# Patient Record
Sex: FEMALE | Race: WHITE
[De-identification: names, ages, dates, MRNs, and addresses within clinical notes are randomized per-mention and may not be internally consistent; named-entity substitution may affect disease eponyms.]

---

## 2017-06-30 ENCOUNTER — HOSPITAL ENCOUNTER (OUTPATIENT)
Dept: HOSPITAL 23 - CAMB | Age: 62
Discharge: HOME | End: 2017-06-30
Attending: ORTHOPAEDIC SURGERY
Payer: OTHER GOVERNMENT

## 2017-06-30 DIAGNOSIS — M24.662: ICD-10-CM

## 2017-06-30 DIAGNOSIS — R91.8: ICD-10-CM

## 2017-06-30 DIAGNOSIS — Z01.818: Primary | ICD-10-CM

## 2017-06-30 LAB
BLOOD UREA NITROGEN: 13 MG/DL (ref 9–23)
BUN/CREATININE RATIO: 43.33
CALCIUM SERUM: 9.1 MG/DL (ref 8.4–10.2)
CK MB SERPL-RTO: 14.5 % (ref 11–15.5)
CK MB SERPL-RTO: 34.1 G/DL (ref 30–36)
CREATININE SERUM: 0.3 MG/DL (ref 0.6–1.4)
GENTAMICIN PEAK SERPL-MCNC: YES MG/L
GLOM FILT RATE ESTIMATED: 121.1 ML/MIN (ref 60–?)
GLUCOSE FASTING: 259 MG/DL (ref 70–110)
HEMATOCRIT: 41.4 % (ref 35–45)
HEMOGLOBIN: 14.1 GM/DL (ref 12–16)
KETONES UR QL: 105 MMOL/L (ref 100–111)
KETONES UR QL: 24 MMOL/L (ref 22–31)
MEAN CELL VOLUME: 89.8 FL (ref 83–96)
MEAN CORPUSCULAR HEMOGLOBIN: 30.6 PG (ref 28–34)
MEAN PLATELET VOLUME: 9 FL (ref 6.5–11.5)
PLATELET COUNT: 48 X10E3 (ref 140–420)
POTASSIUM: 4.2 MMOL/L (ref 3.5–5.1)
RED BLOOD COUNT: 4.6 X10E (ref 3.9–5.3)
SODIUM: 136 MMOL/L (ref 135–145)
U HYALINE CASTS AUWI: (no result) /[LPF]
URBCS1 AUWI: (no result) /[HPF] (ref 0–2)
URINE APPEARANCE: (no result)
URINE BACTERIA AUWI: (no result)
URINE BILIRUBIN: (no result)
URINE BLOOD: (no result)
URINE COLOR: (no result)
URINE GLUCOSE: >1000 MG/DL
URINE KETONE: (no result)
URINE LEUKOCYTE ESTERASE: (no result)
URINE NITRATE: (no result)
URINE PH: 5.5 (ref 5–8)
URINE PROTEIN: (no result)
URINE SOURCE: (no result)
URINE SPECIFIC GRAVITY: 1.03 (ref 1–1.03)
URINE SQUAMOUS EPITHELIAL CELL: (no result) /[HPF]
URINE UROBILINOGEN: 4 MG/DL
URINE YEAST: PRESENT
WHITE BLOOD COUNT: 2.8 X10E3 (ref 4–10.5)

## 2017-07-10 ENCOUNTER — HOSPITAL ENCOUNTER (OUTPATIENT)
Dept: HOSPITAL 23 - CSUR | Age: 62
Discharge: HOME | End: 2017-07-10
Attending: ORTHOPAEDIC SURGERY
Payer: OTHER GOVERNMENT

## 2017-07-10 DIAGNOSIS — Z86.73: ICD-10-CM

## 2017-07-10 DIAGNOSIS — I50.9: ICD-10-CM

## 2017-07-10 DIAGNOSIS — Z98.41: ICD-10-CM

## 2017-07-10 DIAGNOSIS — Z79.899: ICD-10-CM

## 2017-07-10 DIAGNOSIS — M24.662: Primary | ICD-10-CM

## 2017-07-10 DIAGNOSIS — E78.5: ICD-10-CM

## 2017-07-10 DIAGNOSIS — Z90.49: ICD-10-CM

## 2017-07-10 DIAGNOSIS — I11.0: ICD-10-CM

## 2017-07-10 DIAGNOSIS — Z87.891: ICD-10-CM

## 2017-07-10 DIAGNOSIS — M17.0: ICD-10-CM

## 2017-07-10 DIAGNOSIS — Z90.710: ICD-10-CM

## 2017-07-10 DIAGNOSIS — Z98.890: ICD-10-CM

## 2017-07-10 DIAGNOSIS — Z79.4: ICD-10-CM

## 2017-07-10 DIAGNOSIS — Z89.522: ICD-10-CM

## 2017-07-10 DIAGNOSIS — Z98.42: ICD-10-CM

## 2017-07-10 DIAGNOSIS — E66.9: ICD-10-CM

## 2017-07-10 DIAGNOSIS — J44.9: ICD-10-CM

## 2017-07-10 DIAGNOSIS — Z90.721: ICD-10-CM

## 2017-07-10 DIAGNOSIS — Z79.1: ICD-10-CM

## 2017-07-10 DIAGNOSIS — E11.9: ICD-10-CM

## 2017-07-10 LAB
GENTAMICIN PEAK SERPL-MCNC: YES MG/L
U HYALINE CASTS AUWI: (no result) /[LPF]
URINE APPEARANCE: (no result)
URINE BACTERIA AUWI: (no result)
URINE BILIRUBIN: (no result)
URINE BLOOD: (no result)
URINE COLOR: (no result)
URINE GLUCOSE: 500 MG/DL
URINE KETONE: (no result)
URINE LEUKOCYTE ESTERASE: (no result)
URINE NITRATE: (no result)
URINE PH: 5.5
URINE PROTEIN: (no result)
URINE SOURCE: (no result)
URINE SPECIFIC GRAVITY: 1.02
URINE SQUAMOUS EPITHELIAL CELL: (no result) /[HPF]
URINE UROBILINOGEN: 4 MG/DL
UWBCS1 AUWI: (no result)

## 2017-07-10 PROCEDURE — P9035 PLATELET PHERES LEUKOREDUCED: HCPCS

## 2018-01-17 ENCOUNTER — OFFICE VISIT CONVERTED (OUTPATIENT)
Dept: ORTHOPEDIC SURGERY | Facility: CLINIC | Age: 63
End: 2018-01-17
Attending: PHYSICIAN ASSISTANT

## 2018-01-23 ENCOUNTER — OFFICE VISIT CONVERTED (OUTPATIENT)
Dept: ORTHOPEDIC SURGERY | Facility: CLINIC | Age: 63
End: 2018-01-23
Attending: ORTHOPAEDIC SURGERY

## 2018-02-05 ENCOUNTER — OFFICE VISIT CONVERTED (OUTPATIENT)
Dept: ORTHOPEDIC SURGERY | Facility: CLINIC | Age: 63
End: 2018-02-05
Attending: ORTHOPAEDIC SURGERY

## 2018-02-09 ENCOUNTER — OFFICE VISIT CONVERTED (OUTPATIENT)
Dept: ORTHOPEDIC SURGERY | Facility: CLINIC | Age: 63
End: 2018-02-09
Attending: ORTHOPAEDIC SURGERY

## 2018-02-09 ENCOUNTER — OFFICE VISIT CONVERTED (OUTPATIENT)
Dept: FAMILY MEDICINE CLINIC | Facility: CLINIC | Age: 63
End: 2018-02-09
Attending: FAMILY MEDICINE

## 2018-02-15 ENCOUNTER — OFFICE VISIT CONVERTED (OUTPATIENT)
Dept: FAMILY MEDICINE CLINIC | Facility: CLINIC | Age: 63
End: 2018-02-15
Attending: FAMILY MEDICINE

## 2018-02-15 ENCOUNTER — CONVERSION ENCOUNTER (OUTPATIENT)
Dept: FAMILY MEDICINE CLINIC | Facility: CLINIC | Age: 63
End: 2018-02-15

## 2018-03-12 ENCOUNTER — OFFICE VISIT CONVERTED (OUTPATIENT)
Dept: ORTHOPEDIC SURGERY | Facility: CLINIC | Age: 63
End: 2018-03-12
Attending: ORTHOPAEDIC SURGERY

## 2018-03-26 ENCOUNTER — OFFICE VISIT CONVERTED (OUTPATIENT)
Dept: GASTROENTEROLOGY | Facility: CLINIC | Age: 63
End: 2018-03-26
Attending: INTERNAL MEDICINE

## 2018-04-09 ENCOUNTER — OFFICE VISIT CONVERTED (OUTPATIENT)
Dept: FAMILY MEDICINE CLINIC | Facility: CLINIC | Age: 63
End: 2018-04-09
Attending: FAMILY MEDICINE

## 2018-04-20 ENCOUNTER — OFFICE VISIT CONVERTED (OUTPATIENT)
Dept: ORTHOPEDIC SURGERY | Facility: CLINIC | Age: 63
End: 2018-04-20
Attending: ORTHOPAEDIC SURGERY

## 2018-04-20 ENCOUNTER — CONVERSION ENCOUNTER (OUTPATIENT)
Dept: ORTHOPEDIC SURGERY | Facility: CLINIC | Age: 63
End: 2018-04-20

## 2018-04-26 ENCOUNTER — OFFICE VISIT CONVERTED (OUTPATIENT)
Dept: FAMILY MEDICINE CLINIC | Facility: CLINIC | Age: 63
End: 2018-04-26
Attending: FAMILY MEDICINE

## 2018-04-26 ENCOUNTER — CONVERSION ENCOUNTER (OUTPATIENT)
Dept: FAMILY MEDICINE CLINIC | Facility: CLINIC | Age: 63
End: 2018-04-26

## 2018-05-11 ENCOUNTER — OFFICE VISIT CONVERTED (OUTPATIENT)
Dept: FAMILY MEDICINE CLINIC | Facility: CLINIC | Age: 63
End: 2018-05-11
Attending: FAMILY MEDICINE

## 2018-05-31 ENCOUNTER — OFFICE VISIT CONVERTED (OUTPATIENT)
Dept: ORTHOPEDIC SURGERY | Facility: CLINIC | Age: 63
End: 2018-05-31
Attending: ORTHOPAEDIC SURGERY

## 2018-06-12 ENCOUNTER — CONVERSION ENCOUNTER (OUTPATIENT)
Dept: OTHER | Facility: HOSPITAL | Age: 63
End: 2018-06-12

## 2018-06-12 ENCOUNTER — OFFICE VISIT CONVERTED (OUTPATIENT)
Dept: FAMILY MEDICINE CLINIC | Facility: CLINIC | Age: 63
End: 2018-06-12
Attending: FAMILY MEDICINE

## 2018-06-18 ENCOUNTER — OFFICE VISIT CONVERTED (OUTPATIENT)
Dept: ORTHOPEDIC SURGERY | Facility: CLINIC | Age: 63
End: 2018-06-18
Attending: ORTHOPAEDIC SURGERY

## 2018-06-26 ENCOUNTER — CONVERSION ENCOUNTER (OUTPATIENT)
Dept: MAMMOGRAPHY | Facility: HOSPITAL | Age: 63
End: 2018-06-26

## 2018-06-29 ENCOUNTER — OFFICE VISIT CONVERTED (OUTPATIENT)
Dept: ORTHOPEDIC SURGERY | Facility: CLINIC | Age: 63
End: 2018-06-29
Attending: ORTHOPAEDIC SURGERY

## 2018-07-09 ENCOUNTER — CONVERSION ENCOUNTER (OUTPATIENT)
Dept: FAMILY MEDICINE CLINIC | Facility: CLINIC | Age: 63
End: 2018-07-09

## 2018-07-09 ENCOUNTER — OFFICE VISIT CONVERTED (OUTPATIENT)
Dept: FAMILY MEDICINE CLINIC | Facility: CLINIC | Age: 63
End: 2018-07-09
Attending: FAMILY MEDICINE

## 2018-08-09 ENCOUNTER — OFFICE VISIT CONVERTED (OUTPATIENT)
Dept: ORTHOPEDIC SURGERY | Facility: CLINIC | Age: 63
End: 2018-08-09
Attending: ORTHOPAEDIC SURGERY

## 2018-08-14 ENCOUNTER — OFFICE VISIT CONVERTED (OUTPATIENT)
Dept: FAMILY MEDICINE CLINIC | Facility: CLINIC | Age: 63
End: 2018-08-14
Attending: FAMILY MEDICINE

## 2018-08-14 ENCOUNTER — CONVERSION ENCOUNTER (OUTPATIENT)
Dept: FAMILY MEDICINE CLINIC | Facility: CLINIC | Age: 63
End: 2018-08-14

## 2018-08-21 ENCOUNTER — OFFICE VISIT CONVERTED (OUTPATIENT)
Dept: FAMILY MEDICINE CLINIC | Facility: CLINIC | Age: 63
End: 2018-08-21
Attending: FAMILY MEDICINE

## 2018-10-02 ENCOUNTER — OFFICE VISIT CONVERTED (OUTPATIENT)
Dept: FAMILY MEDICINE CLINIC | Facility: CLINIC | Age: 63
End: 2018-10-02
Attending: FAMILY MEDICINE

## 2018-10-12 ENCOUNTER — OFFICE VISIT CONVERTED (OUTPATIENT)
Dept: FAMILY MEDICINE CLINIC | Facility: CLINIC | Age: 63
End: 2018-10-12
Attending: FAMILY MEDICINE

## 2018-10-15 ENCOUNTER — CONVERSION ENCOUNTER (OUTPATIENT)
Dept: ORTHOPEDIC SURGERY | Facility: CLINIC | Age: 63
End: 2018-10-15

## 2018-10-15 ENCOUNTER — OFFICE VISIT CONVERTED (OUTPATIENT)
Dept: ORTHOPEDIC SURGERY | Facility: CLINIC | Age: 63
End: 2018-10-15
Attending: ORTHOPAEDIC SURGERY

## 2018-10-26 ENCOUNTER — OFFICE VISIT CONVERTED (OUTPATIENT)
Dept: FAMILY MEDICINE CLINIC | Facility: CLINIC | Age: 63
End: 2018-10-26
Attending: FAMILY MEDICINE

## 2018-10-30 ENCOUNTER — OFFICE VISIT CONVERTED (OUTPATIENT)
Dept: ONCOLOGY | Facility: HOSPITAL | Age: 63
End: 2018-10-30
Attending: INTERNAL MEDICINE

## 2018-11-08 ENCOUNTER — OFFICE VISIT CONVERTED (OUTPATIENT)
Dept: ORTHOPEDIC SURGERY | Facility: CLINIC | Age: 63
End: 2018-11-08
Attending: ORTHOPAEDIC SURGERY

## 2018-11-26 ENCOUNTER — OFFICE VISIT CONVERTED (OUTPATIENT)
Dept: ORTHOPEDIC SURGERY | Facility: CLINIC | Age: 63
End: 2018-11-26
Attending: ORTHOPAEDIC SURGERY

## 2019-01-15 ENCOUNTER — HOSPITAL ENCOUNTER (OUTPATIENT)
Dept: FAMILY MEDICINE CLINIC | Facility: CLINIC | Age: 64
Discharge: HOME | End: 2019-01-15
Attending: FAMILY MEDICINE

## 2019-01-15 ENCOUNTER — OFFICE VISIT CONVERTED (OUTPATIENT)
Dept: FAMILY MEDICINE CLINIC | Facility: CLINIC | Age: 64
End: 2019-01-15
Attending: FAMILY MEDICINE

## 2019-01-15 LAB
BASOPHILS # BLD AUTO: 0 10*3/UL (ref 0–0.2)
BASOPHILS NFR BLD AUTO: 0 % (ref 0–3)
EOSINOPHIL # BLD AUTO: 0.08 10*3/UL (ref 0–0.7)
EOSINOPHIL # BLD AUTO: 2.19 % (ref 0–7)
ERYTHROCYTE [DISTWIDTH] IN BLOOD BY AUTOMATED COUNT: 14 % (ref 11.5–14.5)
FERRITIN SERPL-MCNC: 134 NG/ML (ref 10–200)
HBA1C MFR BLD: 14 G/DL (ref 12–16)
HCT VFR BLD AUTO: 40.5 % (ref 37–47)
INR PPP: 1.17 (ref 2–3)
IRON SATN MFR SERPL: 36 % (ref 20–55)
IRON SERPL-MCNC: 114 UG/DL (ref 60–170)
LYMPHOCYTES # BLD AUTO: 0.99 10*3/UL (ref 1–5)
MCH RBC QN AUTO: 31.3 PG (ref 27–31)
MCHC RBC AUTO-ENTMCNC: 34.6 G/DL (ref 33–37)
MCV RBC AUTO: 90.4 FL (ref 81–99)
MONOCYTES # BLD AUTO: 0.32 10*3/UL (ref 0.2–1.2)
MONOCYTES NFR BLD AUTO: 8.98 % (ref 3–10)
NEUTROPHILS # BLD AUTO: 2.21 10*3/UL (ref 2–8)
NEUTROPHILS NFR BLD AUTO: 61.4 % (ref 30–85)
NRBC BLD AUTO-RTO: 0 % (ref 0–0.01)
PLATELET # BLD AUTO: 56.9 10*3/UL (ref 130–400)
PMV BLD AUTO: 10.9 FL (ref 7.4–10.4)
PROTHROMBIN TIME: 11.8 S (ref 9.4–12)
RBC # BLD AUTO: 4.48 10*6/UL (ref 4.2–5.4)
TIBC SERPL-MCNC: 316 UG/DL (ref 245–450)
TRANSFERRIN SERPL-MCNC: 221 MG/DL (ref 250–380)
VARIANT LYMPHS NFR BLD MANUAL: 27.5 % (ref 20–45)
WBC # BLD AUTO: 3.59 10*3/UL (ref 4.8–10.8)

## 2019-01-17 ENCOUNTER — OFFICE VISIT CONVERTED (OUTPATIENT)
Dept: GASTROENTEROLOGY | Facility: CLINIC | Age: 64
End: 2019-01-17
Attending: INTERNAL MEDICINE

## 2019-01-17 ENCOUNTER — CONVERSION ENCOUNTER (OUTPATIENT)
Dept: GASTROENTEROLOGY | Facility: CLINIC | Age: 64
End: 2019-01-17

## 2019-01-21 ENCOUNTER — OFFICE VISIT CONVERTED (OUTPATIENT)
Dept: FAMILY MEDICINE CLINIC | Facility: CLINIC | Age: 64
End: 2019-01-21
Attending: FAMILY MEDICINE

## 2019-01-21 ENCOUNTER — HOSPITAL ENCOUNTER (OUTPATIENT)
Dept: OTHER | Facility: HOSPITAL | Age: 64
Discharge: HOME | End: 2019-01-21
Attending: FAMILY MEDICINE

## 2019-02-12 ENCOUNTER — HOSPITAL ENCOUNTER (OUTPATIENT)
Dept: FAMILY MEDICINE CLINIC | Facility: CLINIC | Age: 64
Discharge: HOME | End: 2019-02-12
Attending: FAMILY MEDICINE

## 2019-02-12 ENCOUNTER — OFFICE VISIT CONVERTED (OUTPATIENT)
Dept: FAMILY MEDICINE CLINIC | Facility: CLINIC | Age: 64
End: 2019-02-12
Attending: FAMILY MEDICINE

## 2019-02-12 LAB
ALBUMIN SERPL-MCNC: 3.2 G/DL (ref 3.5–5)
ALBUMIN/GLOB SERPL: 1.3 {RATIO} (ref 1.4–2.6)
ALP SERPL-CCNC: 96 U/L (ref 43–160)
ALT SERPL-CCNC: 20 U/L (ref 10–40)
ANION GAP SERPL CALC-SCNC: 13 MMOL/L (ref 8–19)
AST SERPL-CCNC: 29 U/L (ref 15–50)
BILIRUB SERPL-MCNC: 1.92 MG/DL (ref 0.2–1.3)
BUN SERPL-MCNC: 17 MG/DL (ref 5–25)
BUN/CREAT SERPL: 26 {RATIO} (ref 6–20)
CALCIUM SERPL-MCNC: 10.3 MG/DL (ref 8.7–10.4)
CHLORIDE SERPL-SCNC: 106 MMOL/L (ref 99–111)
CONV CO2: 26 MMOL/L (ref 22–32)
CONV CREATININE URINE, RANDOM: 120.2 MG/DL (ref 10–300)
CONV MICROALBUM.,U,RANDOM: 651.9 MG/L (ref 0–20)
CONV TOTAL PROTEIN: 5.7 G/DL (ref 6.3–8.2)
CREAT UR-MCNC: 0.65 MG/DL (ref 0.5–0.9)
EST. AVERAGE GLUCOSE BLD GHB EST-MCNC: 240 MG/DL
GFR SERPLBLD BASED ON 1.73 SQ M-ARVRAT: >60 ML/MIN/{1.73_M2}
GLOBULIN UR ELPH-MCNC: 2.5 G/DL (ref 2–3.5)
GLUCOSE SERPL-MCNC: 237 MG/DL (ref 65–99)
HBA1C MFR BLD: 10 % (ref 3.5–5.7)
MICROALBUMIN/CREAT UR: 542.3 MG/G{CRE} (ref 0–35)
OSMOLALITY SERPL CALC.SUM OF ELEC: 301 MOSM/KG (ref 273–304)
POTASSIUM SERPL-SCNC: 4.4 MMOL/L (ref 3.5–5.3)
SODIUM SERPL-SCNC: 141 MMOL/L (ref 135–147)

## 2019-02-13 LAB — H PYLORI IGM SER-ACNC: <9 UNITS (ref 0–8.9)

## 2019-02-19 ENCOUNTER — OFFICE VISIT CONVERTED (OUTPATIENT)
Dept: FAMILY MEDICINE CLINIC | Facility: CLINIC | Age: 64
End: 2019-02-19
Attending: FAMILY MEDICINE

## 2019-02-19 ENCOUNTER — HOSPITAL ENCOUNTER (OUTPATIENT)
Dept: FAMILY MEDICINE CLINIC | Facility: CLINIC | Age: 64
Discharge: HOME | End: 2019-02-19
Attending: FAMILY MEDICINE

## 2019-02-21 LAB
BACTERIA SPEC AEROBE CULT: ABNORMAL
CIPROFLOXACIN SUSC ISLT: <=0.5
CLINDAMYCIN SUSC ISLT: 0.25
ERYTHROMYCIN SUSC ISLT: >=8
GENTAMICIN SUSC ISLT: <=0.5
LEVOFLOXACIN SUSC ISLT: 0.25
OXACILLIN SUSC ISLT: 0.5
RIFAMPIN SUSC ISLT: <=0.5
TETRACYCLINE SUSC ISLT: <=1
TMP SMX SUSC ISLT: <=10
VANCOMYCIN SUSC ISLT: 1

## 2019-02-27 ENCOUNTER — HOSPITAL ENCOUNTER (OUTPATIENT)
Dept: FAMILY MEDICINE CLINIC | Facility: CLINIC | Age: 64
Discharge: HOME | End: 2019-02-27
Attending: FAMILY MEDICINE

## 2019-03-01 ENCOUNTER — OFFICE VISIT CONVERTED (OUTPATIENT)
Dept: FAMILY MEDICINE CLINIC | Facility: CLINIC | Age: 64
End: 2019-03-01
Attending: FAMILY MEDICINE

## 2019-03-01 LAB — BACTERIA UR CULT: NORMAL

## 2019-03-20 ENCOUNTER — CONVERSION ENCOUNTER (OUTPATIENT)
Dept: FAMILY MEDICINE CLINIC | Facility: CLINIC | Age: 64
End: 2019-03-20

## 2019-03-20 ENCOUNTER — HOSPITAL ENCOUNTER (OUTPATIENT)
Dept: FAMILY MEDICINE CLINIC | Facility: CLINIC | Age: 64
Discharge: HOME | End: 2019-03-20
Attending: FAMILY MEDICINE

## 2019-03-20 ENCOUNTER — OFFICE VISIT CONVERTED (OUTPATIENT)
Dept: FAMILY MEDICINE CLINIC | Facility: CLINIC | Age: 64
End: 2019-03-20
Attending: FAMILY MEDICINE

## 2019-03-21 ENCOUNTER — HOSPITAL ENCOUNTER (OUTPATIENT)
Dept: GENERAL RADIOLOGY | Facility: HOSPITAL | Age: 64
Discharge: HOME | End: 2019-03-21
Attending: FAMILY MEDICINE

## 2019-03-26 ENCOUNTER — CONVERSION ENCOUNTER (OUTPATIENT)
Dept: FAMILY MEDICINE CLINIC | Facility: CLINIC | Age: 64
End: 2019-03-26

## 2019-03-26 ENCOUNTER — OFFICE VISIT CONVERTED (OUTPATIENT)
Dept: FAMILY MEDICINE CLINIC | Facility: CLINIC | Age: 64
End: 2019-03-26
Attending: FAMILY MEDICINE

## 2019-03-29 ENCOUNTER — HOSPITAL ENCOUNTER (OUTPATIENT)
Dept: FAMILY MEDICINE CLINIC | Facility: CLINIC | Age: 64
Discharge: HOME | End: 2019-03-29
Attending: FAMILY MEDICINE

## 2019-03-31 LAB — BACTERIA SPEC AEROBE CULT: NORMAL

## 2019-04-01 ENCOUNTER — OFFICE VISIT CONVERTED (OUTPATIENT)
Dept: ORTHOPEDIC SURGERY | Facility: CLINIC | Age: 64
End: 2019-04-01
Attending: ORTHOPAEDIC SURGERY

## 2019-04-09 ENCOUNTER — HOSPITAL ENCOUNTER (OUTPATIENT)
Dept: NUCLEAR MEDICINE | Facility: HOSPITAL | Age: 64
Discharge: HOME | End: 2019-04-09
Attending: ORTHOPAEDIC SURGERY

## 2019-05-27 ENCOUNTER — HOSPITAL ENCOUNTER (OUTPATIENT)
Dept: URGENT CARE | Facility: CLINIC | Age: 64
Discharge: HOME | End: 2019-05-27
Attending: FAMILY MEDICINE

## 2019-05-31 LAB
AMPICILLIN SUSC ISLT: <=2
BACTERIA SPEC AEROBE CULT: ABNORMAL
CIPROFLOXACIN SUSC ISLT: 1
CIPROFLOXACIN SUSC ISLT: <=0.5
CLINDAMYCIN SUSC ISLT: 0.25
CONV GENTAMICIN HIGH LEVEL SYNERGY: ABNORMAL
CONV STREPTOMYCIN HIGH LEVEL SYNERGY: ABNORMAL
DAPTOMYCIN SUSC ISLT: 0.5
DAPTOMYCIN SUSC ISLT: 2
DOXYCYCLINE SUSC ISLT: <=0.5
DOXYCYCLINE SUSC ISLT: <=0.5
ERYTHROMYCIN SUSC ISLT: 2
ERYTHROMYCIN SUSC ISLT: >=8
GENTAMICIN SUSC ISLT: <=0.5
LEVOFLOXACIN SUSC ISLT: 0.25
LEVOFLOXACIN SUSC ISLT: 2
LINEZOLID SUSC ISLT: 4
OXACILLIN SUSC ISLT: >=4
RIFAMPIN SUSC ISLT: <=0.5
TETRACYCLINE SUSC ISLT: <=1
TETRACYCLINE SUSC ISLT: <=1
TIGECYCLINE SUSC ISLT: <=0.12
TMP SMX SUSC ISLT: <=10
VANCOMYCIN SUSC ISLT: 2
VANCOMYCIN SUSC ISLT: <=0.5

## 2019-07-18 ENCOUNTER — OFFICE VISIT CONVERTED (OUTPATIENT)
Dept: FAMILY MEDICINE CLINIC | Facility: CLINIC | Age: 64
End: 2019-07-18
Attending: NURSE PRACTITIONER

## 2019-07-18 ENCOUNTER — HOSPITAL ENCOUNTER (OUTPATIENT)
Dept: FAMILY MEDICINE CLINIC | Facility: CLINIC | Age: 64
Discharge: HOME | End: 2019-07-18
Attending: FAMILY MEDICINE

## 2019-07-18 LAB
25(OH)D3 SERPL-MCNC: 19.2 NG/ML (ref 30–100)
ALBUMIN SERPL-MCNC: 2.9 G/DL (ref 3.5–5)
ALBUMIN/GLOB SERPL: 1 {RATIO} (ref 1.4–2.6)
ALP SERPL-CCNC: 103 U/L (ref 43–160)
ALT SERPL-CCNC: 17 U/L (ref 10–40)
ANION GAP SERPL CALC-SCNC: 14 MMOL/L (ref 8–19)
AST SERPL-CCNC: 28 U/L (ref 15–50)
BASOPHILS # BLD AUTO: 0.02 10*3/UL (ref 0–0.2)
BASOPHILS NFR BLD AUTO: 0.7 % (ref 0–3)
BILIRUB SERPL-MCNC: 2.12 MG/DL (ref 0.2–1.3)
BNP SERPL-MCNC: 90 PG/ML (ref 0–900)
BUN SERPL-MCNC: 12 MG/DL (ref 5–25)
BUN/CREAT SERPL: 21 {RATIO} (ref 6–20)
CALCIUM SERPL-MCNC: 9.9 MG/DL (ref 8.7–10.4)
CHLORIDE SERPL-SCNC: 107 MMOL/L (ref 99–111)
CHOLEST SERPL-MCNC: 139 MG/DL (ref 107–200)
CHOLEST/HDLC SERPL: 2.1 {RATIO} (ref 3–6)
CONV ABS IMM GRAN: 0 10*3/UL (ref 0–0.2)
CONV CO2: 25 MMOL/L (ref 22–32)
CONV IMMATURE GRAN: 0 % (ref 0–1.8)
CONV TOTAL PROTEIN: 5.9 G/DL (ref 6.3–8.2)
CREAT UR-MCNC: 0.56 MG/DL (ref 0.5–0.9)
DEPRECATED RDW RBC AUTO: 51.2 FL (ref 36.4–46.3)
EOSINOPHIL # BLD AUTO: 0.12 10*3/UL (ref 0–0.7)
EOSINOPHIL # BLD AUTO: 4.5 % (ref 0–7)
ERYTHROCYTE [DISTWIDTH] IN BLOOD BY AUTOMATED COUNT: 15.2 % (ref 11.7–14.4)
EST. AVERAGE GLUCOSE BLD GHB EST-MCNC: 217 MG/DL
GFR SERPLBLD BASED ON 1.73 SQ M-ARVRAT: >60 ML/MIN/{1.73_M2}
GLOBULIN UR ELPH-MCNC: 3 G/DL (ref 2–3.5)
GLUCOSE SERPL-MCNC: 130 MG/DL (ref 65–99)
HBA1C MFR BLD: 13.5 G/DL (ref 12–16)
HBA1C MFR BLD: 9.2 % (ref 3.5–5.7)
HCT VFR BLD AUTO: 40 % (ref 37–47)
HDLC SERPL-MCNC: 67 MG/DL (ref 40–60)
LDLC SERPL CALC-MCNC: 60 MG/DL (ref 70–100)
LYMPHOCYTES # BLD AUTO: 0.83 10*3/UL (ref 1–5)
MCH RBC QN AUTO: 31.2 PG (ref 27–31)
MCHC RBC AUTO-ENTMCNC: 33.8 G/DL (ref 33–37)
MCV RBC AUTO: 92.4 FL (ref 81–99)
MONOCYTES # BLD AUTO: 0.29 10*3/UL (ref 0.2–1.2)
MONOCYTES NFR BLD AUTO: 10.8 % (ref 3–10)
NEUTROPHILS # BLD AUTO: 1.43 10*3/UL (ref 2–8)
NEUTROPHILS NFR BLD AUTO: 53.1 % (ref 30–85)
NRBC CBCN: 0 % (ref 0–0.7)
OSMOLALITY SERPL CALC.SUM OF ELEC: 296 MOSM/KG (ref 273–304)
PLATELET # BLD AUTO: 59 10*3/UL (ref 130–400)
PMV BLD AUTO: 12.7 FL (ref 9.4–12.3)
POTASSIUM SERPL-SCNC: 4.3 MMOL/L (ref 3.5–5.3)
RBC # BLD AUTO: 4.33 10*6/UL (ref 4.2–5.4)
SODIUM SERPL-SCNC: 142 MMOL/L (ref 135–147)
T4 FREE SERPL-MCNC: 1.1 NG/DL (ref 0.9–1.8)
TRIGL SERPL-MCNC: 62 MG/DL (ref 40–150)
TSH SERPL-ACNC: 1.91 M[IU]/L (ref 0.27–4.2)
VARIANT LYMPHS NFR BLD MANUAL: 30.9 % (ref 20–45)
VLDLC SERPL-MCNC: 12 MG/DL (ref 5–37)
WBC # BLD AUTO: 2.69 10*3/UL (ref 4.8–10.8)

## 2019-07-20 LAB — BACTERIA UR CULT: NORMAL

## 2019-07-22 ENCOUNTER — HOSPITAL ENCOUNTER (OUTPATIENT)
Dept: URGENT CARE | Facility: CLINIC | Age: 64
Discharge: HOME | End: 2019-07-22

## 2019-07-22 LAB — GLUCOSE BLD-MCNC: 367 MG/DL (ref 65–99)

## 2019-08-30 ENCOUNTER — HOSPITAL ENCOUNTER (OUTPATIENT)
Dept: FAMILY MEDICINE CLINIC | Facility: CLINIC | Age: 64
Discharge: HOME | End: 2019-08-30
Attending: NURSE PRACTITIONER

## 2019-08-30 ENCOUNTER — CONVERSION ENCOUNTER (OUTPATIENT)
Dept: FAMILY MEDICINE CLINIC | Facility: CLINIC | Age: 64
End: 2019-08-30

## 2019-08-30 ENCOUNTER — OFFICE VISIT CONVERTED (OUTPATIENT)
Dept: FAMILY MEDICINE CLINIC | Facility: CLINIC | Age: 64
End: 2019-08-30
Attending: NURSE PRACTITIONER

## 2019-08-30 ENCOUNTER — HOSPITAL ENCOUNTER (OUTPATIENT)
Dept: CARDIOLOGY | Facility: HOSPITAL | Age: 64
Discharge: HOME | End: 2019-08-30
Attending: NURSE PRACTITIONER

## 2019-09-01 LAB — BACTERIA UR CULT: NORMAL

## 2019-09-09 ENCOUNTER — OFFICE VISIT CONVERTED (OUTPATIENT)
Dept: CARDIOLOGY | Facility: CLINIC | Age: 64
End: 2019-09-09
Attending: INTERNAL MEDICINE

## 2019-09-23 ENCOUNTER — CONVERSION ENCOUNTER (OUTPATIENT)
Dept: CARDIOLOGY | Facility: CLINIC | Age: 64
End: 2019-09-23
Attending: INTERNAL MEDICINE

## 2019-10-03 ENCOUNTER — HOSPITAL ENCOUNTER (OUTPATIENT)
Dept: MAMMOGRAPHY | Facility: HOSPITAL | Age: 64
Discharge: HOME | End: 2019-10-03
Attending: FAMILY MEDICINE

## 2019-10-10 ENCOUNTER — CONVERSION ENCOUNTER (OUTPATIENT)
Dept: OTHER | Facility: HOSPITAL | Age: 64
End: 2019-10-10

## 2019-10-10 ENCOUNTER — OFFICE VISIT CONVERTED (OUTPATIENT)
Dept: FAMILY MEDICINE CLINIC | Facility: CLINIC | Age: 64
End: 2019-10-10
Attending: FAMILY MEDICINE

## 2019-10-11 ENCOUNTER — CONVERSION ENCOUNTER (OUTPATIENT)
Dept: FAMILY MEDICINE CLINIC | Facility: CLINIC | Age: 64
End: 2019-10-11

## 2019-10-18 ENCOUNTER — HOSPITAL ENCOUNTER (OUTPATIENT)
Dept: MRI IMAGING | Facility: HOSPITAL | Age: 64
Discharge: HOME | End: 2019-10-18
Attending: FAMILY MEDICINE

## 2019-11-13 ENCOUNTER — OFFICE VISIT CONVERTED (OUTPATIENT)
Dept: ORTHOPEDIC SURGERY | Facility: CLINIC | Age: 64
End: 2019-11-13
Attending: ORTHOPAEDIC SURGERY

## 2019-12-05 ENCOUNTER — OFFICE VISIT CONVERTED (OUTPATIENT)
Dept: FAMILY MEDICINE CLINIC | Facility: CLINIC | Age: 64
End: 2019-12-05
Attending: FAMILY MEDICINE

## 2019-12-05 ENCOUNTER — HOSPITAL ENCOUNTER (OUTPATIENT)
Dept: FAMILY MEDICINE CLINIC | Facility: CLINIC | Age: 64
Discharge: HOME | End: 2019-12-05
Attending: FAMILY MEDICINE

## 2019-12-05 LAB
ALBUMIN SERPL-MCNC: 2.8 G/DL (ref 3.5–5)
ALBUMIN/GLOB SERPL: 1 {RATIO} (ref 1.4–2.6)
ALP SERPL-CCNC: 119 U/L (ref 43–160)
ALT SERPL-CCNC: 30 U/L (ref 10–40)
ANION GAP SERPL CALC-SCNC: 15 MMOL/L (ref 8–19)
AST SERPL-CCNC: 34 U/L (ref 15–50)
BILIRUB SERPL-MCNC: 2.8 MG/DL (ref 0.2–1.3)
BUN SERPL-MCNC: 14 MG/DL (ref 5–25)
BUN/CREAT SERPL: 23 {RATIO} (ref 6–20)
CALCIUM SERPL-MCNC: 10.1 MG/DL (ref 8.7–10.4)
CHLORIDE SERPL-SCNC: 103 MMOL/L (ref 99–111)
CONV CO2: 24 MMOL/L (ref 22–32)
CONV TOTAL PROTEIN: 5.5 G/DL (ref 6.3–8.2)
CREAT UR-MCNC: 0.62 MG/DL (ref 0.5–0.9)
GFR SERPLBLD BASED ON 1.73 SQ M-ARVRAT: >60 ML/MIN/{1.73_M2}
GLOBULIN UR ELPH-MCNC: 2.7 G/DL (ref 2–3.5)
GLUCOSE SERPL-MCNC: 437 MG/DL (ref 65–99)
OSMOLALITY SERPL CALC.SUM OF ELEC: 303 MOSM/KG (ref 273–304)
POTASSIUM SERPL-SCNC: 4.9 MMOL/L (ref 3.5–5.3)
SODIUM SERPL-SCNC: 137 MMOL/L (ref 135–147)

## 2019-12-06 ENCOUNTER — HOSPITAL ENCOUNTER (OUTPATIENT)
Dept: OTHER | Facility: HOSPITAL | Age: 64
Discharge: HOME | End: 2019-12-06
Attending: FAMILY MEDICINE

## 2019-12-07 LAB — BACTERIA UR CULT: NORMAL

## 2019-12-09 ENCOUNTER — CONVERSION ENCOUNTER (OUTPATIENT)
Dept: FAMILY MEDICINE CLINIC | Facility: CLINIC | Age: 64
End: 2019-12-09

## 2019-12-09 ENCOUNTER — OFFICE VISIT CONVERTED (OUTPATIENT)
Dept: FAMILY MEDICINE CLINIC | Facility: CLINIC | Age: 64
End: 2019-12-09
Attending: NURSE PRACTITIONER

## 2019-12-18 ENCOUNTER — HOSPITAL ENCOUNTER (OUTPATIENT)
Dept: CT IMAGING | Facility: HOSPITAL | Age: 64
Discharge: HOME | End: 2019-12-18
Attending: FAMILY MEDICINE

## 2019-12-20 ENCOUNTER — HOSPITAL ENCOUNTER (OUTPATIENT)
Dept: URGENT CARE | Facility: CLINIC | Age: 64
Discharge: HOME | End: 2019-12-20

## 2020-01-01 ENCOUNTER — OFFICE VISIT CONVERTED (OUTPATIENT)
Dept: NEUROSURGERY | Facility: CLINIC | Age: 65
End: 2020-01-01
Attending: PHYSICIAN ASSISTANT

## 2020-01-01 ENCOUNTER — HOSPITAL ENCOUNTER (OUTPATIENT)
Dept: URGENT CARE | Facility: CLINIC | Age: 65
Discharge: HOME | End: 2020-12-30

## 2020-01-01 ENCOUNTER — OFFICE VISIT CONVERTED (OUTPATIENT)
Dept: FAMILY MEDICINE CLINIC | Facility: CLINIC | Age: 65
End: 2020-01-01
Attending: NURSE PRACTITIONER

## 2020-01-01 ENCOUNTER — HOSPITAL ENCOUNTER (OUTPATIENT)
Dept: OTHER | Facility: HOSPITAL | Age: 65
Discharge: HOME | End: 2020-10-21
Attending: OTOLARYNGOLOGY

## 2020-01-01 ENCOUNTER — CONVERSION ENCOUNTER (OUTPATIENT)
Dept: FAMILY MEDICINE CLINIC | Facility: CLINIC | Age: 65
End: 2020-01-01

## 2020-01-01 ENCOUNTER — HOSPITAL ENCOUNTER (OUTPATIENT)
Dept: URGENT CARE | Facility: CLINIC | Age: 65
Discharge: HOME | End: 2020-12-21
Attending: FAMILY MEDICINE

## 2020-01-01 ENCOUNTER — HOSPITAL ENCOUNTER (OUTPATIENT)
Dept: OTHER | Facility: HOSPITAL | Age: 65
Discharge: HOME | End: 2020-10-20
Attending: NURSE PRACTITIONER

## 2020-01-01 LAB — SARS-COV-2 RNA SPEC QL NAA+PROBE: NOT DETECTED

## 2020-01-02 ENCOUNTER — HOSPITAL ENCOUNTER (OUTPATIENT)
Dept: MAMMOGRAPHY | Facility: HOSPITAL | Age: 65
Discharge: HOME | End: 2020-01-02
Attending: FAMILY MEDICINE

## 2020-02-13 ENCOUNTER — OFFICE VISIT CONVERTED (OUTPATIENT)
Dept: FAMILY MEDICINE CLINIC | Facility: CLINIC | Age: 65
End: 2020-02-13
Attending: FAMILY MEDICINE

## 2020-04-02 ENCOUNTER — TELEMEDICINE CONVERTED (OUTPATIENT)
Dept: FAMILY MEDICINE CLINIC | Facility: CLINIC | Age: 65
End: 2020-04-02
Attending: NURSE PRACTITIONER

## 2020-06-30 ENCOUNTER — OFFICE VISIT CONVERTED (OUTPATIENT)
Dept: FAMILY MEDICINE CLINIC | Facility: CLINIC | Age: 65
End: 2020-06-30
Attending: NURSE PRACTITIONER

## 2020-07-07 ENCOUNTER — OFFICE VISIT CONVERTED (OUTPATIENT)
Dept: GASTROENTEROLOGY | Facility: CLINIC | Age: 65
End: 2020-07-07
Attending: PHYSICIAN ASSISTANT

## 2020-07-16 ENCOUNTER — HOSPITAL ENCOUNTER (OUTPATIENT)
Dept: CT IMAGING | Facility: HOSPITAL | Age: 65
Discharge: HOME | End: 2020-07-16
Attending: PHYSICIAN ASSISTANT

## 2020-07-16 LAB
ALBUMIN SERPL-MCNC: 2.8 G/DL (ref 3.5–5)
ALBUMIN/GLOB SERPL: 1 {RATIO} (ref 1.4–2.6)
ALP SERPL-CCNC: 99 U/L (ref 43–160)
ALT SERPL-CCNC: 20 U/L (ref 10–40)
AMMONIA PLAS-MCNC: 51 UMOL/L (ref 11–51)
ANION GAP SERPL CALC-SCNC: 12 MMOL/L (ref 8–19)
AST SERPL-CCNC: 23 U/L (ref 15–50)
BASOPHILS # BLD AUTO: 0.03 10*3/UL (ref 0–0.2)
BASOPHILS NFR BLD AUTO: 0.9 % (ref 0–3)
BILIRUB SERPL-MCNC: 2.02 MG/DL (ref 0.2–1.3)
BUN SERPL-MCNC: 15 MG/DL (ref 5–25)
BUN/CREAT SERPL: 27 {RATIO} (ref 6–20)
CALCIUM SERPL-MCNC: 10.1 MG/DL (ref 8.7–10.4)
CHLORIDE SERPL-SCNC: 102 MMOL/L (ref 99–111)
CONV ABS IMM GRAN: 0.01 10*3/UL (ref 0–0.2)
CONV AFP: 1.7 NG/ML (ref 0–8.7)
CONV CO2: 24 MMOL/L (ref 22–32)
CONV IMMATURE GRAN: 0.3 % (ref 0–1.8)
CONV TOTAL PROTEIN: 5.5 G/DL (ref 6.3–8.2)
CREAT UR-MCNC: 0.56 MG/DL (ref 0.5–0.9)
DEPRECATED RDW RBC AUTO: 47.3 FL (ref 36.4–46.3)
EOSINOPHIL # BLD AUTO: 0.13 10*3/UL (ref 0–0.7)
EOSINOPHIL # BLD AUTO: 3.9 % (ref 0–7)
ERYTHROCYTE [DISTWIDTH] IN BLOOD BY AUTOMATED COUNT: 14.7 % (ref 11.7–14.4)
GFR SERPLBLD BASED ON 1.73 SQ M-ARVRAT: >60 ML/MIN/{1.73_M2}
GLOBULIN UR ELPH-MCNC: 2.7 G/DL (ref 2–3.5)
GLUCOSE SERPL-MCNC: 347 MG/DL (ref 65–99)
HCT VFR BLD AUTO: 38.3 % (ref 37–47)
HGB BLD-MCNC: 13.4 G/DL (ref 12–16)
INR PPP: 1.09 (ref 2–3)
LYMPHOCYTES # BLD AUTO: 0.9 10*3/UL (ref 1–5)
LYMPHOCYTES NFR BLD AUTO: 27.2 % (ref 20–45)
MCH RBC QN AUTO: 31.2 PG (ref 27–31)
MCHC RBC AUTO-ENTMCNC: 35 G/DL (ref 33–37)
MCV RBC AUTO: 89.1 FL (ref 81–99)
MONOCYTES # BLD AUTO: 0.24 10*3/UL (ref 0.2–1.2)
MONOCYTES NFR BLD AUTO: 7.3 % (ref 3–10)
NEUTROPHILS # BLD AUTO: 2 10*3/UL (ref 2–8)
NEUTROPHILS NFR BLD AUTO: 60.4 % (ref 30–85)
NRBC CBCN: 0 % (ref 0–0.7)
OSMOLALITY SERPL CALC.SUM OF ELEC: 293 MOSM/KG (ref 273–304)
PLATELET # BLD AUTO: 66 10*3/UL (ref 130–400)
PMV BLD AUTO: 12 FL (ref 9.4–12.3)
POTASSIUM SERPL-SCNC: 4.3 MMOL/L (ref 3.5–5.3)
PROTHROMBIN TIME: 11.6 S (ref 9.4–12)
RBC # BLD AUTO: 4.3 10*6/UL (ref 4.2–5.4)
SODIUM SERPL-SCNC: 134 MMOL/L (ref 135–147)
WBC # BLD AUTO: 3.31 10*3/UL (ref 4.8–10.8)

## 2020-07-21 ENCOUNTER — HOSPITAL ENCOUNTER (OUTPATIENT)
Facility: HOSPITAL | Age: 65
Setting detail: OBSERVATION
LOS: 1 days | Discharge: HOME | End: 2020-07-22
Attending: INTERNAL MEDICINE | Admitting: HOSPITALIST

## 2020-07-21 PROBLEM — I63.9 CVA (CEREBRAL VASCULAR ACCIDENT) (HCC): Status: ACTIVE | Noted: 2020-07-21

## 2020-07-21 PROBLEM — I10 HYPERTENSION: Status: ACTIVE | Noted: 2020-07-21

## 2020-07-21 PROBLEM — K75.81 LIVER CIRRHOSIS SECONDARY TO NASH (NONALCOHOLIC STEATOHEPATITIS) (HCC): Status: ACTIVE | Noted: 2020-07-21

## 2020-07-21 PROBLEM — K74.60 LIVER CIRRHOSIS SECONDARY TO NASH (NONALCOHOLIC STEATOHEPATITIS): Status: ACTIVE | Noted: 2020-07-21

## 2020-07-21 PROBLEM — I63.9 STROKE (HCC): Status: ACTIVE | Noted: 2020-07-21

## 2020-07-21 PROBLEM — J44.9 COPD (CHRONIC OBSTRUCTIVE PULMONARY DISEASE) (HCC): Status: ACTIVE | Noted: 2020-07-21

## 2020-07-21 PROBLEM — J45.909 ASTHMA: Status: ACTIVE | Noted: 2020-07-21

## 2020-07-21 PROBLEM — E11.9 DIABETES MELLITUS: Status: ACTIVE | Noted: 2020-07-21

## 2020-07-21 LAB — GLUCOSE BLDC GLUCOMTR-MCNC: 370 MG/DL (ref 70–130)

## 2020-07-21 PROCEDURE — 82962 GLUCOSE BLOOD TEST: CPT

## 2020-07-21 PROCEDURE — 63710000001 INSULIN GLARGINE PER 5 UNITS: Performed by: INTERNAL MEDICINE

## 2020-07-21 RX ORDER — ASPIRIN 81 MG/1
81 TABLET, CHEWABLE ORAL DAILY
COMMUNITY

## 2020-07-21 RX ORDER — DEXTROSE MONOHYDRATE 25 G/50ML
25 INJECTION, SOLUTION INTRAVENOUS
Status: DISCONTINUED | OUTPATIENT
Start: 2020-07-21 | End: 2020-07-22 | Stop reason: HOSPADM

## 2020-07-21 RX ORDER — POTASSIUM CHLORIDE 750 MG/1
20 TABLET, EXTENDED RELEASE ORAL 2 TIMES DAILY
COMMUNITY
End: 2021-01-01 | Stop reason: SDUPTHER

## 2020-07-21 RX ORDER — SPIRONOLACTONE 50 MG/1
50 TABLET, FILM COATED ORAL DAILY
Status: DISCONTINUED | OUTPATIENT
Start: 2020-07-22 | End: 2020-07-22 | Stop reason: HOSPADM

## 2020-07-21 RX ORDER — INSULIN GLARGINE 100 [IU]/ML
60 INJECTION, SOLUTION SUBCUTANEOUS 2 TIMES DAILY
COMMUNITY
End: 2021-01-01

## 2020-07-21 RX ORDER — NICOTINE POLACRILEX 4 MG
15 LOZENGE BUCCAL
Status: DISCONTINUED | OUTPATIENT
Start: 2020-07-21 | End: 2020-07-22 | Stop reason: HOSPADM

## 2020-07-21 RX ORDER — ASPIRIN 81 MG/1
81 TABLET, CHEWABLE ORAL DAILY
Status: DISCONTINUED | OUTPATIENT
Start: 2020-07-22 | End: 2020-07-22 | Stop reason: HOSPADM

## 2020-07-21 RX ORDER — ATORVASTATIN CALCIUM 80 MG/1
80 TABLET, FILM COATED ORAL NIGHTLY
Status: DISCONTINUED | OUTPATIENT
Start: 2020-07-21 | End: 2020-07-22 | Stop reason: HOSPADM

## 2020-07-21 RX ORDER — SODIUM CHLORIDE 0.9 % (FLUSH) 0.9 %
10 SYRINGE (ML) INJECTION EVERY 12 HOURS SCHEDULED
Status: DISCONTINUED | OUTPATIENT
Start: 2020-07-21 | End: 2020-07-22 | Stop reason: HOSPADM

## 2020-07-21 RX ORDER — SPIRONOLACTONE 25 MG/1
100 TABLET ORAL DAILY
COMMUNITY
End: 2021-01-01 | Stop reason: SDUPTHER

## 2020-07-21 RX ORDER — IPRATROPIUM BROMIDE AND ALBUTEROL SULFATE 2.5; .5 MG/3ML; MG/3ML
3 SOLUTION RESPIRATORY (INHALATION) EVERY 4 HOURS PRN
COMMUNITY

## 2020-07-21 RX ORDER — ONDANSETRON 2 MG/ML
4 INJECTION INTRAMUSCULAR; INTRAVENOUS EVERY 6 HOURS PRN
Status: DISCONTINUED | OUTPATIENT
Start: 2020-07-21 | End: 2020-07-22 | Stop reason: HOSPADM

## 2020-07-21 RX ORDER — POTASSIUM CHLORIDE 750 MG/1
10 CAPSULE, EXTENDED RELEASE ORAL DAILY
Status: DISCONTINUED | OUTPATIENT
Start: 2020-07-22 | End: 2020-07-22 | Stop reason: HOSPADM

## 2020-07-21 RX ORDER — FUROSEMIDE 40 MG/1
40 TABLET ORAL
Status: DISCONTINUED | OUTPATIENT
Start: 2020-07-22 | End: 2020-07-22 | Stop reason: HOSPADM

## 2020-07-21 RX ORDER — INSULIN GLARGINE 100 [IU]/ML
30 INJECTION, SOLUTION SUBCUTANEOUS NIGHTLY
Status: DISCONTINUED | OUTPATIENT
Start: 2020-07-21 | End: 2020-07-22 | Stop reason: HOSPADM

## 2020-07-21 RX ORDER — IPRATROPIUM BROMIDE AND ALBUTEROL SULFATE 2.5; .5 MG/3ML; MG/3ML
3 SOLUTION RESPIRATORY (INHALATION) EVERY 4 HOURS PRN
Status: DISCONTINUED | OUTPATIENT
Start: 2020-07-21 | End: 2020-07-22 | Stop reason: HOSPADM

## 2020-07-21 RX ORDER — SODIUM CHLORIDE 0.9 % (FLUSH) 0.9 %
10 SYRINGE (ML) INJECTION AS NEEDED
Status: DISCONTINUED | OUTPATIENT
Start: 2020-07-21 | End: 2020-07-22 | Stop reason: HOSPADM

## 2020-07-21 RX ORDER — SODIUM CHLORIDE 9 MG/ML
75 INJECTION, SOLUTION INTRAVENOUS CONTINUOUS
Status: DISCONTINUED | OUTPATIENT
Start: 2020-07-21 | End: 2020-07-22 | Stop reason: HOSPADM

## 2020-07-21 RX ORDER — FUROSEMIDE 40 MG/1
40 TABLET ORAL 2 TIMES DAILY
COMMUNITY

## 2020-07-21 RX ADMIN — SODIUM CHLORIDE 75 ML/HR: 9 INJECTION, SOLUTION INTRAVENOUS at 23:16

## 2020-07-21 RX ADMIN — INSULIN GLARGINE 30 UNITS: 100 INJECTION, SOLUTION SUBCUTANEOUS at 23:24

## 2020-07-21 RX ADMIN — ATORVASTATIN CALCIUM 80 MG: 80 TABLET, FILM COATED ORAL at 23:24

## 2020-07-21 RX ADMIN — SODIUM CHLORIDE, PRESERVATIVE FREE 10 ML: 5 INJECTION INTRAVENOUS at 23:20

## 2020-07-22 ENCOUNTER — APPOINTMENT (OUTPATIENT)
Dept: CARDIOLOGY | Facility: HOSPITAL | Age: 65
End: 2020-07-22

## 2020-07-22 VITALS
TEMPERATURE: 97.8 F | HEART RATE: 71 BPM | SYSTOLIC BLOOD PRESSURE: 158 MMHG | HEIGHT: 66 IN | RESPIRATION RATE: 16 BRPM | BODY MASS INDEX: 39.37 KG/M2 | OXYGEN SATURATION: 94 % | WEIGHT: 245 LBS | DIASTOLIC BLOOD PRESSURE: 76 MMHG

## 2020-07-22 LAB
ALBUMIN SERPL-MCNC: 2.4 G/DL (ref 3.5–5.2)
ALBUMIN/GLOB SERPL: 1 G/DL
ALP SERPL-CCNC: 85 U/L (ref 39–117)
ALT SERPL W P-5'-P-CCNC: 18 U/L (ref 1–33)
ANION GAP SERPL CALCULATED.3IONS-SCNC: 6.6 MMOL/L (ref 5–15)
AORTIC DIMENSIONLESS INDEX: 2 (DI)
AST SERPL-CCNC: 19 U/L (ref 1–32)
BH CV ECHO MEAS - AO MAX PG: 8 MMHG
BH CV ECHO MEAS - AO MEAN PG (FULL): -3 MMHG
BH CV ECHO MEAS - AO MEAN PG: 4 MMHG
BH CV ECHO MEAS - AO V2 MAX: 141 CM/SEC
BH CV ECHO MEAS - AO V2 MEAN: 96.2 CM/SEC
BH CV ECHO MEAS - AO V2 VTI: 28 CM
BH CV ECHO MEAS - ASC AORTA: 2.9 CM
BH CV ECHO MEAS - AVA(I,A): 6.1 CM^2
BH CV ECHO MEAS - AVA(I,D): 6.1 CM^2
BH CV ECHO MEAS - BSA(HAYCOCK): 2.3 M^2
BH CV ECHO MEAS - BSA: 2.2 M^2
BH CV ECHO MEAS - BZI_BMI: 39.7 KILOGRAMS/M^2
BH CV ECHO MEAS - BZI_METRIC_HEIGHT: 168 CM
BH CV ECHO MEAS - BZI_METRIC_WEIGHT: 112 KG
BH CV ECHO MEAS - EDV(CUBED): 91.1 ML
BH CV ECHO MEAS - EDV(MOD-SP2): 56 ML
BH CV ECHO MEAS - EDV(MOD-SP4): 48 ML
BH CV ECHO MEAS - EDV(TEICH): 92.4 ML
BH CV ECHO MEAS - EF(CUBED): 67.3 %
BH CV ECHO MEAS - EF(MOD-BP): 69 %
BH CV ECHO MEAS - EF(MOD-SP2): 66.1 %
BH CV ECHO MEAS - EF(MOD-SP4): 72.9 %
BH CV ECHO MEAS - EF(TEICH): 59 %
BH CV ECHO MEAS - ESV(CUBED): 29.8 ML
BH CV ECHO MEAS - ESV(MOD-SP2): 19 ML
BH CV ECHO MEAS - ESV(MOD-SP4): 13 ML
BH CV ECHO MEAS - ESV(TEICH): 37.9 ML
BH CV ECHO MEAS - FS: 31.1 %
BH CV ECHO MEAS - IVS/LVPW: 1.2
BH CV ECHO MEAS - IVSD: 1.4 CM
BH CV ECHO MEAS - LA DIMENSION: 4.8 CM
BH CV ECHO MEAS - LAT PEAK E' VEL: 6.3 CM/SEC
BH CV ECHO MEAS - LV DIASTOLIC VOL/BSA (35-75): 21.9 ML/M^2
BH CV ECHO MEAS - LV MASS(C)D: 222.6 GRAMS
BH CV ECHO MEAS - LV MASS(C)DI: 101.6 GRAMS/M^2
BH CV ECHO MEAS - LV MEAN PG: 7 MMHG
BH CV ECHO MEAS - LV SYSTOLIC VOL/BSA (12-30): 5.9 ML/M^2
BH CV ECHO MEAS - LV V1 MEAN: 131 CM/SEC
BH CV ECHO MEAS - LV V1 VTI: 54.7 CM
BH CV ECHO MEAS - LVIDD: 4.5 CM
BH CV ECHO MEAS - LVIDS: 3.1 CM
BH CV ECHO MEAS - LVLD AP2: 7.5 CM
BH CV ECHO MEAS - LVLD AP4: 7.4 CM
BH CV ECHO MEAS - LVLS AP2: 6 CM
BH CV ECHO MEAS - LVLS AP4: 6.2 CM
BH CV ECHO MEAS - LVOT AREA (M): 3.1 CM^2
BH CV ECHO MEAS - LVOT AREA: 3.1 CM^2
BH CV ECHO MEAS - LVOT DIAM: 2 CM
BH CV ECHO MEAS - LVPWD: 1.2 CM
BH CV ECHO MEAS - MED PEAK E' VEL: 4.9 CM/SEC
BH CV ECHO MEAS - MV A DUR: 0.2 SEC
BH CV ECHO MEAS - MV A MAX VEL: 117 CM/SEC
BH CV ECHO MEAS - MV DEC SLOPE: 576 CM/SEC^2
BH CV ECHO MEAS - MV DEC TIME: 0.19 SEC
BH CV ECHO MEAS - MV E MAX VEL: 103 CM/SEC
BH CV ECHO MEAS - MV E/A: 0.88
BH CV ECHO MEAS - MV MEAN PG: 5 MMHG
BH CV ECHO MEAS - MV P1/2T MAX VEL: 134 CM/SEC
BH CV ECHO MEAS - MV P1/2T: 68.1 MSEC
BH CV ECHO MEAS - MV V2 MEAN: 107 CM/SEC
BH CV ECHO MEAS - MV V2 VTI: 52.5 CM
BH CV ECHO MEAS - MVA P1/2T LCG: 1.6 CM^2
BH CV ECHO MEAS - MVA(P1/2T): 3.2 CM^2
BH CV ECHO MEAS - MVA(VTI): 3.3 CM^2
BH CV ECHO MEAS - PA MAX PG (FULL): 2.2 MMHG
BH CV ECHO MEAS - PA MAX PG: 3.5 MMHG
BH CV ECHO MEAS - PA V2 MAX: 93.7 CM/SEC
BH CV ECHO MEAS - PVA(V,A): 1.9 CM^2
BH CV ECHO MEAS - PVA(V,D): 1.9 CM^2
BH CV ECHO MEAS - QP/QS: 0.23
BH CV ECHO MEAS - RV MAX PG: 1.3 MMHG
BH CV ECHO MEAS - RV MEAN PG: 1 MMHG
BH CV ECHO MEAS - RV V1 MAX: 56.4 CM/SEC
BH CV ECHO MEAS - RV V1 MEAN: 40.6 CM/SEC
BH CV ECHO MEAS - RV V1 VTI: 12.7 CM
BH CV ECHO MEAS - RVDD: 3.9 CM
BH CV ECHO MEAS - RVOT AREA: 3.1 CM^2
BH CV ECHO MEAS - RVOT DIAM: 2 CM
BH CV ECHO MEAS - SI(CUBED): 28 ML/M^2
BH CV ECHO MEAS - SI(LVOT): 78.4 ML/M^2
BH CV ECHO MEAS - SI(MOD-SP2): 16.9 ML/M^2
BH CV ECHO MEAS - SI(MOD-SP4): 16 ML/M^2
BH CV ECHO MEAS - SI(TEICH): 24.9 ML/M^2
BH CV ECHO MEAS - SV(CUBED): 61.3 ML
BH CV ECHO MEAS - SV(LVOT): 171.8 ML
BH CV ECHO MEAS - SV(MOD-SP2): 37 ML
BH CV ECHO MEAS - SV(MOD-SP4): 35 ML
BH CV ECHO MEAS - SV(RVOT): 39.9 ML
BH CV ECHO MEAS - SV(TEICH): 54.5 ML
BH CV ECHO MEAS - TAPSE (>1.6): 3.2 CM2
BH CV ECHO MEASUREMENTS AVERAGE E/E' RATIO: 18.39
BH CV XLRA - RV BASE: 3.9 CM
BH CV XLRA - RV LENGTH: 6.9 CM
BH CV XLRA - RV MID: 3.3 CM
BILIRUB SERPL-MCNC: 1.9 MG/DL (ref 0–1.2)
BUN SERPL-MCNC: 11 MG/DL (ref 8–23)
BUN/CREAT SERPL: 23.9 (ref 7–25)
CALCIUM SPEC-SCNC: 8.3 MG/DL (ref 8.6–10.5)
CHLORIDE SERPL-SCNC: 107 MMOL/L (ref 98–107)
CHOLEST SERPL-MCNC: 133 MG/DL (ref 0–200)
CO2 SERPL-SCNC: 22.4 MMOL/L (ref 22–29)
CREAT SERPL-MCNC: 0.46 MG/DL (ref 0.57–1)
DEPRECATED RDW RBC AUTO: 44.8 FL (ref 37–54)
ERYTHROCYTE [DISTWIDTH] IN BLOOD BY AUTOMATED COUNT: 13.5 % (ref 12.3–15.4)
GFR SERPL CREATININE-BSD FRML MDRD: 135 ML/MIN/1.73
GLOBULIN UR ELPH-MCNC: 2.4 GM/DL
GLUCOSE BLDC GLUCOMTR-MCNC: 307 MG/DL (ref 70–130)
GLUCOSE BLDC GLUCOMTR-MCNC: 314 MG/DL (ref 70–130)
GLUCOSE BLDC GLUCOMTR-MCNC: 327 MG/DL (ref 70–130)
GLUCOSE BLDC GLUCOMTR-MCNC: 362 MG/DL (ref 70–130)
GLUCOSE SERPL-MCNC: 327 MG/DL (ref 65–99)
HBA1C MFR BLD: 10.2 % (ref 4.8–5.6)
HCT VFR BLD AUTO: 35.8 % (ref 34–46.6)
HDLC SERPL-MCNC: 54 MG/DL (ref 40–60)
HGB BLD-MCNC: 12.6 G/DL (ref 12–15.9)
LDLC SERPL CALC-MCNC: 57 MG/DL (ref 0–100)
LDLC/HDLC SERPL: 1.06 {RATIO}
LEFT ATRIUM VOLUME INDEX: 16 ML/M2
MCH RBC QN AUTO: 31.5 PG (ref 26.6–33)
MCHC RBC AUTO-ENTMCNC: 35.2 G/DL (ref 31.5–35.7)
MCV RBC AUTO: 89.5 FL (ref 79–97)
PLATELET # BLD AUTO: 58 10*3/MM3 (ref 140–450)
PMV BLD AUTO: 11.5 FL (ref 6–12)
POTASSIUM SERPL-SCNC: 4.1 MMOL/L (ref 3.5–5.2)
PROT SERPL-MCNC: 4.8 G/DL (ref 6–8.5)
RBC # BLD AUTO: 4 10*6/MM3 (ref 3.77–5.28)
SODIUM SERPL-SCNC: 136 MMOL/L (ref 136–145)
TRIGL SERPL-MCNC: 110 MG/DL (ref 0–150)
VLDLC SERPL-MCNC: 22 MG/DL (ref 5–40)
WBC # BLD AUTO: 2.6 10*3/MM3 (ref 3.4–10.8)

## 2020-07-22 PROCEDURE — G0378 HOSPITAL OBSERVATION PER HR: HCPCS

## 2020-07-22 PROCEDURE — 83036 HEMOGLOBIN GLYCOSYLATED A1C: CPT | Performed by: INTERNAL MEDICINE

## 2020-07-22 PROCEDURE — 99222 1ST HOSP IP/OBS MODERATE 55: CPT | Performed by: PSYCHIATRY & NEUROLOGY

## 2020-07-22 PROCEDURE — 82962 GLUCOSE BLOOD TEST: CPT

## 2020-07-22 PROCEDURE — 93303 ECHO TRANSTHORACIC: CPT | Performed by: INTERNAL MEDICINE

## 2020-07-22 PROCEDURE — 80053 COMPREHEN METABOLIC PANEL: CPT | Performed by: INTERNAL MEDICINE

## 2020-07-22 PROCEDURE — 97530 THERAPEUTIC ACTIVITIES: CPT

## 2020-07-22 PROCEDURE — 97166 OT EVAL MOD COMPLEX 45 MIN: CPT

## 2020-07-22 PROCEDURE — 63710000001 INSULIN LISPRO (HUMAN) PER 5 UNITS: Performed by: INTERNAL MEDICINE

## 2020-07-22 PROCEDURE — 80061 LIPID PANEL: CPT | Performed by: INTERNAL MEDICINE

## 2020-07-22 PROCEDURE — 93306 TTE W/DOPPLER COMPLETE: CPT

## 2020-07-22 PROCEDURE — 85027 COMPLETE CBC AUTOMATED: CPT | Performed by: INTERNAL MEDICINE

## 2020-07-22 RX ADMIN — SODIUM CHLORIDE, PRESERVATIVE FREE 10 ML: 5 INJECTION INTRAVENOUS at 08:48

## 2020-07-22 RX ADMIN — POTASSIUM CHLORIDE 10 MEQ: 10 CAPSULE, COATED, EXTENDED RELEASE ORAL at 08:46

## 2020-07-22 RX ADMIN — ASPIRIN 81 MG: 81 TABLET, CHEWABLE ORAL at 08:46

## 2020-07-22 RX ADMIN — SPIRONOLACTONE 50 MG: 50 TABLET, FILM COATED ORAL at 08:46

## 2020-07-22 RX ADMIN — INSULIN LISPRO 5 UNITS: 100 INJECTION, SOLUTION INTRAVENOUS; SUBCUTANEOUS at 17:08

## 2020-07-22 RX ADMIN — INSULIN LISPRO 7 UNITS: 100 INJECTION, SOLUTION INTRAVENOUS; SUBCUTANEOUS at 11:19

## 2020-07-22 RX ADMIN — INSULIN LISPRO 8 UNITS: 100 INJECTION, SOLUTION INTRAVENOUS; SUBCUTANEOUS at 08:46

## 2020-07-22 RX ADMIN — INSULIN LISPRO 7 UNITS: 100 INJECTION, SOLUTION INTRAVENOUS; SUBCUTANEOUS at 17:08

## 2020-07-22 RX ADMIN — FUROSEMIDE 40 MG: 40 TABLET ORAL at 08:46

## 2020-07-22 RX ADMIN — INSULIN LISPRO 5 UNITS: 100 INJECTION, SOLUTION INTRAVENOUS; SUBCUTANEOUS at 11:20

## 2020-07-22 RX ADMIN — INSULIN LISPRO 5 UNITS: 100 INJECTION, SOLUTION INTRAVENOUS; SUBCUTANEOUS at 08:47

## 2020-07-22 NOTE — PLAN OF CARE
Problem: Patient Care Overview  Goal: Plan of Care Review  Outcome: Ongoing (interventions implemented as appropriate)  Flowsheets (Taken 7/22/2020 5447)  Plan of Care Reviewed With: patient

## 2020-07-22 NOTE — H&P
Internal medicine history and physical  INTERNAL MEDICINE   [facility]       Patient Identification:  Name: Noemi Rasheed  Age: 65 y.o.  Sex: female  :  1955  MRN: [id number]                   Primary Care Physician: Stefano Reyna DO                               Date of admission:2020    Chief Complaint: Progressive right-sided headache since  followed by episode of confusion and numbness of the left side of the face and unsteadiness this morning.    History of Present Illness:   Patient is a 55-year-old female with complicated past medical history including cirrhosis of the liver with prior episodes of hepatic encephalopathy, history of diabetes hypertension asthma and COPD has been really busy lately because of the illness of her brother who eventually passed away this past .  According to her on  while she was dealing with these things she started having right-sided headache which she could not explain why she was having it.  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  She attributed her headache to that.  This morning when she was getting ready to see her primary care physician she started having these spells where she was very confused her headache was worse and she was unsteady and felt like she is going almost passed out.  She thought that she may be having episode of elevated ammonia level.  She called her primary care physician's office and asked him to be seen earlier than 11:30 in the morning and he suggested that if they can count before 11 they can be seen.  While patient was getting ready to come to the emergency room her  decided that instead of going to the primary care physician's office they may have to go to  the emergency room because of the way she looked and was so unsteady.  She was also having numbness of the left side of the face.  Evaluation in the emergency room at [facility] eventually revealed lacunar infarct in the periventricular area.  This resulted in conversation with our stroke specialist Dr. Miller and patient was accepted in transfer for further care.  Patient had an MRI and CT angiogram performed at the outside facility.  Patient is currently feeling better and numbness of the left side of the face is improved she still has headache.  She denies any focal weakness of arm or legs and she seems to be much more coherent.  Her ammonia level was checked out to be normal.      Past Medical History:  Past Medical History:   Diagnosis Date   • Asthma    • COPD (chronic obstructive pulmonary disease) (CMS/HCC)    • Diabetes mellitus (CMS/HCC)    • Hypertension    • Liver cirrhosis secondary to BARRIENTOS (nonalcoholic steatohepatitis) (CMS/HCC)    • Stroke (CMS/HCC)      Past Surgical History:  Past Surgical History:   Procedure Laterality Date   •  SECTION     • CHOLECYSTECTOMY     • HYSTERECTOMY     • JOINT REPLACEMENT Bilateral     KNEE      Home Meds:  Medications Prior to Admission   Medication Sig Dispense Refill Last Dose   • aspirin 81 MG chewable tablet Chew 81 mg Daily.      • furosemide (LASIX) 40 MG tablet Take 40 mg by mouth 2 (Two) Times a Day.      • insulin aspart (novoLOG) 100 UNIT/ML injection Inject  under the skin into the appropriate area as directed 3 (Three) Times a Day Before Meals.      • insulin glargine (LANTUS) 100 UNIT/ML injection Inject 60 Units under the skin into the appropriate area as directed 2 (Two) Times a Day.      • ipratropium-albuterol (DUO-NEB) 0.5-2.5 mg/3 ml nebulizer Take 3 mL by nebulization Every 4 (Four) Hours As Needed for Wheezing.      • potassium chloride (K-DUR,KLOR-CON) 10 MEQ CR tablet Take 20 mEq by mouth 2 (Two) Times a Day.      •  "spironolactone (ALDACTONE) 25 MG tablet Take 50 mg by mouth Daily.        Current Meds:   No current facility-administered medications for this encounter.   Allergies:  No Known Allergies  Social History:   Social History     Tobacco Use   • Smoking status: Former Smoker     Types: Cigarettes     Last attempt to quit: 2000     Years since quittin.0   • Smokeless tobacco: Never Used   Substance Use Topics   • Alcohol use: Never     Frequency: Never      Family History:  History reviewed. No pertinent family history.       Review of Systems  See history of present illness and past medical history.    Constitutional: Remarkable for no fever or chills  Cardiovascular: Remarkable for no chest pain or shortness of breath  GI: Remarkable for no nausea vomiting or diarrhea  : Remarkable for no burning in urination frequency urgency  Musculoskeletal: Remarkable for neck discomfort due to arthritis  Neurological: Remarkable for headache numbness of the face and unsteadiness along with confusional episode as described in history presenting illness.      Vitals:   BP (!) 187/87 (BP Location: Left arm, Patient Position: Lying)   Pulse 65   Temp 97.8 °F (36.6 °C) (Oral)   Resp 16   Ht 167.6 cm (66\")   Wt 111 kg (245 lb)   SpO2 99%   BMI 39.54 kg/m²   I/O: No intake or output data in the 24 hours ending 20 7042  Exam:  Patient is examined using the personal protective equipment as per guidelines from infection control for this particular patient as enacted.  Hand washing was performed before and after patient interaction.  General Appearance:    Alert, cooperative, no distress, appears stated age   Head:    Normocephalic, without obvious abnormality, atraumatic   Eyes:    PERRL, conjunctiva/corneas clear, EOM's intact, both eyes   Ears:    Normal external ear canals, both ears   Nose:   Nares normal, septum midline, mucosa normal, no drainage    or sinus tenderness   Throat:   Lips, tongue, gums normal; " oral mucosa pink and moist   Neck:   Supple, symmetrical, trachea midline, no adenopathy;     thyroid:  no enlargement/tenderness/nodules; no carotid    bruit or JVD   Back:     Symmetric, no curvature, ROM normal, no CVA tenderness   Lungs:     Clear to auscultation bilaterally, respirations unlabored   Chest Wall:    No tenderness or deformity    Heart:   S1-S2 regular   Abdomen:    Obese soft nontender   Extremities:   Extremities normal, atraumatic, no cyanosis or edema   Pulses:   Pulses palpable in all extremities; symmetric all extremities   Skin:   Skin color normal, Skin is warm and dry,  no rashes or palpable lesions   Neurologic:  Grossly nonfocal       Data Review:      I reviewed the patient's new clinical results.  Lab data at the outside facility reviewed and it shows BUN 16 creatinine 0.65 potassium 5.4 chloride 100 LFTs within normal limits CBC shows WBC 2.98 hemoglobin 13.2 and platelet 57  Ammonia level 30 CT scan of the head without IV contrast shows hypodensity in the left periventricular region in the frontal area concerning for small vessel ischemic changes.  CT angiogram did not show any perfusion abnormalities or any blockages in her circulation.  MRI of the brain showed more acute lacunar type infarction in the left periventricular region.  Assessment:  Active Hospital Problems    Diagnosis POA   • **Stroke (CMS/HCC) [I63.9] Unknown   • Asthma [J45.909] Unknown   • COPD (chronic obstructive pulmonary disease) (CMS/HCC) [J44.9] Unknown   • Diabetes mellitus (CMS/HCC) [E11.9] Unknown   • Hypertension [I10] Unknown   • Liver cirrhosis secondary to BARRIENTOS (nonalcoholic steatohepatitis) (CMS/HCC) [K75.81, K74.60] Unknown       Medical decision making:  Acute white matter left periventricular CVA of lacunar type-plan is to admit the patient continue with aspirin statin and neurological evaluation and order echocardiogram and neurology consultation.  Further management including intervention  decisions as well as diagnostic modalities and whether or not she needed to be loaded with Plavix in the context of chronic liver disease with chronic thrombocytopenia would be deferred to neurology service.  Control blood pressure at adequate level and see how she does.  Leukopenia and thrombocytopenia likely secondary to chronic liver disease with history of cirrhosis plan is to monitor.  Cirrhosis of the liver due to Dickinson with no evidence of hyperammonemia or hepatic encephalopathy plan is to continue her current regimen.  Diabetes mellitus-continue with Accu-Cheks and sliding scale coverage watch for hypoglycemia.  Significant psychosocial stress with recent demise of her brother who is scheduled to have  on Friday and patient was hoping to be discharged soon so that she can participate in his .  History of asthma/COPD-continue with nebulizer treatment and provided with continuous pulse ox monitoring.    Quincy Yoder MD   2020  22:55  Much of this encounter note is an electronic transcription/translation of spoken language to printed text. The electronic translation of spoken language may permit erroneous, or at times, nonsensical words or phrases to be inadvertently transcribed; Although I have reviewed the note for such errors, some may still exist

## 2020-07-22 NOTE — CONSULTS
"Neurology Consult Note    Consult Date: 2020    Referring MD: Quincy Yoder MD    Reason for Consult I have been asked to see the patient in neurological consultation to render advice and opinion regarding acute stroke    Noemi Rasheed is a 65 y.o. female with past medical history of COPD, Barrientos cirrhosis, hypertension, poorly controlled insulin-dependent diabetes who presented to [facility] yesterday for headache and was ultimately found to have a small left periventricular stroke.  CTA was done there which was negative.  Since admission here her neurologic symptoms have essentially resolved and she has no new deficits or complaints.  She admits to not managing her own health fairly well recently due to her brothers medical illness.  Unfortunately he  just this week.  She is a non-smoker.  She denies a prior history of stroke.    Past Medical History:   Diagnosis Date   • Asthma    • COPD (chronic obstructive pulmonary disease) (CMS/HCC)    • Diabetes mellitus (CMS/HCC)    • Hypertension    • Liver cirrhosis secondary to BARRIENTOS (nonalcoholic steatohepatitis) (CMS/HCC)    • Stroke (CMS/HCC)        ROS:  No fevers, chills  + Headache, weakness  No chest pain, palpitations  No shortness of air, cough    Exam  /79 (BP Location: Right arm, Patient Position: Lying)   Pulse 69   Temp 97.8 °F (36.6 °C) (Oral)   Resp 17   Ht 167.6 cm (66\")   Wt 111 kg (245 lb)   SpO2 95%   BMI 39.54 kg/m²   Gen: NAD, vitals reviewed  MS: oriented x3, recent/remote memory intact, normal attention/concentration, language intact, no neglect.  CN: visual acuity grossly normal, PERRL, EOMI, no facial droop, no dysarthria  Motor: 5/5 throughout upper and lower extremities, normal tone    DATA:    Lab Results   Component Value Date    GLUCOSE 327 (H) 2020    CALCIUM 8.3 (L) 2020     2020    K 4.1 2020    CO2 22.4 2020     2020    BUN 11 2020    CREATININE 0.46 " (L) 07/22/2020    EGFRIFAFRI >150 07/11/2016    EGFRIFNONA 135 07/22/2020    BCR 23.9 07/22/2020    ANIONGAP 6.6 07/22/2020     Lab Results   Component Value Date    WBC 2.60 (L) 07/22/2020    HGB 12.6 07/22/2020    HCT 35.8 07/22/2020    MCV 89.5 07/22/2020    PLT 58 (L) 07/22/2020       Lab review: Glucose 327, WBC 2.6, platelet count 58, hemoglobin A1c 10.2    Imaging review: I personally reviewed her MRI brain at [facility] which shows a small left periventricular acute infarct with mild small vessel disease.  I also reviewed her CTA head and neck performed there which shows no significant flow-limiting stenosis in the extracranial or intracranial circulation.  Radiology reports reviewed    2D echocardiogram pending    Diagnoses:  Stroke, left subcortical, due to small vessel disease  Insulin-dependent diabetes without complication  Essential hypertension    Comment: Small vessel stroke likely due to poorly controlled hypertension and diabetes.  She cannot take aspirin due to thrombocytopenia.  She cannot take statins due to cirrhosis.  For secondary stroke prevention I urged her to target a blood pressure of less than 130/80 and monitor this daily.  She also needs significant improvement in her glycemic control with a goal hemoglobin A1c less than 7.    PLAN:  Aspirin contraindicated due to thrombocytopenia  Lipitor contraindicated due to cirrhosis  BP less than 130/80  Goal hemoglobin A1c less than 7%  Follow-up 2D echo    Discussed with Dr. Johnson.  Okay for discharge today from neurology standpoint if no significant abnormalities on 2D echocardiogram

## 2020-07-22 NOTE — PLAN OF CARE
Problem: Patient Care Overview  Goal: Plan of Care Review  Flowsheets (Taken 7/22/2020 1400)  Outcome Summary: Pt is a 65 year old female admitted from home with initial c/o of L sides weakness and unsteady balance. Pt seen todays date to assess independene and safety with self care. Pt [id number] transfers and functional mobility to bathroom Independent with No AD or LOB. Pt and OT notes no residual L sided weakness, impaired sensation, visual preception, cognitive, or balance deficits. Eval only completed today with education for safety with ADLs after d/c. No annticpated OT needs after discharge home with family.   Note:   Appropriate PPE worn throughout encounter with pt and therapist in masks. Therapist wore safety glasses and gloves and performed hand hygiene prior and after encounter. Pt not on advanced droplet precautions. PPE worn during encounter is standard procedure.

## 2020-07-22 NOTE — NURSING NOTE
Acute rehab referral received via stroke order set. Patient noted to be independent with OT. No acute rehab needs at this time. Will sign off.     Thank you!    Anand Heard RN  p

## 2020-07-22 NOTE — THERAPY EVALUATION
Acute Care - Occupational Therapy Initial Evaluation  [facility]     Patient Name: Noemi Rasheed  : 1955  MRN: [id number]  Today's Date: 2020             Admit Date: 2020     No diagnosis found.  Patient Active Problem List   Diagnosis   • Asthma   • COPD (chronic obstructive pulmonary disease) (CMS/HCC)   • Diabetes mellitus (CMS/HCC)   • Hypertension   • Liver cirrhosis secondary to BARRIENTOS (nonalcoholic steatohepatitis) (CMS/HCC)   • Stroke (CMS/HCC)   • CVA (cerebral vascular accident) (CMS/HCC)     Past Medical History:   Diagnosis Date   • Asthma    • COPD (chronic obstructive pulmonary disease) (CMS/HCC)    • Diabetes mellitus (CMS/HCC)    • Hypertension    • Liver cirrhosis secondary to BARRIENTOS (nonalcoholic steatohepatitis) (CMS/HCC)    • Stroke (CMS/HCC)      Past Surgical History:   Procedure Laterality Date   •  SECTION     • CHOLECYSTECTOMY     • HYSTERECTOMY     • JOINT REPLACEMENT Bilateral     KNEE          OT ASSESSMENT FLOWSHEET (last 12 hours)      Occupational Therapy Evaluation     Row Name 20 1400                   OT Evaluation Time/Intention    Subjective Information  no complaints  -SM        Document Type  evaluation  -SM        Mode of Treatment  occupational therapy  -SM        Patient Effort  good  -SM           General Information    Patient Profile Reviewed?  yes  -SM        Patient Observations  alert;cooperative;agree to therapy  -SM        General Observations of Patient  Pt supine in bed, agreeable to OT eval.   -SM        Prior Level of Function  independent:;all household mobility;ADL's  -SM        Equipment Currently Used at Home  none  -SM        Existing Precautions/Restrictions  fall  -SM           Relationship/Environment    Lives With  spouse  -SM           Cognitive Assessment/Intervention- PT/OT    Orientation Status (Cognition)  oriented x 4  -SM        Follows Commands (Cognition)  WFL  -SM           Safety Issues, Functional Mobility     Comment, Safety Issues/Impairments (Mobility)  No LOB noted with mobility to bathroom  -           Bed Mobility Assessment/Treatment    Bed Mobility Assessment/Treatment  supine-sit-supine  -SM        Supine-Sit-Supine [place] (Bed Mobility)  independent  -           Functional Mobility    Functional Mobility- Ind. Level  independent  -        Functional Mobility-Distance (Feet)  20  -SM        Functional Mobility- Comment  to bathroom and back  -           Transfer Assessment/Treatment    Transfer Assessment/Treatment  sit-stand transfer  -SM           Sit-Stand Transfer    Sit-Stand [place] (Transfers)  independent  -           General ROM    GENERAL ROM COMMENTS  BUE AROM WFL  -           MMT (Manual Muscle Testing)    General MMT Comments  BUE grossly 5/5  -SM           Positioning and Restraints    Pre-Treatment Position  in bed  -SM        Post Treatment Position  bed  -SM        In Bed  fowlers;call light within reach;encouraged to call for assist;exit alarm on  -           Pain Assessment    Additional Documentation  Pain Scale: Numbers Pre/Post-Treatment (Group)  -SM           Pain Scale: Numbers Pre/Post-Treatment    Pain Scale: Numbers, Pretreatment  0/10 - no pain  -SM        Pain Scale: Numbers, Post-Treatment  0/10 - no pain  -SM           Plan of Care Review    Plan of Care Reviewed With  patient  -SM        Outcome Summary  Pt is a 65 year old female admitted from home with initial c/o of L sides weakness and unsteady balance. Pt seen todays date to assess independene and safety with self care. Pt [id number] transfers and functional mobility to bathroom Independent with No AD or LOB. Pt and OT notes no residual L sided weakness, impaired sensation, visual preception, cognitive, or balance deficits. Eval only completed today with education for safety with ADLs after d/c. No annticpated OT needs after discharge home with family.   -           Clinical Impression (OT)     Functional Level at Time of Evaluation (OT Eval)  Pt at OF for ADLs and mobility.   -        Criteria for Skilled Therapeutic Interventions Met (OT Eval)  yes;treatment indicated  -        Rehab Potential (OT Eval)  good, to achieve stated therapy goals  -        Therapy Frequency (OT Eval)  evaluation only  -        Care Plan Review (OT)  evaluation/treatment results reviewed;care plan/treatment goals reviewed;patient/other agree to care plan  -        Anticipated Discharge Disposition (OT)  home  -SM           Patient Education Goal (OT)    Activity (Patient Education Goal, OT)  Pt to demonstrate safety with transfers and functional mobility for ADLs.  -SM        [place]/Cues/Accuracy (Memory Goal 2, OT)  independent  -SM        Time Frame (Patient Education Goal, OT)  short term goal (STG);2 weeks  -SM        Progress/Outcome (Patient Education Goal, OT)  goal met  -SM          User Key  (r) = Recorded By, (t) = Taken By, (c) = Cosigned By    Initials Name Effective Dates    Avis Rivas OT 04/02/20 -          Occupational Therapy Education                 Title: PT OT SLP Therapies (In Progress)     Topic: Occupational Therapy (In Progress)     Point: ADL training (Done)     Description:   Instruct learner(s) on proper safety adaptation and remediation techniques during self care or transfers.   Instruct in proper use of assistive devices.              Learning Progress Summary           Patient Acceptance, E, VU by  at 7/22/2020 0228    Comment:  Pt educated in role of OT and POC. Pt instruction of safety with transfers and ADLs upon discharge.                   Point: Home exercise program (Not Started)     Description:   Instruct learner(s) on appropriate technique for monitoring, assisting and/or progressing therapeutic exercises/activities.              Learner Progress:   Not documented in this visit.          Point: Precautions (Not Started)     Description:   Instruct  learner(s) on prescribed precautions during self-care and functional transfers.              Learner Progress:   Not documented in this visit.          Point: Body mechanics (Not Started)     Description:   Instruct learner(s) on proper positioning and spine alignment during self-care, functional mobility activities and/or exercises.              Learner Progress:   Not documented in this visit.                      User Key     Initials Effective Dates Name Provider Type Discipline     04/02/20 -  Avis Oconnor OT Occupational Therapist OT                  OT Recommendation and Plan  Outcome Summary/Treatment Plan (OT)  Anticipated Discharge Disposition (OT): home  Therapy Frequency (OT Eval): evaluation only  Plan of Care Review  Plan of Care Reviewed With: patient  Plan of Care Reviewed With: patient  Outcome Summary: Pt is a 65 year old female admitted from home with initial c/o of L sides weakness and unsteady balance. Pt seen todays date to assess independene and safety with self care. Pt [id number] transfers and functional mobility to bathroom Independent with No AD or LOB. Pt and OT notes no residual L sided weakness, impaired sensation, visual preception, cognitive, or balance deficits. Eval only completed today with education for safety with ADLs after d/c. No annticpated OT needs after discharge home with family.     Outcome Measures     Row Name 07/22/20 1400             How much help from another is currently needed...    Putting on and taking off regular lower body clothing?  4  -SM      Bathing (including washing, rinsing, and drying)  4  -SM      Toileting (which includes using toilet bed pan or urinal)  4  -SM      Putting on and taking off regular upper body clothing  4  -SM      Taking care of personal grooming (such as brushing teeth)  4  -SM      Eating meals  4  -SM      AM-PAC 6 Clicks Score (OT)  24  -SM         Functional Assessment    Outcome Measure Options  AM-PAC 6 Clicks Daily  Activity (OT)  -        User Key  (r) = Recorded By, (t) = Taken By, (c) = Cosigned By    Initials Name Provider Type    Avis Rivas OT Occupational Therapist          Time Calculation:   Time Calculation- OT     Row Name 07/22/20 1411             Time Calculation- OT    OT Start Time  1445  -SM      OT Stop Time  1458  -      OT Time Calculation (min)  13 min  -      Total Timed Code Minutes- OT  10 minute(s)  -      OT Received On  07/22/20  -        User Key  (r) = Recorded By, (t) = Taken By, (c) = Cosigned By    Initials Name Provider Type    Avis Rivas OT Occupational Therapist        Therapy Charges for Today     Code Description Service Date Service Provider Modifiers Qty    [id number]  OT EVAL MOD COMPLEXITY 2 7/22/2020 Avis Oconnor OT GO 1    [id number]  OT THERAPEUTIC ACT EA 15 MIN 7/22/2020 Avis Oconnor OT GO 1               Avis Oconnor OT  7/22/2020

## 2020-07-22 NOTE — DISCHARGE SUMMARY
LOS: 1 day     Name: Noemi Rasheed  Age/Sex: 65 y.o. female  :  1955        PCP: Stefano Reyna DO  No chief complaint on file.     Subjective   She is fairly neurologically back to her baseline.  Denies new issues or complaints this morning.  She really wants to get out of the hospital as soon as possible given her brother's recent death and  Friday.  General: No Fever or Chills, Cardiac: No Chest Pain or Palpitations, Resp: No Cough or SOA, GI: No Nausea, Vomiting, or Diarrhea and Other: No bleeding      aspirin 81 mg Oral Daily   atorvastatin 80 mg Oral Nightly   furosemide 40 mg Oral BID   insulin glargine 30 Units Subcutaneous Nightly   insulin lispro 0-9 Units Subcutaneous TID AC   insulin lispro 5 Units Subcutaneous TID With Meals   potassium chloride 10 mEq Oral Daily   sodium chloride 10 mL Intravenous Q12H   spironolactone 50 mg Oral Daily       sodium chloride 75 mL/hr Last Rate: 75 mL/hr (20 2316)       Objective   Vital Signs  Temp:  [97.8 °F (36.6 °C)-97.9 °F (36.6 °C)] 97.8 °F (36.6 °C)  Heart Rate:  [65-78] 69  Resp:  [16-17] 17  BP: (129-187)/(55-87) 137/79  Body mass index is 39.54 kg/m².  No intake or output data in the 24 hours ending 20 1340    Physical Exam   Constitutional: She is oriented to person, place, and time. She appears well-developed and well-nourished.   HENT:   Head: Normocephalic and atraumatic.   Cardiovascular: Normal rate, regular rhythm and normal heart sounds.   Pulmonary/Chest: Effort normal and breath sounds normal.   Abdominal: Soft. Bowel sounds are normal.   Musculoskeletal: Normal range of motion. She exhibits no edema.   Neurological: She is alert and oriented to person, place, and time. No cranial nerve deficit.   Skin: Skin is warm and dry. Capillary refill takes less than 2 seconds.   Psychiatric: She has a normal mood and affect. Her behavior is normal.   Nursing note and vitals reviewed.        Results Review:       I reviewed  the patient's new clinical results.  Results from last 7 days   Lab Units 07/22/20  0509   WBC 10*3/mm3 2.60*   HEMOGLOBIN g/dL 12.6   PLATELETS 10*3/mm3 58*     Results from last 7 days   Lab Units 07/22/20  0509   SODIUM mmol/L 136   POTASSIUM mmol/L 4.1   CHLORIDE mmol/L 107   CO2 mmol/L 22.4   BUN mg/dL 11   CREATININE mg/dL 0.46*   CALCIUM mg/dL 8.3*   Estimated Creatinine Clearance: 86.2 mL/min (A) (by C-G formula based on SCr of 0.46 mg/dL (L)).      Assessment/Plan   Active Hospital Problems    Diagnosis  POA   • **Stroke (CMS/[facility]) [I63.9]  Unknown   • Asthma [J45.909]  Unknown   • COPD (chronic obstructive pulmonary disease) (CMS/[facility]) [J44.9]  Unknown   • Diabetes mellitus (CMS/[facility]) [E11.9]  Unknown   • Hypertension [I10]  Unknown   • Liver cirrhosis secondary to BARRIENTOS (nonalcoholic steatohepatitis) (CMS/[facility]) [K75.81, K74.60]  Unknown   • CVA (cerebral vascular accident) (CMS/[facility]) [I63.9]  Yes      Resolved Hospital Problems   No resolved problems to display.       PLAN  This is a 65-year-old lady that presented to the hospital as a transfer from [facility] with lacunar infarctions.  Her strokes are probably related to uncontrolled diabetes and uncontrolled secondary risk factors.  Unfortunately additional antiplatelet medications over 81 mg aspirin would be contraindicated given her pancytopenia.  Given her cirrhosis traditional statin therapy would also be not recommended.  Discussed the case with the patient and the neurologist.  She will need more aggressive diabetic control.  Have asked her to follow-up with her primary care physician in the next 1 to 2 weeks in order to go over her diabetes management.  Otherwise continue home medications and blood pressure is at goal.  Echocardiogram was reviewed shows no PFO.  However there are some findings concerning for underlying valvular heart disease and recommend outpatient cardiology referral.  Should be discharged in a stable condition this  evening.    Disposition    Fausto Johnson MD  [facility]  07/22/20  13:40       Your medication list      CONTINUE taking these medications      Instructions Last Dose Given Next Dose Due   aspirin 81 MG chewable tablet      Chew 81 mg Daily.       furosemide 40 MG tablet  Commonly known as:  LASIX      Take 40 mg by mouth 2 (Two) Times a Day.       insulin aspart 100 UNIT/ML injection  Commonly known as:  novoLOG      Inject  under the skin into the appropriate area as directed 3 (Three) Times a Day Before Meals.       insulin glargine 100 UNIT/ML injection  Commonly known as:  LANTUS      Inject 60 Units under the skin into the appropriate area as directed 2 (Two) Times a Day.       ipratropium-albuterol 0.5-2.5 mg/3 ml nebulizer  Commonly known as:  DUO-NEB      Take 3 mL by nebulization Every 4 (Four) Hours As Needed for Wheezing.       potassium chloride 10 MEQ CR tablet  Commonly known as:  K-DUR,KLOR-CON      Take 20 mEq by mouth 2 (Two) Times a Day.       spironolactone 25 MG tablet  Commonly known as:  ALDACTONE      Take 50 mg by mouth Daily.

## 2020-07-22 NOTE — NURSING NOTE
Acute rehab referral received via stroke order set. Please note that the acute rehab admission RNs will not be actively evaluating this patient. If it is felt that the patient is or will be acute rehab appropriate please call the admissions office at 1419 and a full evaluation will be initiated.    Thank you!    Anand Heard RN  p   f

## 2020-07-23 ENCOUNTER — READMISSION MANAGEMENT (OUTPATIENT)
Dept: CALL CENTER | Facility: HOSPITAL | Age: 65
End: 2020-07-23

## 2020-07-23 NOTE — PROGRESS NOTES
Case Management Discharge Note      Final Note: Home.    Provided Post Acute Provider List?: N/A    Destination      No service has been selected for the patient.      Durable Medical Equipment      No service has been selected for the patient.      Dialysis/Infusion      No service has been selected for the patient.      Home Medical Care      No service has been selected for the patient.      Therapy      No service has been selected for the patient.      Community Resources      No service has been selected for the patient.        Transportation Services  Other: Other    Final Discharge Disposition Code: 01 - home or self-care

## 2020-07-23 NOTE — OUTREACH NOTE
Prep Survey      Responses   [facility] patient discharged from?  [facility]   Is LACE score < 7 ?  No   Eligibility  Readm Mgmt   Discharge diagnosis  Stroke, asthma, COPD, Dm, HTN, Liver cirrhosis, CVA   COVID-19 Test Status  Not tested   Does the patient have one of the following disease processes/diagnoses(primary or secondary)?  Stroke (TIA)   Does the patient have Home health ordered?  No   Is there a DME ordered?  No   Comments regarding appointments  Pt to schedule F/U with PCP   Medication alerts for this patient  No changes   Prep survey completed?  Yes          Anne Kelly RN

## 2020-07-28 ENCOUNTER — OFFICE VISIT CONVERTED (OUTPATIENT)
Dept: FAMILY MEDICINE CLINIC | Facility: CLINIC | Age: 65
End: 2020-07-28
Attending: FAMILY MEDICINE

## 2020-07-28 ENCOUNTER — HOSPITAL ENCOUNTER (OUTPATIENT)
Dept: FAMILY MEDICINE CLINIC | Facility: CLINIC | Age: 65
Discharge: HOME | End: 2020-07-28
Attending: FAMILY MEDICINE

## 2020-07-28 ENCOUNTER — READMISSION MANAGEMENT (OUTPATIENT)
Dept: CALL CENTER | Facility: HOSPITAL | Age: 65
End: 2020-07-28

## 2020-07-28 LAB
ALBUMIN SERPL-MCNC: 2.8 G/DL (ref 3.5–5)
ALBUMIN/GLOB SERPL: 1.1 {RATIO} (ref 1.4–2.6)
ALP SERPL-CCNC: 103 U/L (ref 43–160)
ALT SERPL-CCNC: 21 U/L (ref 10–40)
ANION GAP SERPL CALC-SCNC: 15 MMOL/L (ref 8–19)
AST SERPL-CCNC: 31 U/L (ref 15–50)
BILIRUB SERPL-MCNC: 1.33 MG/DL (ref 0.2–1.3)
BUN SERPL-MCNC: 19 MG/DL (ref 5–25)
BUN/CREAT SERPL: 28 {RATIO} (ref 6–20)
CALCIUM SERPL-MCNC: 10.8 MG/DL (ref 8.7–10.4)
CHLORIDE SERPL-SCNC: 102 MMOL/L (ref 99–111)
CONV CO2: 25 MMOL/L (ref 22–32)
CONV TOTAL PROTEIN: 5.3 G/DL (ref 6.3–8.2)
CREAT UR-MCNC: 0.67 MG/DL (ref 0.5–0.9)
EST. AVERAGE GLUCOSE BLD GHB EST-MCNC: 249 MG/DL
GFR SERPLBLD BASED ON 1.73 SQ M-ARVRAT: >60 ML/MIN/{1.73_M2}
GLOBULIN UR ELPH-MCNC: 2.5 G/DL (ref 2–3.5)
GLUCOSE SERPL-MCNC: 302 MG/DL (ref 65–99)
HBA1C MFR BLD: 10.3 % (ref 3.5–5.7)
OSMOLALITY SERPL CALC.SUM OF ELEC: 300 MOSM/KG (ref 273–304)
POTASSIUM SERPL-SCNC: 4.4 MMOL/L (ref 3.5–5.3)
SODIUM SERPL-SCNC: 138 MMOL/L (ref 135–147)

## 2020-07-28 NOTE — OUTREACH NOTE
"Stroke Week 1 Survey      Responses   [facility] patient discharged from?  [place]   Does the patient have one of the following disease processes/diagnoses(primary or secondary)?  Stroke (TIA)   Is there a successful TCM telephone encounter documented?  No   Week 1 attempt successful?  Yes   Call start time  1322   Call end time  1331   Discharge diagnosis  Stroke, asthma, COPD, Dm, HTN, Liver cirrhosis, CVA   Is patient permission given to speak with other caregiver?  Yes   List who call center can speak with     Meds reviewed with patient/caregiver?  Yes   Is the patient having any side effects they believe may be caused by any medication additions or changes?  No   Does the patient have all medications ordered at discharge?  Yes   Is the patient taking all medications as directed (includes completed medication regime)?  Yes   Medication comments  MD changed her to treseba instead of Lantus.    Does the patient have a primary care provider?   Yes   Does the patient have an appointment with their PCP within 7 days of discharge?  Yes   Has the patient kept scheduled appointments due by today?  Yes   Psychosocial issues?  Yes   Psychosocial comments  brother  recently.    Does the patient require any assistance with activities of daily living such as eating, bathing, dressing, walking, etc.?  No   Does the patient have any residual symptoms from stroke/TIA?  Yes   Residual symptoms comments  right leg feels \"heavier\" than left. Walking independently but needs cane if on unsteady ground.    Does the patient understand the diet ordered at discharge?  Yes   Comments  Her gluc has been higher than normal, Md changed her diabetic meds. PCP told her the murmur has gotten worse, she will see Cardiology soon.    Did the patient receive a copy of their discharge instructions?  Yes   Nursing interventions  Reviewed instructions with patient   What is the patient's perception of their health status since " discharge?  Improving   Nursing interventions  Nurse provided patient education   Is the patient/caregiver able to teach back the risk factors for a stroke?  High blood pressure-goal below 120/80, Diabetes   Is the patient/caregiver able to teach back signs and symptoms related to disease process for when to call PCP?  Yes   Is the patient/caregiver able to teach back the hierarchy of who to call/visit for symptoms/problems? PCP, Specialist, Home health nurse, Urgent Care, ED, 911  Yes   Week 1 call completed?  Yes          Jennifer Caicedo RN

## 2020-08-06 ENCOUNTER — READMISSION MANAGEMENT (OUTPATIENT)
Dept: CALL CENTER | Facility: HOSPITAL | Age: 65
End: 2020-08-06

## 2020-08-06 NOTE — OUTREACH NOTE
Stroke Week 2 Survey      Responses   [facility] patient discharged from?  [place]   Does the patient have one of the following disease processes/diagnoses(primary or secondary)?  Stroke (TIA)   Week 2 attempt successful?  Yes   Call start time  1111   Call end time  1113   Medication alerts for this patient  no new  medication   Meds reviewed with patient/caregiver?  Yes   Is the patient taking all medications as directed (includes completed medication regime)?  Yes   Comments regarding appointments  has kept her appointments   Has the patient kept scheduled appointments due by today?  Yes   Comments  needing to have an MRI    Has home health visited the patient within 72 hours of discharge?  N/A   Does the patient require any assistance with activities of daily living such as eating, bathing, dressing, walking, etc.?  No   Does the patient have any residual symptoms from stroke/TIA?  No   Does the patient understand the diet ordered at discharge?  Yes   What is the patient's perception of their health status since discharge?  Improving   Is the patient able to teach back FAST for Stroke?  Yes   Is the patient/caregiver able to teach back the risk factors for a stroke?  High blood pressure-goal below 120/80, High Cholesterol   Is the patient/caregiver able to teach back signs and symptoms related to disease process for when to call PCP?  Yes   Is the patient/caregiver able to teach back signs and symptoms related to disease process for when to call 911?  Yes   Is the patient/caregiver able to teach back the hierarchy of who to call/visit for symptoms/problems? PCP, Specialist, Home health nurse, Urgent Care, ED, 911  Yes   Week 2 call completed?  Yes   Wrap up additional comments  patient feels she is improving, has returned back to work          Grace Agarwal RN

## 2020-08-17 ENCOUNTER — HOSPITAL ENCOUNTER (OUTPATIENT)
Dept: MRI IMAGING | Facility: HOSPITAL | Age: 65
Discharge: HOME | End: 2020-08-17
Attending: FAMILY MEDICINE

## 2020-08-21 ENCOUNTER — HOSPITAL ENCOUNTER (OUTPATIENT)
Dept: CARDIOLOGY | Facility: HOSPITAL | Age: 65
Discharge: HOME | End: 2020-08-21
Attending: SPECIALIST

## 2020-08-31 ENCOUNTER — HOSPITAL ENCOUNTER (OUTPATIENT)
Dept: PREADMISSION TESTING | Facility: HOSPITAL | Age: 65
Discharge: HOME | End: 2020-08-31
Attending: INTERNAL MEDICINE

## 2020-08-31 LAB — SARS-COV-2 RNA SPEC QL NAA+PROBE: NOT DETECTED

## 2020-09-02 ENCOUNTER — HOSPITAL ENCOUNTER (OUTPATIENT)
Dept: GASTROENTEROLOGY | Facility: HOSPITAL | Age: 65
Setting detail: HOSPITAL OUTPATIENT SURGERY
Discharge: HOME | End: 2020-09-02
Attending: INTERNAL MEDICINE

## 2020-09-02 LAB — GLUCOSE BLD-MCNC: 281 MG/DL (ref 65–99)

## 2020-09-24 ENCOUNTER — OFFICE VISIT CONVERTED (OUTPATIENT)
Dept: NEUROSURGERY | Facility: CLINIC | Age: 65
End: 2020-09-24
Attending: PHYSICIAN ASSISTANT

## 2021-01-01 ENCOUNTER — TELEPHONE (OUTPATIENT)
Dept: FAMILY MEDICINE CLINIC | Facility: CLINIC | Age: 66
End: 2021-01-01

## 2021-01-01 ENCOUNTER — APPOINTMENT (OUTPATIENT)
Dept: GENERAL RADIOLOGY | Facility: HOSPITAL | Age: 66
End: 2021-01-01

## 2021-01-01 ENCOUNTER — OFFICE VISIT (OUTPATIENT)
Dept: FAMILY MEDICINE CLINIC | Facility: CLINIC | Age: 66
End: 2021-01-01

## 2021-01-01 ENCOUNTER — OFFICE VISIT (OUTPATIENT)
Dept: GASTROENTEROLOGY | Facility: CLINIC | Age: 66
End: 2021-01-01

## 2021-01-01 ENCOUNTER — HOSPITAL ENCOUNTER (OUTPATIENT)
Dept: GENERAL RADIOLOGY | Facility: HOSPITAL | Age: 66
Discharge: HOME | End: 2021-06-23

## 2021-01-01 ENCOUNTER — APPOINTMENT (OUTPATIENT)
Dept: CT IMAGING | Facility: HOSPITAL | Age: 66
End: 2021-01-01

## 2021-01-01 ENCOUNTER — HOSPITAL ENCOUNTER (EMERGENCY)
Facility: HOSPITAL | Age: 66
Discharge: HOME | End: 2021-09-19
Attending: EMERGENCY MEDICINE | Admitting: EMERGENCY MEDICINE

## 2021-01-01 ENCOUNTER — HOSPITAL ENCOUNTER (INPATIENT)
Facility: HOSPITAL | Age: 66
LOS: 10 days | End: 2021-10-01
Attending: EMERGENCY MEDICINE | Admitting: INTERNAL MEDICINE

## 2021-01-01 ENCOUNTER — TRANSITIONAL CARE MANAGEMENT TELEPHONE ENCOUNTER (OUTPATIENT)
Dept: CALL CENTER | Facility: HOSPITAL | Age: 66
End: 2021-01-01

## 2021-01-01 ENCOUNTER — TELEPHONE (OUTPATIENT)
Dept: URGENT CARE | Facility: CLINIC | Age: 66
End: 2021-01-01

## 2021-01-01 ENCOUNTER — CONVERSION ENCOUNTER (OUTPATIENT)
Dept: FAMILY MEDICINE CLINIC | Facility: CLINIC | Age: 66
End: 2021-01-01

## 2021-01-01 ENCOUNTER — APPOINTMENT (OUTPATIENT)
Dept: CARDIOLOGY | Facility: HOSPITAL | Age: 66
End: 2021-01-01

## 2021-01-01 ENCOUNTER — HOSPITAL ENCOUNTER (INPATIENT)
Facility: HOSPITAL | Age: 66
End: 2021-01-01
Attending: STUDENT IN AN ORGANIZED HEALTH CARE EDUCATION/TRAINING PROGRAM | Admitting: STUDENT IN AN ORGANIZED HEALTH CARE EDUCATION/TRAINING PROGRAM

## 2021-01-01 ENCOUNTER — TELEPHONE (OUTPATIENT)
Dept: NEUROSURGERY | Facility: CLINIC | Age: 66
End: 2021-01-01

## 2021-01-01 ENCOUNTER — OFFICE VISIT CONVERTED (OUTPATIENT)
Dept: FAMILY MEDICINE CLINIC | Facility: CLINIC | Age: 66
End: 2021-01-01
Attending: FAMILY MEDICINE

## 2021-01-01 ENCOUNTER — TELEPHONE (OUTPATIENT)
Dept: UROLOGY | Facility: CLINIC | Age: 66
End: 2021-01-01

## 2021-01-01 ENCOUNTER — TELEPHONE (OUTPATIENT)
Dept: GASTROENTEROLOGY | Facility: CLINIC | Age: 66
End: 2021-01-01

## 2021-01-01 ENCOUNTER — READMISSION MANAGEMENT (OUTPATIENT)
Dept: CALL CENTER | Facility: HOSPITAL | Age: 66
End: 2021-01-01

## 2021-01-01 ENCOUNTER — HOSPITAL ENCOUNTER (OUTPATIENT)
Dept: FAMILY MEDICINE CLINIC | Facility: CLINIC | Age: 66
Discharge: HOME | End: 2021-01-26
Attending: FAMILY MEDICINE

## 2021-01-01 ENCOUNTER — HOSPITAL ENCOUNTER (INPATIENT)
Facility: HOSPITAL | Age: 66
LOS: 34 days | Discharge: NURSING, REHAB OR LONG-TERM CARE | End: 2021-09-03
Attending: EMERGENCY MEDICINE | Admitting: STUDENT IN AN ORGANIZED HEALTH CARE EDUCATION/TRAINING PROGRAM

## 2021-01-01 ENCOUNTER — OFFICE VISIT (OUTPATIENT)
Dept: ORTHOPEDIC SURGERY | Facility: CLINIC | Age: 66
End: 2021-01-01

## 2021-01-01 ENCOUNTER — HOSPITAL ENCOUNTER (OUTPATIENT)
Dept: GENERAL RADIOLOGY | Facility: HOSPITAL | Age: 66
Discharge: HOME | End: 2021-03-16
Attending: FAMILY MEDICINE

## 2021-01-01 ENCOUNTER — OFFICE VISIT CONVERTED (OUTPATIENT)
Dept: FAMILY MEDICINE CLINIC | Facility: CLINIC | Age: 66
End: 2021-01-01
Attending: NURSE PRACTITIONER

## 2021-01-01 ENCOUNTER — HOSPITAL ENCOUNTER (OUTPATIENT)
Dept: MAMMOGRAPHY | Facility: HOSPITAL | Age: 66
Discharge: HOME | End: 2021-03-11
Attending: NURSE PRACTITIONER

## 2021-01-01 ENCOUNTER — OFFICE VISIT (OUTPATIENT)
Dept: NEUROSURGERY | Facility: CLINIC | Age: 66
End: 2021-01-01

## 2021-01-01 ENCOUNTER — HOSPITAL ENCOUNTER (OUTPATIENT)
Dept: ULTRASOUND IMAGING | Facility: HOSPITAL | Age: 66
Discharge: HOME | End: 2021-06-18

## 2021-01-01 ENCOUNTER — APPOINTMENT (OUTPATIENT)
Dept: MRI IMAGING | Facility: HOSPITAL | Age: 66
End: 2021-01-01

## 2021-01-01 ENCOUNTER — HOSPITAL ENCOUNTER (OUTPATIENT)
Dept: URGENT CARE | Facility: CLINIC | Age: 66
Discharge: HOME | End: 2021-03-02
Attending: EMERGENCY MEDICINE

## 2021-01-01 ENCOUNTER — HOSPITAL ENCOUNTER (OUTPATIENT)
Dept: CT IMAGING | Facility: HOSPITAL | Age: 66
Discharge: HOME | End: 2021-07-02
Admitting: FAMILY MEDICINE

## 2021-01-01 ENCOUNTER — LAB REQUISITION (OUTPATIENT)
Dept: LAB | Facility: HOSPITAL | Age: 66
End: 2021-01-01

## 2021-01-01 ENCOUNTER — APPOINTMENT (OUTPATIENT)
Dept: ULTRASOUND IMAGING | Facility: HOSPITAL | Age: 66
End: 2021-01-01

## 2021-01-01 ENCOUNTER — OFFICE VISIT CONVERTED (OUTPATIENT)
Dept: ORTHOPEDIC SURGERY | Facility: CLINIC | Age: 66
End: 2021-01-01
Attending: ORTHOPAEDIC SURGERY

## 2021-01-01 ENCOUNTER — LAB (OUTPATIENT)
Dept: LAB | Facility: HOSPITAL | Age: 66
End: 2021-01-01

## 2021-01-01 ENCOUNTER — APPOINTMENT (OUTPATIENT)
Dept: NEUROLOGY | Facility: HOSPITAL | Age: 66
End: 2021-01-01

## 2021-01-01 VITALS
OXYGEN SATURATION: 94 % | HEIGHT: 66 IN | TEMPERATURE: 97.6 F | HEART RATE: 74 BPM | SYSTOLIC BLOOD PRESSURE: 138 MMHG | WEIGHT: 272 LBS | RESPIRATION RATE: 18 BRPM | DIASTOLIC BLOOD PRESSURE: 78 MMHG | BODY MASS INDEX: 43.71 KG/M2

## 2021-01-01 VITALS — OXYGEN SATURATION: 95 % | WEIGHT: 251 LBS | BODY MASS INDEX: 40.34 KG/M2 | HEART RATE: 85 BPM | HEIGHT: 66 IN

## 2021-01-01 VITALS
BODY MASS INDEX: 43.12 KG/M2 | TEMPERATURE: 97.5 F | DIASTOLIC BLOOD PRESSURE: 64 MMHG | SYSTOLIC BLOOD PRESSURE: 135 MMHG | HEIGHT: 66 IN | WEIGHT: 268.31 LBS | HEART RATE: 72 BPM

## 2021-01-01 VITALS — OXYGEN SATURATION: 93 % | WEIGHT: 255.8 LBS | HEIGHT: 66 IN | HEART RATE: 78 BPM | BODY MASS INDEX: 41.11 KG/M2

## 2021-01-01 VITALS
WEIGHT: 276 LBS | OXYGEN SATURATION: 93 % | TEMPERATURE: 99 F | DIASTOLIC BLOOD PRESSURE: 74 MMHG | HEART RATE: 85 BPM | BODY MASS INDEX: 44.36 KG/M2 | RESPIRATION RATE: 18 BRPM | SYSTOLIC BLOOD PRESSURE: 138 MMHG | HEIGHT: 66 IN

## 2021-01-01 VITALS
HEIGHT: 67 IN | OXYGEN SATURATION: 98 % | DIASTOLIC BLOOD PRESSURE: 68 MMHG | DIASTOLIC BLOOD PRESSURE: 82 MMHG | RESPIRATION RATE: 20 BRPM | OXYGEN SATURATION: 95 % | TEMPERATURE: 98.1 F | SYSTOLIC BLOOD PRESSURE: 134 MMHG | WEIGHT: 260 LBS | BODY MASS INDEX: 42.2 KG/M2 | HEIGHT: 66 IN | HEART RATE: 135 BPM | HEART RATE: 83 BPM | RESPIRATION RATE: 16 BRPM | WEIGHT: 262.57 LBS | TEMPERATURE: 98.2 F | BODY MASS INDEX: 40.81 KG/M2 | SYSTOLIC BLOOD PRESSURE: 110 MMHG

## 2021-01-01 VITALS — HEART RATE: 76 BPM | HEIGHT: 66 IN | OXYGEN SATURATION: 98 %

## 2021-01-01 VITALS
SYSTOLIC BLOOD PRESSURE: 136 MMHG | OXYGEN SATURATION: 94 % | HEART RATE: 76 BPM | TEMPERATURE: 97.7 F | BODY MASS INDEX: 41.84 KG/M2 | HEIGHT: 66 IN | DIASTOLIC BLOOD PRESSURE: 78 MMHG | WEIGHT: 260.37 LBS

## 2021-01-01 VITALS — HEIGHT: 66 IN | WEIGHT: 250 LBS | BODY MASS INDEX: 40.18 KG/M2 | HEART RATE: 72 BPM | OXYGEN SATURATION: 98 %

## 2021-01-01 VITALS
BODY MASS INDEX: 45.64 KG/M2 | WEIGHT: 284 LBS | DIASTOLIC BLOOD PRESSURE: 90 MMHG | TEMPERATURE: 97.8 F | HEART RATE: 81 BPM | SYSTOLIC BLOOD PRESSURE: 180 MMHG | HEIGHT: 66 IN | OXYGEN SATURATION: 94 %

## 2021-01-01 VITALS
SYSTOLIC BLOOD PRESSURE: 138 MMHG | HEART RATE: 79 BPM | TEMPERATURE: 97.4 F | BODY MASS INDEX: 41.14 KG/M2 | DIASTOLIC BLOOD PRESSURE: 74 MMHG | HEIGHT: 66 IN | RESPIRATION RATE: 12 BRPM | WEIGHT: 256 LBS | OXYGEN SATURATION: 97 %

## 2021-01-01 VITALS
BODY MASS INDEX: 41.22 KG/M2 | TEMPERATURE: 97.8 F | HEART RATE: 78 BPM | DIASTOLIC BLOOD PRESSURE: 70 MMHG | WEIGHT: 256.5 LBS | SYSTOLIC BLOOD PRESSURE: 150 MMHG | OXYGEN SATURATION: 95 % | HEIGHT: 66 IN

## 2021-01-01 VITALS
HEART RATE: 78 BPM | HEIGHT: 67 IN | BODY MASS INDEX: 40.81 KG/M2 | RESPIRATION RATE: 20 BRPM | OXYGEN SATURATION: 95 % | WEIGHT: 260 LBS | SYSTOLIC BLOOD PRESSURE: 110 MMHG | TEMPERATURE: 97.9 F | DIASTOLIC BLOOD PRESSURE: 60 MMHG

## 2021-01-01 VITALS
OXYGEN SATURATION: 87 % | SYSTOLIC BLOOD PRESSURE: 146 MMHG | BODY MASS INDEX: 39.94 KG/M2 | BODY MASS INDEX: 43.55 KG/M2 | HEART RATE: 94 BPM | HEIGHT: 67 IN | WEIGHT: 271 LBS | HEART RATE: 68 BPM | HEIGHT: 66 IN | DIASTOLIC BLOOD PRESSURE: 62 MMHG

## 2021-01-01 VITALS
RESPIRATION RATE: 18 BRPM | OXYGEN SATURATION: 98 % | TEMPERATURE: 97.9 F | HEART RATE: 78 BPM | HEIGHT: 67 IN | BODY MASS INDEX: 38.61 KG/M2 | WEIGHT: 246 LBS | SYSTOLIC BLOOD PRESSURE: 132 MMHG | DIASTOLIC BLOOD PRESSURE: 78 MMHG

## 2021-01-01 VITALS
BODY MASS INDEX: 41.71 KG/M2 | OXYGEN SATURATION: 95 % | WEIGHT: 259.5 LBS | SYSTOLIC BLOOD PRESSURE: 130 MMHG | DIASTOLIC BLOOD PRESSURE: 58 MMHG | HEART RATE: 82 BPM | HEIGHT: 66 IN | TEMPERATURE: 98.5 F

## 2021-01-01 VITALS — BODY MASS INDEX: 43.16 KG/M2 | OXYGEN SATURATION: 97 % | HEIGHT: 67 IN | WEIGHT: 275 LBS | HEART RATE: 89 BPM

## 2021-01-01 VITALS
SYSTOLIC BLOOD PRESSURE: 140 MMHG | WEIGHT: 270 LBS | HEART RATE: 70 BPM | TEMPERATURE: 98.9 F | DIASTOLIC BLOOD PRESSURE: 70 MMHG | HEIGHT: 66 IN | OXYGEN SATURATION: 96 % | BODY MASS INDEX: 43.39 KG/M2

## 2021-01-01 VITALS
DIASTOLIC BLOOD PRESSURE: 66 MMHG | RESPIRATION RATE: 18 BRPM | TEMPERATURE: 98.1 F | WEIGHT: 264.25 LBS | BODY MASS INDEX: 42.47 KG/M2 | OXYGEN SATURATION: 97 % | SYSTOLIC BLOOD PRESSURE: 148 MMHG | HEART RATE: 78 BPM | HEIGHT: 66 IN

## 2021-01-01 VITALS
TEMPERATURE: 98.1 F | SYSTOLIC BLOOD PRESSURE: 138 MMHG | WEIGHT: 258 LBS | OXYGEN SATURATION: 95 % | HEART RATE: 82 BPM | HEIGHT: 66 IN | BODY MASS INDEX: 41.46 KG/M2 | DIASTOLIC BLOOD PRESSURE: 64 MMHG

## 2021-01-01 VITALS
SYSTOLIC BLOOD PRESSURE: 124 MMHG | DIASTOLIC BLOOD PRESSURE: 70 MMHG | BODY MASS INDEX: 41.3 KG/M2 | OXYGEN SATURATION: 97 % | HEIGHT: 66 IN | TEMPERATURE: 97.8 F | RESPIRATION RATE: 20 BRPM | WEIGHT: 257 LBS | HEART RATE: 80 BPM

## 2021-01-01 VITALS
SYSTOLIC BLOOD PRESSURE: 109 MMHG | RESPIRATION RATE: 20 BRPM | OXYGEN SATURATION: 93 % | DIASTOLIC BLOOD PRESSURE: 57 MMHG | TEMPERATURE: 98.8 F | WEIGHT: 226.19 LBS | HEART RATE: 91 BPM | BODY MASS INDEX: 36.35 KG/M2 | HEIGHT: 66 IN

## 2021-01-01 VITALS
RESPIRATION RATE: 22 BRPM | OXYGEN SATURATION: 95 % | HEART RATE: 85 BPM | TEMPERATURE: 98 F | SYSTOLIC BLOOD PRESSURE: 148 MMHG | HEIGHT: 66 IN | BODY MASS INDEX: 42.59 KG/M2 | DIASTOLIC BLOOD PRESSURE: 60 MMHG | WEIGHT: 265 LBS

## 2021-01-01 VITALS
HEIGHT: 66 IN | OXYGEN SATURATION: 93 % | HEART RATE: 81 BPM | TEMPERATURE: 98.1 F | WEIGHT: 269.25 LBS | DIASTOLIC BLOOD PRESSURE: 66 MMHG | SYSTOLIC BLOOD PRESSURE: 154 MMHG | BODY MASS INDEX: 43.27 KG/M2

## 2021-01-01 VITALS — TEMPERATURE: 96.9 F | BODY MASS INDEX: 42.61 KG/M2 | HEIGHT: 66 IN | WEIGHT: 265.12 LBS

## 2021-01-01 VITALS
HEIGHT: 66 IN | OXYGEN SATURATION: 95 % | TEMPERATURE: 98 F | WEIGHT: 269.25 LBS | DIASTOLIC BLOOD PRESSURE: 60 MMHG | BODY MASS INDEX: 43.27 KG/M2 | SYSTOLIC BLOOD PRESSURE: 136 MMHG | HEART RATE: 78 BPM | RESPIRATION RATE: 20 BRPM

## 2021-01-01 VITALS
DIASTOLIC BLOOD PRESSURE: 64 MMHG | BODY MASS INDEX: 40.68 KG/M2 | HEIGHT: 66 IN | OXYGEN SATURATION: 95 % | TEMPERATURE: 98.2 F | WEIGHT: 253.12 LBS | SYSTOLIC BLOOD PRESSURE: 140 MMHG | HEART RATE: 73 BPM

## 2021-01-01 VITALS
WEIGHT: 261.37 LBS | HEIGHT: 66 IN | DIASTOLIC BLOOD PRESSURE: 74 MMHG | HEART RATE: 84 BPM | BODY MASS INDEX: 42.01 KG/M2 | TEMPERATURE: 98 F | OXYGEN SATURATION: 95 % | SYSTOLIC BLOOD PRESSURE: 170 MMHG

## 2021-01-01 VITALS
RESPIRATION RATE: 18 BRPM | OXYGEN SATURATION: 97 % | HEIGHT: 67 IN | DIASTOLIC BLOOD PRESSURE: 68 MMHG | WEIGHT: 255 LBS | SYSTOLIC BLOOD PRESSURE: 134 MMHG | TEMPERATURE: 97.9 F | HEART RATE: 79 BPM | BODY MASS INDEX: 40.02 KG/M2

## 2021-01-01 VITALS — HEIGHT: 67 IN | HEART RATE: 77 BPM | OXYGEN SATURATION: 92 % | WEIGHT: 251.12 LBS | BODY MASS INDEX: 39.42 KG/M2

## 2021-01-01 VITALS
HEIGHT: 62 IN | WEIGHT: 284 LBS | HEART RATE: 74 BPM | DIASTOLIC BLOOD PRESSURE: 80 MMHG | BODY MASS INDEX: 52.26 KG/M2 | SYSTOLIC BLOOD PRESSURE: 178 MMHG | OXYGEN SATURATION: 96 % | TEMPERATURE: 98.1 F

## 2021-01-01 VITALS
HEIGHT: 66 IN | TEMPERATURE: 97.1 F | HEART RATE: 75 BPM | DIASTOLIC BLOOD PRESSURE: 82 MMHG | SYSTOLIC BLOOD PRESSURE: 160 MMHG | OXYGEN SATURATION: 97 % | BODY MASS INDEX: 44.39 KG/M2 | WEIGHT: 276.25 LBS

## 2021-01-01 VITALS
WEIGHT: 255 LBS | BODY MASS INDEX: 40.98 KG/M2 | SYSTOLIC BLOOD PRESSURE: 134 MMHG | HEART RATE: 78 BPM | OXYGEN SATURATION: 97 % | DIASTOLIC BLOOD PRESSURE: 64 MMHG | HEIGHT: 66 IN | TEMPERATURE: 97.5 F

## 2021-01-01 VITALS
OXYGEN SATURATION: 97 % | SYSTOLIC BLOOD PRESSURE: 143 MMHG | BODY MASS INDEX: 40.18 KG/M2 | DIASTOLIC BLOOD PRESSURE: 73 MMHG | HEIGHT: 66 IN | HEART RATE: 77 BPM | WEIGHT: 250 LBS | RESPIRATION RATE: 18 BRPM | TEMPERATURE: 98.1 F

## 2021-01-01 VITALS
HEIGHT: 66 IN | DIASTOLIC BLOOD PRESSURE: 68 MMHG | DIASTOLIC BLOOD PRESSURE: 68 MMHG | OXYGEN SATURATION: 94 % | SYSTOLIC BLOOD PRESSURE: 148 MMHG | WEIGHT: 260.12 LBS | BODY MASS INDEX: 41.8 KG/M2 | SYSTOLIC BLOOD PRESSURE: 148 MMHG | TEMPERATURE: 98.7 F | HEART RATE: 81 BPM

## 2021-01-01 VITALS — WEIGHT: 250 LBS | BODY MASS INDEX: 40.18 KG/M2 | HEIGHT: 66 IN | RESPIRATION RATE: 18 BRPM

## 2021-01-01 VITALS — BODY MASS INDEX: 44.9 KG/M2 | HEIGHT: 66 IN | HEART RATE: 89 BPM | OXYGEN SATURATION: 90 % | WEIGHT: 279.37 LBS

## 2021-01-01 VITALS
BODY MASS INDEX: 42.43 KG/M2 | WEIGHT: 264 LBS | DIASTOLIC BLOOD PRESSURE: 48 MMHG | SYSTOLIC BLOOD PRESSURE: 145 MMHG | RESPIRATION RATE: 16 BRPM | HEIGHT: 66 IN | OXYGEN SATURATION: 97 % | HEART RATE: 71 BPM

## 2021-01-01 VITALS
WEIGHT: 224.87 LBS | SYSTOLIC BLOOD PRESSURE: 149 MMHG | HEIGHT: 66 IN | DIASTOLIC BLOOD PRESSURE: 62 MMHG | TEMPERATURE: 98.5 F | BODY MASS INDEX: 36.14 KG/M2

## 2021-01-01 VITALS
HEIGHT: 66 IN | OXYGEN SATURATION: 97 % | RESPIRATION RATE: 16 BRPM | HEART RATE: 73 BPM | DIASTOLIC BLOOD PRESSURE: 66 MMHG | WEIGHT: 270 LBS | SYSTOLIC BLOOD PRESSURE: 162 MMHG | BODY MASS INDEX: 43.39 KG/M2

## 2021-01-01 VITALS
TEMPERATURE: 97.9 F | DIASTOLIC BLOOD PRESSURE: 54 MMHG | OXYGEN SATURATION: 94 % | HEART RATE: 81 BPM | WEIGHT: 267.5 LBS | HEIGHT: 66 IN | BODY MASS INDEX: 42.99 KG/M2 | SYSTOLIC BLOOD PRESSURE: 132 MMHG

## 2021-01-01 VITALS
OXYGEN SATURATION: 96 % | RESPIRATION RATE: 16 BRPM | TEMPERATURE: 98 F | SYSTOLIC BLOOD PRESSURE: 149 MMHG | BODY MASS INDEX: 44.03 KG/M2 | HEIGHT: 66 IN | DIASTOLIC BLOOD PRESSURE: 66 MMHG | HEART RATE: 78 BPM | WEIGHT: 274 LBS

## 2021-01-01 VITALS
WEIGHT: 261 LBS | OXYGEN SATURATION: 99 % | RESPIRATION RATE: 16 BRPM | TEMPERATURE: 97 F | BODY MASS INDEX: 40.97 KG/M2 | SYSTOLIC BLOOD PRESSURE: 125 MMHG | HEART RATE: 75 BPM | HEIGHT: 67 IN | DIASTOLIC BLOOD PRESSURE: 78 MMHG

## 2021-01-01 VITALS
HEART RATE: 83 BPM | HEIGHT: 66 IN | SYSTOLIC BLOOD PRESSURE: 150 MMHG | BODY MASS INDEX: 41.62 KG/M2 | WEIGHT: 259 LBS | DIASTOLIC BLOOD PRESSURE: 60 MMHG

## 2021-01-01 VITALS
WEIGHT: 280.37 LBS | DIASTOLIC BLOOD PRESSURE: 100 MMHG | HEART RATE: 84 BPM | HEIGHT: 66 IN | SYSTOLIC BLOOD PRESSURE: 162 MMHG | OXYGEN SATURATION: 97 % | BODY MASS INDEX: 45.06 KG/M2

## 2021-01-01 VITALS — BODY MASS INDEX: 39.39 KG/M2 | HEIGHT: 67 IN | WEIGHT: 251 LBS | RESPIRATION RATE: 16 BRPM

## 2021-01-01 VITALS — HEART RATE: 78 BPM | HEIGHT: 66 IN | WEIGHT: 276.25 LBS | OXYGEN SATURATION: 98 % | BODY MASS INDEX: 44.39 KG/M2

## 2021-01-01 VITALS
TEMPERATURE: 98 F | OXYGEN SATURATION: 95 % | HEART RATE: 83 BPM | HEIGHT: 66 IN | BODY MASS INDEX: 41.98 KG/M2 | SYSTOLIC BLOOD PRESSURE: 152 MMHG | WEIGHT: 261.2 LBS | DIASTOLIC BLOOD PRESSURE: 76 MMHG

## 2021-01-01 VITALS
WEIGHT: 254 LBS | DIASTOLIC BLOOD PRESSURE: 59 MMHG | SYSTOLIC BLOOD PRESSURE: 147 MMHG | BODY MASS INDEX: 40.82 KG/M2 | HEIGHT: 66 IN

## 2021-01-01 VITALS
WEIGHT: 266.56 LBS | HEIGHT: 67 IN | OXYGEN SATURATION: 97 % | DIASTOLIC BLOOD PRESSURE: 57 MMHG | HEART RATE: 80 BPM | SYSTOLIC BLOOD PRESSURE: 147 MMHG | BODY MASS INDEX: 41.84 KG/M2

## 2021-01-01 VITALS
BODY MASS INDEX: 44.11 KG/M2 | DIASTOLIC BLOOD PRESSURE: 78 MMHG | SYSTOLIC BLOOD PRESSURE: 132 MMHG | HEIGHT: 66 IN | HEART RATE: 73 BPM | TEMPERATURE: 98 F | WEIGHT: 274.5 LBS | OXYGEN SATURATION: 97 %

## 2021-01-01 VITALS
HEART RATE: 71 BPM | BODY MASS INDEX: 41.64 KG/M2 | WEIGHT: 259.12 LBS | OXYGEN SATURATION: 97 % | DIASTOLIC BLOOD PRESSURE: 66 MMHG | SYSTOLIC BLOOD PRESSURE: 134 MMHG | TEMPERATURE: 97.7 F | HEIGHT: 66 IN

## 2021-01-01 VITALS — WEIGHT: 257.5 LBS | HEART RATE: 62 BPM | OXYGEN SATURATION: 95 % | HEIGHT: 66 IN | BODY MASS INDEX: 41.38 KG/M2

## 2021-01-01 VITALS
WEIGHT: 267.12 LBS | HEIGHT: 66 IN | TEMPERATURE: 99 F | BODY MASS INDEX: 42.93 KG/M2 | OXYGEN SATURATION: 95 % | HEART RATE: 76 BPM | SYSTOLIC BLOOD PRESSURE: 144 MMHG | DIASTOLIC BLOOD PRESSURE: 58 MMHG

## 2021-01-01 VITALS — RESPIRATION RATE: 16 BRPM | HEIGHT: 66 IN | BODY MASS INDEX: 40.18 KG/M2 | WEIGHT: 250 LBS

## 2021-01-01 VITALS
HEIGHT: 66 IN | BODY MASS INDEX: 44.74 KG/M2 | SYSTOLIC BLOOD PRESSURE: 130 MMHG | DIASTOLIC BLOOD PRESSURE: 66 MMHG | OXYGEN SATURATION: 96 % | WEIGHT: 278.37 LBS | TEMPERATURE: 98.1 F | HEART RATE: 71 BPM

## 2021-01-01 VITALS
HEIGHT: 65 IN | RESPIRATION RATE: 20 BRPM | BODY MASS INDEX: 41.51 KG/M2 | WEIGHT: 249.12 LBS | SYSTOLIC BLOOD PRESSURE: 129 MMHG | TEMPERATURE: 99 F | OXYGEN SATURATION: 90 % | HEART RATE: 97 BPM | DIASTOLIC BLOOD PRESSURE: 57 MMHG

## 2021-01-01 VITALS
SYSTOLIC BLOOD PRESSURE: 168 MMHG | DIASTOLIC BLOOD PRESSURE: 90 MMHG | HEIGHT: 66 IN | OXYGEN SATURATION: 93 % | HEART RATE: 74 BPM | TEMPERATURE: 98.1 F | WEIGHT: 289.25 LBS | BODY MASS INDEX: 46.49 KG/M2

## 2021-01-01 VITALS
TEMPERATURE: 98.4 F | OXYGEN SATURATION: 91 % | DIASTOLIC BLOOD PRESSURE: 88 MMHG | BODY MASS INDEX: 42.11 KG/M2 | HEIGHT: 66 IN | SYSTOLIC BLOOD PRESSURE: 156 MMHG | WEIGHT: 262 LBS | HEART RATE: 80 BPM

## 2021-01-01 VITALS
HEART RATE: 74 BPM | OXYGEN SATURATION: 95 % | TEMPERATURE: 97.9 F | SYSTOLIC BLOOD PRESSURE: 156 MMHG | DIASTOLIC BLOOD PRESSURE: 64 MMHG

## 2021-01-01 VITALS — HEIGHT: 66 IN | HEART RATE: 80 BPM | OXYGEN SATURATION: 96 %

## 2021-01-01 VITALS — WEIGHT: 261 LBS | OXYGEN SATURATION: 95 % | HEIGHT: 66 IN | BODY MASS INDEX: 41.95 KG/M2

## 2021-01-01 DIAGNOSIS — M43.10 ACQUIRED SPONDYLOLISTHESIS: ICD-10-CM

## 2021-01-01 DIAGNOSIS — R26.2 DIFFICULTY WALKING: ICD-10-CM

## 2021-01-01 DIAGNOSIS — J96.01 ACUTE RESPIRATORY FAILURE WITH HYPOXIA (HCC): ICD-10-CM

## 2021-01-01 DIAGNOSIS — M54.2 NECK PAIN: ICD-10-CM

## 2021-01-01 DIAGNOSIS — M54.17 LUMBOSACRAL RADICULOPATHY: Primary | ICD-10-CM

## 2021-01-01 DIAGNOSIS — R77.8 ELEVATED TROPONIN: ICD-10-CM

## 2021-01-01 DIAGNOSIS — Z96.653 HISTORY OF BILATERAL KNEE REPLACEMENT: ICD-10-CM

## 2021-01-01 DIAGNOSIS — M54.41 ACUTE MIDLINE LOW BACK PAIN WITH BILATERAL SCIATICA: ICD-10-CM

## 2021-01-01 DIAGNOSIS — E86.0 DEHYDRATION: ICD-10-CM

## 2021-01-01 DIAGNOSIS — J18.9 PNEUMONIA OF BOTH LUNGS DUE TO INFECTIOUS ORGANISM, UNSPECIFIED PART OF LUNG: Primary | ICD-10-CM
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
## 2021-01-01 DIAGNOSIS — Z20.822 COVID-19 VIRUS TEST RESULT UNKNOWN: ICD-10-CM
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
## 2021-01-01 DIAGNOSIS — K74.60 HEPATIC CIRRHOSIS, UNSPECIFIED HEPATIC CIRRHOSIS TYPE, UNSPECIFIED WHETHER ASCITES PRESENT (HCC): Primary | ICD-10-CM

## 2021-01-01 DIAGNOSIS — M54.42 ACUTE MIDLINE LOW BACK PAIN WITH BILATERAL SCIATICA: Primary | ICD-10-CM

## 2021-01-01 DIAGNOSIS — M53.3 COCCYX PAIN: ICD-10-CM

## 2021-01-01 DIAGNOSIS — K74.60 HEPATIC CIRRHOSIS, UNSPECIFIED HEPATIC CIRRHOSIS TYPE, UNSPECIFIED WHETHER ASCITES PRESENT (HCC): ICD-10-CM

## 2021-01-01 DIAGNOSIS — J18.9 MULTIFOCAL PNEUMONIA: Primary | ICD-10-CM

## 2021-01-01 DIAGNOSIS — M54.42 ACUTE MIDLINE LOW BACK PAIN WITH BILATERAL SCIATICA: ICD-10-CM

## 2021-01-01 DIAGNOSIS — M25.561 PAIN IN BOTH KNEES, UNSPECIFIED CHRONICITY: Primary | ICD-10-CM

## 2021-01-01 DIAGNOSIS — R30.0 DYSURIA: ICD-10-CM

## 2021-01-01 DIAGNOSIS — R31.0 GROSS HEMATURIA: Primary | ICD-10-CM

## 2021-01-01 DIAGNOSIS — M25.562 PAIN IN BOTH KNEES, UNSPECIFIED CHRONICITY: Primary | ICD-10-CM

## 2021-01-01 DIAGNOSIS — M51.36 DEGENERATIVE DISC DISEASE, LUMBAR: ICD-10-CM

## 2021-01-01 DIAGNOSIS — M54.50 ACUTE MIDLINE LOW BACK PAIN WITHOUT SCIATICA: ICD-10-CM

## 2021-01-01 DIAGNOSIS — Z78.9 DECREASED ACTIVITIES OF DAILY LIVING (ADL): ICD-10-CM

## 2021-01-01 DIAGNOSIS — M47.817 SPONDYLOSIS OF LUMBOSACRAL REGION WITHOUT MYELOPATHY OR RADICULOPATHY: Primary | ICD-10-CM

## 2021-01-01 DIAGNOSIS — N39.0 URINARY TRACT INFECTION, SITE NOT SPECIFIED: ICD-10-CM

## 2021-01-01 DIAGNOSIS — M48.061 SPINAL STENOSIS, LUMBAR REGION, WITHOUT NEUROGENIC CLAUDICATION: ICD-10-CM

## 2021-01-01 DIAGNOSIS — M54.41 ACUTE MIDLINE LOW BACK PAIN WITH BILATERAL SCIATICA: Primary | ICD-10-CM

## 2021-01-01 DIAGNOSIS — N39.0 ACUTE UTI: Primary | ICD-10-CM

## 2021-01-01 DIAGNOSIS — R09.02 HYPOXEMIA: ICD-10-CM

## 2021-01-01 LAB
ABO GROUP BLD: NORMAL
ABO GROUP BLD: NORMAL
ALBUMIN SERPL-MCNC: 1.5 G/DL (ref 3.5–5.2)
ALBUMIN SERPL-MCNC: 1.6 G/DL (ref 3.5–5.2)
ALBUMIN SERPL-MCNC: 1.7 G/DL (ref 3.5–5.2)
ALBUMIN SERPL-MCNC: 1.7 G/DL (ref 3.5–5.2)
ALBUMIN SERPL-MCNC: 1.8 G/DL (ref 3.5–5.2)
ALBUMIN SERPL-MCNC: 1.9 G/DL (ref 3.5–5.2)
ALBUMIN SERPL-MCNC: 2 G/DL (ref 3.5–5.2)
ALBUMIN SERPL-MCNC: 2.1 G/DL (ref 3.5–5.2)
ALBUMIN SERPL-MCNC: 2.2 G/DL (ref 3.5–5.2)
ALBUMIN SERPL-MCNC: 2.3 G/DL (ref 3.5–5.2)
ALBUMIN SERPL-MCNC: 2.5 G/DL (ref 3.5–5.2)
ALBUMIN SERPL-MCNC: 2.5 G/DL (ref 3.5–5.2)
ALBUMIN SERPL-MCNC: 2.6 G/DL (ref 3.5–5.2)
ALBUMIN SERPL-MCNC: 2.7 G/DL (ref 3.5–5)
ALBUMIN SERPL-MCNC: 3.1 G/DL (ref 3.5–5.2)
ALBUMIN/GLOB SERPL: 0.4 G/DL
ALBUMIN/GLOB SERPL: 0.5 G/DL
ALBUMIN/GLOB SERPL: 0.6 G/DL
ALBUMIN/GLOB SERPL: 0.7 G/DL
ALBUMIN/GLOB SERPL: 0.8 G/DL
ALBUMIN/GLOB SERPL: 0.9 G/DL
ALBUMIN/GLOB SERPL: 1 G/DL
ALBUMIN/GLOB SERPL: 1.1 G/DL
ALBUMIN/GLOB SERPL: 1.1 {RATIO} (ref 1.4–2.6)
ALBUMIN/GLOB SERPL: 1.4 G/DL
ALP SERPL-CCNC: 102 U/L (ref 39–117)
ALP SERPL-CCNC: 103 U/L (ref 43–160)
ALP SERPL-CCNC: 104 U/L (ref 39–117)
ALP SERPL-CCNC: 107 U/L (ref 39–117)
ALP SERPL-CCNC: 107 U/L (ref 39–117)
ALP SERPL-CCNC: 109 U/L (ref 39–117)
ALP SERPL-CCNC: 110 U/L (ref 39–117)
ALP SERPL-CCNC: 115 U/L (ref 39–117)
ALP SERPL-CCNC: 115 U/L (ref 39–117)
ALP SERPL-CCNC: 116 U/L (ref 39–117)
ALP SERPL-CCNC: 116 U/L (ref 39–117)
ALP SERPL-CCNC: 118 U/L (ref 39–117)
ALP SERPL-CCNC: 120 U/L (ref 39–117)
ALP SERPL-CCNC: 121 U/L (ref 39–117)
ALP SERPL-CCNC: 121 U/L (ref 39–117)
ALP SERPL-CCNC: 123 U/L (ref 39–117)
ALP SERPL-CCNC: 123 U/L (ref 39–117)
ALP SERPL-CCNC: 125 U/L (ref 39–117)
ALP SERPL-CCNC: 125 U/L (ref 39–117)
ALP SERPL-CCNC: 126 U/L (ref 39–117)
ALP SERPL-CCNC: 129 U/L (ref 39–117)
ALP SERPL-CCNC: 130 U/L (ref 39–117)
ALP SERPL-CCNC: 130 U/L (ref 39–117)
ALP SERPL-CCNC: 133 U/L (ref 39–117)
ALP SERPL-CCNC: 133 U/L (ref 39–117)
ALP SERPL-CCNC: 134 U/L (ref 39–117)
ALP SERPL-CCNC: 136 U/L (ref 39–117)
ALP SERPL-CCNC: 137 U/L (ref 39–117)
ALP SERPL-CCNC: 139 U/L (ref 39–117)
ALP SERPL-CCNC: 140 U/L (ref 39–117)
ALP SERPL-CCNC: 141 U/L (ref 39–117)
ALP SERPL-CCNC: 142 U/L (ref 39–117)
ALP SERPL-CCNC: 144 U/L (ref 39–117)
ALP SERPL-CCNC: 144 U/L (ref 39–117)
ALP SERPL-CCNC: 147 U/L (ref 39–117)
ALP SERPL-CCNC: 153 U/L (ref 39–117)
ALP SERPL-CCNC: 161 U/L (ref 39–117)
ALP SERPL-CCNC: 184 U/L (ref 39–117)
ALPHA-FETOPROTEIN: 3.22 NG/ML (ref 0–8.3)
ALT SERPL W P-5'-P-CCNC: 113 U/L (ref 1–33)
ALT SERPL W P-5'-P-CCNC: 12 U/L (ref 1–33)
ALT SERPL W P-5'-P-CCNC: 14 U/L (ref 1–33)
ALT SERPL W P-5'-P-CCNC: 16 U/L (ref 1–33)
ALT SERPL W P-5'-P-CCNC: 17 U/L (ref 1–33)
ALT SERPL W P-5'-P-CCNC: 17 U/L (ref 1–33)
ALT SERPL W P-5'-P-CCNC: 19 U/L (ref 1–33)
ALT SERPL W P-5'-P-CCNC: 20 U/L (ref 1–33)
ALT SERPL W P-5'-P-CCNC: 21 U/L (ref 1–33)
ALT SERPL W P-5'-P-CCNC: 21 U/L (ref 1–33)
ALT SERPL W P-5'-P-CCNC: 22 U/L (ref 1–33)
ALT SERPL W P-5'-P-CCNC: 22 U/L (ref 1–33)
ALT SERPL W P-5'-P-CCNC: 23 U/L (ref 1–33)
ALT SERPL W P-5'-P-CCNC: 25 U/L (ref 1–33)
ALT SERPL W P-5'-P-CCNC: 25 U/L (ref 1–33)
ALT SERPL W P-5'-P-CCNC: 28 U/L (ref 1–33)
ALT SERPL W P-5'-P-CCNC: 28 U/L (ref 1–33)
ALT SERPL W P-5'-P-CCNC: 29 U/L (ref 1–33)
ALT SERPL W P-5'-P-CCNC: 30 U/L (ref 1–33)
ALT SERPL W P-5'-P-CCNC: 30 U/L (ref 1–33)
ALT SERPL W P-5'-P-CCNC: 34 U/L (ref 1–33)
ALT SERPL W P-5'-P-CCNC: 356 U/L (ref 1–33)
ALT SERPL W P-5'-P-CCNC: 36 U/L (ref 1–33)
ALT SERPL W P-5'-P-CCNC: 419 U/L (ref 1–33)
ALT SERPL W P-5'-P-CCNC: 42 U/L (ref 1–33)
ALT SERPL W P-5'-P-CCNC: 44 U/L (ref 1–33)
ALT SERPL W P-5'-P-CCNC: 45 U/L (ref 1–33)
ALT SERPL W P-5'-P-CCNC: 47 U/L (ref 1–33)
ALT SERPL W P-5'-P-CCNC: 51 U/L (ref 1–33)
ALT SERPL W P-5'-P-CCNC: 519 U/L (ref 1–33)
ALT SERPL W P-5'-P-CCNC: 53 U/L (ref 1–33)
ALT SERPL W P-5'-P-CCNC: 54 U/L (ref 1–33)
ALT SERPL W P-5'-P-CCNC: 54 U/L (ref 1–33)
ALT SERPL W P-5'-P-CCNC: 55 U/L (ref 1–33)
ALT SERPL W P-5'-P-CCNC: 566 U/L (ref 1–33)
ALT SERPL W P-5'-P-CCNC: 64 U/L (ref 1–33)
ALT SERPL W P-5'-P-CCNC: 66 U/L (ref 1–33)
ALT SERPL W P-5'-P-CCNC: 67 U/L (ref 1–33)
ALT SERPL W P-5'-P-CCNC: 70 U/L (ref 1–33)
ALT SERPL-CCNC: 20 U/L (ref 10–40)
AMMONIA BLD-SCNC: 24 UMOL/L (ref 11–51)
AMMONIA BLD-SCNC: 25 UMOL/L (ref 11–51)
AMMONIA BLD-SCNC: 71 UMOL/L (ref 11–51)
AMMONIA BLD-SCNC: 78 UMOL/L (ref 11–51)
ANION GAP SERPL CALC-SCNC: 14 MMOL/L (ref 8–19)
ANION GAP SERPL CALCULATED.3IONS-SCNC: 10.7 MMOL/L (ref 5–15)
ANION GAP SERPL CALCULATED.3IONS-SCNC: 11.1 MMOL/L (ref 5–15)
ANION GAP SERPL CALCULATED.3IONS-SCNC: 11.3 MMOL/L (ref 5–15)
ANION GAP SERPL CALCULATED.3IONS-SCNC: 12.1 MMOL/L (ref 5–15)
ANION GAP SERPL CALCULATED.3IONS-SCNC: 13 MMOL/L (ref 5–15)
ANION GAP SERPL CALCULATED.3IONS-SCNC: 13.8 MMOL/L (ref 5–15)
ANION GAP SERPL CALCULATED.3IONS-SCNC: 14.1 MMOL/L (ref 5–15)
ANION GAP SERPL CALCULATED.3IONS-SCNC: 15.7 MMOL/L (ref 5–15)
ANION GAP SERPL CALCULATED.3IONS-SCNC: 17.5 MMOL/L (ref 5–15)
ANION GAP SERPL CALCULATED.3IONS-SCNC: 18.4 MMOL/L (ref 5–15)
ANION GAP SERPL CALCULATED.3IONS-SCNC: 19.2 MMOL/L (ref 5–15)
ANION GAP SERPL CALCULATED.3IONS-SCNC: 19.7 MMOL/L (ref 5–15)
ANION GAP SERPL CALCULATED.3IONS-SCNC: 2 MMOL/L (ref 5–15)
ANION GAP SERPL CALCULATED.3IONS-SCNC: 2 MMOL/L (ref 5–15)
ANION GAP SERPL CALCULATED.3IONS-SCNC: 2.9 MMOL/L (ref 5–15)
ANION GAP SERPL CALCULATED.3IONS-SCNC: 3.1 MMOL/L (ref 5–15)
ANION GAP SERPL CALCULATED.3IONS-SCNC: 3.3 MMOL/L (ref 5–15)
ANION GAP SERPL CALCULATED.3IONS-SCNC: 3.4 MMOL/L (ref 5–15)
ANION GAP SERPL CALCULATED.3IONS-SCNC: 3.6 MMOL/L (ref 5–15)
ANION GAP SERPL CALCULATED.3IONS-SCNC: 3.6 MMOL/L (ref 5–15)
ANION GAP SERPL CALCULATED.3IONS-SCNC: 3.8 MMOL/L (ref 5–15)
ANION GAP SERPL CALCULATED.3IONS-SCNC: 3.9 MMOL/L (ref 5–15)
ANION GAP SERPL CALCULATED.3IONS-SCNC: 4 MMOL/L (ref 5–15)
ANION GAP SERPL CALCULATED.3IONS-SCNC: 4.2 MMOL/L (ref 5–15)
ANION GAP SERPL CALCULATED.3IONS-SCNC: 4.9 MMOL/L (ref 5–15)
ANION GAP SERPL CALCULATED.3IONS-SCNC: 5.3 MMOL/L (ref 5–15)
ANION GAP SERPL CALCULATED.3IONS-SCNC: 5.4 MMOL/L (ref 5–15)
ANION GAP SERPL CALCULATED.3IONS-SCNC: 5.6 MMOL/L (ref 5–15)
ANION GAP SERPL CALCULATED.3IONS-SCNC: 5.8 MMOL/L (ref 5–15)
ANION GAP SERPL CALCULATED.3IONS-SCNC: 5.8 MMOL/L (ref 5–15)
ANION GAP SERPL CALCULATED.3IONS-SCNC: 6.1 MMOL/L (ref 5–15)
ANION GAP SERPL CALCULATED.3IONS-SCNC: 6.2 MMOL/L (ref 5–15)
ANION GAP SERPL CALCULATED.3IONS-SCNC: 6.3 MMOL/L (ref 5–15)
ANION GAP SERPL CALCULATED.3IONS-SCNC: 6.5 MMOL/L (ref 5–15)
ANION GAP SERPL CALCULATED.3IONS-SCNC: 6.5 MMOL/L (ref 5–15)
ANION GAP SERPL CALCULATED.3IONS-SCNC: 6.8 MMOL/L (ref 5–15)
ANION GAP SERPL CALCULATED.3IONS-SCNC: 6.9 MMOL/L (ref 5–15)
ANION GAP SERPL CALCULATED.3IONS-SCNC: 7 MMOL/L (ref 5–15)
ANION GAP SERPL CALCULATED.3IONS-SCNC: 7.5 MMOL/L (ref 5–15)
ANION GAP SERPL CALCULATED.3IONS-SCNC: 7.8 MMOL/L (ref 5–15)
ANION GAP SERPL CALCULATED.3IONS-SCNC: 7.9 MMOL/L (ref 5–15)
ANION GAP SERPL CALCULATED.3IONS-SCNC: 8.1 MMOL/L (ref 5–15)
ANION GAP SERPL CALCULATED.3IONS-SCNC: 8.3 MMOL/L (ref 5–15)
ANION GAP SERPL CALCULATED.3IONS-SCNC: 8.6 MMOL/L (ref 5–15)
ANION GAP SERPL CALCULATED.3IONS-SCNC: 9.2 MMOL/L (ref 5–15)
ANISOCYTOSIS BLD QL: ABNORMAL
ANISOCYTOSIS BLD QL: NORMAL
APTT PPP: 22.9 SECONDS (ref 22.2–34.2)
APTT PPP: 28.9 SECONDS (ref 22.2–34.2)
ARTERIAL PATENCY WRIST A: POSITIVE
AST SERPL-CCNC: 1033 U/L (ref 1–32)
AST SERPL-CCNC: 1808 U/L (ref 1–32)
AST SERPL-CCNC: 20 U/L (ref 1–32)
AST SERPL-CCNC: 2054 U/L (ref 1–32)
AST SERPL-CCNC: 21 U/L (ref 1–32)
AST SERPL-CCNC: 24 U/L (ref 1–32)
AST SERPL-CCNC: 24 U/L (ref 1–32)
AST SERPL-CCNC: 25 U/L (ref 1–32)
AST SERPL-CCNC: 29 U/L (ref 15–50)
AST SERPL-CCNC: 30 U/L (ref 1–32)
AST SERPL-CCNC: 31 U/L (ref 1–32)
AST SERPL-CCNC: 32 U/L (ref 1–32)
AST SERPL-CCNC: 33 U/L (ref 1–32)
AST SERPL-CCNC: 33 U/L (ref 1–32)
AST SERPL-CCNC: 34 U/L (ref 1–32)
AST SERPL-CCNC: 36 U/L (ref 1–32)
AST SERPL-CCNC: 37 U/L (ref 1–32)
AST SERPL-CCNC: 38 U/L (ref 1–32)
AST SERPL-CCNC: 39 U/L (ref 1–32)
AST SERPL-CCNC: 40 U/L (ref 1–32)
AST SERPL-CCNC: 40 U/L (ref 1–32)
AST SERPL-CCNC: 41 U/L (ref 1–32)
AST SERPL-CCNC: 412 U/L (ref 1–32)
AST SERPL-CCNC: 45 U/L (ref 1–32)
AST SERPL-CCNC: 46 U/L (ref 1–32)
AST SERPL-CCNC: 51 U/L (ref 1–32)
AST SERPL-CCNC: 51 U/L (ref 1–32)
AST SERPL-CCNC: 542 U/L (ref 1–32)
AST SERPL-CCNC: 56 U/L (ref 1–32)
AST SERPL-CCNC: 61 U/L (ref 1–32)
AST SERPL-CCNC: 64 U/L (ref 1–32)
AST SERPL-CCNC: 66 U/L (ref 1–32)
AST SERPL-CCNC: 67 U/L (ref 1–32)
AST SERPL-CCNC: 71 U/L (ref 1–32)
AST SERPL-CCNC: 83 U/L (ref 1–32)
BACTERIA SPEC AEROBE CULT: NO GROWTH
BACTERIA SPEC AEROBE CULT: NORMAL
BACTERIA SPEC RESP CULT: ABNORMAL
BACTERIA SPEC RESP CULT: NORMAL
BACTERIA UR QL AUTO: ABNORMAL /HPF
BASE EXCESS BLDA CALC-SCNC: -10.4 MMOL/L (ref -2–2)
BASE EXCESS BLDA CALC-SCNC: -14 MMOL/L (ref -2–2)
BASE EXCESS BLDA CALC-SCNC: -16.1 MMOL/L (ref -2–2)
BASE EXCESS BLDA CALC-SCNC: -17.2 MMOL/L (ref -2–2)
BASE EXCESS BLDA CALC-SCNC: -17.9 MMOL/L (ref -2–2)
BASE EXCESS BLDA CALC-SCNC: -18 MMOL/L (ref -2–2)
BASE EXCESS BLDA CALC-SCNC: -21.1 MMOL/L (ref -2–2)
BASE EXCESS BLDA CALC-SCNC: -4.8 MMOL/L (ref -2–2)
BASE EXCESS BLDA CALC-SCNC: -7.8 MMOL/L (ref -2–2)
BASE EXCESS BLDA CALC-SCNC: -8.2 MMOL/L (ref -2–2)
BASE EXCESS BLDA CALC-SCNC: 0.6 MMOL/L (ref -2–2)
BASE EXCESS BLDA CALC-SCNC: 7 MMOL/L (ref -2–2)
BASE EXCESS BLDA CALC-SCNC: 7.8 MMOL/L (ref -2–2)
BASOPHILS # BLD AUTO: 0 10*3/MM3 (ref 0–0.2)
BASOPHILS # BLD AUTO: 0.01 10*3/MM3 (ref 0–0.2)
BASOPHILS # BLD AUTO: 0.02 10*3/MM3 (ref 0–0.2)
BASOPHILS # BLD AUTO: 0.03 10*3/MM3 (ref 0–0.2)
BASOPHILS # BLD AUTO: 0.03 10*3/MM3 (ref 0–0.2)
BASOPHILS # BLD AUTO: 0.06 10*3/MM3 (ref 0–0.2)
BASOPHILS NFR BLD AUTO: 0 % (ref 0–1.5)
BASOPHILS NFR BLD AUTO: 0.1 % (ref 0–1.5)
BASOPHILS NFR BLD AUTO: 0.2 % (ref 0–1.5)
BASOPHILS NFR BLD AUTO: 0.3 % (ref 0–1.5)
BASOPHILS NFR BLD AUTO: 0.3 % (ref 0–1.5)
BASOPHILS NFR BLD AUTO: 0.4 % (ref 0–1.5)
BASOPHILS NFR BLD AUTO: 0.5 % (ref 0–1.5)
BASOPHILS NFR BLD AUTO: 0.8 % (ref 0–1.5)
BASOPHILS NFR BLD AUTO: 0.9 % (ref 0–1.5)
BDY SITE: ABNORMAL
BH CV ECHO MEAS - AO MAX PG: 35 MMHG
BH CV ECHO MEAS - AO MEAN PG: 22 MMHG
BH CV ECHO MEAS - AO ROOT DIAM: 2.9 CM
BH CV ECHO MEAS - AO V2 MAX: 297 CM/SEC
BH CV ECHO MEAS - AO V2 VTI: 42.3 CM
BH CV ECHO MEAS - AVA PLANIMETRY TRACED: 2.5 CM2
BH CV ECHO MEAS - EDV(MOD-SP2): 108 ML
BH CV ECHO MEAS - EDV(MOD-SP4): 97 ML
BH CV ECHO MEAS - EF(MOD-BP): 60.5 %
BH CV ECHO MEAS - ESV(MOD-SP2): 40 ML
BH CV ECHO MEAS - ESV(MOD-SP4): 38 ML
BH CV ECHO MEAS - IVSD: 1 CM
BH CV ECHO MEAS - LA DIMENSION(2D): 3.3 CM
BH CV ECHO MEAS - LAT PEAK E' VEL: 7.8 CM/SEC
BH CV ECHO MEAS - LV MAX PG: 17 MMHG
BH CV ECHO MEAS - LV MEAN PG: 8 MMHG
BH CV ECHO MEAS - LV V1 MAX: 204 CM/SEC
BH CV ECHO MEAS - LV V1 VTI: 33 CM
BH CV ECHO MEAS - LVIDD: 4.5 CM
BH CV ECHO MEAS - LVIDS: 2.1 CM
BH CV ECHO MEAS - LVOT DIAM: 2 CM
BH CV ECHO MEAS - LVPWD: 1.1 CM
BH CV ECHO MEAS - MED PEAK E' VEL: 3.94 CM/SEC
BH CV ECHO MEAS - MV A MAX VEL: 159 CM/SEC
BH CV ECHO MEAS - MV DEC TIME: 236 MSEC
BH CV ECHO MEAS - MV E MAX VEL: 99 CM/SEC
BH CV ECHO MEAS - MV E/A: 0.6
BH CV ECHO MEAS - RVDD: 3.4 CM
BH CV ECHO MEAS - TR MAX PG: 77 MMHG
BH CV ECHO MEASUREMENTS AVERAGE E/E' RATIO: 16.87
BH CV LOWER VASCULAR LEFT COMMON FEMORAL AUGMENT: NORMAL
BH CV LOWER VASCULAR LEFT COMMON FEMORAL COMPETENT: NORMAL
BH CV LOWER VASCULAR LEFT COMMON FEMORAL COMPRESS: NORMAL
BH CV LOWER VASCULAR LEFT COMMON FEMORAL PHASIC: NORMAL
BH CV LOWER VASCULAR LEFT COMMON FEMORAL SPONT: NORMAL
BH CV LOWER VASCULAR LEFT DISTAL FEMORAL COMPRESS: NORMAL
BH CV LOWER VASCULAR LEFT GREATER SAPH AK COMPRESS: NORMAL
BH CV LOWER VASCULAR LEFT GREATER SAPH BK COMPRESS: NORMAL
BH CV LOWER VASCULAR LEFT LESSER SAPH COMPRESS: NORMAL
BH CV LOWER VASCULAR LEFT MID FEMORAL AUGMENT: NORMAL
BH CV LOWER VASCULAR LEFT MID FEMORAL COMPETENT: NORMAL
BH CV LOWER VASCULAR LEFT MID FEMORAL COMPRESS: NORMAL
BH CV LOWER VASCULAR LEFT MID FEMORAL PHASIC: NORMAL
BH CV LOWER VASCULAR LEFT MID FEMORAL SPONT: NORMAL
BH CV LOWER VASCULAR LEFT PERONEAL COMPRESS: NORMAL
BH CV LOWER VASCULAR LEFT POPLITEAL AUGMENT: NORMAL
BH CV LOWER VASCULAR LEFT POPLITEAL COMPETENT: NORMAL
BH CV LOWER VASCULAR LEFT POPLITEAL COMPRESS: NORMAL
BH CV LOWER VASCULAR LEFT POPLITEAL PHASIC: NORMAL
BH CV LOWER VASCULAR LEFT POPLITEAL SPONT: NORMAL
BH CV LOWER VASCULAR LEFT POSTERIOR TIBIAL COMPRESS: NORMAL
BH CV LOWER VASCULAR LEFT PROXIMAL FEMORAL COMPRESS: NORMAL
BH CV LOWER VASCULAR LEFT SAPHENOFEMORAL JUNCTION COMPRESS: NORMAL
BH CV LOWER VASCULAR RIGHT COMMON FEMORAL AUGMENT: NORMAL
BH CV LOWER VASCULAR RIGHT COMMON FEMORAL COMPETENT: NORMAL
BH CV LOWER VASCULAR RIGHT COMMON FEMORAL COMPRESS: NORMAL
BH CV LOWER VASCULAR RIGHT COMMON FEMORAL PHASIC: NORMAL
BH CV LOWER VASCULAR RIGHT COMMON FEMORAL SPONT: NORMAL
BH CV LOWER VASCULAR RIGHT DISTAL FEMORAL COMPRESS: NORMAL
BH CV LOWER VASCULAR RIGHT GREATER SAPH AK COMPRESS: NORMAL
BH CV LOWER VASCULAR RIGHT GREATER SAPH BK COMPRESS: NORMAL
BH CV LOWER VASCULAR RIGHT LESSER SAPH COMPRESS: NORMAL
BH CV LOWER VASCULAR RIGHT MID FEMORAL AUGMENT: NORMAL
BH CV LOWER VASCULAR RIGHT MID FEMORAL COMPETENT: NORMAL
BH CV LOWER VASCULAR RIGHT MID FEMORAL COMPRESS: NORMAL
BH CV LOWER VASCULAR RIGHT MID FEMORAL PHASIC: NORMAL
BH CV LOWER VASCULAR RIGHT MID FEMORAL SPONT: NORMAL
BH CV LOWER VASCULAR RIGHT PERONEAL COMPRESS: NORMAL
BH CV LOWER VASCULAR RIGHT POPLITEAL AUGMENT: NORMAL
BH CV LOWER VASCULAR RIGHT POPLITEAL COMPETENT: NORMAL
BH CV LOWER VASCULAR RIGHT POPLITEAL COMPRESS: NORMAL
BH CV LOWER VASCULAR RIGHT POPLITEAL PHASIC: NORMAL
BH CV LOWER VASCULAR RIGHT POPLITEAL SPONT: NORMAL
BH CV LOWER VASCULAR RIGHT POSTERIOR TIBIAL COMPRESS: NORMAL
BH CV LOWER VASCULAR RIGHT PROXIMAL FEMORAL COMPRESS: NORMAL
BH CV LOWER VASCULAR RIGHT SAPHENOFEMORAL JUNCTION COMPRESS: NORMAL
BILIRUB CONJ SERPL-MCNC: 0.2 MG/DL (ref 0–0.3)
BILIRUB CONJ SERPL-MCNC: 0.2 MG/DL (ref 0–0.3)
BILIRUB CONJ SERPL-MCNC: 0.3 MG/DL (ref 0–0.3)
BILIRUB CONJ SERPL-MCNC: 0.3 MG/DL (ref 0–0.3)
BILIRUB CONJ SERPL-MCNC: 0.4 MG/DL (ref 0–0.3)
BILIRUB CONJ SERPL-MCNC: 0.4 MG/DL (ref 0–0.3)
BILIRUB INDIRECT SERPL-MCNC: 1 MG/DL
BILIRUB INDIRECT SERPL-MCNC: 1.1 MG/DL
BILIRUB SERPL-MCNC: 0.6 MG/DL (ref 0–1.2)
BILIRUB SERPL-MCNC: 0.7 MG/DL (ref 0–1.2)
BILIRUB SERPL-MCNC: 0.8 MG/DL (ref 0–1.2)
BILIRUB SERPL-MCNC: 0.9 MG/DL (ref 0–1.2)
BILIRUB SERPL-MCNC: 0.9 MG/DL (ref 0–1.2)
BILIRUB SERPL-MCNC: 1 MG/DL (ref 0–1.2)
BILIRUB SERPL-MCNC: 1 MG/DL (ref 0–1.2)
BILIRUB SERPL-MCNC: 1.1 MG/DL (ref 0–1.2)
BILIRUB SERPL-MCNC: 1.2 MG/DL (ref 0–1.2)
BILIRUB SERPL-MCNC: 1.2 MG/DL (ref 0–1.2)
BILIRUB SERPL-MCNC: 1.4 MG/DL (ref 0–1.2)
BILIRUB SERPL-MCNC: 1.5 MG/DL (ref 0–1.2)
BILIRUB SERPL-MCNC: 1.6 MG/DL (ref 0–1.2)
BILIRUB SERPL-MCNC: 1.8 MG/DL (ref 0–1.2)
BILIRUB SERPL-MCNC: 1.9 MG/DL (ref 0–1.2)
BILIRUB SERPL-MCNC: 2 MG/DL (ref 0–1.2)
BILIRUB SERPL-MCNC: 2.1 MG/DL (ref 0–1.2)
BILIRUB SERPL-MCNC: 2.12 MG/DL (ref 0.2–1.3)
BILIRUB SERPL-MCNC: 2.3 MG/DL (ref 0–1.2)
BILIRUB SERPL-MCNC: 2.4 MG/DL (ref 0–1.2)
BILIRUB SERPL-MCNC: 2.5 MG/DL (ref 0–1.2)
BILIRUB SERPL-MCNC: 2.5 MG/DL (ref 0–1.2)
BILIRUB SERPL-MCNC: 2.6 MG/DL (ref 0–1.2)
BILIRUB SERPL-MCNC: 2.6 MG/DL (ref 0–1.2)
BILIRUB SERPL-MCNC: 3 MG/DL (ref 0–1.2)
BILIRUB SERPL-MCNC: 3.1 MG/DL (ref 0–1.2)
BILIRUB SERPL-MCNC: 3.8 MG/DL (ref 0–1.2)
BILIRUB UR QL STRIP: ABNORMAL
BILIRUB UR QL STRIP: NEGATIVE
BLD GP AB SCN SERPL QL: NEGATIVE
BUN SERPL-MCNC: 103 MG/DL (ref 8–23)
BUN SERPL-MCNC: 110 MG/DL (ref 8–23)
BUN SERPL-MCNC: 12 MG/DL (ref 8–23)
BUN SERPL-MCNC: 120 MG/DL (ref 8–23)
BUN SERPL-MCNC: 128 MG/DL (ref 8–23)
BUN SERPL-MCNC: 132 MG/DL (ref 8–23)
BUN SERPL-MCNC: 135 MG/DL (ref 8–23)
BUN SERPL-MCNC: 14 MG/DL (ref 8–23)
BUN SERPL-MCNC: 16 MG/DL (ref 8–23)
BUN SERPL-MCNC: 19 MG/DL (ref 8–23)
BUN SERPL-MCNC: 21 MG/DL (ref 8–23)
BUN SERPL-MCNC: 21 MG/DL (ref 8–23)
BUN SERPL-MCNC: 23 MG/DL (ref 8–23)
BUN SERPL-MCNC: 24 MG/DL (ref 5–25)
BUN SERPL-MCNC: 24 MG/DL (ref 8–23)
BUN SERPL-MCNC: 25 MG/DL (ref 8–23)
BUN SERPL-MCNC: 26 MG/DL (ref 8–23)
BUN SERPL-MCNC: 27 MG/DL (ref 8–23)
BUN SERPL-MCNC: 27 MG/DL (ref 8–23)
BUN SERPL-MCNC: 28 MG/DL (ref 8–23)
BUN SERPL-MCNC: 29 MG/DL (ref 8–23)
BUN SERPL-MCNC: 30 MG/DL (ref 8–23)
BUN SERPL-MCNC: 32 MG/DL (ref 8–23)
BUN SERPL-MCNC: 36 MG/DL (ref 8–23)
BUN SERPL-MCNC: 38 MG/DL (ref 8–23)
BUN SERPL-MCNC: 42 MG/DL (ref 8–23)
BUN SERPL-MCNC: 43 MG/DL (ref 8–23)
BUN SERPL-MCNC: 45 MG/DL (ref 8–23)
BUN SERPL-MCNC: 46 MG/DL (ref 8–23)
BUN SERPL-MCNC: 46 MG/DL (ref 8–23)
BUN SERPL-MCNC: 47 MG/DL (ref 8–23)
BUN SERPL-MCNC: 47 MG/DL (ref 8–23)
BUN SERPL-MCNC: 48 MG/DL (ref 8–23)
BUN SERPL-MCNC: 49 MG/DL (ref 8–23)
BUN SERPL-MCNC: 50 MG/DL (ref 8–23)
BUN SERPL-MCNC: 61 MG/DL (ref 8–23)
BUN SERPL-MCNC: 68 MG/DL (ref 8–23)
BUN SERPL-MCNC: 78 MG/DL (ref 8–23)
BUN SERPL-MCNC: 83 MG/DL (ref 8–23)
BUN/CREAT SERPL: 102.3 (ref 7–25)
BUN/CREAT SERPL: 114.6 (ref 7–25)
BUN/CREAT SERPL: 20.7 (ref 7–25)
BUN/CREAT SERPL: 23.7 (ref 7–25)
BUN/CREAT SERPL: 26.1 (ref 7–25)
BUN/CREAT SERPL: 26.2 (ref 7–25)
BUN/CREAT SERPL: 27.5 (ref 7–25)
BUN/CREAT SERPL: 30 {RATIO} (ref 6–20)
BUN/CREAT SERPL: 31.7 (ref 7–25)
BUN/CREAT SERPL: 31.9 (ref 7–25)
BUN/CREAT SERPL: 32.9 (ref 7–25)
BUN/CREAT SERPL: 33.8 (ref 7–25)
BUN/CREAT SERPL: 33.9 (ref 7–25)
BUN/CREAT SERPL: 34.4 (ref 7–25)
BUN/CREAT SERPL: 34.5 (ref 7–25)
BUN/CREAT SERPL: 35.1 (ref 7–25)
BUN/CREAT SERPL: 35.9 (ref 7–25)
BUN/CREAT SERPL: 36.2 (ref 7–25)
BUN/CREAT SERPL: 36.6 (ref 7–25)
BUN/CREAT SERPL: 37.3 (ref 7–25)
BUN/CREAT SERPL: 39 (ref 7–25)
BUN/CREAT SERPL: 39.3 (ref 7–25)
BUN/CREAT SERPL: 39.7 (ref 7–25)
BUN/CREAT SERPL: 40.4 (ref 7–25)
BUN/CREAT SERPL: 41.7 (ref 7–25)
BUN/CREAT SERPL: 43.5 (ref 7–25)
BUN/CREAT SERPL: 45.5 (ref 7–25)
BUN/CREAT SERPL: 46.9 (ref 7–25)
BUN/CREAT SERPL: 47.4 (ref 7–25)
BUN/CREAT SERPL: 52 (ref 7–25)
BUN/CREAT SERPL: 53.8 (ref 7–25)
BUN/CREAT SERPL: 56.3 (ref 7–25)
BUN/CREAT SERPL: 58.5 (ref 7–25)
BUN/CREAT SERPL: 59.5 (ref 7–25)
BUN/CREAT SERPL: 62.5 (ref 7–25)
BUN/CREAT SERPL: 63.3 (ref 7–25)
BUN/CREAT SERPL: 65.3 (ref 7–25)
BUN/CREAT SERPL: 69.1 (ref 7–25)
BUN/CREAT SERPL: 76.3 (ref 7–25)
BUN/CREAT SERPL: 77.4 (ref 7–25)
BUN/CREAT SERPL: 77.6 (ref 7–25)
BUN/CREAT SERPL: 77.8 (ref 7–25)
BUN/CREAT SERPL: 79.3 (ref 7–25)
BUN/CREAT SERPL: 79.6 (ref 7–25)
BUN/CREAT SERPL: 80.4 (ref 7–25)
BUN/CREAT SERPL: 84 (ref 7–25)
BUN/CREAT SERPL: 87 (ref 7–25)
BUN/CREAT SERPL: 87.7 (ref 7–25)
BUN/CREAT SERPL: 90.5 (ref 7–25)
BUN/CREAT SERPL: 93.9 (ref 7–25)
CA-I BLDA-SCNC: 1.09 MMOL/L (ref 1.13–1.32)
CA-I BLDA-SCNC: 1.09 MMOL/L (ref 1.13–1.32)
CA-I BLDA-SCNC: 1.1 MMOL/L (ref 1.13–1.32)
CA-I BLDA-SCNC: 1.11 MMOL/L (ref 1.13–1.32)
CA-I BLDA-SCNC: 1.12 MMOL/L (ref 1.13–1.32)
CA-I BLDA-SCNC: 1.13 MMOL/L (ref 1.13–1.32)
CA-I BLDA-SCNC: 1.13 MMOL/L (ref 1.13–1.32)
CA-I BLDA-SCNC: 1.14 MMOL/L (ref 1.13–1.32)
CA-I BLDA-SCNC: 1.19 MMOL/L (ref 1.13–1.32)
CALCIUM SERPL-MCNC: 10.6 MG/DL (ref 8.7–10.4)
CALCIUM SPEC-SCNC: 7.7 MG/DL (ref 8.6–10.5)
CALCIUM SPEC-SCNC: 7.8 MG/DL (ref 8.6–10.5)
CALCIUM SPEC-SCNC: 7.9 MG/DL (ref 8.6–10.5)
CALCIUM SPEC-SCNC: 8 MG/DL (ref 8.6–10.5)
CALCIUM SPEC-SCNC: 8.1 MG/DL (ref 8.6–10.5)
CALCIUM SPEC-SCNC: 8.2 MG/DL (ref 8.6–10.5)
CALCIUM SPEC-SCNC: 8.3 MG/DL (ref 8.6–10.5)
CALCIUM SPEC-SCNC: 8.4 MG/DL (ref 8.6–10.5)
CALCIUM SPEC-SCNC: 8.5 MG/DL (ref 8.6–10.5)
CALCIUM SPEC-SCNC: 8.5 MG/DL (ref 8.6–10.5)
CALCIUM SPEC-SCNC: 8.6 MG/DL (ref 8.6–10.5)
CALCIUM SPEC-SCNC: 8.7 MG/DL (ref 8.6–10.5)
CALCIUM SPEC-SCNC: 8.8 MG/DL (ref 8.6–10.5)
CALCIUM SPEC-SCNC: 8.9 MG/DL (ref 8.6–10.5)
CALCIUM SPEC-SCNC: 9 MG/DL (ref 8.6–10.5)
CALCIUM SPEC-SCNC: 9.1 MG/DL (ref 8.6–10.5)
CALCIUM SPEC-SCNC: 9.1 MG/DL (ref 8.6–10.5)
CALCIUM SPEC-SCNC: 9.2 MG/DL (ref 8.6–10.5)
CALCIUM SPEC-SCNC: 9.3 MG/DL (ref 8.6–10.5)
CALCIUM SPEC-SCNC: 9.4 MG/DL (ref 8.6–10.5)
CALCIUM SPEC-SCNC: 9.5 MG/DL (ref 8.6–10.5)
CHLORIDE BLDA-SCNC: 100 MMOL/L (ref 98–106)
CHLORIDE BLDA-SCNC: 101 MMOL/L (ref 98–106)
CHLORIDE BLDA-SCNC: 101 MMOL/L (ref 98–106)
CHLORIDE BLDA-SCNC: 102 MMOL/L (ref 98–106)
CHLORIDE BLDA-SCNC: 102 MMOL/L (ref 98–106)
CHLORIDE BLDA-SCNC: 103 MMOL/L (ref 98–106)
CHLORIDE BLDA-SCNC: 104 MMOL/L (ref 98–106)
CHLORIDE SERPL-SCNC: 100 MMOL/L (ref 98–107)
CHLORIDE SERPL-SCNC: 101 MMOL/L (ref 98–107)
CHLORIDE SERPL-SCNC: 102 MMOL/L (ref 98–107)
CHLORIDE SERPL-SCNC: 102 MMOL/L (ref 98–107)
CHLORIDE SERPL-SCNC: 103 MMOL/L (ref 98–107)
CHLORIDE SERPL-SCNC: 104 MMOL/L (ref 98–107)
CHLORIDE SERPL-SCNC: 105 MMOL/L (ref 98–107)
CHLORIDE SERPL-SCNC: 105 MMOL/L (ref 98–107)
CHLORIDE SERPL-SCNC: 106 MMOL/L (ref 98–107)
CHLORIDE SERPL-SCNC: 106 MMOL/L (ref 98–107)
CHLORIDE SERPL-SCNC: 107 MMOL/L (ref 98–107)
CHLORIDE SERPL-SCNC: 107 MMOL/L (ref 98–107)
CHLORIDE SERPL-SCNC: 93 MMOL/L (ref 98–107)
CHLORIDE SERPL-SCNC: 94 MMOL/L (ref 98–107)
CHLORIDE SERPL-SCNC: 95 MMOL/L (ref 98–107)
CHLORIDE SERPL-SCNC: 95 MMOL/L (ref 99–111)
CHLORIDE SERPL-SCNC: 97 MMOL/L (ref 98–107)
CHLORIDE SERPL-SCNC: 98 MMOL/L (ref 98–107)
CHLORIDE SERPL-SCNC: 99 MMOL/L (ref 98–107)
CHOLEST SERPL-MCNC: 69 MG/DL (ref 0–200)
CK SERPL-CCNC: 150 U/L (ref 20–180)
CK SERPL-CCNC: 71 U/L (ref 20–180)
CK SERPL-CCNC: 86 U/L (ref 20–180)
CK SERPL-CCNC: 92 U/L (ref 20–180)
CK SERPL-CCNC: 93 U/L (ref 20–180)
CLARITY UR: ABNORMAL
CLARITY UR: CLEAR
CO2 SERPL-SCNC: 12.8 MMOL/L (ref 22–29)
CO2 SERPL-SCNC: 13.3 MMOL/L (ref 22–29)
CO2 SERPL-SCNC: 15.9 MMOL/L (ref 22–29)
CO2 SERPL-SCNC: 17.3 MMOL/L (ref 22–29)
CO2 SERPL-SCNC: 17.6 MMOL/L (ref 22–29)
CO2 SERPL-SCNC: 18 MMOL/L (ref 22–29)
CO2 SERPL-SCNC: 18.9 MMOL/L (ref 22–29)
CO2 SERPL-SCNC: 19.8 MMOL/L (ref 22–29)
CO2 SERPL-SCNC: 19.8 MMOL/L (ref 22–29)
CO2 SERPL-SCNC: 20.2 MMOL/L (ref 22–29)
CO2 SERPL-SCNC: 20.9 MMOL/L (ref 22–29)
CO2 SERPL-SCNC: 21.8 MMOL/L (ref 22–29)
CO2 SERPL-SCNC: 22.4 MMOL/L (ref 22–29)
CO2 SERPL-SCNC: 22.5 MMOL/L (ref 22–29)
CO2 SERPL-SCNC: 24.5 MMOL/L (ref 22–29)
CO2 SERPL-SCNC: 25.2 MMOL/L (ref 22–29)
CO2 SERPL-SCNC: 25.3 MMOL/L (ref 22–29)
CO2 SERPL-SCNC: 25.7 MMOL/L (ref 22–29)
CO2 SERPL-SCNC: 26 MMOL/L (ref 22–29)
CO2 SERPL-SCNC: 26.1 MMOL/L (ref 22–29)
CO2 SERPL-SCNC: 26.2 MMOL/L (ref 22–29)
CO2 SERPL-SCNC: 26.4 MMOL/L (ref 22–29)
CO2 SERPL-SCNC: 26.7 MMOL/L (ref 22–29)
CO2 SERPL-SCNC: 27.6 MMOL/L (ref 22–29)
CO2 SERPL-SCNC: 28.7 MMOL/L (ref 22–29)
CO2 SERPL-SCNC: 28.8 MMOL/L (ref 22–29)
CO2 SERPL-SCNC: 28.9 MMOL/L (ref 22–29)
CO2 SERPL-SCNC: 29 MMOL/L (ref 22–29)
CO2 SERPL-SCNC: 29.2 MMOL/L (ref 22–29)
CO2 SERPL-SCNC: 29.6 MMOL/L (ref 22–29)
CO2 SERPL-SCNC: 29.7 MMOL/L (ref 22–29)
CO2 SERPL-SCNC: 29.9 MMOL/L (ref 22–29)
CO2 SERPL-SCNC: 30.1 MMOL/L (ref 22–29)
CO2 SERPL-SCNC: 30.1 MMOL/L (ref 22–29)
CO2 SERPL-SCNC: 30.2 MMOL/L (ref 22–29)
CO2 SERPL-SCNC: 30.4 MMOL/L (ref 22–29)
CO2 SERPL-SCNC: 30.4 MMOL/L (ref 22–29)
CO2 SERPL-SCNC: 30.5 MMOL/L (ref 22–29)
CO2 SERPL-SCNC: 30.5 MMOL/L (ref 22–29)
CO2 SERPL-SCNC: 30.7 MMOL/L (ref 22–29)
CO2 SERPL-SCNC: 30.8 MMOL/L (ref 22–29)
CO2 SERPL-SCNC: 31.1 MMOL/L (ref 22–29)
CO2 SERPL-SCNC: 31.2 MMOL/L (ref 22–29)
CO2 SERPL-SCNC: 31.7 MMOL/L (ref 22–29)
CO2 SERPL-SCNC: 31.9 MMOL/L (ref 22–29)
CO2 SERPL-SCNC: 32.7 MMOL/L (ref 22–29)
CO2 SERPL-SCNC: 33.1 MMOL/L (ref 22–29)
COHGB MFR BLD: 0.1 % (ref 0–1.5)
COHGB MFR BLD: 0.1 % (ref 0–1.5)
COHGB MFR BLD: 0.2 % (ref 0–1.5)
COHGB MFR BLD: 0.2 % (ref 0–1.5)
COHGB MFR BLD: 0.3 % (ref 0–1.5)
COHGB MFR BLD: 0.8 % (ref 0–1.5)
COHGB MFR BLD: 1.2 % (ref 0–1.5)
COHGB MFR BLD: 1.4 % (ref 0–1.5)
COLOR UR: ABNORMAL
COLOR UR: ABNORMAL
COLOR UR: YELLOW
COLOR UR: YELLOW
CONV CO2: 30 MMOL/L (ref 22–32)
CONV TOTAL PROTEIN: 5.2 G/DL (ref 6.3–8.2)
CREAT SERPL-MCNC: 0.41 MG/DL (ref 0.57–1)
CREAT SERPL-MCNC: 0.41 MG/DL (ref 0.57–1)
CREAT SERPL-MCNC: 0.42 MG/DL (ref 0.57–1)
CREAT SERPL-MCNC: 0.42 MG/DL (ref 0.57–1)
CREAT SERPL-MCNC: 0.44 MG/DL (ref 0.57–1)
CREAT SERPL-MCNC: 0.48 MG/DL (ref 0.57–1)
CREAT SERPL-MCNC: 0.48 MG/DL (ref 0.57–1)
CREAT SERPL-MCNC: 0.49 MG/DL (ref 0.57–1)
CREAT SERPL-MCNC: 0.5 MG/DL (ref 0.57–1)
CREAT SERPL-MCNC: 0.5 MG/DL (ref 0.57–1)
CREAT SERPL-MCNC: 0.51 MG/DL (ref 0.57–1)
CREAT SERPL-MCNC: 0.52 MG/DL (ref 0.57–1)
CREAT SERPL-MCNC: 0.54 MG/DL (ref 0.57–1)
CREAT SERPL-MCNC: 0.54 MG/DL (ref 0.57–1)
CREAT SERPL-MCNC: 0.55 MG/DL (ref 0.57–1)
CREAT SERPL-MCNC: 0.55 MG/DL (ref 0.57–1)
CREAT SERPL-MCNC: 0.56 MG/DL (ref 0.57–1)
CREAT SERPL-MCNC: 0.57 MG/DL (ref 0.57–1)
CREAT SERPL-MCNC: 0.58 MG/DL (ref 0.57–1)
CREAT SERPL-MCNC: 0.59 MG/DL (ref 0.57–1)
CREAT SERPL-MCNC: 0.6 MG/DL (ref 0.57–1)
CREAT SERPL-MCNC: 0.61 MG/DL (ref 0.57–1)
CREAT SERPL-MCNC: 0.62 MG/DL (ref 0.57–1)
CREAT SERPL-MCNC: 0.62 MG/DL (ref 0.57–1)
CREAT SERPL-MCNC: 0.63 MG/DL (ref 0.57–1)
CREAT SERPL-MCNC: 0.64 MG/DL (ref 0.57–1)
CREAT SERPL-MCNC: 0.68 MG/DL (ref 0.57–1)
CREAT SERPL-MCNC: 0.76 MG/DL (ref 0.57–1)
CREAT SERPL-MCNC: 0.81 MG/DL (ref 0.57–1)
CREAT SERPL-MCNC: 0.91 MG/DL (ref 0.57–1)
CREAT SERPL-MCNC: 1.38 MG/DL (ref 0.57–1)
CREAT SERPL-MCNC: 1.97 MG/DL (ref 0.57–1)
CREAT SERPL-MCNC: 2.22 MG/DL (ref 0.57–1)
CREAT SERPL-MCNC: 2.27 MG/DL (ref 0.57–1)
CREAT SERPL-MCNC: 2.46 MG/DL (ref 0.57–1)
CREAT SERPL-MCNC: 2.53 MG/DL (ref 0.57–1)
CREAT SERPL-MCNC: 2.99 MG/DL (ref 0.57–1)
CREAT SERPL-MCNC: 3.07 MG/DL (ref 0.57–1)
CREAT SERPL-MCNC: 3.36 MG/DL (ref 0.57–1)
CREAT SERPL-MCNC: 3.4 MG/DL (ref 0.57–1)
CREAT SERPL-MCNC: 3.42 MG/DL (ref 0.57–1)
CREAT UR-MCNC: 0.81 MG/DL (ref 0.5–0.9)
CRP SERPL-MCNC: 1.88 MG/DL (ref 0–0.5)
CRP SERPL-MCNC: 2.06 MG/DL (ref 0–0.5)
CRP SERPL-MCNC: 2.56 MG/DL (ref 0–0.5)
CRP SERPL-MCNC: 2.57 MG/DL (ref 0–0.5)
CRP SERPL-MCNC: 2.8 MG/DL (ref 0–0.5)
CRP SERPL-MCNC: 4.38 MG/DL (ref 0–0.5)
CRP SERPL-MCNC: 4.43 MG/DL (ref 0–0.5)
CRP SERPL-MCNC: <0.3 MG/DL (ref 0–0.5)
D DIMER PPP FEU-MCNC: 0.73 MG/L (FEU) (ref 0–0.59)
D DIMER PPP FEU-MCNC: 0.92 MG/L (FEU) (ref 0–0.59)
D DIMER PPP FEU-MCNC: 1.32 MG/L (FEU) (ref 0–0.59)
D DIMER PPP FEU-MCNC: 1.4 MG/L (FEU) (ref 0–0.59)
D DIMER PPP FEU-MCNC: 12.35 MG/L (FEU) (ref 0–0.59)
D DIMER PPP FEU-MCNC: 3.35 MG/L (FEU) (ref 0–0.59)
D DIMER PPP FEU-MCNC: 4.08 MG/L (FEU) (ref 0–0.59)
D-DIMER HIGH CURVE: 5.13 MG/L (FEU) (ref 0–0.59)
D-DIMER HIGH CURVE: 5.8 MG/L (FEU) (ref 0–0.59)
D-DIMER HIGH CURVE: 5.8 MG/L (FEU) (ref 0–0.59)
D-LACTATE SERPL-SCNC: 1.8 MMOL/L (ref 0.5–2)
D-LACTATE SERPL-SCNC: 1.9 MMOL/L (ref 0.5–2)
D-LACTATE SERPL-SCNC: 1.9 MMOL/L (ref 0.5–2)
D-LACTATE SERPL-SCNC: 2.2 MMOL/L (ref 0.5–2)
D-LACTATE SERPL-SCNC: 2.3 MMOL/L (ref 0.5–2)
D-LACTATE SERPL-SCNC: 4.4 MMOL/L (ref 0.5–2)
D-LACTATE SERPL-SCNC: 5 MMOL/L (ref 0.5–2)
D-LACTATE SERPL-SCNC: 7.8 MMOL/L (ref 0.5–2)
DEPRECATED RDW RBC AUTO: 45.5 FL (ref 37–54)
DEPRECATED RDW RBC AUTO: 45.7 FL (ref 37–54)
DEPRECATED RDW RBC AUTO: 45.8 FL (ref 37–54)
DEPRECATED RDW RBC AUTO: 46.1 FL (ref 37–54)
DEPRECATED RDW RBC AUTO: 46.2 FL (ref 37–54)
DEPRECATED RDW RBC AUTO: 46.8 FL (ref 37–54)
DEPRECATED RDW RBC AUTO: 46.9 FL (ref 37–54)
DEPRECATED RDW RBC AUTO: 47.1 FL (ref 37–54)
DEPRECATED RDW RBC AUTO: 47.2 FL (ref 37–54)
DEPRECATED RDW RBC AUTO: 47.2 FL (ref 37–54)
DEPRECATED RDW RBC AUTO: 47.3 FL (ref 37–54)
DEPRECATED RDW RBC AUTO: 47.6 FL (ref 37–54)
DEPRECATED RDW RBC AUTO: 48.6 FL (ref 37–54)
DEPRECATED RDW RBC AUTO: 49.1 FL (ref 37–54)
DEPRECATED RDW RBC AUTO: 49.3 FL (ref 37–54)
DEPRECATED RDW RBC AUTO: 49.4 FL (ref 37–54)
DEPRECATED RDW RBC AUTO: 49.9 FL (ref 37–54)
DEPRECATED RDW RBC AUTO: 50 FL (ref 37–54)
DEPRECATED RDW RBC AUTO: 50.1 FL (ref 37–54)
DEPRECATED RDW RBC AUTO: 50.5 FL (ref 37–54)
DEPRECATED RDW RBC AUTO: 53.5 FL (ref 37–54)
DEPRECATED RDW RBC AUTO: 54.3 FL (ref 37–54)
DEPRECATED RDW RBC AUTO: 54.5 FL (ref 37–54)
DEPRECATED RDW RBC AUTO: 56.2 FL (ref 37–54)
DEPRECATED RDW RBC AUTO: 57.4 FL (ref 37–54)
DEPRECATED RDW RBC AUTO: 58.2 FL (ref 37–54)
DEPRECATED RDW RBC AUTO: 58.3 FL (ref 37–54)
DEPRECATED RDW RBC AUTO: 60.7 FL (ref 37–54)
DEPRECATED RDW RBC AUTO: 60.9 FL (ref 37–54)
DEPRECATED RDW RBC AUTO: 61 FL (ref 37–54)
DEPRECATED RDW RBC AUTO: 61.6 FL (ref 37–54)
DEPRECATED RDW RBC AUTO: 62.4 FL (ref 37–54)
DEPRECATED RDW RBC AUTO: 63.4 FL (ref 37–54)
DEPRECATED RDW RBC AUTO: 63.4 FL (ref 37–54)
DEPRECATED RDW RBC AUTO: 63.9 FL (ref 37–54)
DEPRECATED RDW RBC AUTO: 64.2 FL (ref 37–54)
DEPRECATED RDW RBC AUTO: 64.3 FL (ref 37–54)
DEPRECATED RDW RBC AUTO: 64.4 FL (ref 37–54)
DEPRECATED RDW RBC AUTO: 65.6 FL (ref 37–54)
DEPRECATED RDW RBC AUTO: 65.6 FL (ref 37–54)
DEPRECATED RDW RBC AUTO: 66 FL (ref 37–54)
DEPRECATED RDW RBC AUTO: 66.4 FL (ref 37–54)
DEPRECATED RDW RBC AUTO: 66.7 FL (ref 37–54)
DEPRECATED RDW RBC AUTO: 68.5 FL (ref 37–54)
DEPRECATED RDW RBC AUTO: 68.8 FL (ref 37–54)
DEPRECATED RDW RBC AUTO: 69 FL (ref 37–54)
EOSINOPHIL # BLD AUTO: 0 10*3/MM3 (ref 0–0.4)
EOSINOPHIL # BLD AUTO: 0.01 10*3/MM3 (ref 0–0.4)
EOSINOPHIL # BLD AUTO: 0.01 10*3/MM3 (ref 0–0.4)
EOSINOPHIL # BLD AUTO: 0.02 10*3/MM3 (ref 0–0.4)
EOSINOPHIL # BLD AUTO: 0.04 10*3/MM3 (ref 0–0.4)
EOSINOPHIL # BLD AUTO: 0.04 10*3/MM3 (ref 0–0.4)
EOSINOPHIL # BLD AUTO: 0.05 10*3/MM3 (ref 0–0.4)
EOSINOPHIL # BLD AUTO: 0.06 10*3/MM3 (ref 0–0.4)
EOSINOPHIL # BLD AUTO: 0.07 10*3/MM3 (ref 0–0.4)
EOSINOPHIL # BLD AUTO: 0.07 10*3/MM3 (ref 0–0.4)
EOSINOPHIL # BLD AUTO: 0.08 10*3/MM3 (ref 0–0.4)
EOSINOPHIL # BLD AUTO: 0.09 10*3/MM3 (ref 0–0.4)
EOSINOPHIL # BLD AUTO: 0.1 10*3/MM3 (ref 0–0.4)
EOSINOPHIL # BLD AUTO: 0.11 10*3/MM3 (ref 0–0.4)
EOSINOPHIL # BLD AUTO: 0.11 10*3/MM3 (ref 0–0.4)
EOSINOPHIL # BLD AUTO: 0.12 10*3/MM3 (ref 0–0.4)
EOSINOPHIL # BLD AUTO: 0.13 10*3/MM3 (ref 0–0.4)
EOSINOPHIL # BLD AUTO: 0.14 10*3/MM3 (ref 0–0.4)
EOSINOPHIL # BLD AUTO: 0.15 10*3/MM3 (ref 0–0.4)
EOSINOPHIL # BLD AUTO: 0.16 10*3/MM3 (ref 0–0.4)
EOSINOPHIL # BLD AUTO: 0.16 10*3/MM3 (ref 0–0.4)
EOSINOPHIL # BLD AUTO: 0.17 10*3/MM3 (ref 0–0.4)
EOSINOPHIL # BLD AUTO: 0.18 10*3/MM3 (ref 0–0.4)
EOSINOPHIL # BLD MANUAL: 0.53 10*3/MM3 (ref 0–0.4)
EOSINOPHIL NFR BLD AUTO: 0 % (ref 0.3–6.2)
EOSINOPHIL NFR BLD AUTO: 0.1 % (ref 0.3–6.2)
EOSINOPHIL NFR BLD AUTO: 0.1 % (ref 0.3–6.2)
EOSINOPHIL NFR BLD AUTO: 0.2 % (ref 0.3–6.2)
EOSINOPHIL NFR BLD AUTO: 0.4 % (ref 0.3–6.2)
EOSINOPHIL NFR BLD AUTO: 0.7 % (ref 0.3–6.2)
EOSINOPHIL NFR BLD AUTO: 0.9 % (ref 0.3–6.2)
EOSINOPHIL NFR BLD AUTO: 1 % (ref 0.3–6.2)
EOSINOPHIL NFR BLD AUTO: 1 % (ref 0.3–6.2)
EOSINOPHIL NFR BLD AUTO: 1.2 % (ref 0.3–6.2)
EOSINOPHIL NFR BLD AUTO: 1.3 % (ref 0.3–6.2)
EOSINOPHIL NFR BLD AUTO: 1.5 % (ref 0.3–6.2)
EOSINOPHIL NFR BLD AUTO: 1.7 % (ref 0.3–6.2)
EOSINOPHIL NFR BLD AUTO: 2 % (ref 0.3–6.2)
EOSINOPHIL NFR BLD AUTO: 2.3 % (ref 0.3–6.2)
EOSINOPHIL NFR BLD AUTO: 2.7 % (ref 0.3–6.2)
EOSINOPHIL NFR BLD AUTO: 2.8 % (ref 0.3–6.2)
EOSINOPHIL NFR BLD AUTO: 2.9 % (ref 0.3–6.2)
EOSINOPHIL NFR BLD AUTO: 3 % (ref 0.3–6.2)
EOSINOPHIL NFR BLD AUTO: 3 % (ref 0.3–6.2)
EOSINOPHIL NFR BLD AUTO: 3.4 % (ref 0.3–6.2)
EOSINOPHIL NFR BLD AUTO: 3.8 % (ref 0.3–6.2)
EOSINOPHIL NFR BLD AUTO: 4.2 % (ref 0.3–6.2)
EOSINOPHIL NFR BLD MANUAL: 2 % (ref 0.3–6.2)
ERYTHROCYTE [DISTWIDTH] IN BLOOD BY AUTOMATED COUNT: 14.2 % (ref 12.3–15.4)
ERYTHROCYTE [DISTWIDTH] IN BLOOD BY AUTOMATED COUNT: 14.3 % (ref 12.3–15.4)
ERYTHROCYTE [DISTWIDTH] IN BLOOD BY AUTOMATED COUNT: 14.4 % (ref 12.3–15.4)
ERYTHROCYTE [DISTWIDTH] IN BLOOD BY AUTOMATED COUNT: 14.4 % (ref 12.3–15.4)
ERYTHROCYTE [DISTWIDTH] IN BLOOD BY AUTOMATED COUNT: 14.5 % (ref 12.3–15.4)
ERYTHROCYTE [DISTWIDTH] IN BLOOD BY AUTOMATED COUNT: 14.6 % (ref 12.3–15.4)
ERYTHROCYTE [DISTWIDTH] IN BLOOD BY AUTOMATED COUNT: 14.7 % (ref 12.3–15.4)
ERYTHROCYTE [DISTWIDTH] IN BLOOD BY AUTOMATED COUNT: 14.8 % (ref 12.3–15.4)
ERYTHROCYTE [DISTWIDTH] IN BLOOD BY AUTOMATED COUNT: 14.8 % (ref 12.3–15.4)
ERYTHROCYTE [DISTWIDTH] IN BLOOD BY AUTOMATED COUNT: 14.9 % (ref 12.3–15.4)
ERYTHROCYTE [DISTWIDTH] IN BLOOD BY AUTOMATED COUNT: 15.1 % (ref 12.3–15.4)
ERYTHROCYTE [DISTWIDTH] IN BLOOD BY AUTOMATED COUNT: 15.3 % (ref 12.3–15.4)
ERYTHROCYTE [DISTWIDTH] IN BLOOD BY AUTOMATED COUNT: 15.4 % (ref 12.3–15.4)
ERYTHROCYTE [DISTWIDTH] IN BLOOD BY AUTOMATED COUNT: 15.4 % (ref 12.3–15.4)
ERYTHROCYTE [DISTWIDTH] IN BLOOD BY AUTOMATED COUNT: 15.9 % (ref 12.3–15.4)
ERYTHROCYTE [DISTWIDTH] IN BLOOD BY AUTOMATED COUNT: 15.9 % (ref 12.3–15.4)
ERYTHROCYTE [DISTWIDTH] IN BLOOD BY AUTOMATED COUNT: 16.3 % (ref 12.3–15.4)
ERYTHROCYTE [DISTWIDTH] IN BLOOD BY AUTOMATED COUNT: 16.8 % (ref 12.3–15.4)
ERYTHROCYTE [DISTWIDTH] IN BLOOD BY AUTOMATED COUNT: 16.9 % (ref 12.3–15.4)
ERYTHROCYTE [DISTWIDTH] IN BLOOD BY AUTOMATED COUNT: 17 % (ref 12.3–15.4)
ERYTHROCYTE [DISTWIDTH] IN BLOOD BY AUTOMATED COUNT: 17.2 % (ref 12.3–15.4)
ERYTHROCYTE [DISTWIDTH] IN BLOOD BY AUTOMATED COUNT: 17.6 % (ref 12.3–15.4)
ERYTHROCYTE [DISTWIDTH] IN BLOOD BY AUTOMATED COUNT: 18.2 % (ref 12.3–15.4)
ERYTHROCYTE [DISTWIDTH] IN BLOOD BY AUTOMATED COUNT: 18.2 % (ref 12.3–15.4)
ERYTHROCYTE [DISTWIDTH] IN BLOOD BY AUTOMATED COUNT: 18.6 % (ref 12.3–15.4)
ERYTHROCYTE [DISTWIDTH] IN BLOOD BY AUTOMATED COUNT: 18.9 % (ref 12.3–15.4)
ERYTHROCYTE [DISTWIDTH] IN BLOOD BY AUTOMATED COUNT: 19 % (ref 12.3–15.4)
ERYTHROCYTE [DISTWIDTH] IN BLOOD BY AUTOMATED COUNT: 19.2 % (ref 12.3–15.4)
ERYTHROCYTE [DISTWIDTH] IN BLOOD BY AUTOMATED COUNT: 19.4 % (ref 12.3–15.4)
ERYTHROCYTE [DISTWIDTH] IN BLOOD BY AUTOMATED COUNT: 19.4 % (ref 12.3–15.4)
ERYTHROCYTE [DISTWIDTH] IN BLOOD BY AUTOMATED COUNT: 19.5 % (ref 12.3–15.4)
ERYTHROCYTE [DISTWIDTH] IN BLOOD BY AUTOMATED COUNT: 19.6 % (ref 12.3–15.4)
ERYTHROCYTE [DISTWIDTH] IN BLOOD BY AUTOMATED COUNT: 19.6 % (ref 12.3–15.4)
ERYTHROCYTE [DISTWIDTH] IN BLOOD BY AUTOMATED COUNT: 19.7 % (ref 12.3–15.4)
ERYTHROCYTE [DISTWIDTH] IN BLOOD BY AUTOMATED COUNT: 19.8 % (ref 12.3–15.4)
ERYTHROCYTE [DISTWIDTH] IN BLOOD BY AUTOMATED COUNT: 20 % (ref 12.3–15.4)
ERYTHROCYTE [DISTWIDTH] IN BLOOD BY AUTOMATED COUNT: 20 % (ref 12.3–15.4)
ERYTHROCYTE [DISTWIDTH] IN BLOOD BY AUTOMATED COUNT: 20.1 % (ref 12.3–15.4)
ERYTHROCYTE [DISTWIDTH] IN BLOOD BY AUTOMATED COUNT: 20.3 % (ref 12.3–15.4)
ERYTHROCYTE [DISTWIDTH] IN BLOOD BY AUTOMATED COUNT: 20.3 % (ref 12.3–15.4)
ERYTHROCYTE [DISTWIDTH] IN BLOOD BY AUTOMATED COUNT: 20.5 % (ref 12.3–15.4)
ERYTHROCYTE [DISTWIDTH] IN BLOOD BY AUTOMATED COUNT: 20.6 % (ref 12.3–15.4)
ERYTHROCYTE [DISTWIDTH] IN BLOOD BY AUTOMATED COUNT: 20.6 % (ref 12.3–15.4)
ERYTHROCYTE [DISTWIDTH] IN BLOOD BY AUTOMATED COUNT: 21.2 % (ref 12.3–15.4)
ERYTHROCYTE [DISTWIDTH] IN BLOOD BY AUTOMATED COUNT: 21.6 % (ref 12.3–15.4)
ERYTHROCYTE [SEDIMENTATION RATE] IN BLOOD: 11 MM/HR (ref 0–30)
ERYTHROCYTE [SEDIMENTATION RATE] IN BLOOD: 5 MM/HR (ref 0–30)
FERRITIN SERPL-MCNC: 1047 NG/ML (ref 13–150)
FERRITIN SERPL-MCNC: 1059 NG/ML (ref 13–150)
FERRITIN SERPL-MCNC: 558.9 NG/ML (ref 13–150)
FERRITIN SERPL-MCNC: 570.6 NG/ML (ref 13–150)
FERRITIN SERPL-MCNC: 771.4 NG/ML (ref 13–150)
FERRITIN SERPL-MCNC: 785.7 NG/ML (ref 13–150)
FERRITIN SERPL-MCNC: 808.3 NG/ML (ref 13–150)
FHHB: 10.2 % (ref 0–5)
FHHB: 11.3 % (ref 0–5)
FHHB: 11.7 % (ref 0–5)
FHHB: 36.7 % (ref 0–5)
FHHB: 4.1 % (ref 0–5)
FHHB: 6 % (ref 0–5)
FHHB: 6 % (ref 0–5)
FHHB: 6.1 % (ref 0–5)
FHHB: 8 % (ref 0–5)
FHHB: 8.1 % (ref 0–5)
FHHB: 8.1 % (ref 0–5)
FHHB: 9.3 % (ref 0–5)
FHHB: 9.4 % (ref 0–5)
FIBRINOGEN PPP-MCNC: 223 MG/DL (ref 200–450)
FIBRINOGEN PPP-MCNC: 245 MG/DL (ref 200–450)
FIBRINOGEN PPP-MCNC: 257 MG/DL (ref 200–450)
FIBRINOGEN PPP-MCNC: 292 MG/DL (ref 200–450)
FIBRINOGEN PPP-MCNC: 333 MG/DL (ref 200–450)
GAS FLOW AIRWAY: 13 LPM
GAS FLOW AIRWAY: 15 LPM
GAS FLOW AIRWAY: 15 LPM
GAS FLOW AIRWAY: ABNORMAL L/MIN
GFR SERPL CREATININE-BSD FRML MDRD: 101 ML/MIN/1.73
GFR SERPL CREATININE-BSD FRML MDRD: 103 ML/MIN/1.73
GFR SERPL CREATININE-BSD FRML MDRD: 105 ML/MIN/1.73
GFR SERPL CREATININE-BSD FRML MDRD: 108 ML/MIN/1.73
GFR SERPL CREATININE-BSD FRML MDRD: 110 ML/MIN/1.73
GFR SERPL CREATININE-BSD FRML MDRD: 110 ML/MIN/1.73
GFR SERPL CREATININE-BSD FRML MDRD: 112 ML/MIN/1.73
GFR SERPL CREATININE-BSD FRML MDRD: 112 ML/MIN/1.73
GFR SERPL CREATININE-BSD FRML MDRD: 117 ML/MIN/1.73
GFR SERPL CREATININE-BSD FRML MDRD: 120 ML/MIN/1.73
GFR SERPL CREATININE-BSD FRML MDRD: 123 ML/MIN/1.73
GFR SERPL CREATININE-BSD FRML MDRD: 123 ML/MIN/1.73
GFR SERPL CREATININE-BSD FRML MDRD: 126 ML/MIN/1.73
GFR SERPL CREATININE-BSD FRML MDRD: 129 ML/MIN/1.73
GFR SERPL CREATININE-BSD FRML MDRD: 129 ML/MIN/1.73
GFR SERPL CREATININE-BSD FRML MDRD: 13 ML/MIN/1.73
GFR SERPL CREATININE-BSD FRML MDRD: 13 ML/MIN/1.73
GFR SERPL CREATININE-BSD FRML MDRD: 14 ML/MIN/1.73
GFR SERPL CREATININE-BSD FRML MDRD: 142 ML/MIN/1.73
GFR SERPL CREATININE-BSD FRML MDRD: 15 ML/MIN/1.73
GFR SERPL CREATININE-BSD FRML MDRD: 150 ML/MIN/1.73
GFR SERPL CREATININE-BSD FRML MDRD: 150 ML/MIN/1.73
GFR SERPL CREATININE-BSD FRML MDRD: 16 ML/MIN/1.73
GFR SERPL CREATININE-BSD FRML MDRD: 19 ML/MIN/1.73
GFR SERPL CREATININE-BSD FRML MDRD: 20 ML/MIN/1.73
GFR SERPL CREATININE-BSD FRML MDRD: 21 ML/MIN/1.73
GFR SERPL CREATININE-BSD FRML MDRD: 22 ML/MIN/1.73
GFR SERPL CREATININE-BSD FRML MDRD: 25 ML/MIN/1.73
GFR SERPL CREATININE-BSD FRML MDRD: 38 ML/MIN/1.73
GFR SERPL CREATININE-BSD FRML MDRD: 61 ML/MIN/1.73
GFR SERPL CREATININE-BSD FRML MDRD: 70 ML/MIN/1.73
GFR SERPL CREATININE-BSD FRML MDRD: 76 ML/MIN/1.73
GFR SERPL CREATININE-BSD FRML MDRD: 86 ML/MIN/1.73
GFR SERPL CREATININE-BSD FRML MDRD: 92 ML/MIN/1.73
GFR SERPL CREATININE-BSD FRML MDRD: 94 ML/MIN/1.73
GFR SERPL CREATININE-BSD FRML MDRD: 96 ML/MIN/1.73
GFR SERPL CREATININE-BSD FRML MDRD: 96 ML/MIN/1.73
GFR SERPL CREATININE-BSD FRML MDRD: 98 ML/MIN/1.73
GFR SERPL CREATININE-BSD FRML MDRD: 99 ML/MIN/1.73
GFR SERPL CREATININE-BSD FRML MDRD: >150 ML/MIN/1.73
GFR SERPL CREATININE-BSD FRML MDRD: >150 ML/MIN/1.73
GFR SERPL CREATININE-BSD FRML MDRD: ABNORMAL ML/MIN/{1.73_M2}
GFR SERPLBLD BASED ON 1.73 SQ M-ARVRAT: >60 ML/MIN/{1.73_M2}
GLOBULIN UR ELPH-MCNC: 2.1 GM/DL
GLOBULIN UR ELPH-MCNC: 2.1 GM/DL
GLOBULIN UR ELPH-MCNC: 2.2 GM/DL
GLOBULIN UR ELPH-MCNC: 2.3 GM/DL
GLOBULIN UR ELPH-MCNC: 2.4 GM/DL
GLOBULIN UR ELPH-MCNC: 2.5 G/DL (ref 2–3.5)
GLOBULIN UR ELPH-MCNC: 2.5 GM/DL
GLOBULIN UR ELPH-MCNC: 2.6 GM/DL
GLOBULIN UR ELPH-MCNC: 2.7 GM/DL
GLOBULIN UR ELPH-MCNC: 2.8 GM/DL
GLOBULIN UR ELPH-MCNC: 2.8 GM/DL
GLOBULIN UR ELPH-MCNC: 2.9 GM/DL
GLOBULIN UR ELPH-MCNC: 2.9 GM/DL
GLOBULIN UR ELPH-MCNC: 3 GM/DL
GLOBULIN UR ELPH-MCNC: 3 GM/DL
GLOBULIN UR ELPH-MCNC: 3.1 GM/DL
GLOBULIN UR ELPH-MCNC: 3.3 GM/DL
GLOBULIN UR ELPH-MCNC: 3.4 GM/DL
GLOBULIN UR ELPH-MCNC: 3.4 GM/DL
GLOBULIN UR ELPH-MCNC: 3.6 GM/DL
GLOBULIN UR ELPH-MCNC: 3.8 GM/DL
GLOBULIN UR ELPH-MCNC: 3.9 GM/DL
GLOBULIN UR ELPH-MCNC: 4 GM/DL
GLOBULIN UR ELPH-MCNC: 4.1 GM/DL
GLOBULIN UR ELPH-MCNC: 4.1 GM/DL
GLOBULIN UR ELPH-MCNC: 4.3 GM/DL
GLOBULIN UR ELPH-MCNC: 4.4 GM/DL
GLOBULIN UR ELPH-MCNC: 4.6 GM/DL
GLOBULIN UR ELPH-MCNC: 4.9 GM/DL
GLUCOSE BLDA-MCNC: 141 MMOL/L (ref 65–99)
GLUCOSE BLDA-MCNC: 172 MMOL/L (ref 65–99)
GLUCOSE BLDA-MCNC: 322 MMOL/L (ref 65–99)
GLUCOSE BLDA-MCNC: 439 MMOL/L (ref 65–99)
GLUCOSE BLDA-MCNC: 440 MMOL/L (ref 65–99)
GLUCOSE BLDA-MCNC: 444 MMOL/L (ref 65–99)
GLUCOSE BLDA-MCNC: 455 MMOL/L (ref 65–99)
GLUCOSE BLDA-MCNC: 467 MMOL/L (ref 65–99)
GLUCOSE BLDA-MCNC: 65 MMOL/L (ref 65–99)
GLUCOSE BLDC GLUCOMTR-MCNC: 100 MG/DL (ref 70–99)
GLUCOSE BLDC GLUCOMTR-MCNC: 100 MG/DL (ref 70–99)
GLUCOSE BLDC GLUCOMTR-MCNC: 101 MG/DL (ref 70–99)
GLUCOSE BLDC GLUCOMTR-MCNC: 102 MG/DL (ref 70–99)
GLUCOSE BLDC GLUCOMTR-MCNC: 102 MG/DL (ref 70–99)
GLUCOSE BLDC GLUCOMTR-MCNC: 105 MG/DL (ref 70–99)
GLUCOSE BLDC GLUCOMTR-MCNC: 106 MG/DL (ref 70–99)
GLUCOSE BLDC GLUCOMTR-MCNC: 110 MG/DL (ref 70–99)
GLUCOSE BLDC GLUCOMTR-MCNC: 110 MG/DL (ref 70–99)
GLUCOSE BLDC GLUCOMTR-MCNC: 112 MG/DL (ref 70–99)
GLUCOSE BLDC GLUCOMTR-MCNC: 112 MG/DL (ref 70–99)
GLUCOSE BLDC GLUCOMTR-MCNC: 114 MG/DL (ref 70–99)
GLUCOSE BLDC GLUCOMTR-MCNC: 114 MG/DL (ref 70–99)
GLUCOSE BLDC GLUCOMTR-MCNC: 117 MG/DL (ref 70–99)
GLUCOSE BLDC GLUCOMTR-MCNC: 117 MG/DL (ref 70–99)
GLUCOSE BLDC GLUCOMTR-MCNC: 118 MG/DL (ref 70–99)
GLUCOSE BLDC GLUCOMTR-MCNC: 118 MG/DL (ref 70–99)
GLUCOSE BLDC GLUCOMTR-MCNC: 119 MG/DL (ref 70–99)
GLUCOSE BLDC GLUCOMTR-MCNC: 120 MG/DL (ref 70–99)
GLUCOSE BLDC GLUCOMTR-MCNC: 120 MG/DL (ref 70–99)
GLUCOSE BLDC GLUCOMTR-MCNC: 121 MG/DL (ref 70–99)
GLUCOSE BLDC GLUCOMTR-MCNC: 123 MG/DL (ref 70–99)
GLUCOSE BLDC GLUCOMTR-MCNC: 123 MG/DL (ref 70–99)
GLUCOSE BLDC GLUCOMTR-MCNC: 125 MG/DL (ref 70–99)
GLUCOSE BLDC GLUCOMTR-MCNC: 126 MG/DL (ref 70–99)
GLUCOSE BLDC GLUCOMTR-MCNC: 126 MG/DL (ref 70–99)
GLUCOSE BLDC GLUCOMTR-MCNC: 127 MG/DL (ref 70–99)
GLUCOSE BLDC GLUCOMTR-MCNC: 131 MG/DL (ref 70–99)
GLUCOSE BLDC GLUCOMTR-MCNC: 132 MG/DL (ref 70–99)
GLUCOSE BLDC GLUCOMTR-MCNC: 132 MG/DL (ref 70–99)
GLUCOSE BLDC GLUCOMTR-MCNC: 133 MG/DL (ref 70–99)
GLUCOSE BLDC GLUCOMTR-MCNC: 134 MG/DL (ref 70–99)
GLUCOSE BLDC GLUCOMTR-MCNC: 136 MG/DL (ref 70–99)
GLUCOSE BLDC GLUCOMTR-MCNC: 138 MG/DL (ref 70–99)
GLUCOSE BLDC GLUCOMTR-MCNC: 139 MG/DL (ref 70–99)
GLUCOSE BLDC GLUCOMTR-MCNC: 139 MG/DL (ref 70–99)
GLUCOSE BLDC GLUCOMTR-MCNC: 140 MG/DL (ref 70–99)
GLUCOSE BLDC GLUCOMTR-MCNC: 140 MG/DL (ref 70–99)
GLUCOSE BLDC GLUCOMTR-MCNC: 141 MG/DL (ref 70–99)
GLUCOSE BLDC GLUCOMTR-MCNC: 141 MG/DL (ref 70–99)
GLUCOSE BLDC GLUCOMTR-MCNC: 142 MG/DL (ref 70–99)
GLUCOSE BLDC GLUCOMTR-MCNC: 142 MG/DL (ref 70–99)
GLUCOSE BLDC GLUCOMTR-MCNC: 143 MG/DL (ref 70–99)
GLUCOSE BLDC GLUCOMTR-MCNC: 144 MG/DL (ref 70–99)
GLUCOSE BLDC GLUCOMTR-MCNC: 145 MG/DL (ref 70–99)
GLUCOSE BLDC GLUCOMTR-MCNC: 145 MG/DL (ref 70–99)
GLUCOSE BLDC GLUCOMTR-MCNC: 146 MG/DL (ref 70–99)
GLUCOSE BLDC GLUCOMTR-MCNC: 147 MG/DL (ref 70–99)
GLUCOSE BLDC GLUCOMTR-MCNC: 148 MG/DL (ref 70–99)
GLUCOSE BLDC GLUCOMTR-MCNC: 148 MG/DL (ref 70–99)
GLUCOSE BLDC GLUCOMTR-MCNC: 150 MG/DL (ref 70–99)
GLUCOSE BLDC GLUCOMTR-MCNC: 151 MG/DL (ref 70–99)
GLUCOSE BLDC GLUCOMTR-MCNC: 152 MG/DL (ref 70–99)
GLUCOSE BLDC GLUCOMTR-MCNC: 153 MG/DL (ref 70–99)
GLUCOSE BLDC GLUCOMTR-MCNC: 154 MG/DL (ref 70–99)
GLUCOSE BLDC GLUCOMTR-MCNC: 155 MG/DL (ref 70–99)
GLUCOSE BLDC GLUCOMTR-MCNC: 156 MG/DL (ref 70–99)
GLUCOSE BLDC GLUCOMTR-MCNC: 159 MG/DL (ref 70–99)
GLUCOSE BLDC GLUCOMTR-MCNC: 159 MG/DL (ref 70–99)
GLUCOSE BLDC GLUCOMTR-MCNC: 160 MG/DL (ref 70–99)
GLUCOSE BLDC GLUCOMTR-MCNC: 160 MG/DL (ref 70–99)
GLUCOSE BLDC GLUCOMTR-MCNC: 161 MG/DL (ref 70–99)
GLUCOSE BLDC GLUCOMTR-MCNC: 162 MG/DL (ref 70–99)
GLUCOSE BLDC GLUCOMTR-MCNC: 164 MG/DL (ref 70–99)
GLUCOSE BLDC GLUCOMTR-MCNC: 166 MG/DL (ref 70–99)
GLUCOSE BLDC GLUCOMTR-MCNC: 168 MG/DL (ref 70–99)
GLUCOSE BLDC GLUCOMTR-MCNC: 169 MG/DL (ref 70–99)
GLUCOSE BLDC GLUCOMTR-MCNC: 170 MG/DL (ref 70–99)
GLUCOSE BLDC GLUCOMTR-MCNC: 172 MG/DL (ref 70–99)
GLUCOSE BLDC GLUCOMTR-MCNC: 173 MG/DL (ref 70–99)
GLUCOSE BLDC GLUCOMTR-MCNC: 173 MG/DL (ref 70–99)
GLUCOSE BLDC GLUCOMTR-MCNC: 174 MG/DL (ref 70–99)
GLUCOSE BLDC GLUCOMTR-MCNC: 175 MG/DL (ref 70–99)
GLUCOSE BLDC GLUCOMTR-MCNC: 177 MG/DL (ref 70–99)
GLUCOSE BLDC GLUCOMTR-MCNC: 178 MG/DL (ref 70–99)
GLUCOSE BLDC GLUCOMTR-MCNC: 180 MG/DL (ref 70–99)
GLUCOSE BLDC GLUCOMTR-MCNC: 182 MG/DL (ref 70–99)
GLUCOSE BLDC GLUCOMTR-MCNC: 185 MG/DL (ref 70–99)
GLUCOSE BLDC GLUCOMTR-MCNC: 186 MG/DL (ref 70–99)
GLUCOSE BLDC GLUCOMTR-MCNC: 188 MG/DL (ref 70–99)
GLUCOSE BLDC GLUCOMTR-MCNC: 190 MG/DL (ref 70–99)
GLUCOSE BLDC GLUCOMTR-MCNC: 191 MG/DL (ref 70–99)
GLUCOSE BLDC GLUCOMTR-MCNC: 192 MG/DL (ref 70–99)
GLUCOSE BLDC GLUCOMTR-MCNC: 193 MG/DL (ref 70–99)
GLUCOSE BLDC GLUCOMTR-MCNC: 193 MG/DL (ref 70–99)
GLUCOSE BLDC GLUCOMTR-MCNC: 195 MG/DL (ref 70–99)
GLUCOSE BLDC GLUCOMTR-MCNC: 195 MG/DL (ref 70–99)
GLUCOSE BLDC GLUCOMTR-MCNC: 196 MG/DL (ref 70–99)
GLUCOSE BLDC GLUCOMTR-MCNC: 199 MG/DL (ref 70–99)
GLUCOSE BLDC GLUCOMTR-MCNC: 200 MG/DL (ref 70–99)
GLUCOSE BLDC GLUCOMTR-MCNC: 200 MG/DL (ref 70–99)
GLUCOSE BLDC GLUCOMTR-MCNC: 201 MG/DL (ref 70–99)
GLUCOSE BLDC GLUCOMTR-MCNC: 202 MG/DL (ref 70–99)
GLUCOSE BLDC GLUCOMTR-MCNC: 203 MG/DL (ref 70–99)
GLUCOSE BLDC GLUCOMTR-MCNC: 204 MG/DL (ref 70–99)
GLUCOSE BLDC GLUCOMTR-MCNC: 205 MG/DL (ref 70–99)
GLUCOSE BLDC GLUCOMTR-MCNC: 205 MG/DL (ref 70–99)
GLUCOSE BLDC GLUCOMTR-MCNC: 206 MG/DL (ref 70–99)
GLUCOSE BLDC GLUCOMTR-MCNC: 208 MG/DL (ref 70–99)
GLUCOSE BLDC GLUCOMTR-MCNC: 209 MG/DL (ref 70–99)
GLUCOSE BLDC GLUCOMTR-MCNC: 209 MG/DL (ref 70–99)
GLUCOSE BLDC GLUCOMTR-MCNC: 212 MG/DL (ref 70–99)
GLUCOSE BLDC GLUCOMTR-MCNC: 213 MG/DL (ref 70–99)
GLUCOSE BLDC GLUCOMTR-MCNC: 214 MG/DL (ref 70–99)
GLUCOSE BLDC GLUCOMTR-MCNC: 214 MG/DL (ref 70–99)
GLUCOSE BLDC GLUCOMTR-MCNC: 217 MG/DL (ref 70–99)
GLUCOSE BLDC GLUCOMTR-MCNC: 217 MG/DL (ref 70–99)
GLUCOSE BLDC GLUCOMTR-MCNC: 218 MG/DL (ref 70–99)
GLUCOSE BLDC GLUCOMTR-MCNC: 220 MG/DL (ref 70–99)
GLUCOSE BLDC GLUCOMTR-MCNC: 220 MG/DL (ref 70–99)
GLUCOSE BLDC GLUCOMTR-MCNC: 221 MG/DL (ref 70–99)
GLUCOSE BLDC GLUCOMTR-MCNC: 225 MG/DL (ref 70–99)
GLUCOSE BLDC GLUCOMTR-MCNC: 226 MG/DL (ref 70–99)
GLUCOSE BLDC GLUCOMTR-MCNC: 227 MG/DL (ref 70–99)
GLUCOSE BLDC GLUCOMTR-MCNC: 228 MG/DL (ref 70–99)
GLUCOSE BLDC GLUCOMTR-MCNC: 228 MG/DL (ref 70–99)
GLUCOSE BLDC GLUCOMTR-MCNC: 229 MG/DL (ref 70–99)
GLUCOSE BLDC GLUCOMTR-MCNC: 229 MG/DL (ref 70–99)
GLUCOSE BLDC GLUCOMTR-MCNC: 231 MG/DL (ref 70–99)
GLUCOSE BLDC GLUCOMTR-MCNC: 231 MG/DL (ref 70–99)
GLUCOSE BLDC GLUCOMTR-MCNC: 232 MG/DL (ref 70–99)
GLUCOSE BLDC GLUCOMTR-MCNC: 233 MG/DL (ref 70–99)
GLUCOSE BLDC GLUCOMTR-MCNC: 234 MG/DL (ref 70–99)
GLUCOSE BLDC GLUCOMTR-MCNC: 235 MG/DL (ref 70–99)
GLUCOSE BLDC GLUCOMTR-MCNC: 236 MG/DL (ref 70–99)
GLUCOSE BLDC GLUCOMTR-MCNC: 239 MG/DL (ref 70–99)
GLUCOSE BLDC GLUCOMTR-MCNC: 239 MG/DL (ref 70–99)
GLUCOSE BLDC GLUCOMTR-MCNC: 242 MG/DL (ref 70–99)
GLUCOSE BLDC GLUCOMTR-MCNC: 243 MG/DL (ref 70–99)
GLUCOSE BLDC GLUCOMTR-MCNC: 244 MG/DL (ref 70–99)
GLUCOSE BLDC GLUCOMTR-MCNC: 245 MG/DL (ref 70–99)
GLUCOSE BLDC GLUCOMTR-MCNC: 251 MG/DL (ref 70–99)
GLUCOSE BLDC GLUCOMTR-MCNC: 251 MG/DL (ref 70–99)
GLUCOSE BLDC GLUCOMTR-MCNC: 252 MG/DL (ref 70–99)
GLUCOSE BLDC GLUCOMTR-MCNC: 254 MG/DL (ref 70–99)
GLUCOSE BLDC GLUCOMTR-MCNC: 258 MG/DL (ref 70–99)
GLUCOSE BLDC GLUCOMTR-MCNC: 260 MG/DL (ref 70–99)
GLUCOSE BLDC GLUCOMTR-MCNC: 262 MG/DL (ref 70–99)
GLUCOSE BLDC GLUCOMTR-MCNC: 264 MG/DL (ref 70–99)
GLUCOSE BLDC GLUCOMTR-MCNC: 265 MG/DL (ref 70–99)
GLUCOSE BLDC GLUCOMTR-MCNC: 269 MG/DL (ref 70–99)
GLUCOSE BLDC GLUCOMTR-MCNC: 271 MG/DL (ref 70–99)
GLUCOSE BLDC GLUCOMTR-MCNC: 272 MG/DL (ref 70–99)
GLUCOSE BLDC GLUCOMTR-MCNC: 273 MG/DL (ref 70–99)
GLUCOSE BLDC GLUCOMTR-MCNC: 274 MG/DL (ref 70–99)
GLUCOSE BLDC GLUCOMTR-MCNC: 276 MG/DL (ref 70–99)
GLUCOSE BLDC GLUCOMTR-MCNC: 278 MG/DL (ref 70–99)
GLUCOSE BLDC GLUCOMTR-MCNC: 279 MG/DL (ref 70–99)
GLUCOSE BLDC GLUCOMTR-MCNC: 281 MG/DL (ref 70–99)
GLUCOSE BLDC GLUCOMTR-MCNC: 281 MG/DL (ref 70–99)
GLUCOSE BLDC GLUCOMTR-MCNC: 288 MG/DL (ref 70–99)
GLUCOSE BLDC GLUCOMTR-MCNC: 288 MG/DL (ref 70–99)
GLUCOSE BLDC GLUCOMTR-MCNC: 289 MG/DL (ref 70–99)
GLUCOSE BLDC GLUCOMTR-MCNC: 289 MG/DL (ref 70–99)
GLUCOSE BLDC GLUCOMTR-MCNC: 291 MG/DL (ref 70–99)
GLUCOSE BLDC GLUCOMTR-MCNC: 293 MG/DL (ref 70–99)
GLUCOSE BLDC GLUCOMTR-MCNC: 297 MG/DL (ref 70–99)
GLUCOSE BLDC GLUCOMTR-MCNC: 299 MG/DL (ref 70–99)
GLUCOSE BLDC GLUCOMTR-MCNC: 299 MG/DL (ref 70–99)
GLUCOSE BLDC GLUCOMTR-MCNC: 301 MG/DL (ref 70–99)
GLUCOSE BLDC GLUCOMTR-MCNC: 301 MG/DL (ref 70–99)
GLUCOSE BLDC GLUCOMTR-MCNC: 302 MG/DL (ref 70–99)
GLUCOSE BLDC GLUCOMTR-MCNC: 302 MG/DL (ref 70–99)
GLUCOSE BLDC GLUCOMTR-MCNC: 303 MG/DL (ref 70–99)
GLUCOSE BLDC GLUCOMTR-MCNC: 304 MG/DL (ref 70–99)
GLUCOSE BLDC GLUCOMTR-MCNC: 307 MG/DL (ref 70–99)
GLUCOSE BLDC GLUCOMTR-MCNC: 307 MG/DL (ref 70–99)
GLUCOSE BLDC GLUCOMTR-MCNC: 309 MG/DL (ref 70–99)
GLUCOSE BLDC GLUCOMTR-MCNC: 314 MG/DL (ref 70–99)
GLUCOSE BLDC GLUCOMTR-MCNC: 314 MG/DL (ref 70–99)
GLUCOSE BLDC GLUCOMTR-MCNC: 315 MG/DL (ref 70–99)
GLUCOSE BLDC GLUCOMTR-MCNC: 315 MG/DL (ref 70–99)
GLUCOSE BLDC GLUCOMTR-MCNC: 316 MG/DL (ref 70–99)
GLUCOSE BLDC GLUCOMTR-MCNC: 321 MG/DL (ref 70–99)
GLUCOSE BLDC GLUCOMTR-MCNC: 322 MG/DL (ref 70–99)
GLUCOSE BLDC GLUCOMTR-MCNC: 327 MG/DL (ref 70–99)
GLUCOSE BLDC GLUCOMTR-MCNC: 329 MG/DL (ref 70–99)
GLUCOSE BLDC GLUCOMTR-MCNC: 336 MG/DL (ref 70–99)
GLUCOSE BLDC GLUCOMTR-MCNC: 336 MG/DL (ref 70–99)
GLUCOSE BLDC GLUCOMTR-MCNC: 337 MG/DL (ref 70–99)
GLUCOSE BLDC GLUCOMTR-MCNC: 340 MG/DL (ref 70–99)
GLUCOSE BLDC GLUCOMTR-MCNC: 341 MG/DL (ref 70–99)
GLUCOSE BLDC GLUCOMTR-MCNC: 342 MG/DL (ref 70–99)
GLUCOSE BLDC GLUCOMTR-MCNC: 344 MG/DL (ref 70–99)
GLUCOSE BLDC GLUCOMTR-MCNC: 347 MG/DL (ref 70–99)
GLUCOSE BLDC GLUCOMTR-MCNC: 347 MG/DL (ref 70–99)
GLUCOSE BLDC GLUCOMTR-MCNC: 351 MG/DL (ref 70–99)
GLUCOSE BLDC GLUCOMTR-MCNC: 357 MG/DL (ref 70–99)
GLUCOSE BLDC GLUCOMTR-MCNC: 358 MG/DL (ref 70–99)
GLUCOSE BLDC GLUCOMTR-MCNC: 359 MG/DL (ref 70–99)
GLUCOSE BLDC GLUCOMTR-MCNC: 360 MG/DL (ref 70–99)
GLUCOSE BLDC GLUCOMTR-MCNC: 361 MG/DL (ref 70–99)
GLUCOSE BLDC GLUCOMTR-MCNC: 363 MG/DL (ref 70–99)
GLUCOSE BLDC GLUCOMTR-MCNC: 366 MG/DL (ref 70–99)
GLUCOSE BLDC GLUCOMTR-MCNC: 367 MG/DL (ref 70–99)
GLUCOSE BLDC GLUCOMTR-MCNC: 368 MG/DL (ref 70–99)
GLUCOSE BLDC GLUCOMTR-MCNC: 372 MG/DL (ref 70–99)
GLUCOSE BLDC GLUCOMTR-MCNC: 377 MG/DL (ref 70–99)
GLUCOSE BLDC GLUCOMTR-MCNC: 377 MG/DL (ref 70–99)
GLUCOSE BLDC GLUCOMTR-MCNC: 379 MG/DL (ref 70–99)
GLUCOSE BLDC GLUCOMTR-MCNC: 382 MG/DL (ref 70–99)
GLUCOSE BLDC GLUCOMTR-MCNC: 382 MG/DL (ref 70–99)
GLUCOSE BLDC GLUCOMTR-MCNC: 384 MG/DL (ref 70–99)
GLUCOSE BLDC GLUCOMTR-MCNC: 385 MG/DL (ref 70–99)
GLUCOSE BLDC GLUCOMTR-MCNC: 387 MG/DL (ref 70–99)
GLUCOSE BLDC GLUCOMTR-MCNC: 387 MG/DL (ref 70–99)
GLUCOSE BLDC GLUCOMTR-MCNC: 391 MG/DL (ref 70–99)
GLUCOSE BLDC GLUCOMTR-MCNC: 392 MG/DL (ref 70–99)
GLUCOSE BLDC GLUCOMTR-MCNC: 394 MG/DL (ref 70–99)
GLUCOSE BLDC GLUCOMTR-MCNC: 401 MG/DL (ref 70–99)
GLUCOSE BLDC GLUCOMTR-MCNC: 403 MG/DL (ref 70–99)
GLUCOSE BLDC GLUCOMTR-MCNC: 406 MG/DL (ref 70–99)
GLUCOSE BLDC GLUCOMTR-MCNC: 407 MG/DL (ref 70–99)
GLUCOSE BLDC GLUCOMTR-MCNC: 408 MG/DL (ref 70–99)
GLUCOSE BLDC GLUCOMTR-MCNC: 412 MG/DL (ref 70–99)
GLUCOSE BLDC GLUCOMTR-MCNC: 415 MG/DL (ref 70–99)
GLUCOSE BLDC GLUCOMTR-MCNC: 417 MG/DL (ref 70–99)
GLUCOSE BLDC GLUCOMTR-MCNC: 417 MG/DL (ref 70–99)
GLUCOSE BLDC GLUCOMTR-MCNC: 42 MG/DL (ref 70–99)
GLUCOSE BLDC GLUCOMTR-MCNC: 420 MG/DL (ref 70–99)
GLUCOSE BLDC GLUCOMTR-MCNC: 423 MG/DL (ref 70–99)
GLUCOSE BLDC GLUCOMTR-MCNC: 426 MG/DL (ref 70–99)
GLUCOSE BLDC GLUCOMTR-MCNC: 428 MG/DL (ref 70–99)
GLUCOSE BLDC GLUCOMTR-MCNC: 429 MG/DL (ref 70–99)
GLUCOSE BLDC GLUCOMTR-MCNC: 432 MG/DL (ref 70–99)
GLUCOSE BLDC GLUCOMTR-MCNC: 433 MG/DL (ref 70–99)
GLUCOSE BLDC GLUCOMTR-MCNC: 440 MG/DL (ref 70–99)
GLUCOSE BLDC GLUCOMTR-MCNC: 450 MG/DL (ref 70–99)
GLUCOSE BLDC GLUCOMTR-MCNC: 458 MG/DL (ref 70–99)
GLUCOSE BLDC GLUCOMTR-MCNC: 46 MG/DL (ref 70–99)
GLUCOSE BLDC GLUCOMTR-MCNC: 474 MG/DL (ref 70–99)
GLUCOSE BLDC GLUCOMTR-MCNC: 50 MG/DL (ref 70–99)
GLUCOSE BLDC GLUCOMTR-MCNC: 52 MG/DL (ref 70–99)
GLUCOSE BLDC GLUCOMTR-MCNC: 54 MG/DL (ref 70–99)
GLUCOSE BLDC GLUCOMTR-MCNC: 59 MG/DL (ref 70–99)
GLUCOSE BLDC GLUCOMTR-MCNC: 59 MG/DL (ref 70–99)
GLUCOSE BLDC GLUCOMTR-MCNC: 65 MG/DL (ref 70–99)
GLUCOSE BLDC GLUCOMTR-MCNC: 66 MG/DL (ref 70–99)
GLUCOSE BLDC GLUCOMTR-MCNC: 69 MG/DL (ref 70–99)
GLUCOSE BLDC GLUCOMTR-MCNC: 70 MG/DL (ref 70–99)
GLUCOSE BLDC GLUCOMTR-MCNC: 71 MG/DL (ref 70–99)
GLUCOSE BLDC GLUCOMTR-MCNC: 72 MG/DL (ref 70–99)
GLUCOSE BLDC GLUCOMTR-MCNC: 76 MG/DL (ref 70–99)
GLUCOSE BLDC GLUCOMTR-MCNC: 77 MG/DL (ref 70–99)
GLUCOSE BLDC GLUCOMTR-MCNC: 77 MG/DL (ref 70–99)
GLUCOSE BLDC GLUCOMTR-MCNC: 79 MG/DL (ref 70–99)
GLUCOSE BLDC GLUCOMTR-MCNC: 79 MG/DL (ref 70–99)
GLUCOSE BLDC GLUCOMTR-MCNC: 80 MG/DL (ref 70–99)
GLUCOSE BLDC GLUCOMTR-MCNC: 82 MG/DL (ref 70–99)
GLUCOSE BLDC GLUCOMTR-MCNC: 84 MG/DL (ref 70–99)
GLUCOSE BLDC GLUCOMTR-MCNC: 85 MG/DL (ref 70–99)
GLUCOSE BLDC GLUCOMTR-MCNC: 86 MG/DL (ref 70–99)
GLUCOSE BLDC GLUCOMTR-MCNC: 88 MG/DL (ref 70–99)
GLUCOSE BLDC GLUCOMTR-MCNC: 90 MG/DL (ref 70–99)
GLUCOSE BLDC GLUCOMTR-MCNC: 90 MG/DL (ref 70–99)
GLUCOSE BLDC GLUCOMTR-MCNC: 92 MG/DL (ref 70–99)
GLUCOSE BLDC GLUCOMTR-MCNC: 92 MG/DL (ref 70–99)
GLUCOSE BLDC GLUCOMTR-MCNC: 94 MG/DL (ref 70–99)
GLUCOSE BLDC GLUCOMTR-MCNC: 94 MG/DL (ref 70–99)
GLUCOSE BLDC GLUCOMTR-MCNC: 97 MG/DL (ref 70–99)
GLUCOSE BLDC GLUCOMTR-MCNC: 98 MG/DL (ref 70–99)
GLUCOSE BLDC GLUCOMTR-MCNC: 98 MG/DL (ref 70–99)
GLUCOSE BLDC GLUCOMTR-MCNC: 99 MG/DL (ref 70–99)
GLUCOSE SERPL-MCNC: 102 MG/DL (ref 65–99)
GLUCOSE SERPL-MCNC: 108 MG/DL (ref 65–99)
GLUCOSE SERPL-MCNC: 119 MG/DL (ref 65–99)
GLUCOSE SERPL-MCNC: 127 MG/DL (ref 65–99)
GLUCOSE SERPL-MCNC: 132 MG/DL (ref 65–99)
GLUCOSE SERPL-MCNC: 135 MG/DL (ref 65–99)
GLUCOSE SERPL-MCNC: 140 MG/DL (ref 65–99)
GLUCOSE SERPL-MCNC: 140 MG/DL (ref 65–99)
GLUCOSE SERPL-MCNC: 143 MG/DL (ref 65–99)
GLUCOSE SERPL-MCNC: 160 MG/DL (ref 65–99)
GLUCOSE SERPL-MCNC: 166 MG/DL (ref 65–99)
GLUCOSE SERPL-MCNC: 176 MG/DL (ref 65–99)
GLUCOSE SERPL-MCNC: 177 MG/DL (ref 65–99)
GLUCOSE SERPL-MCNC: 178 MG/DL (ref 65–99)
GLUCOSE SERPL-MCNC: 178 MG/DL (ref 65–99)
GLUCOSE SERPL-MCNC: 180 MG/DL (ref 65–99)
GLUCOSE SERPL-MCNC: 188 MG/DL (ref 65–99)
GLUCOSE SERPL-MCNC: 198 MG/DL (ref 65–99)
GLUCOSE SERPL-MCNC: 201 MG/DL (ref 65–99)
GLUCOSE SERPL-MCNC: 210 MG/DL (ref 65–99)
GLUCOSE SERPL-MCNC: 224 MG/DL (ref 65–99)
GLUCOSE SERPL-MCNC: 237 MG/DL (ref 65–99)
GLUCOSE SERPL-MCNC: 241 MG/DL (ref 65–99)
GLUCOSE SERPL-MCNC: 263 MG/DL (ref 65–99)
GLUCOSE SERPL-MCNC: 270 MG/DL (ref 65–99)
GLUCOSE SERPL-MCNC: 290 MG/DL (ref 65–99)
GLUCOSE SERPL-MCNC: 293 MG/DL (ref 65–99)
GLUCOSE SERPL-MCNC: 301 MG/DL (ref 65–99)
GLUCOSE SERPL-MCNC: 313 MG/DL (ref 65–99)
GLUCOSE SERPL-MCNC: 321 MG/DL (ref 65–99)
GLUCOSE SERPL-MCNC: 323 MG/DL (ref 65–99)
GLUCOSE SERPL-MCNC: 328 MG/DL (ref 65–99)
GLUCOSE SERPL-MCNC: 333 MG/DL (ref 65–99)
GLUCOSE SERPL-MCNC: 379 MG/DL (ref 65–99)
GLUCOSE SERPL-MCNC: 387 MG/DL (ref 65–99)
GLUCOSE SERPL-MCNC: 391 MG/DL (ref 65–99)
GLUCOSE SERPL-MCNC: 398 MG/DL (ref 65–99)
GLUCOSE SERPL-MCNC: 407 MG/DL (ref 65–99)
GLUCOSE SERPL-MCNC: 409 MG/DL (ref 65–99)
GLUCOSE SERPL-MCNC: 443 MG/DL (ref 65–99)
GLUCOSE SERPL-MCNC: 447 MG/DL (ref 65–99)
GLUCOSE SERPL-MCNC: 460 MG/DL (ref 65–99)
GLUCOSE SERPL-MCNC: 494 MG/DL (ref 65–99)
GLUCOSE SERPL-MCNC: 52 MG/DL (ref 65–99)
GLUCOSE SERPL-MCNC: 56 MG/DL (ref 65–99)
GLUCOSE SERPL-MCNC: 63 MG/DL (ref 65–99)
GLUCOSE SERPL-MCNC: 70 MG/DL (ref 65–99)
GLUCOSE SERPL-MCNC: 84 MG/DL (ref 65–99)
GLUCOSE SERPL-MCNC: 85 MG/DL (ref 65–99)
GLUCOSE SERPL-MCNC: 94 MG/DL (ref 65–99)
GLUCOSE UR STRIP-MCNC: ABNORMAL MG/DL
GLUCOSE UR STRIP-MCNC: ABNORMAL MG/DL
GLUCOSE UR STRIP-MCNC: NEGATIVE MG/DL
GLUCOSE UR STRIP-MCNC: NEGATIVE MG/DL
GRAM STN SPEC: ABNORMAL
GRAM STN SPEC: NORMAL
GRAN CASTS URNS QL MICRO: ABNORMAL /LPF
HBA1C MFR BLD: 6.08 % (ref 4.8–5.6)
HBA1C MFR BLD: 8.37 % (ref 4.8–5.6)
HBV SURFACE AG SERPL QL IA: NORMAL
HCO3 BLDA-SCNC: 11.5 MMOL/L (ref 22–26)
HCO3 BLDA-SCNC: 12 MMOL/L (ref 22–26)
HCO3 BLDA-SCNC: 13.8 MMOL/L (ref 22–26)
HCO3 BLDA-SCNC: 13.8 MMOL/L (ref 22–26)
HCO3 BLDA-SCNC: 14.5 MMOL/L (ref 22–26)
HCO3 BLDA-SCNC: 14.8 MMOL/L (ref 22–26)
HCO3 BLDA-SCNC: 16.5 MMOL/L (ref 22–26)
HCO3 BLDA-SCNC: 16.8 MMOL/L (ref 22–26)
HCO3 BLDA-SCNC: 20 MMOL/L (ref 22–26)
HCO3 BLDA-SCNC: 22.4 MMOL/L (ref 22–26)
HCO3 BLDA-SCNC: 27.1 MMOL/L (ref 22–26)
HCO3 BLDA-SCNC: 29.6 MMOL/L (ref 22–26)
HCO3 BLDA-SCNC: 30.6 MMOL/L (ref 22–26)
HCT VFR BLD AUTO: 21.4 % (ref 34–46.6)
HCT VFR BLD AUTO: 21.9 % (ref 34–46.6)
HCT VFR BLD AUTO: 25.1 % (ref 34–46.6)
HCT VFR BLD AUTO: 25.3 % (ref 34–46.6)
HCT VFR BLD AUTO: 25.6 % (ref 34–46.6)
HCT VFR BLD AUTO: 26.1 % (ref 34–46.6)
HCT VFR BLD AUTO: 26.1 % (ref 34–46.6)
HCT VFR BLD AUTO: 26.3 % (ref 34–46.6)
HCT VFR BLD AUTO: 26.7 % (ref 34–46.6)
HCT VFR BLD AUTO: 26.7 % (ref 34–46.6)
HCT VFR BLD AUTO: 27 % (ref 34–46.6)
HCT VFR BLD AUTO: 27.1 % (ref 34–46.6)
HCT VFR BLD AUTO: 27.8 % (ref 34–46.6)
HCT VFR BLD AUTO: 28.3 % (ref 34–46.6)
HCT VFR BLD AUTO: 28.3 % (ref 34–46.6)
HCT VFR BLD AUTO: 28.6 % (ref 34–46.6)
HCT VFR BLD AUTO: 29 % (ref 34–46.6)
HCT VFR BLD AUTO: 29.1 % (ref 34–46.6)
HCT VFR BLD AUTO: 29.4 % (ref 34–46.6)
HCT VFR BLD AUTO: 29.4 % (ref 34–46.6)
HCT VFR BLD AUTO: 29.6 % (ref 34–46.6)
HCT VFR BLD AUTO: 30.2 % (ref 34–46.6)
HCT VFR BLD AUTO: 30.6 % (ref 34–46.6)
HCT VFR BLD AUTO: 30.9 % (ref 34–46.6)
HCT VFR BLD AUTO: 31.3 % (ref 34–46.6)
HCT VFR BLD AUTO: 34.5 % (ref 34–46.6)
HCT VFR BLD AUTO: 34.7 % (ref 34–46.6)
HCT VFR BLD AUTO: 35.2 % (ref 34–46.6)
HCT VFR BLD AUTO: 35.6 % (ref 34–46.6)
HCT VFR BLD AUTO: 36 % (ref 34–46.6)
HCT VFR BLD AUTO: 36.2 % (ref 34–46.6)
HCT VFR BLD AUTO: 36.4 % (ref 34–46.6)
HCT VFR BLD AUTO: 36.6 % (ref 34–46.6)
HCT VFR BLD AUTO: 37.2 % (ref 34–46.6)
HCT VFR BLD AUTO: 37.5 % (ref 34–46.6)
HCT VFR BLD AUTO: 37.6 % (ref 34–46.6)
HCT VFR BLD AUTO: 37.8 % (ref 34–46.6)
HCT VFR BLD AUTO: 38.3 % (ref 34–46.6)
HCT VFR BLD AUTO: 38.4 % (ref 34–46.6)
HCT VFR BLD AUTO: 38.6 % (ref 34–46.6)
HCT VFR BLD AUTO: 39.1 % (ref 34–46.6)
HCT VFR BLD AUTO: 39.6 % (ref 34–46.6)
HCT VFR BLD AUTO: 39.9 % (ref 34–46.6)
HCT VFR BLD AUTO: 40.5 % (ref 34–46.6)
HCT VFR BLD AUTO: 40.8 % (ref 34–46.6)
HCT VFR BLD AUTO: 41 % (ref 34–46.6)
HDLC SERPL-MCNC: 19 MG/DL (ref 40–60)
HGB BLD-MCNC: 10.3 G/DL (ref 12–15.9)
HGB BLD-MCNC: 10.4 G/DL (ref 12–15.9)
HGB BLD-MCNC: 11.6 G/DL (ref 12–15.9)
HGB BLD-MCNC: 12 G/DL (ref 12–15.9)
HGB BLD-MCNC: 12.1 G/DL (ref 12–15.9)
HGB BLD-MCNC: 12.1 G/DL (ref 12–15.9)
HGB BLD-MCNC: 12.2 G/DL (ref 12–15.9)
HGB BLD-MCNC: 12.2 G/DL (ref 12–15.9)
HGB BLD-MCNC: 12.3 G/DL (ref 12–15.9)
HGB BLD-MCNC: 12.4 G/DL (ref 12–15.9)
HGB BLD-MCNC: 12.5 G/DL (ref 12–15.9)
HGB BLD-MCNC: 12.6 G/DL (ref 12–15.9)
HGB BLD-MCNC: 12.7 G/DL (ref 12–15.9)
HGB BLD-MCNC: 12.8 G/DL (ref 12–15.9)
HGB BLD-MCNC: 12.9 G/DL (ref 12–15.9)
HGB BLD-MCNC: 13 G/DL (ref 12–15.9)
HGB BLD-MCNC: 13 G/DL (ref 12–15.9)
HGB BLD-MCNC: 13.1 G/DL (ref 12–15.9)
HGB BLD-MCNC: 13.2 G/DL (ref 12–15.9)
HGB BLD-MCNC: 13.4 G/DL (ref 12–15.9)
HGB BLD-MCNC: 13.8 G/DL (ref 12–15.9)
HGB BLD-MCNC: 13.8 G/DL (ref 12–15.9)
HGB BLD-MCNC: 13.9 G/DL (ref 12–15.9)
HGB BLD-MCNC: 14.3 G/DL (ref 12–15.9)
HGB BLD-MCNC: 7.2 G/DL (ref 12–15.9)
HGB BLD-MCNC: 7.3 G/DL (ref 12–15.9)
HGB BLD-MCNC: 8.5 G/DL (ref 12–15.9)
HGB BLD-MCNC: 8.5 G/DL (ref 12–15.9)
HGB BLD-MCNC: 8.6 G/DL (ref 12–15.9)
HGB BLD-MCNC: 8.6 G/DL (ref 12–15.9)
HGB BLD-MCNC: 8.7 G/DL (ref 12–15.9)
HGB BLD-MCNC: 8.8 G/DL (ref 12–15.9)
HGB BLD-MCNC: 9.1 G/DL (ref 12–15.9)
HGB BLD-MCNC: 9.1 G/DL (ref 12–15.9)
HGB BLD-MCNC: 9.2 G/DL (ref 12–15.9)
HGB BLD-MCNC: 9.4 G/DL (ref 12–15.9)
HGB BLD-MCNC: 9.4 G/DL (ref 12–15.9)
HGB BLD-MCNC: 9.5 G/DL (ref 12–15.9)
HGB BLD-MCNC: 9.7 G/DL (ref 12–15.9)
HGB BLD-MCNC: 9.7 G/DL (ref 12–15.9)
HGB BLD-MCNC: 9.9 G/DL (ref 12–15.9)
HGB BLD-MCNC: 9.9 G/DL (ref 12–15.9)
HGB BLDA-MCNC: 10.3 G/DL (ref 11.7–14.6)
HGB BLDA-MCNC: 10.4 G/DL (ref 11.7–14.6)
HGB BLDA-MCNC: 11 G/DL (ref 11.7–14.6)
HGB BLDA-MCNC: 11.3 G/DL (ref 11.7–14.6)
HGB BLDA-MCNC: 11.8 G/DL (ref 11.7–14.6)
HGB BLDA-MCNC: 13.3 G/DL (ref 11.7–14.6)
HGB BLDA-MCNC: 15 G/DL (ref 11.7–14.6)
HGB BLDA-MCNC: 9.4 G/DL (ref 11.7–14.6)
HGB BLDA-MCNC: 9.7 G/DL (ref 11.7–14.6)
HGB BLDA-MCNC: 9.7 G/DL (ref 11.7–14.6)
HGB BLDA-MCNC: 9.9 G/DL (ref 11.7–14.6)
HGB UR QL STRIP.AUTO: ABNORMAL
HGB UR QL STRIP.AUTO: NEGATIVE
HOLD SPECIMEN: NORMAL
HYALINE CASTS UR QL AUTO: ABNORMAL /LPF
HYPOCHROMIA BLD QL: ABNORMAL
HYPOCHROMIA BLD QL: ABNORMAL
HYPOCHROMIA BLD QL: NORMAL
IMM GRANULOCYTES # BLD AUTO: 0.01 10*3/MM3 (ref 0–0.05)
IMM GRANULOCYTES # BLD AUTO: 0.02 10*3/MM3 (ref 0–0.05)
IMM GRANULOCYTES # BLD AUTO: 0.03 10*3/MM3 (ref 0–0.05)
IMM GRANULOCYTES # BLD AUTO: 0.04 10*3/MM3 (ref 0–0.05)
IMM GRANULOCYTES # BLD AUTO: 0.05 10*3/MM3 (ref 0–0.05)
IMM GRANULOCYTES # BLD AUTO: 0.06 10*3/MM3 (ref 0–0.05)
IMM GRANULOCYTES # BLD AUTO: 0.07 10*3/MM3 (ref 0–0.05)
IMM GRANULOCYTES # BLD AUTO: 0.07 10*3/MM3 (ref 0–0.05)
IMM GRANULOCYTES # BLD AUTO: 0.08 10*3/MM3 (ref 0–0.05)
IMM GRANULOCYTES # BLD AUTO: 0.11 10*3/MM3 (ref 0–0.05)
IMM GRANULOCYTES # BLD AUTO: 0.11 10*3/MM3 (ref 0–0.05)
IMM GRANULOCYTES # BLD AUTO: 0.12 10*3/MM3 (ref 0–0.05)
IMM GRANULOCYTES # BLD AUTO: 0.13 10*3/MM3 (ref 0–0.05)
IMM GRANULOCYTES # BLD AUTO: 0.14 10*3/MM3 (ref 0–0.05)
IMM GRANULOCYTES NFR BLD AUTO: 0.2 % (ref 0–0.5)
IMM GRANULOCYTES NFR BLD AUTO: 0.3 % (ref 0–0.5)
IMM GRANULOCYTES NFR BLD AUTO: 0.4 % (ref 0–0.5)
IMM GRANULOCYTES NFR BLD AUTO: 0.5 % (ref 0–0.5)
IMM GRANULOCYTES NFR BLD AUTO: 0.6 % (ref 0–0.5)
IMM GRANULOCYTES NFR BLD AUTO: 0.6 % (ref 0–0.5)
IMM GRANULOCYTES NFR BLD AUTO: 0.7 % (ref 0–0.5)
IMM GRANULOCYTES NFR BLD AUTO: 0.8 % (ref 0–0.5)
IMM GRANULOCYTES NFR BLD AUTO: 0.9 % (ref 0–0.5)
IMM GRANULOCYTES NFR BLD AUTO: 1 % (ref 0–0.5)
IMM GRANULOCYTES NFR BLD AUTO: 1.3 % (ref 0–0.5)
IMM GRANULOCYTES NFR BLD AUTO: 1.4 % (ref 0–0.5)
IMM GRANULOCYTES NFR BLD AUTO: 1.6 % (ref 0–0.5)
INHALED O2 CONCENTRATION: 100 %
INHALED O2 CONCENTRATION: 100 %
INHALED O2 CONCENTRATION: 50 %
INHALED O2 CONCENTRATION: 55 %
INHALED O2 CONCENTRATION: 60 %
INHALED O2 CONCENTRATION: 80 %
INR PPP: 1.02 (ref 2–3)
INR PPP: 1.17 (ref 2–3)
INR PPP: 1.26 (ref 2–3)
INR PPP: 1.65 (ref 2–3)
IRON 24H UR-MRATE: 83 MCG/DL (ref 37–145)
IRON SATN MFR SERPL: 53 % (ref 20–50)
IVRT: 69 MSEC
KETONES UR QL STRIP: ABNORMAL
KETONES UR QL STRIP: NEGATIVE
L PNEUMO1 AG UR QL IA: NEGATIVE
LACTATE BLDA-SCNC: 1.82 MMOL/L (ref 0.5–2)
LACTATE BLDA-SCNC: 2.38 MMOL/L (ref 0.5–2)
LACTATE BLDA-SCNC: 2.53 MMOL/L (ref 0.5–2)
LACTATE BLDA-SCNC: 2.7 MMOL/L (ref 0.5–2)
LACTATE BLDA-SCNC: 4.54 MMOL/L (ref 0.5–2)
LACTATE BLDA-SCNC: 5.41 MMOL/L (ref 0.5–2)
LACTATE BLDA-SCNC: 6.74 MMOL/L (ref 0.5–2)
LACTATE BLDA-SCNC: 7.1 MMOL/L (ref 0.5–2)
LACTATE BLDA-SCNC: 9.15 MMOL/L (ref 0.5–2)
LACTATE BLDA-SCNC: ABNORMAL MMOL/L
LARGE PLATELETS: ABNORMAL
LARGE PLATELETS: ABNORMAL
LARGE PLATELETS: NORMAL
LDH SERPL-CCNC: 453 U/L (ref 135–214)
LDH SERPL-CCNC: 477 U/L (ref 135–214)
LDH SERPL-CCNC: 478 U/L (ref 135–214)
LDH SERPL-CCNC: 509 U/L (ref 135–214)
LDH SERPL-CCNC: 513 U/L (ref 135–214)
LDH SERPL-CCNC: 626 U/L (ref 135–214)
LDLC SERPL CALC-MCNC: 32 MG/DL (ref 0–100)
LDLC/HDLC SERPL: 1.68 {RATIO}
LEFT ATRIUM VOLUME INDEX: 24.2 ML/M2
LEUKOCYTE ESTERASE UR QL STRIP.AUTO: ABNORMAL
LEUKOCYTE ESTERASE UR QL STRIP.AUTO: NEGATIVE
LIPASE SERPL-CCNC: 13 U/L (ref 13–60)
LYMPHOCYTES # BLD AUTO: 0.22 10*3/MM3 (ref 0.7–3.1)
LYMPHOCYTES # BLD AUTO: 0.23 10*3/MM3 (ref 0.7–3.1)
LYMPHOCYTES # BLD AUTO: 0.25 10*3/MM3 (ref 0.7–3.1)
LYMPHOCYTES # BLD AUTO: 0.26 10*3/MM3 (ref 0.7–3.1)
LYMPHOCYTES # BLD AUTO: 0.27 10*3/MM3 (ref 0.7–3.1)
LYMPHOCYTES # BLD AUTO: 0.28 10*3/MM3 (ref 0.7–3.1)
LYMPHOCYTES # BLD AUTO: 0.29 10*3/MM3 (ref 0.7–3.1)
LYMPHOCYTES # BLD AUTO: 0.3 10*3/MM3 (ref 0.7–3.1)
LYMPHOCYTES # BLD AUTO: 0.31 10*3/MM3 (ref 0.7–3.1)
LYMPHOCYTES # BLD AUTO: 0.34 10*3/MM3 (ref 0.7–3.1)
LYMPHOCYTES # BLD AUTO: 0.34 10*3/MM3 (ref 0.7–3.1)
LYMPHOCYTES # BLD AUTO: 0.37 10*3/MM3 (ref 0.7–3.1)
LYMPHOCYTES # BLD AUTO: 0.38 10*3/MM3 (ref 0.7–3.1)
LYMPHOCYTES # BLD AUTO: 0.41 10*3/MM3 (ref 0.7–3.1)
LYMPHOCYTES # BLD AUTO: 0.41 10*3/MM3 (ref 0.7–3.1)
LYMPHOCYTES # BLD AUTO: 0.47 10*3/MM3 (ref 0.7–3.1)
LYMPHOCYTES # BLD AUTO: 0.49 10*3/MM3 (ref 0.7–3.1)
LYMPHOCYTES # BLD AUTO: 0.49 10*3/MM3 (ref 0.7–3.1)
LYMPHOCYTES # BLD AUTO: 0.55 10*3/MM3 (ref 0.7–3.1)
LYMPHOCYTES # BLD AUTO: 0.59 10*3/MM3 (ref 0.7–3.1)
LYMPHOCYTES # BLD AUTO: 0.61 10*3/MM3 (ref 0.7–3.1)
LYMPHOCYTES # BLD AUTO: 0.63 10*3/MM3 (ref 0.7–3.1)
LYMPHOCYTES # BLD AUTO: 0.64 10*3/MM3 (ref 0.7–3.1)
LYMPHOCYTES # BLD AUTO: 0.66 10*3/MM3 (ref 0.7–3.1)
LYMPHOCYTES # BLD AUTO: 0.69 10*3/MM3 (ref 0.7–3.1)
LYMPHOCYTES # BLD AUTO: 0.75 10*3/MM3 (ref 0.7–3.1)
LYMPHOCYTES # BLD AUTO: 0.88 10*3/MM3 (ref 0.7–3.1)
LYMPHOCYTES # BLD AUTO: 0.92 10*3/MM3 (ref 0.7–3.1)
LYMPHOCYTES # BLD AUTO: 1.01 10*3/MM3 (ref 0.7–3.1)
LYMPHOCYTES # BLD AUTO: 1.12 10*3/MM3 (ref 0.7–3.1)
LYMPHOCYTES # BLD AUTO: 1.17 10*3/MM3 (ref 0.7–3.1)
LYMPHOCYTES # BLD MANUAL: 0.24 10*3/MM3 (ref 0.7–3.1)
LYMPHOCYTES # BLD MANUAL: 2.12 10*3/MM3 (ref 0.7–3.1)
LYMPHOCYTES # BLD MANUAL: 2.24 10*3/MM3 (ref 0.7–3.1)
LYMPHOCYTES NFR BLD AUTO: 10 % (ref 19.6–45.3)
LYMPHOCYTES NFR BLD AUTO: 11.6 % (ref 19.6–45.3)
LYMPHOCYTES NFR BLD AUTO: 13.1 % (ref 19.6–45.3)
LYMPHOCYTES NFR BLD AUTO: 13.3 % (ref 19.6–45.3)
LYMPHOCYTES NFR BLD AUTO: 14.6 % (ref 19.6–45.3)
LYMPHOCYTES NFR BLD AUTO: 19.2 % (ref 19.6–45.3)
LYMPHOCYTES NFR BLD AUTO: 2.6 % (ref 19.6–45.3)
LYMPHOCYTES NFR BLD AUTO: 2.7 % (ref 19.6–45.3)
LYMPHOCYTES NFR BLD AUTO: 2.9 % (ref 19.6–45.3)
LYMPHOCYTES NFR BLD AUTO: 24.8 % (ref 19.6–45.3)
LYMPHOCYTES NFR BLD AUTO: 3.3 % (ref 19.6–45.3)
LYMPHOCYTES NFR BLD AUTO: 3.7 % (ref 19.6–45.3)
LYMPHOCYTES NFR BLD AUTO: 3.8 % (ref 19.6–45.3)
LYMPHOCYTES NFR BLD AUTO: 31 % (ref 19.6–45.3)
LYMPHOCYTES NFR BLD AUTO: 33.2 % (ref 19.6–45.3)
LYMPHOCYTES NFR BLD AUTO: 34.5 % (ref 19.6–45.3)
LYMPHOCYTES NFR BLD AUTO: 4 % (ref 19.6–45.3)
LYMPHOCYTES NFR BLD AUTO: 4 % (ref 19.6–45.3)
LYMPHOCYTES NFR BLD AUTO: 4.9 % (ref 19.6–45.3)
LYMPHOCYTES NFR BLD AUTO: 5 % (ref 19.6–45.3)
LYMPHOCYTES NFR BLD AUTO: 5.1 % (ref 19.6–45.3)
LYMPHOCYTES NFR BLD AUTO: 5.5 % (ref 19.6–45.3)
LYMPHOCYTES NFR BLD AUTO: 5.6 % (ref 19.6–45.3)
LYMPHOCYTES NFR BLD AUTO: 6 % (ref 19.6–45.3)
LYMPHOCYTES NFR BLD AUTO: 6.2 % (ref 19.6–45.3)
LYMPHOCYTES NFR BLD AUTO: 6.4 % (ref 19.6–45.3)
LYMPHOCYTES NFR BLD AUTO: 7.1 % (ref 19.6–45.3)
LYMPHOCYTES NFR BLD AUTO: 7.4 % (ref 19.6–45.3)
LYMPHOCYTES NFR BLD AUTO: 7.4 % (ref 19.6–45.3)
LYMPHOCYTES NFR BLD AUTO: 7.5 % (ref 19.6–45.3)
LYMPHOCYTES NFR BLD AUTO: 7.5 % (ref 19.6–45.3)
LYMPHOCYTES NFR BLD AUTO: 7.7 % (ref 19.6–45.3)
LYMPHOCYTES NFR BLD AUTO: 9.4 % (ref 19.6–45.3)
LYMPHOCYTES NFR BLD AUTO: 9.4 % (ref 19.6–45.3)
LYMPHOCYTES NFR BLD MANUAL: 1 % (ref 19.6–45.3)
LYMPHOCYTES NFR BLD MANUAL: 3 % (ref 19.6–45.3)
LYMPHOCYTES NFR BLD MANUAL: 3 % (ref 5–12)
LYMPHOCYTES NFR BLD MANUAL: 5 % (ref 5–12)
LYMPHOCYTES NFR BLD MANUAL: 8 % (ref 19.6–45.3)
LYMPHOCYTES NFR BLD MANUAL: 9 % (ref 5–12)
MACROCYTES BLD QL SMEAR: ABNORMAL
MACROCYTES BLD QL SMEAR: NORMAL
MAGNESIUM SERPL-MCNC: 1.5 MG/DL (ref 1.6–2.4)
MAGNESIUM SERPL-MCNC: 1.5 MG/DL (ref 1.6–2.4)
MAGNESIUM SERPL-MCNC: 1.6 MG/DL (ref 1.6–2.4)
MAGNESIUM SERPL-MCNC: 1.7 MG/DL (ref 1.6–2.4)
MAGNESIUM SERPL-MCNC: 1.8 MG/DL (ref 1.6–2.4)
MAGNESIUM SERPL-MCNC: 1.9 MG/DL (ref 1.6–2.4)
MAGNESIUM SERPL-MCNC: 2 MG/DL (ref 1.6–2.4)
MAGNESIUM SERPL-MCNC: 2.1 MG/DL (ref 1.6–2.4)
MAGNESIUM SERPL-MCNC: 2.1 MG/DL (ref 1.6–2.4)
MAGNESIUM SERPL-MCNC: 2.2 MG/DL (ref 1.6–2.4)
MAGNESIUM SERPL-MCNC: 2.4 MG/DL (ref 1.6–2.4)
MAGNESIUM SERPL-MCNC: 2.7 MG/DL (ref 1.6–2.4)
MAXIMAL PREDICTED HEART RATE: 152 BPM
MAXIMAL PREDICTED HEART RATE: 152 BPM
MCH RBC QN AUTO: 29.4 PG (ref 26.6–33)
MCH RBC QN AUTO: 29.5 PG (ref 26.6–33)
MCH RBC QN AUTO: 29.7 PG (ref 26.6–33)
MCH RBC QN AUTO: 29.8 PG (ref 26.6–33)
MCH RBC QN AUTO: 29.9 PG (ref 26.6–33)
MCH RBC QN AUTO: 30 PG (ref 26.6–33)
MCH RBC QN AUTO: 30.1 PG (ref 26.6–33)
MCH RBC QN AUTO: 30.1 PG (ref 26.6–33)
MCH RBC QN AUTO: 30.2 PG (ref 26.6–33)
MCH RBC QN AUTO: 30.3 PG (ref 26.6–33)
MCH RBC QN AUTO: 30.4 PG (ref 26.6–33)
MCH RBC QN AUTO: 30.5 PG (ref 26.6–33)
MCH RBC QN AUTO: 30.6 PG (ref 26.6–33)
MCH RBC QN AUTO: 30.7 PG (ref 26.6–33)
MCH RBC QN AUTO: 30.8 PG (ref 26.6–33)
MCH RBC QN AUTO: 30.8 PG (ref 26.6–33)
MCH RBC QN AUTO: 30.9 PG (ref 26.6–33)
MCH RBC QN AUTO: 31.1 PG (ref 26.6–33)
MCH RBC QN AUTO: 31.2 PG (ref 26.6–33)
MCH RBC QN AUTO: 31.2 PG (ref 26.6–33)
MCH RBC QN AUTO: 31.3 PG (ref 26.6–33)
MCH RBC QN AUTO: 31.3 PG (ref 26.6–33)
MCH RBC QN AUTO: 31.4 PG (ref 26.6–33)
MCHC RBC AUTO-ENTMCNC: 30.8 G/DL (ref 31.5–35.7)
MCHC RBC AUTO-ENTMCNC: 31.6 G/DL (ref 31.5–35.7)
MCHC RBC AUTO-ENTMCNC: 31.7 G/DL (ref 31.5–35.7)
MCHC RBC AUTO-ENTMCNC: 31.9 G/DL (ref 31.5–35.7)
MCHC RBC AUTO-ENTMCNC: 32 G/DL (ref 31.5–35.7)
MCHC RBC AUTO-ENTMCNC: 32.2 G/DL (ref 31.5–35.7)
MCHC RBC AUTO-ENTMCNC: 32.4 G/DL (ref 31.5–35.7)
MCHC RBC AUTO-ENTMCNC: 32.5 G/DL (ref 31.5–35.7)
MCHC RBC AUTO-ENTMCNC: 32.6 G/DL (ref 31.5–35.7)
MCHC RBC AUTO-ENTMCNC: 32.6 G/DL (ref 31.5–35.7)
MCHC RBC AUTO-ENTMCNC: 33 G/DL (ref 31.5–35.7)
MCHC RBC AUTO-ENTMCNC: 33.1 G/DL (ref 31.5–35.7)
MCHC RBC AUTO-ENTMCNC: 33.1 G/DL (ref 31.5–35.7)
MCHC RBC AUTO-ENTMCNC: 33.2 G/DL (ref 31.5–35.7)
MCHC RBC AUTO-ENTMCNC: 33.3 G/DL (ref 31.5–35.7)
MCHC RBC AUTO-ENTMCNC: 33.3 G/DL (ref 31.5–35.7)
MCHC RBC AUTO-ENTMCNC: 33.4 G/DL (ref 31.5–35.7)
MCHC RBC AUTO-ENTMCNC: 33.6 G/DL (ref 31.5–35.7)
MCHC RBC AUTO-ENTMCNC: 33.6 G/DL (ref 31.5–35.7)
MCHC RBC AUTO-ENTMCNC: 33.7 G/DL (ref 31.5–35.7)
MCHC RBC AUTO-ENTMCNC: 33.8 G/DL (ref 31.5–35.7)
MCHC RBC AUTO-ENTMCNC: 33.9 G/DL (ref 31.5–35.7)
MCHC RBC AUTO-ENTMCNC: 34.1 G/DL (ref 31.5–35.7)
MCHC RBC AUTO-ENTMCNC: 34.1 G/DL (ref 31.5–35.7)
MCHC RBC AUTO-ENTMCNC: 34.2 G/DL (ref 31.5–35.7)
MCHC RBC AUTO-ENTMCNC: 34.2 G/DL (ref 31.5–35.7)
MCHC RBC AUTO-ENTMCNC: 34.3 G/DL (ref 31.5–35.7)
MCHC RBC AUTO-ENTMCNC: 34.3 G/DL (ref 31.5–35.7)
MCHC RBC AUTO-ENTMCNC: 34.4 G/DL (ref 31.5–35.7)
MCHC RBC AUTO-ENTMCNC: 34.5 G/DL (ref 31.5–35.7)
MCHC RBC AUTO-ENTMCNC: 34.6 G/DL (ref 31.5–35.7)
MCHC RBC AUTO-ENTMCNC: 34.7 G/DL (ref 31.5–35.7)
MCHC RBC AUTO-ENTMCNC: 34.8 G/DL (ref 31.5–35.7)
MCHC RBC AUTO-ENTMCNC: 34.9 G/DL (ref 31.5–35.7)
MCHC RBC AUTO-ENTMCNC: 35 G/DL (ref 31.5–35.7)
MCHC RBC AUTO-ENTMCNC: 35 G/DL (ref 31.5–35.7)
MCHC RBC AUTO-ENTMCNC: 35.1 G/DL (ref 31.5–35.7)
MCHC RBC AUTO-ENTMCNC: 35.2 G/DL (ref 31.5–35.7)
MCV RBC AUTO: 87.1 FL (ref 79–97)
MCV RBC AUTO: 87.5 FL (ref 79–97)
MCV RBC AUTO: 87.6 FL (ref 79–97)
MCV RBC AUTO: 87.9 FL (ref 79–97)
MCV RBC AUTO: 88.2 FL (ref 79–97)
MCV RBC AUTO: 88.2 FL (ref 79–97)
MCV RBC AUTO: 88.5 FL (ref 79–97)
MCV RBC AUTO: 88.6 FL (ref 79–97)
MCV RBC AUTO: 88.7 FL (ref 79–97)
MCV RBC AUTO: 88.7 FL (ref 79–97)
MCV RBC AUTO: 88.8 FL (ref 79–97)
MCV RBC AUTO: 88.9 FL (ref 79–97)
MCV RBC AUTO: 89 FL (ref 79–97)
MCV RBC AUTO: 89 FL (ref 79–97)
MCV RBC AUTO: 89.3 FL (ref 79–97)
MCV RBC AUTO: 89.4 FL (ref 79–97)
MCV RBC AUTO: 89.4 FL (ref 79–97)
MCV RBC AUTO: 89.5 FL (ref 79–97)
MCV RBC AUTO: 89.6 FL (ref 79–97)
MCV RBC AUTO: 89.6 FL (ref 79–97)
MCV RBC AUTO: 89.9 FL (ref 79–97)
MCV RBC AUTO: 90.1 FL (ref 79–97)
MCV RBC AUTO: 90.1 FL (ref 79–97)
MCV RBC AUTO: 90.2 FL (ref 79–97)
MCV RBC AUTO: 90.3 FL (ref 79–97)
MCV RBC AUTO: 90.5 FL (ref 79–97)
MCV RBC AUTO: 90.6 FL (ref 79–97)
MCV RBC AUTO: 90.6 FL (ref 79–97)
MCV RBC AUTO: 90.8 FL (ref 79–97)
MCV RBC AUTO: 90.9 FL (ref 79–97)
MCV RBC AUTO: 91 FL (ref 79–97)
MCV RBC AUTO: 91 FL (ref 79–97)
MCV RBC AUTO: 91.1 FL (ref 79–97)
MCV RBC AUTO: 91.2 FL (ref 79–97)
MCV RBC AUTO: 91.3 FL (ref 79–97)
MCV RBC AUTO: 91.6 FL (ref 79–97)
MCV RBC AUTO: 91.6 FL (ref 79–97)
MCV RBC AUTO: 92.2 FL (ref 79–97)
MCV RBC AUTO: 92.2 FL (ref 79–97)
MCV RBC AUTO: 92.7 FL (ref 79–97)
MCV RBC AUTO: 93.3 FL (ref 79–97)
MCV RBC AUTO: 93.6 FL (ref 79–97)
MCV RBC AUTO: 93.8 FL (ref 79–97)
MCV RBC AUTO: 93.9 FL (ref 79–97)
MCV RBC AUTO: 96 FL (ref 79–97)
MCV RBC AUTO: 96.6 FL (ref 79–97)
METAMYELOCYTES NFR BLD MANUAL: 2 % (ref 0–0)
METAMYELOCYTES NFR BLD MANUAL: 3 % (ref 0–0)
METHGB BLD QL: 0 % (ref 0–1.5)
METHGB BLD QL: 0 % (ref 0–1.5)
METHGB BLD QL: 0.1 % (ref 0–1.5)
METHGB BLD QL: 0.2 % (ref 0–1.5)
METHGB BLD QL: 0.2 % (ref 0–1.5)
METHGB BLD QL: 0.3 % (ref 0–1.5)
METHGB BLD QL: 0.4 % (ref 0–1.5)
MICROCYTES BLD QL: NORMAL
MODALITY: ABNORMAL
MONOCYTES # BLD AUTO: 0.15 10*3/MM3 (ref 0.1–0.9)
MONOCYTES # BLD AUTO: 0.17 10*3/MM3 (ref 0.1–0.9)
MONOCYTES # BLD AUTO: 0.23 10*3/MM3 (ref 0.1–0.9)
MONOCYTES # BLD AUTO: 0.26 10*3/MM3 (ref 0.1–0.9)
MONOCYTES # BLD AUTO: 0.26 10*3/MM3 (ref 0.1–0.9)
MONOCYTES # BLD AUTO: 0.27 10*3/MM3 (ref 0.1–0.9)
MONOCYTES # BLD AUTO: 0.3 10*3/MM3 (ref 0.1–0.9)
MONOCYTES # BLD AUTO: 0.31 10*3/MM3 (ref 0.1–0.9)
MONOCYTES # BLD AUTO: 0.32 10*3/MM3 (ref 0.1–0.9)
MONOCYTES # BLD AUTO: 0.32 10*3/MM3 (ref 0.1–0.9)
MONOCYTES # BLD AUTO: 0.34 10*3/MM3 (ref 0.1–0.9)
MONOCYTES # BLD AUTO: 0.36 10*3/MM3 (ref 0.1–0.9)
MONOCYTES # BLD AUTO: 0.37 10*3/MM3 (ref 0.1–0.9)
MONOCYTES # BLD AUTO: 0.39 10*3/MM3 (ref 0.1–0.9)
MONOCYTES # BLD AUTO: 0.39 10*3/MM3 (ref 0.1–0.9)
MONOCYTES # BLD AUTO: 0.4 10*3/MM3 (ref 0.1–0.9)
MONOCYTES # BLD AUTO: 0.41 10*3/MM3 (ref 0.1–0.9)
MONOCYTES # BLD AUTO: 0.42 10*3/MM3 (ref 0.1–0.9)
MONOCYTES # BLD AUTO: 0.43 10*3/MM3 (ref 0.1–0.9)
MONOCYTES # BLD AUTO: 0.46 10*3/MM3 (ref 0.1–0.9)
MONOCYTES # BLD AUTO: 0.47 10*3/MM3 (ref 0.1–0.9)
MONOCYTES # BLD AUTO: 0.49 10*3/MM3 (ref 0.1–0.9)
MONOCYTES # BLD AUTO: 0.51 10*3/MM3 (ref 0.1–0.9)
MONOCYTES # BLD AUTO: 0.53 10*3/MM3 (ref 0.1–0.9)
MONOCYTES # BLD AUTO: 0.56 10*3/MM3 (ref 0.1–0.9)
MONOCYTES # BLD AUTO: 0.57 10*3/MM3 (ref 0.1–0.9)
MONOCYTES # BLD AUTO: 0.57 10*3/MM3 (ref 0.1–0.9)
MONOCYTES # BLD AUTO: 0.59 10*3/MM3 (ref 0.1–0.9)
MONOCYTES # BLD AUTO: 0.6 10*3/MM3 (ref 0.1–0.9)
MONOCYTES # BLD AUTO: 0.6 10*3/MM3 (ref 0.1–0.9)
MONOCYTES # BLD AUTO: 0.7 10*3/MM3 (ref 0.1–0.9)
MONOCYTES # BLD AUTO: 0.73 10*3/MM3 (ref 0.1–0.9)
MONOCYTES # BLD AUTO: 0.73 10*3/MM3 (ref 0.1–0.9)
MONOCYTES # BLD AUTO: 0.75 10*3/MM3 (ref 0.1–0.9)
MONOCYTES # BLD AUTO: 0.79 10*3/MM3 (ref 0.1–0.9)
MONOCYTES # BLD AUTO: 1.33 10*3/MM3 (ref 0.1–0.9)
MONOCYTES # BLD AUTO: 4.02 10*3/MM3 (ref 0.1–0.9)
MONOCYTES NFR BLD AUTO: 1.7 % (ref 5–12)
MONOCYTES NFR BLD AUTO: 14.6 % (ref 5–12)
MONOCYTES NFR BLD AUTO: 17.3 % (ref 5–12)
MONOCYTES NFR BLD AUTO: 19.7 % (ref 5–12)
MONOCYTES NFR BLD AUTO: 22.4 % (ref 5–12)
MONOCYTES NFR BLD AUTO: 3.9 % (ref 5–12)
MONOCYTES NFR BLD AUTO: 4.1 % (ref 5–12)
MONOCYTES NFR BLD AUTO: 4.5 % (ref 5–12)
MONOCYTES NFR BLD AUTO: 4.7 % (ref 5–12)
MONOCYTES NFR BLD AUTO: 4.8 % (ref 5–12)
MONOCYTES NFR BLD AUTO: 4.9 % (ref 5–12)
MONOCYTES NFR BLD AUTO: 5 % (ref 5–12)
MONOCYTES NFR BLD AUTO: 5.1 % (ref 5–12)
MONOCYTES NFR BLD AUTO: 5.1 % (ref 5–12)
MONOCYTES NFR BLD AUTO: 5.3 % (ref 5–12)
MONOCYTES NFR BLD AUTO: 5.6 % (ref 5–12)
MONOCYTES NFR BLD AUTO: 5.7 % (ref 5–12)
MONOCYTES NFR BLD AUTO: 5.9 % (ref 5–12)
MONOCYTES NFR BLD AUTO: 5.9 % (ref 5–12)
MONOCYTES NFR BLD AUTO: 6 % (ref 5–12)
MONOCYTES NFR BLD AUTO: 6.3 % (ref 5–12)
MONOCYTES NFR BLD AUTO: 6.7 % (ref 5–12)
MONOCYTES NFR BLD AUTO: 7.6 % (ref 5–12)
MONOCYTES NFR BLD AUTO: 7.7 % (ref 5–12)
MONOCYTES NFR BLD AUTO: 7.8 % (ref 5–12)
MONOCYTES NFR BLD AUTO: 7.9 % (ref 5–12)
MONOCYTES NFR BLD AUTO: 8.2 % (ref 5–12)
MONOCYTES NFR BLD AUTO: 8.9 % (ref 5–12)
MONOCYTES NFR BLD AUTO: 9.3 % (ref 5–12)
MONOCYTES NFR BLD AUTO: 9.8 % (ref 5–12)
MONOCYTES NFR BLD AUTO: 9.8 % (ref 5–12)
MONOCYTES NFR BLD AUTO: 9.9 % (ref 5–12)
MRSA DNA SPEC QL NAA+PROBE: NORMAL
MRSA DNA SPEC QL NAA+PROBE: NORMAL
MYELOCYTES NFR BLD MANUAL: 1 % (ref 0–0)
NEUTROPHILS # BLD AUTO: 21.49 10*3/MM3 (ref 1.7–7)
NEUTROPHILS # BLD AUTO: 23.38 10*3/MM3 (ref 1.7–7)
NEUTROPHILS # BLD AUTO: 36.22 10*3/MM3 (ref 1.7–7)
NEUTROPHILS NFR BLD AUTO: 0.89 10*3/MM3 (ref 1.7–7)
NEUTROPHILS NFR BLD AUTO: 1 10*3/MM3 (ref 1.7–7)
NEUTROPHILS NFR BLD AUTO: 1.31 10*3/MM3 (ref 1.7–7)
NEUTROPHILS NFR BLD AUTO: 1.44 10*3/MM3 (ref 1.7–7)
NEUTROPHILS NFR BLD AUTO: 1.52 10*3/MM3 (ref 1.7–7)
NEUTROPHILS NFR BLD AUTO: 1.68 10*3/MM3 (ref 1.7–7)
NEUTROPHILS NFR BLD AUTO: 1.78 10*3/MM3 (ref 1.7–7)
NEUTROPHILS NFR BLD AUTO: 10.04 10*3/MM3 (ref 1.7–7)
NEUTROPHILS NFR BLD AUTO: 10.55 10*3/MM3 (ref 1.7–7)
NEUTROPHILS NFR BLD AUTO: 11.07 10*3/MM3 (ref 1.7–7)
NEUTROPHILS NFR BLD AUTO: 12.57 10*3/MM3 (ref 1.7–7)
NEUTROPHILS NFR BLD AUTO: 13.55 10*3/MM3 (ref 1.7–7)
NEUTROPHILS NFR BLD AUTO: 3.08 10*3/MM3 (ref 1.7–7)
NEUTROPHILS NFR BLD AUTO: 3.24 10*3/MM3 (ref 1.7–7)
NEUTROPHILS NFR BLD AUTO: 3.26 10*3/MM3 (ref 1.7–7)
NEUTROPHILS NFR BLD AUTO: 3.46 10*3/MM3 (ref 1.7–7)
NEUTROPHILS NFR BLD AUTO: 3.47 10*3/MM3 (ref 1.7–7)
NEUTROPHILS NFR BLD AUTO: 39.1 % (ref 42.7–76)
NEUTROPHILS NFR BLD AUTO: 4.4 10*3/MM3 (ref 1.7–7)
NEUTROPHILS NFR BLD AUTO: 4.46 10*3/MM3 (ref 1.7–7)
NEUTROPHILS NFR BLD AUTO: 4.89 10*3/MM3 (ref 1.7–7)
NEUTROPHILS NFR BLD AUTO: 42.7 % (ref 42.7–76)
NEUTROPHILS NFR BLD AUTO: 5.57 10*3/MM3 (ref 1.7–7)
NEUTROPHILS NFR BLD AUTO: 5.58 10*3/MM3 (ref 1.7–7)
NEUTROPHILS NFR BLD AUTO: 5.78 10*3/MM3 (ref 1.7–7)
NEUTROPHILS NFR BLD AUTO: 5.79 10*3/MM3 (ref 1.7–7)
NEUTROPHILS NFR BLD AUTO: 5.9 10*3/MM3 (ref 1.7–7)
NEUTROPHILS NFR BLD AUTO: 5.96 10*3/MM3 (ref 1.7–7)
NEUTROPHILS NFR BLD AUTO: 54.1 % (ref 42.7–76)
NEUTROPHILS NFR BLD AUTO: 54.6 % (ref 42.7–76)
NEUTROPHILS NFR BLD AUTO: 6.9 10*3/MM3 (ref 1.7–7)
NEUTROPHILS NFR BLD AUTO: 6.97 10*3/MM3 (ref 1.7–7)
NEUTROPHILS NFR BLD AUTO: 61.5 % (ref 42.7–76)
NEUTROPHILS NFR BLD AUTO: 72.1 % (ref 42.7–76)
NEUTROPHILS NFR BLD AUTO: 72.6 % (ref 42.7–76)
NEUTROPHILS NFR BLD AUTO: 73.4 % (ref 42.7–76)
NEUTROPHILS NFR BLD AUTO: 8.23 10*3/MM3 (ref 1.7–7)
NEUTROPHILS NFR BLD AUTO: 8.51 10*3/MM3 (ref 1.7–7)
NEUTROPHILS NFR BLD AUTO: 8.63 10*3/MM3 (ref 1.7–7)
NEUTROPHILS NFR BLD AUTO: 8.68 10*3/MM3 (ref 1.7–7)
NEUTROPHILS NFR BLD AUTO: 8.97 10*3/MM3 (ref 1.7–7)
NEUTROPHILS NFR BLD AUTO: 80 % (ref 42.7–76)
NEUTROPHILS NFR BLD AUTO: 80.6 % (ref 42.7–76)
NEUTROPHILS NFR BLD AUTO: 81 % (ref 42.7–76)
NEUTROPHILS NFR BLD AUTO: 82.5 % (ref 42.7–76)
NEUTROPHILS NFR BLD AUTO: 83.3 % (ref 42.7–76)
NEUTROPHILS NFR BLD AUTO: 83.6 % (ref 42.7–76)
NEUTROPHILS NFR BLD AUTO: 83.6 % (ref 42.7–76)
NEUTROPHILS NFR BLD AUTO: 84.4 % (ref 42.7–76)
NEUTROPHILS NFR BLD AUTO: 85.7 % (ref 42.7–76)
NEUTROPHILS NFR BLD AUTO: 86 % (ref 42.7–76)
NEUTROPHILS NFR BLD AUTO: 86.1 % (ref 42.7–76)
NEUTROPHILS NFR BLD AUTO: 86.2 % (ref 42.7–76)
NEUTROPHILS NFR BLD AUTO: 86.2 % (ref 42.7–76)
NEUTROPHILS NFR BLD AUTO: 87.1 % (ref 42.7–76)
NEUTROPHILS NFR BLD AUTO: 87.6 % (ref 42.7–76)
NEUTROPHILS NFR BLD AUTO: 87.8 % (ref 42.7–76)
NEUTROPHILS NFR BLD AUTO: 87.9 % (ref 42.7–76)
NEUTROPHILS NFR BLD AUTO: 88 % (ref 42.7–76)
NEUTROPHILS NFR BLD AUTO: 88.2 % (ref 42.7–76)
NEUTROPHILS NFR BLD AUTO: 88.5 % (ref 42.7–76)
NEUTROPHILS NFR BLD AUTO: 89.4 % (ref 42.7–76)
NEUTROPHILS NFR BLD AUTO: 89.5 % (ref 42.7–76)
NEUTROPHILS NFR BLD AUTO: 89.9 % (ref 42.7–76)
NEUTROPHILS NFR BLD AUTO: 9.58 10*3/MM3 (ref 1.7–7)
NEUTROPHILS NFR BLD AUTO: 91.3 % (ref 42.7–76)
NEUTROPHILS NFR BLD AUTO: 91.6 % (ref 42.7–76)
NEUTROPHILS NFR BLD AUTO: 94.2 % (ref 42.7–76)
NEUTROPHILS NFR BLD MANUAL: 66 % (ref 42.7–76)
NEUTROPHILS NFR BLD MANUAL: 81 % (ref 42.7–76)
NEUTROPHILS NFR BLD MANUAL: 94 % (ref 42.7–76)
NEUTS BAND NFR BLD MANUAL: 15 % (ref 0–5)
NEUTS BAND NFR BLD MANUAL: 2 % (ref 0–5)
NITRITE UR QL STRIP: NEGATIVE
NOTE: ABNORMAL
NRBC BLD AUTO-RTO: 0 /100 WBC (ref 0–0.2)
NRBC SPEC MANUAL: 12 /100 WBC (ref 0–0.2)
NRBC SPEC MANUAL: 27 /100 WBC (ref 0–0.2)
NRBC SPEC MANUAL: 32 /100 WBC (ref 0–0.2)
NT-PROBNP SERPL-MCNC: 1458 PG/ML (ref 0–900)
NT-PROBNP SERPL-MCNC: 3321 PG/ML (ref 0–900)
NT-PROBNP SERPL-MCNC: 4077 PG/ML (ref 0–900)
NT-PROBNP SERPL-MCNC: 671.2 PG/ML (ref 0–900)
OSMOLALITY SERPL CALC.SUM OF ELEC: 297 MOSM/KG (ref 273–304)
OSMOLALITY SERPL: 292 MOSM/KG (ref 280–301)
OSMOLALITY UR: 340 MOSM/KG
OVALOCYTES BLD QL SMEAR: ABNORMAL
OVALOCYTES BLD QL SMEAR: NORMAL
OXYHGB MFR BLDV: 62.5 % (ref 94–99)
OXYHGB MFR BLDV: 87 % (ref 94–99)
OXYHGB MFR BLDV: 88.1 % (ref 94–99)
OXYHGB MFR BLDV: 89.5 % (ref 94–99)
OXYHGB MFR BLDV: 89.9 % (ref 94–99)
OXYHGB MFR BLDV: 90.1 % (ref 94–99)
OXYHGB MFR BLDV: 91.2 % (ref 94–99)
OXYHGB MFR BLDV: 91.4 % (ref 94–99)
OXYHGB MFR BLDV: 91.6 % (ref 94–99)
OXYHGB MFR BLDV: 92.4 % (ref 94–99)
OXYHGB MFR BLDV: 93.5 % (ref 94–99)
OXYHGB MFR BLDV: 93.5 % (ref 94–99)
OXYHGB MFR BLDV: 95.2 % (ref 94–99)
PCO2 BLDA: 22.7 MM HG (ref 35–45)
PCO2 BLDA: 35.8 MM HG (ref 35–45)
PCO2 BLDA: 36.5 MM HG (ref 35–45)
PCO2 BLDA: 39.9 MM HG (ref 35–45)
PCO2 BLDA: 40.4 MM HG (ref 35–45)
PCO2 BLDA: 42.2 MM HG (ref 35–45)
PCO2 BLDA: 42.2 MM HG (ref 35–45)
PCO2 BLDA: 53.7 MM HG (ref 35–45)
PCO2 BLDA: 54.1 MM HG (ref 35–45)
PCO2 BLDA: 58.1 MM HG (ref 35–45)
PCO2 BLDA: 71.6 MM HG (ref 35–45)
PCO2 BLDA: 73.3 MM HG (ref 35–45)
PCO2 BLDA: 92.8 MM HG (ref 35–45)
PEEP RESPIRATORY: 10 CM[H2O]
PEEP RESPIRATORY: 12 CM[H2O]
PF4 HEPARIN CMPLX IGG SERPL IA: 0.23 OD (ref 0–0.4)
PH BLDA: 6.79 PH UNITS (ref 7.35–7.45)
PH BLDA: 6.92 PH UNITS (ref 7.35–7.45)
PH BLDA: 7.01 PH UNITS (ref 7.35–7.45)
PH BLDA: 7.05 PH UNITS (ref 7.35–7.45)
PH BLDA: 7.07 PH UNITS (ref 7.35–7.45)
PH BLDA: 7.11 PH UNITS (ref 7.35–7.45)
PH BLDA: 7.16 PH UNITS (ref 7.35–7.45)
PH BLDA: 7.19 PH UNITS (ref 7.35–7.45)
PH BLDA: 7.23 PH UNITS (ref 7.35–7.45)
PH BLDA: 7.32 PH UNITS (ref 7.35–7.45)
PH BLDA: 7.49 PH UNITS (ref 7.35–7.45)
PH BLDA: 7.54 PH UNITS (ref 7.35–7.45)
PH BLDA: 7.54 PH UNITS (ref 7.35–7.45)
PH UR STRIP.AUTO: 5.5 [PH] (ref 5–8)
PH UR STRIP.AUTO: 6 [PH] (ref 5–8)
PH UR STRIP.AUTO: 6.5 [PH] (ref 5–8)
PH UR STRIP.AUTO: <=5 [PH] (ref 5–8)
PHOSPHATE SERPL-MCNC: 2.2 MG/DL (ref 2.5–4.5)
PHOSPHATE SERPL-MCNC: 2.4 MG/DL (ref 2.5–4.5)
PHOSPHATE SERPL-MCNC: 2.6 MG/DL (ref 2.5–4.5)
PHOSPHATE SERPL-MCNC: 2.6 MG/DL (ref 2.5–4.5)
PHOSPHATE SERPL-MCNC: 2.7 MG/DL (ref 2.5–4.5)
PHOSPHATE SERPL-MCNC: 2.8 MG/DL (ref 2.5–4.5)
PHOSPHATE SERPL-MCNC: 3.6 MG/DL (ref 2.5–4.5)
PHOSPHATE SERPL-MCNC: 4.4 MG/DL (ref 2.5–4.5)
PHOSPHATE SERPL-MCNC: 4.5 MG/DL (ref 2.5–4.5)
PHOSPHATE SERPL-MCNC: 5.2 MG/DL (ref 2.5–4.5)
PHOSPHATE SERPL-MCNC: 6.1 MG/DL (ref 2.5–4.5)
PHOSPHATE SERPL-MCNC: 6.2 MG/DL (ref 2.5–4.5)
PHOSPHATE SERPL-MCNC: 6.3 MG/DL (ref 2.5–4.5)
PHOSPHATE SERPL-MCNC: 6.8 MG/DL (ref 2.5–4.5)
PHOSPHATE SERPL-MCNC: 9 MG/DL (ref 2.5–4.5)
PLATELET # BLD AUTO: 102 10*3/MM3 (ref 140–450)
PLATELET # BLD AUTO: 109 10*3/MM3 (ref 140–450)
PLATELET # BLD AUTO: 120 10*3/MM3 (ref 140–450)
PLATELET # BLD AUTO: 120 10*3/MM3 (ref 140–450)
PLATELET # BLD AUTO: 133 10*3/MM3 (ref 140–450)
PLATELET # BLD AUTO: 153 10*3/MM3 (ref 140–450)
PLATELET # BLD AUTO: 23 10*3/MM3 (ref 140–450)
PLATELET # BLD AUTO: 244 10*3/MM3 (ref 140–450)
PLATELET # BLD AUTO: 25 10*3/MM3 (ref 140–450)
PLATELET # BLD AUTO: 26 10*3/MM3 (ref 140–450)
PLATELET # BLD AUTO: 26 10*3/MM3 (ref 140–450)
PLATELET # BLD AUTO: 30 10*3/MM3 (ref 140–450)
PLATELET # BLD AUTO: 31 10*3/MM3 (ref 140–450)
PLATELET # BLD AUTO: 32 10*3/MM3 (ref 140–450)
PLATELET # BLD AUTO: 32 10*3/MM3 (ref 140–450)
PLATELET # BLD AUTO: 36 10*3/MM3 (ref 140–450)
PLATELET # BLD AUTO: 38 10*3/MM3 (ref 140–450)
PLATELET # BLD AUTO: 45 10*3/MM3 (ref 140–450)
PLATELET # BLD AUTO: 45 10*3/MM3 (ref 140–450)
PLATELET # BLD AUTO: 46 10*3/MM3 (ref 140–450)
PLATELET # BLD AUTO: 49 10*3/MM3 (ref 140–450)
PLATELET # BLD AUTO: 50 10*3/MM3 (ref 140–450)
PLATELET # BLD AUTO: 50 10*3/MM3 (ref 140–450)
PLATELET # BLD AUTO: 52 10*3/MM3 (ref 140–450)
PLATELET # BLD AUTO: 52 10*3/MM3 (ref 140–450)
PLATELET # BLD AUTO: 53 10*3/MM3 (ref 140–450)
PLATELET # BLD AUTO: 54 10*3/MM3 (ref 140–450)
PLATELET # BLD AUTO: 55 10*3/MM3 (ref 140–450)
PLATELET # BLD AUTO: 57 10*3/MM3 (ref 140–450)
PLATELET # BLD AUTO: 58 10*3/MM3 (ref 140–450)
PLATELET # BLD AUTO: 58 10*3/MM3 (ref 140–450)
PLATELET # BLD AUTO: 60 10*3/MM3 (ref 140–450)
PLATELET # BLD AUTO: 60 10*3/MM3 (ref 140–450)
PLATELET # BLD AUTO: 61 10*3/MM3 (ref 140–450)
PLATELET # BLD AUTO: 62 10*3/MM3 (ref 140–450)
PLATELET # BLD AUTO: 63 10*3/MM3 (ref 140–450)
PLATELET # BLD AUTO: 65 10*3/MM3 (ref 140–450)
PLATELET # BLD AUTO: 65 10*3/MM3 (ref 140–450)
PLATELET # BLD AUTO: 67 10*3/MM3 (ref 140–450)
PLATELET # BLD AUTO: 72 10*3/MM3 (ref 140–450)
PLATELET # BLD AUTO: 82 10*3/MM3 (ref 140–450)
PLATELET # BLD AUTO: 83 10*3/MM3 (ref 140–450)
PLATELET # BLD AUTO: 87 10*3/MM3 (ref 140–450)
PLATELET # BLD AUTO: 90 10*3/MM3 (ref 140–450)
PLATELET # BLD AUTO: 92 10*3/MM3 (ref 140–450)
PLATELET # BLD AUTO: 99 10*3/MM3 (ref 140–450)
PMV BLD AUTO: 10.5 FL (ref 6–12)
PMV BLD AUTO: 10.6 FL (ref 6–12)
PMV BLD AUTO: 10.9 FL (ref 6–12)
PMV BLD AUTO: 11.1 FL (ref 6–12)
PMV BLD AUTO: 11.1 FL (ref 6–12)
PMV BLD AUTO: 11.2 FL (ref 6–12)
PMV BLD AUTO: 11.3 FL (ref 6–12)
PMV BLD AUTO: 11.4 FL (ref 6–12)
PMV BLD AUTO: 11.5 FL (ref 6–12)
PMV BLD AUTO: 11.6 FL (ref 6–12)
PMV BLD AUTO: 11.7 FL (ref 6–12)
PMV BLD AUTO: 11.8 FL (ref 6–12)
PMV BLD AUTO: 12.1 FL (ref 6–12)
PMV BLD AUTO: 12.1 FL (ref 6–12)
PMV BLD AUTO: 12.3 FL (ref 6–12)
PMV BLD AUTO: 12.4 FL (ref 6–12)
PMV BLD AUTO: 12.5 FL (ref 6–12)
PMV BLD AUTO: 12.5 FL (ref 6–12)
PMV BLD AUTO: 12.6 FL (ref 6–12)
PMV BLD AUTO: 12.8 FL (ref 6–12)
PMV BLD AUTO: 12.9 FL (ref 6–12)
PMV BLD AUTO: 13 FL (ref 6–12)
PMV BLD AUTO: 13.1 FL (ref 6–12)
PMV BLD AUTO: 13.1 FL (ref 6–12)
PMV BLD AUTO: 13.2 FL (ref 6–12)
PMV BLD AUTO: 13.2 FL (ref 6–12)
PMV BLD AUTO: 13.3 FL (ref 6–12)
PMV BLD AUTO: 13.3 FL (ref 6–12)
PMV BLD AUTO: ABNORMAL FL
PO2 BLD: 122 MM[HG] (ref 0–500)
PO2 BLD: 138 MM[HG] (ref 0–500)
PO2 BLD: 148 MM[HG] (ref 0–500)
PO2 BLD: 151 MM[HG] (ref 0–500)
PO2 BLD: 153 MM[HG] (ref 0–500)
PO2 BLD: 160 MM[HG] (ref 0–500)
PO2 BLD: 164 MM[HG] (ref 0–500)
PO2 BLD: 165 MM[HG] (ref 0–500)
PO2 BLD: 170 MM[HG] (ref 0–500)
PO2 BLD: 49 MM[HG] (ref 0–500)
PO2 BLD: 69 MM[HG] (ref 0–500)
PO2 BLDA: 49.2 MM HG (ref 80–100)
PO2 BLDA: 53.1 MM HG (ref 80–100)
PO2 BLDA: 56.4 MM HG (ref 80–100)
PO2 BLDA: 69.3 MM HG (ref 80–100)
PO2 BLDA: 74.2 MM HG (ref 80–100)
PO2 BLDA: 76.6 MM HG (ref 80–100)
PO2 BLDA: 79.9 MM HG (ref 80–100)
PO2 BLDA: 82 MM HG (ref 80–100)
PO2 BLDA: 82.6 MM HG (ref 80–100)
PO2 BLDA: 83 MM HG (ref 80–100)
PO2 BLDA: 83.1 MM HG (ref 80–100)
PO2 BLDA: 84.9 MM HG (ref 80–100)
PO2 BLDA: 97.4 MM HG (ref 80–100)
POIKILOCYTOSIS BLD QL SMEAR: ABNORMAL
POIKILOCYTOSIS BLD QL SMEAR: NORMAL
POIKILOCYTOSIS BLD QL SMEAR: NORMAL
POLYCHROMASIA BLD QL SMEAR: NORMAL
POTASSIUM BLDA-SCNC: 3.42 MMOL/L (ref 3.5–5)
POTASSIUM BLDA-SCNC: 3.96 MMOL/L (ref 3.5–5)
POTASSIUM BLDA-SCNC: 4.32 MMOL/L (ref 3.5–5)
POTASSIUM BLDA-SCNC: 4.44 MMOL/L (ref 3.5–5)
POTASSIUM BLDA-SCNC: 5.01 MMOL/L (ref 3.5–5)
POTASSIUM BLDA-SCNC: 5.59 MMOL/L (ref 3.5–5)
POTASSIUM BLDA-SCNC: 6.8 MMOL/L (ref 3.5–5)
POTASSIUM BLDA-SCNC: 7.13 MMOL/L (ref 3.5–5)
POTASSIUM BLDA-SCNC: 7.32 MMOL/L (ref 3.5–5)
POTASSIUM SERPL-SCNC: 2.8 MMOL/L (ref 3.5–5.2)
POTASSIUM SERPL-SCNC: 3 MMOL/L (ref 3.5–5.2)
POTASSIUM SERPL-SCNC: 3.1 MMOL/L (ref 3.5–5.2)
POTASSIUM SERPL-SCNC: 3.2 MMOL/L (ref 3.5–5.2)
POTASSIUM SERPL-SCNC: 3.3 MMOL/L (ref 3.5–5.2)
POTASSIUM SERPL-SCNC: 3.4 MMOL/L (ref 3.5–5.2)
POTASSIUM SERPL-SCNC: 3.5 MMOL/L (ref 3.5–5.2)
POTASSIUM SERPL-SCNC: 3.5 MMOL/L (ref 3.5–5.2)
POTASSIUM SERPL-SCNC: 3.6 MMOL/L (ref 3.5–5.2)
POTASSIUM SERPL-SCNC: 3.7 MMOL/L (ref 3.5–5.2)
POTASSIUM SERPL-SCNC: 3.7 MMOL/L (ref 3.5–5.2)
POTASSIUM SERPL-SCNC: 3.9 MMOL/L (ref 3.5–5.2)
POTASSIUM SERPL-SCNC: 4 MMOL/L (ref 3.5–5.2)
POTASSIUM SERPL-SCNC: 4 MMOL/L (ref 3.5–5.2)
POTASSIUM SERPL-SCNC: 4.1 MMOL/L (ref 3.5–5.3)
POTASSIUM SERPL-SCNC: 4.2 MMOL/L (ref 3.5–5.2)
POTASSIUM SERPL-SCNC: 4.3 MMOL/L (ref 3.5–5.2)
POTASSIUM SERPL-SCNC: 4.4 MMOL/L (ref 3.5–5.2)
POTASSIUM SERPL-SCNC: 4.5 MMOL/L (ref 3.5–5.2)
POTASSIUM SERPL-SCNC: 4.6 MMOL/L (ref 3.5–5.2)
POTASSIUM SERPL-SCNC: 4.7 MMOL/L (ref 3.5–5.2)
POTASSIUM SERPL-SCNC: 5.1 MMOL/L (ref 3.5–5.2)
POTASSIUM SERPL-SCNC: 5.1 MMOL/L (ref 3.5–5.2)
POTASSIUM SERPL-SCNC: 5.7 MMOL/L (ref 3.5–5.2)
POTASSIUM SERPL-SCNC: 6.7 MMOL/L (ref 3.5–5.2)
POTASSIUM SERPL-SCNC: 7.3 MMOL/L (ref 3.5–5.2)
PROCALCITONIN SERPL-MCNC: 0.15 NG/ML (ref 0–0.25)
PROCALCITONIN SERPL-MCNC: 0.27 NG/ML (ref 0–0.25)
PROCALCITONIN SERPL-MCNC: 1.04 NG/ML (ref 0–0.25)
PROCALCITONIN SERPL-MCNC: 5.18 NG/ML (ref 0–0.25)
PROMYELOCYTES NFR BLD MANUAL: 1 % (ref 0–0)
PROMYELOCYTES NFR BLD MANUAL: 2 % (ref 0–0)
PROT SERPL-MCNC: 4 G/DL (ref 6–8.5)
PROT SERPL-MCNC: 4.1 G/DL (ref 6–8.5)
PROT SERPL-MCNC: 4.3 G/DL (ref 6–8.5)
PROT SERPL-MCNC: 4.3 G/DL (ref 6–8.5)
PROT SERPL-MCNC: 4.4 G/DL (ref 6–8.5)
PROT SERPL-MCNC: 4.4 G/DL (ref 6–8.5)
PROT SERPL-MCNC: 4.5 G/DL (ref 6–8.5)
PROT SERPL-MCNC: 4.5 G/DL (ref 6–8.5)
PROT SERPL-MCNC: 4.6 G/DL (ref 6–8.5)
PROT SERPL-MCNC: 4.7 G/DL (ref 6–8.5)
PROT SERPL-MCNC: 4.8 G/DL (ref 6–8.5)
PROT SERPL-MCNC: 4.9 G/DL (ref 6–8.5)
PROT SERPL-MCNC: 5 G/DL (ref 6–8.5)
PROT SERPL-MCNC: 5.2 G/DL (ref 6–8.5)
PROT SERPL-MCNC: 5.3 G/DL (ref 6–8.5)
PROT SERPL-MCNC: 5.3 G/DL (ref 6–8.5)
PROT SERPL-MCNC: 5.8 G/DL (ref 6–8.5)
PROT SERPL-MCNC: 5.9 G/DL (ref 6–8.5)
PROT SERPL-MCNC: 6.2 G/DL (ref 6–8.5)
PROT SERPL-MCNC: 6.3 G/DL (ref 6–8.5)
PROT SERPL-MCNC: 6.5 G/DL (ref 6–8.5)
PROT SERPL-MCNC: 6.7 G/DL (ref 6–8.5)
PROT UR QL STRIP: ABNORMAL
PROTHROMBIN TIME: 11.1 SECONDS (ref 9.4–12)
PROTHROMBIN TIME: 12.5 SECONDS (ref 9.4–12)
PROTHROMBIN TIME: 13.4 SECONDS (ref 9.4–12)
PROTHROMBIN TIME: 17 SECONDS (ref 9.4–12)
QT INTERVAL: 290 MS
QT INTERVAL: 311 MS
QT INTERVAL: 344 MS
QT INTERVAL: 359 MS
QT INTERVAL: 371 MS
QT INTERVAL: 398 MS
QT INTERVAL: 400 MS
QT INTERVAL: 410 MS
RBC # BLD AUTO: 2.37 10*6/MM3 (ref 3.77–5.28)
RBC # BLD AUTO: 2.42 10*6/MM3 (ref 3.77–5.28)
RBC # BLD AUTO: 2.83 10*6/MM3 (ref 3.77–5.28)
RBC # BLD AUTO: 2.84 10*6/MM3 (ref 3.77–5.28)
RBC # BLD AUTO: 2.85 10*6/MM3 (ref 3.77–5.28)
RBC # BLD AUTO: 2.87 10*6/MM3 (ref 3.77–5.28)
RBC # BLD AUTO: 2.88 10*6/MM3 (ref 3.77–5.28)
RBC # BLD AUTO: 2.88 10*6/MM3 (ref 3.77–5.28)
RBC # BLD AUTO: 2.92 10*6/MM3 (ref 3.77–5.28)
RBC # BLD AUTO: 2.94 10*6/MM3 (ref 3.77–5.28)
RBC # BLD AUTO: 2.96 10*6/MM3 (ref 3.77–5.28)
RBC # BLD AUTO: 3 10*6/MM3 (ref 3.77–5.28)
RBC # BLD AUTO: 3.09 10*6/MM3 (ref 3.77–5.28)
RBC # BLD AUTO: 3.11 10*6/MM3 (ref 3.77–5.28)
RBC # BLD AUTO: 3.12 10*6/MM3 (ref 3.77–5.28)
RBC # BLD AUTO: 3.13 10*6/MM3 (ref 3.77–5.28)
RBC # BLD AUTO: 3.26 10*6/MM3 (ref 3.77–5.28)
RBC # BLD AUTO: 3.28 10*6/MM3 (ref 3.77–5.28)
RBC # BLD AUTO: 3.3 10*6/MM3 (ref 3.77–5.28)
RBC # BLD AUTO: 3.31 10*6/MM3 (ref 3.77–5.28)
RBC # BLD AUTO: 3.34 10*6/MM3 (ref 3.77–5.28)
RBC # BLD AUTO: 3.34 10*6/MM3 (ref 3.77–5.28)
RBC # BLD AUTO: 3.81 10*6/MM3 (ref 3.77–5.28)
RBC # BLD AUTO: 3.86 10*6/MM3 (ref 3.77–5.28)
RBC # BLD AUTO: 3.96 10*6/MM3 (ref 3.77–5.28)
RBC # BLD AUTO: 3.97 10*6/MM3 (ref 3.77–5.28)
RBC # BLD AUTO: 4.01 10*6/MM3 (ref 3.77–5.28)
RBC # BLD AUTO: 4.02 10*6/MM3 (ref 3.77–5.28)
RBC # BLD AUTO: 4.03 10*6/MM3 (ref 3.77–5.28)
RBC # BLD AUTO: 4.04 10*6/MM3 (ref 3.77–5.28)
RBC # BLD AUTO: 4.07 10*6/MM3 (ref 3.77–5.28)
RBC # BLD AUTO: 4.15 10*6/MM3 (ref 3.77–5.28)
RBC # BLD AUTO: 4.17 10*6/MM3 (ref 3.77–5.28)
RBC # BLD AUTO: 4.18 10*6/MM3 (ref 3.77–5.28)
RBC # BLD AUTO: 4.22 10*6/MM3 (ref 3.77–5.28)
RBC # BLD AUTO: 4.23 10*6/MM3 (ref 3.77–5.28)
RBC # BLD AUTO: 4.23 10*6/MM3 (ref 3.77–5.28)
RBC # BLD AUTO: 4.24 10*6/MM3 (ref 3.77–5.28)
RBC # BLD AUTO: 4.25 10*6/MM3 (ref 3.77–5.28)
RBC # BLD AUTO: 4.32 10*6/MM3 (ref 3.77–5.28)
RBC # BLD AUTO: 4.34 10*6/MM3 (ref 3.77–5.28)
RBC # BLD AUTO: 4.36 10*6/MM3 (ref 3.77–5.28)
RBC # BLD AUTO: 4.4 10*6/MM3 (ref 3.77–5.28)
RBC # BLD AUTO: 4.56 10*6/MM3 (ref 3.77–5.28)
RBC # BLD AUTO: 4.58 10*6/MM3 (ref 3.77–5.28)
RBC # BLD AUTO: 4.65 10*6/MM3 (ref 3.77–5.28)
RBC # UR: ABNORMAL /HPF
RBC MORPH BLD: NORMAL
REF LAB TEST METHOD: ABNORMAL
RH BLD: POSITIVE
RH BLD: POSITIVE
S PNEUM AG SPEC QL LA: NEGATIVE
SAO2 % BLDCOA: 63 % (ref 95–99)
SAO2 % BLDCOA: 88.1 % (ref 95–99)
SAO2 % BLDCOA: 88.6 % (ref 95–99)
SAO2 % BLDCOA: 89.8 % (ref 95–99)
SAO2 % BLDCOA: 90.5 % (ref 95–99)
SAO2 % BLDCOA: 90.6 % (ref 95–99)
SAO2 % BLDCOA: 91.8 % (ref 95–99)
SAO2 % BLDCOA: 91.9 % (ref 95–99)
SAO2 % BLDCOA: 92 % (ref 95–99)
SAO2 % BLDCOA: 93.9 % (ref 95–99)
SAO2 % BLDCOA: 93.9 % (ref 95–99)
SAO2 % BLDCOA: 94 % (ref 95–99)
SAO2 % BLDCOA: 95.9 % (ref 95–99)
SET MECH RESP RATE: 24
SET MECH RESP RATE: 26
SMALL PLATELETS BLD QL SMEAR: ABNORMAL
SMALL PLATELETS BLD QL SMEAR: NORMAL
SODIUM BLDA-SCNC: 127.3 MMOL/L (ref 136–146)
SODIUM BLDA-SCNC: 127.9 MMOL/L (ref 136–146)
SODIUM BLDA-SCNC: 128.2 MMOL/L (ref 136–146)
SODIUM BLDA-SCNC: 128.8 MMOL/L (ref 136–146)
SODIUM BLDA-SCNC: 128.9 MMOL/L (ref 136–146)
SODIUM BLDA-SCNC: 129.1 MMOL/L (ref 136–146)
SODIUM BLDA-SCNC: 130.2 MMOL/L (ref 136–146)
SODIUM BLDA-SCNC: 132.5 MMOL/L (ref 136–146)
SODIUM BLDA-SCNC: 133.9 MMOL/L (ref 136–146)
SODIUM SERPL-SCNC: 129 MMOL/L (ref 136–145)
SODIUM SERPL-SCNC: 130 MMOL/L (ref 136–145)
SODIUM SERPL-SCNC: 131 MMOL/L (ref 136–145)
SODIUM SERPL-SCNC: 132 MMOL/L (ref 136–145)
SODIUM SERPL-SCNC: 133 MMOL/L (ref 136–145)
SODIUM SERPL-SCNC: 134 MMOL/L (ref 136–145)
SODIUM SERPL-SCNC: 135 MMOL/L (ref 135–147)
SODIUM SERPL-SCNC: 135 MMOL/L (ref 136–145)
SODIUM SERPL-SCNC: 136 MMOL/L (ref 136–145)
SODIUM SERPL-SCNC: 137 MMOL/L (ref 136–145)
SODIUM SERPL-SCNC: 138 MMOL/L (ref 136–145)
SODIUM SERPL-SCNC: 139 MMOL/L (ref 136–145)
SODIUM SERPL-SCNC: 140 MMOL/L (ref 136–145)
SODIUM SERPL-SCNC: 141 MMOL/L (ref 136–145)
SODIUM SERPL-SCNC: 142 MMOL/L (ref 136–145)
SODIUM SERPL-SCNC: 142 MMOL/L (ref 136–145)
SODIUM UR-SCNC: 74 MMOL/L
SP GR UR STRIP: 1.01 (ref 1–1.03)
SP GR UR STRIP: 1.02 (ref 1–1.03)
SQUAMOUS #/AREA URNS HPF: ABNORMAL /HPF
STOMATOCYTES BLD QL SMEAR: NORMAL
STRESS TARGET HR: 129 BPM
STRESS TARGET HR: 129 BPM
T&S EXPIRATION DATE: NORMAL
TIBC SERPL-MCNC: 158 MCG/DL (ref 298–536)
TRANSFERRIN SERPL-MCNC: 106 MG/DL (ref 200–360)
TRIGL SERPL-MCNC: 550 MG/DL (ref 0–150)
TRIGL SERPL-MCNC: 90 MG/DL (ref 0–150)
TROPONIN T SERPL-MCNC: 0.02 NG/ML (ref 0–0.03)
TROPONIN T SERPL-MCNC: 0.09 NG/ML (ref 0–0.03)
TROPONIN T SERPL-MCNC: 0.1 NG/ML (ref 0–0.03)
TROPONIN T SERPL-MCNC: 0.12 NG/ML (ref 0–0.03)
TROPONIN T SERPL-MCNC: 0.13 NG/ML (ref 0–0.03)
TROPONIN T SERPL-MCNC: 0.29 NG/ML (ref 0–0.03)
TROPONIN T SERPL-MCNC: 0.38 NG/ML (ref 0–0.03)
TROPONIN T SERPL-MCNC: 0.38 NG/ML (ref 0–0.03)
TROPONIN T SERPL-MCNC: 0.4 NG/ML (ref 0–0.03)
TROPONIN T SERPL-MCNC: 0.51 NG/ML (ref 0–0.03)
TROPONIN T SERPL-MCNC: 0.79 NG/ML (ref 0–0.03)
TROPONIN T SERPL-MCNC: 0.87 NG/ML (ref 0–0.03)
TROPONIN T SERPL-MCNC: <0.01 NG/ML (ref 0–0.03)
TROPONIN T SERPL-MCNC: <0.01 NG/ML (ref 0–0.03)
TSH SERPL DL<=0.05 MIU/L-ACNC: 0.98 UIU/ML (ref 0.27–4.2)
UROBILINOGEN UR QL STRIP: ABNORMAL
VANCOMYCIN TROUGH SERPL-MCNC: 15.2 MCG/ML (ref 5–20)
VARIANT LYMPHS NFR BLD MANUAL: 2 % (ref 0–5)
VENTILATOR MODE: 400
VENTILATOR MODE: AC
VLDLC SERPL-MCNC: 18 MG/DL (ref 5–40)
WAXY CASTS #/AREA URNS LPF: ABNORMAL /LPF
WBC # BLD AUTO: 1.17 10*3/MM3 (ref 3.4–10.8)
WBC # BLD AUTO: 1.9 10*3/MM3 (ref 3.4–10.8)
WBC # BLD AUTO: 10.02 10*3/MM3 (ref 3.4–10.8)
WBC # BLD AUTO: 10.07 10*3/MM3 (ref 3.4–10.8)
WBC # BLD AUTO: 10.17 10*3/MM3 (ref 3.4–10.8)
WBC # BLD AUTO: 10.48 10*3/MM3 (ref 3.4–10.8)
WBC # BLD AUTO: 11.44 10*3/MM3 (ref 3.4–10.8)
WBC # BLD AUTO: 11.54 10*3/MM3 (ref 3.4–10.8)
WBC # BLD AUTO: 11.56 10*3/MM3 (ref 3.4–10.8)
WBC # BLD AUTO: 12.07 10*3/MM3 (ref 3.4–10.8)
WBC # BLD AUTO: 13.18 10*3/MM3 (ref 3.4–10.8)
WBC # BLD AUTO: 14.41 10*3/MM3 (ref 3.4–10.8)
WBC # BLD AUTO: 15.36 10*3/MM3 (ref 3.4–10.8)
WBC # BLD AUTO: 15.81 10*3/MM3 (ref 3.4–10.8)
WBC # BLD AUTO: 18.88 10*3/MM3 (ref 3.4–10.8)
WBC # BLD AUTO: 2.08 10*3/MM3 (ref 3.4–10.8)
WBC # BLD AUTO: 2.13 10*3/MM3 (ref 3.4–10.8)
WBC # BLD AUTO: 2.33 10*3/MM3 (ref 3.4–10.8)
WBC # BLD AUTO: 2.55 10*3/MM3 (ref 3.4–10.8)
WBC # BLD AUTO: 2.66 10*3/MM3 (ref 3.4–10.8)
WBC # BLD AUTO: 24.35 10*3/MM3 (ref 3.4–10.8)
WBC # BLD AUTO: 25.41 10*3/MM3 (ref 3.4–10.8)
WBC # BLD AUTO: 26.53 10*3/MM3 (ref 3.4–10.8)
WBC # BLD AUTO: 3.26 10*3/MM3 (ref 3.4–10.8)
WBC # BLD AUTO: 3.87 10*3/MM3 (ref 3.4–10.8)
WBC # BLD AUTO: 30.99 10*3/MM3 (ref 3.4–10.8)
WBC # BLD AUTO: 31.37 10*3/MM3 (ref 3.4–10.8)
WBC # BLD AUTO: 31.94 10*3/MM3 (ref 3.4–10.8)
WBC # BLD AUTO: 4.02 10*3/MM3 (ref 3.4–10.8)
WBC # BLD AUTO: 4.03 10*3/MM3 (ref 3.4–10.8)
WBC # BLD AUTO: 4.16 10*3/MM3 (ref 3.4–10.8)
WBC # BLD AUTO: 4.2 10*3/MM3 (ref 3.4–10.8)
WBC # BLD AUTO: 44.72 10*3/MM3 (ref 3.4–10.8)
WBC # BLD AUTO: 5.06 10*3/MM3 (ref 3.4–10.8)
WBC # BLD AUTO: 5.33 10*3/MM3 (ref 3.4–10.8)
WBC # BLD AUTO: 6.07 10*3/MM3 (ref 3.4–10.8)
WBC # BLD AUTO: 6.56 10*3/MM3 (ref 3.4–10.8)
WBC # BLD AUTO: 6.68 10*3/MM3 (ref 3.4–10.8)
WBC # BLD AUTO: 6.73 10*3/MM3 (ref 3.4–10.8)
WBC # BLD AUTO: 6.78 10*3/MM3 (ref 3.4–10.8)
WBC # BLD AUTO: 6.81 10*3/MM3 (ref 3.4–10.8)
WBC # BLD AUTO: 6.86 10*3/MM3 (ref 3.4–10.8)
WBC # BLD AUTO: 7.67 10*3/MM3 (ref 3.4–10.8)
WBC # BLD AUTO: 7.8 10*3/MM3 (ref 3.4–10.8)
WBC # BLD AUTO: 7.8 10*3/MM3 (ref 3.4–10.8)
WBC # BLD AUTO: 9.57 10*3/MM3 (ref 3.4–10.8)
WBC # BLD AUTO: 9.68 10*3/MM3 (ref 3.4–10.8)
WBC # BLD AUTO: 9.83 10*3/MM3 (ref 3.4–10.8)
WBC MORPH BLD: NORMAL
WBC UR QL AUTO: ABNORMAL /HPF
WHOLE BLOOD HOLD SPECIMEN: NORMAL
YEAST URNS QL MICRO: ABNORMAL /HPF

## 2021-01-01 PROCEDURE — 82805 BLOOD GASES W/O2 SATURATION: CPT | Performed by: INTERNAL MEDICINE

## 2021-01-01 PROCEDURE — 63710000001 PREDNISONE PER 1 MG: Performed by: INTERNAL MEDICINE

## 2021-01-01 PROCEDURE — 25010000002 FUROSEMIDE PER 20 MG: Performed by: INTERNAL MEDICINE

## 2021-01-01 PROCEDURE — 63710000001 INSULIN DETEMIR PER 5 UNITS: Performed by: FAMILY MEDICINE

## 2021-01-01 PROCEDURE — 94799 UNLISTED PULMONARY SVC/PX: CPT

## 2021-01-01 PROCEDURE — 25010000002 CEFEPIME PER 500 MG: Performed by: INTERNAL MEDICINE

## 2021-01-01 PROCEDURE — 99233 SBSQ HOSP IP/OBS HIGH 50: CPT | Performed by: FAMILY MEDICINE

## 2021-01-01 PROCEDURE — 25010000002 FENTANYL CITRATE (PF) 1000 MCG/20ML SOLUTION: Performed by: INTERNAL MEDICINE

## 2021-01-01 PROCEDURE — 83605 ASSAY OF LACTIC ACID: CPT | Performed by: INTERNAL MEDICINE

## 2021-01-01 PROCEDURE — 83735 ASSAY OF MAGNESIUM: CPT | Performed by: INTERNAL MEDICINE

## 2021-01-01 PROCEDURE — 99232 SBSQ HOSP IP/OBS MODERATE 35: CPT | Performed by: INTERNAL MEDICINE

## 2021-01-01 PROCEDURE — 99291 CRITICAL CARE FIRST HOUR: CPT | Performed by: SURGERY

## 2021-01-01 PROCEDURE — 93005 ELECTROCARDIOGRAM TRACING: CPT | Performed by: FAMILY MEDICINE

## 2021-01-01 PROCEDURE — 25010000002 DEXAMETHASONE PER 1 MG: Performed by: INTERNAL MEDICINE

## 2021-01-01 PROCEDURE — 83050 HGB METHEMOGLOBIN QUAN: CPT | Performed by: NURSE PRACTITIONER

## 2021-01-01 PROCEDURE — 25010000002 CEFTRIAXONE PER 250 MG: Performed by: PHYSICIAN ASSISTANT

## 2021-01-01 PROCEDURE — 93005 ELECTROCARDIOGRAM TRACING: CPT | Performed by: INTERNAL MEDICINE

## 2021-01-01 PROCEDURE — 83615 LACTATE (LD) (LDH) ENZYME: CPT | Performed by: PHYSICIAN ASSISTANT

## 2021-01-01 PROCEDURE — 71260 CT THORAX DX C+: CPT

## 2021-01-01 PROCEDURE — 72050 X-RAY EXAM NECK SPINE 4/5VWS: CPT

## 2021-01-01 PROCEDURE — 80053 COMPREHEN METABOLIC PANEL: CPT

## 2021-01-01 PROCEDURE — 99291 CRITICAL CARE FIRST HOUR: CPT | Performed by: INTERNAL MEDICINE

## 2021-01-01 PROCEDURE — 85025 COMPLETE CBC W/AUTO DIFF WBC: CPT | Performed by: PHYSICIAN ASSISTANT

## 2021-01-01 PROCEDURE — 96372 THER/PROPH/DIAG INJ SC/IM: CPT | Performed by: NURSE PRACTITIONER

## 2021-01-01 PROCEDURE — 85027 COMPLETE CBC AUTOMATED: CPT | Performed by: INTERNAL MEDICINE

## 2021-01-01 PROCEDURE — 25010000002 CALCIUM GLUCONATE-NACL 1-0.675 GM/50ML-% SOLUTION: Performed by: INTERNAL MEDICINE

## 2021-01-01 PROCEDURE — 80048 BASIC METABOLIC PNL TOTAL CA: CPT | Performed by: INTERNAL MEDICINE

## 2021-01-01 PROCEDURE — 85007 BL SMEAR W/DIFF WBC COUNT: CPT | Performed by: INTERNAL MEDICINE

## 2021-01-01 PROCEDURE — 85384 FIBRINOGEN ACTIVITY: CPT | Performed by: PHYSICIAN ASSISTANT

## 2021-01-01 PROCEDURE — 87205 SMEAR GRAM STAIN: CPT | Performed by: FAMILY MEDICINE

## 2021-01-01 PROCEDURE — 97530 THERAPEUTIC ACTIVITIES: CPT

## 2021-01-01 PROCEDURE — 83880 ASSAY OF NATRIURETIC PEPTIDE: CPT | Performed by: INTERNAL MEDICINE

## 2021-01-01 PROCEDURE — 84100 ASSAY OF PHOSPHORUS: CPT | Performed by: NURSE PRACTITIONER

## 2021-01-01 PROCEDURE — 63710000001 INSULIN DETEMIR PER 5 UNITS: Performed by: INTERNAL MEDICINE

## 2021-01-01 PROCEDURE — 85025 COMPLETE CBC W/AUTO DIFF WBC: CPT | Performed by: INTERNAL MEDICINE

## 2021-01-01 PROCEDURE — 25010000002 AZITHROMYCIN PER 500 MG: Performed by: PHYSICIAN ASSISTANT

## 2021-01-01 PROCEDURE — 82962 GLUCOSE BLOOD TEST: CPT

## 2021-01-01 PROCEDURE — 85025 COMPLETE CBC W/AUTO DIFF WBC: CPT

## 2021-01-01 PROCEDURE — 93970 EXTREMITY STUDY: CPT

## 2021-01-01 PROCEDURE — 82105 ALPHA-FETOPROTEIN SERUM: CPT

## 2021-01-01 PROCEDURE — 85027 COMPLETE CBC AUTOMATED: CPT | Performed by: NURSE PRACTITIONER

## 2021-01-01 PROCEDURE — 82550 ASSAY OF CK (CPK): CPT | Performed by: PHYSICIAN ASSISTANT

## 2021-01-01 PROCEDURE — 63710000001 INSULIN LISPRO (HUMAN) PER 5 UNITS: Performed by: INTERNAL MEDICINE

## 2021-01-01 PROCEDURE — 84484 ASSAY OF TROPONIN QUANT: CPT | Performed by: INTERNAL MEDICINE

## 2021-01-01 PROCEDURE — 86140 C-REACTIVE PROTEIN: CPT | Performed by: PHYSICIAN ASSISTANT

## 2021-01-01 PROCEDURE — 83735 ASSAY OF MAGNESIUM: CPT | Performed by: STUDENT IN AN ORGANIZED HEALTH CARE EDUCATION/TRAINING PROGRAM

## 2021-01-01 PROCEDURE — 85379 FIBRIN DEGRADATION QUANT: CPT | Performed by: PHYSICIAN ASSISTANT

## 2021-01-01 PROCEDURE — 25010000002 ENOXAPARIN PER 10 MG: Performed by: PHYSICIAN ASSISTANT

## 2021-01-01 PROCEDURE — 82805 BLOOD GASES W/O2 SATURATION: CPT | Performed by: EMERGENCY MEDICINE

## 2021-01-01 PROCEDURE — 25010000002 CEFEPIME 2 G/NS 100 ML SOLUTION: Performed by: FAMILY MEDICINE

## 2021-01-01 PROCEDURE — 85730 THROMBOPLASTIN TIME PARTIAL: CPT | Performed by: INTERNAL MEDICINE

## 2021-01-01 PROCEDURE — 80053 COMPREHEN METABOLIC PANEL: CPT | Performed by: INTERNAL MEDICINE

## 2021-01-01 PROCEDURE — 84132 ASSAY OF SERUM POTASSIUM: CPT | Performed by: INTERNAL MEDICINE

## 2021-01-01 PROCEDURE — 87103 BLOOD FUNGUS CULTURE: CPT | Performed by: PHYSICIAN ASSISTANT

## 2021-01-01 PROCEDURE — 0 AMPICILLIN-SULBACTAM PER 1.5 G: Performed by: SURGERY

## 2021-01-01 PROCEDURE — 80053 COMPREHEN METABOLIC PANEL: CPT | Performed by: NURSE PRACTITIONER

## 2021-01-01 PROCEDURE — 63710000001 INSULIN LISPRO (HUMAN) PER 5 UNITS: Performed by: FAMILY MEDICINE

## 2021-01-01 PROCEDURE — 25010000002 CALCIUM GLUCONATE-NACL 1-0.675 GM/50ML-% SOLUTION: Performed by: PHYSICIAN ASSISTANT

## 2021-01-01 PROCEDURE — 84145 PROCALCITONIN (PCT): CPT | Performed by: INTERNAL MEDICINE

## 2021-01-01 PROCEDURE — 36600 WITHDRAWAL OF ARTERIAL BLOOD: CPT | Performed by: PHYSICIAN ASSISTANT

## 2021-01-01 PROCEDURE — 63710000001 INSULIN REGULAR HUMAN PER 5 UNITS: Performed by: NURSE PRACTITIONER

## 2021-01-01 PROCEDURE — 94640 AIRWAY INHALATION TREATMENT: CPT

## 2021-01-01 PROCEDURE — 82728 ASSAY OF FERRITIN: CPT | Performed by: PHYSICIAN ASSISTANT

## 2021-01-01 PROCEDURE — 85014 HEMATOCRIT: CPT | Performed by: PHYSICIAN ASSISTANT

## 2021-01-01 PROCEDURE — 85025 COMPLETE CBC W/AUTO DIFF WBC: CPT | Performed by: FAMILY MEDICINE

## 2021-01-01 PROCEDURE — 71045 X-RAY EXAM CHEST 1 VIEW: CPT

## 2021-01-01 PROCEDURE — 83050 HGB METHEMOGLOBIN QUAN: CPT | Performed by: EMERGENCY MEDICINE

## 2021-01-01 PROCEDURE — 70551 MRI BRAIN STEM W/O DYE: CPT

## 2021-01-01 PROCEDURE — 25010000002 ENOXAPARIN PER 10 MG: Performed by: FAMILY MEDICINE

## 2021-01-01 PROCEDURE — 95816 EEG AWAKE AND DROWSY: CPT | Performed by: PSYCHIATRY & NEUROLOGY

## 2021-01-01 PROCEDURE — 25010000003 AMPICILLIN-SULBACTAM PER 1.5 G: Performed by: SURGERY

## 2021-01-01 PROCEDURE — 25010000002 AMIODARONE IN DEXTROSE 5% 150-4.21 MG/100ML-% SOLUTION: Performed by: PHYSICIAN ASSISTANT

## 2021-01-01 PROCEDURE — 94003 VENT MGMT INPAT SUBQ DAY: CPT

## 2021-01-01 PROCEDURE — P9016 RBC LEUKOCYTES REDUCED: HCPCS

## 2021-01-01 PROCEDURE — 25010000002 DEXAMETHASONE PER 1 MG: Performed by: PHYSICIAN ASSISTANT

## 2021-01-01 PROCEDURE — 94669 MECHANICAL CHEST WALL OSCILL: CPT

## 2021-01-01 PROCEDURE — 94660 CPAP INITIATION&MGMT: CPT

## 2021-01-01 PROCEDURE — 83735 ASSAY OF MAGNESIUM: CPT | Performed by: NURSE PRACTITIONER

## 2021-01-01 PROCEDURE — 99233 SBSQ HOSP IP/OBS HIGH 50: CPT | Performed by: INTERNAL MEDICINE

## 2021-01-01 PROCEDURE — 25010000002 MORPHINE PER 10 MG: Performed by: HOSPITALIST

## 2021-01-01 PROCEDURE — 36415 COLL VENOUS BLD VENIPUNCTURE: CPT

## 2021-01-01 PROCEDURE — 82805 BLOOD GASES W/O2 SATURATION: CPT | Performed by: PHYSICIAN ASSISTANT

## 2021-01-01 PROCEDURE — 80076 HEPATIC FUNCTION PANEL: CPT | Performed by: INTERNAL MEDICINE

## 2021-01-01 PROCEDURE — 99232 SBSQ HOSP IP/OBS MODERATE 35: CPT | Performed by: FAMILY MEDICINE

## 2021-01-01 PROCEDURE — 86140 C-REACTIVE PROTEIN: CPT | Performed by: INTERNAL MEDICINE

## 2021-01-01 PROCEDURE — 87070 CULTURE OTHR SPECIMN AEROBIC: CPT | Performed by: INTERNAL MEDICINE

## 2021-01-01 PROCEDURE — 80053 COMPREHEN METABOLIC PANEL: CPT | Performed by: EMERGENCY MEDICINE

## 2021-01-01 PROCEDURE — 82805 BLOOD GASES W/O2 SATURATION: CPT | Performed by: SURGERY

## 2021-01-01 PROCEDURE — 85025 COMPLETE CBC W/AUTO DIFF WBC: CPT | Performed by: STUDENT IN AN ORGANIZED HEALTH CARE EDUCATION/TRAINING PROGRAM

## 2021-01-01 PROCEDURE — 93308 TTE F-UP OR LMTD: CPT

## 2021-01-01 PROCEDURE — 86022 PLATELET ANTIBODIES: CPT | Performed by: INTERNAL MEDICINE

## 2021-01-01 PROCEDURE — 83880 ASSAY OF NATRIURETIC PEPTIDE: CPT | Performed by: EMERGENCY MEDICINE

## 2021-01-01 PROCEDURE — 84100 ASSAY OF PHOSPHORUS: CPT | Performed by: INTERNAL MEDICINE

## 2021-01-01 PROCEDURE — 83735 ASSAY OF MAGNESIUM: CPT | Performed by: PHYSICIAN ASSISTANT

## 2021-01-01 PROCEDURE — 36600 WITHDRAWAL OF ARTERIAL BLOOD: CPT | Performed by: HOSPITALIST

## 2021-01-01 PROCEDURE — 85018 HEMOGLOBIN: CPT | Performed by: PHYSICIAN ASSISTANT

## 2021-01-01 PROCEDURE — 25010000002 PROPOFOL 10 MG/ML EMULSION: Performed by: SURGERY

## 2021-01-01 PROCEDURE — 99214 OFFICE O/P EST MOD 30 MIN: CPT | Performed by: NURSE PRACTITIONER

## 2021-01-01 PROCEDURE — 25010000002 MAGNESIUM SULFATE 2 GM/50ML SOLUTION: Performed by: INTERNAL MEDICINE

## 2021-01-01 PROCEDURE — 94760 N-INVAS EAR/PLS OXIMETRY 1: CPT

## 2021-01-01 PROCEDURE — 82375 ASSAY CARBOXYHB QUANT: CPT | Performed by: INTERNAL MEDICINE

## 2021-01-01 PROCEDURE — 87086 URINE CULTURE/COLONY COUNT: CPT | Performed by: FAMILY MEDICINE

## 2021-01-01 PROCEDURE — 25010000002 CEFEPIME PER 500 MG: Performed by: FAMILY MEDICINE

## 2021-01-01 PROCEDURE — XW0DXM6 INTRODUCTION OF BARICITINIB INTO MOUTH AND PHARYNX, EXTERNAL APPROACH, NEW TECHNOLOGY GROUP 6: ICD-10-PCS | Performed by: INTERNAL MEDICINE

## 2021-01-01 PROCEDURE — 97110 THERAPEUTIC EXERCISES: CPT

## 2021-01-01 PROCEDURE — 25010000002 ENOXAPARIN PER 10 MG: Performed by: INTERNAL MEDICINE

## 2021-01-01 PROCEDURE — 0 IOPAMIDOL PER 1 ML: Performed by: INTERNAL MEDICINE

## 2021-01-01 PROCEDURE — 83605 ASSAY OF LACTIC ACID: CPT | Performed by: EMERGENCY MEDICINE

## 2021-01-01 PROCEDURE — 02HV33Z INSERTION OF INFUSION DEVICE INTO SUPERIOR VENA CAVA, PERCUTANEOUS APPROACH: ICD-10-PCS | Performed by: SURGERY

## 2021-01-01 PROCEDURE — 83880 ASSAY OF NATRIURETIC PEPTIDE: CPT

## 2021-01-01 PROCEDURE — 93010 ELECTROCARDIOGRAM REPORT: CPT | Performed by: INTERNAL MEDICINE

## 2021-01-01 PROCEDURE — 25010000002 METHYLPREDNISOLONE PER 40 MG: Performed by: INTERNAL MEDICINE

## 2021-01-01 PROCEDURE — 99285 EMERGENCY DEPT VISIT HI MDM: CPT

## 2021-01-01 PROCEDURE — 25010000002 DEXAMETHASONE PER 1 MG: Performed by: NURSE PRACTITIONER

## 2021-01-01 PROCEDURE — 85014 HEMATOCRIT: CPT | Performed by: INTERNAL MEDICINE

## 2021-01-01 PROCEDURE — 36600 WITHDRAWAL OF ARTERIAL BLOOD: CPT | Performed by: SURGERY

## 2021-01-01 PROCEDURE — 25010000002 VANCOMYCIN 5 G RECONSTITUTED SOLUTION: Performed by: EMERGENCY MEDICINE

## 2021-01-01 PROCEDURE — 93321 DOPPLER ECHO F-UP/LMTD STD: CPT | Performed by: INTERNAL MEDICINE

## 2021-01-01 PROCEDURE — 83050 HGB METHEMOGLOBIN QUAN: CPT | Performed by: SURGERY

## 2021-01-01 PROCEDURE — 80053 COMPREHEN METABOLIC PANEL: CPT | Performed by: STUDENT IN AN ORGANIZED HEALTH CARE EDUCATION/TRAINING PROGRAM

## 2021-01-01 PROCEDURE — 76937 US GUIDE VASCULAR ACCESS: CPT | Performed by: INTERNAL MEDICINE

## 2021-01-01 PROCEDURE — 85652 RBC SED RATE AUTOMATED: CPT | Performed by: INTERNAL MEDICINE

## 2021-01-01 PROCEDURE — 94002 VENT MGMT INPAT INIT DAY: CPT

## 2021-01-01 PROCEDURE — 93308 TTE F-UP OR LMTD: CPT | Performed by: INTERNAL MEDICINE

## 2021-01-01 PROCEDURE — 25010000002 HEPARIN (PORCINE) PER 1000 UNITS: Performed by: INTERNAL MEDICINE

## 2021-01-01 PROCEDURE — 80048 BASIC METABOLIC PNL TOTAL CA: CPT | Performed by: FAMILY MEDICINE

## 2021-01-01 PROCEDURE — 86901 BLOOD TYPING SEROLOGIC RH(D): CPT | Performed by: INTERNAL MEDICINE

## 2021-01-01 PROCEDURE — 84484 ASSAY OF TROPONIN QUANT: CPT | Performed by: PHYSICIAN ASSISTANT

## 2021-01-01 PROCEDURE — 86900 BLOOD TYPING SEROLOGIC ABO: CPT

## 2021-01-01 PROCEDURE — 93005 ELECTROCARDIOGRAM TRACING: CPT | Performed by: PHYSICIAN ASSISTANT

## 2021-01-01 PROCEDURE — 99213 OFFICE O/P EST LOW 20 MIN: CPT | Performed by: PHYSICIAN ASSISTANT

## 2021-01-01 PROCEDURE — 84443 ASSAY THYROID STIM HORMONE: CPT | Performed by: INTERNAL MEDICINE

## 2021-01-01 PROCEDURE — 81001 URINALYSIS AUTO W/SCOPE: CPT | Performed by: EMERGENCY MEDICINE

## 2021-01-01 PROCEDURE — 25010000002 IMMUNE GLOBULIN (HUMAN) 30 GM/300ML SOLUTION: Performed by: INTERNAL MEDICINE

## 2021-01-01 PROCEDURE — 25010000002 CEFEPIME 2 G/NS 100 ML SOLUTION: Performed by: INTERNAL MEDICINE

## 2021-01-01 PROCEDURE — U0003 INFECTIOUS AGENT DETECTION BY NUCLEIC ACID (DNA OR RNA); SEVERE ACUTE RESPIRATORY SYNDROME CORONAVIRUS 2 (SARS-COV-2) (CORONAVIRUS DISEASE [COVID-19]), AMPLIFIED PROBE TECHNIQUE, MAKING USE OF HIGH THROUGHPUT TECHNOLOGIES AS DESCRIBED BY CMS-2020-01-R: HCPCS | Performed by: PHYSICIAN ASSISTANT

## 2021-01-01 PROCEDURE — 82140 ASSAY OF AMMONIA: CPT

## 2021-01-01 PROCEDURE — 97165 OT EVAL LOW COMPLEX 30 MIN: CPT

## 2021-01-01 PROCEDURE — P9047 ALBUMIN (HUMAN), 25%, 50ML: HCPCS | Performed by: PHYSICIAN ASSISTANT

## 2021-01-01 PROCEDURE — 99223 1ST HOSP IP/OBS HIGH 75: CPT | Performed by: INTERNAL MEDICINE

## 2021-01-01 PROCEDURE — 83050 HGB METHEMOGLOBIN QUAN: CPT | Performed by: PHYSICIAN ASSISTANT

## 2021-01-01 PROCEDURE — 25010000002 VANCOMYCIN 5 G RECONSTITUTED SOLUTION: Performed by: INTERNAL MEDICINE

## 2021-01-01 PROCEDURE — 72100 X-RAY EXAM L-S SPINE 2/3 VWS: CPT

## 2021-01-01 PROCEDURE — 99214 OFFICE O/P EST MOD 30 MIN: CPT | Performed by: FAMILY MEDICINE

## 2021-01-01 PROCEDURE — 63710000001 INSULIN LISPRO (HUMAN) PER 5 UNITS: Performed by: PHYSICIAN ASSISTANT

## 2021-01-01 PROCEDURE — 63710000001 DEXAMETHASONE PER 0.25 MG: Performed by: PHYSICIAN ASSISTANT

## 2021-01-01 PROCEDURE — 25010000002 METHYLPREDNISOLONE PER 40 MG: Performed by: SURGERY

## 2021-01-01 PROCEDURE — 76705 ECHO EXAM OF ABDOMEN: CPT | Performed by: RADIOLOGY

## 2021-01-01 PROCEDURE — 80053 COMPREHEN METABOLIC PANEL: CPT | Performed by: PHYSICIAN ASSISTANT

## 2021-01-01 PROCEDURE — 96365 THER/PROPH/DIAG IV INF INIT: CPT

## 2021-01-01 PROCEDURE — 86923 COMPATIBILITY TEST ELECTRIC: CPT

## 2021-01-01 PROCEDURE — 86850 RBC ANTIBODY SCREEN: CPT | Performed by: INTERNAL MEDICINE

## 2021-01-01 PROCEDURE — 83050 HGB METHEMOGLOBIN QUAN: CPT | Performed by: HOSPITALIST

## 2021-01-01 PROCEDURE — 63710000001 INSULIN LISPRO (HUMAN) PER 5 UNITS

## 2021-01-01 PROCEDURE — 36556 INSERT NON-TUNNEL CV CATH: CPT | Performed by: SURGERY

## 2021-01-01 PROCEDURE — 85610 PROTHROMBIN TIME: CPT | Performed by: INTERNAL MEDICINE

## 2021-01-01 PROCEDURE — 36556 INSERT NON-TUNNEL CV CATH: CPT | Performed by: INTERNAL MEDICINE

## 2021-01-01 PROCEDURE — 87340 HEPATITIS B SURFACE AG IA: CPT | Performed by: INTERNAL MEDICINE

## 2021-01-01 PROCEDURE — 85610 PROTHROMBIN TIME: CPT

## 2021-01-01 PROCEDURE — 97164 PT RE-EVAL EST PLAN CARE: CPT

## 2021-01-01 PROCEDURE — 95816 EEG AWAKE AND DROWSY: CPT

## 2021-01-01 PROCEDURE — 81001 URINALYSIS AUTO W/SCOPE: CPT | Performed by: INTERNAL MEDICINE

## 2021-01-01 PROCEDURE — 25010000002 ENOXAPARIN PER 10 MG: Performed by: SURGERY

## 2021-01-01 PROCEDURE — 87205 SMEAR GRAM STAIN: CPT | Performed by: INTERNAL MEDICINE

## 2021-01-01 PROCEDURE — 82248 BILIRUBIN DIRECT: CPT | Performed by: INTERNAL MEDICINE

## 2021-01-01 PROCEDURE — 93970 EXTREMITY STUDY: CPT | Performed by: SURGERY

## 2021-01-01 PROCEDURE — 82805 BLOOD GASES W/O2 SATURATION: CPT | Performed by: HOSPITALIST

## 2021-01-01 PROCEDURE — 25010000002 IMMUNE GLOBULIN (HUMAN) 10 GM/100ML SOLUTION: Performed by: INTERNAL MEDICINE

## 2021-01-01 PROCEDURE — 87086 URINE CULTURE/COLONY COUNT: CPT | Performed by: INTERNAL MEDICINE

## 2021-01-01 PROCEDURE — 83050 HGB METHEMOGLOBIN QUAN: CPT | Performed by: INTERNAL MEDICINE

## 2021-01-01 PROCEDURE — 36600 WITHDRAWAL OF ARTERIAL BLOOD: CPT | Performed by: INTERNAL MEDICINE

## 2021-01-01 PROCEDURE — 85007 BL SMEAR W/DIFF WBC COUNT: CPT | Performed by: PHYSICIAN ASSISTANT

## 2021-01-01 PROCEDURE — 85018 HEMOGLOBIN: CPT | Performed by: INTERNAL MEDICINE

## 2021-01-01 PROCEDURE — 74177 CT ABD & PELVIS W/CONTRAST: CPT

## 2021-01-01 PROCEDURE — 82140 ASSAY OF AMMONIA: CPT | Performed by: INTERNAL MEDICINE

## 2021-01-01 PROCEDURE — 82375 ASSAY CARBOXYHB QUANT: CPT | Performed by: NURSE PRACTITIONER

## 2021-01-01 PROCEDURE — 25010000002 AMIODARONE IN DEXTROSE 5% 360-4.14 MG/200ML-% SOLUTION: Performed by: PHYSICIAN ASSISTANT

## 2021-01-01 PROCEDURE — 70450 CT HEAD/BRAIN W/O DYE: CPT | Performed by: RADIOLOGY

## 2021-01-01 PROCEDURE — 82140 ASSAY OF AMMONIA: CPT | Performed by: SURGERY

## 2021-01-01 PROCEDURE — 71275 CT ANGIOGRAPHY CHEST: CPT

## 2021-01-01 PROCEDURE — 25010000002 MORPHINE PER 10 MG: Performed by: INTERNAL MEDICINE

## 2021-01-01 PROCEDURE — 85025 COMPLETE CBC W/AUTO DIFF WBC: CPT | Performed by: EMERGENCY MEDICINE

## 2021-01-01 PROCEDURE — 83036 HEMOGLOBIN GLYCOSYLATED A1C: CPT | Performed by: FAMILY MEDICINE

## 2021-01-01 PROCEDURE — 63710000001 INSULIN REGULAR HUMAN PER 5 UNITS: Performed by: PHYSICIAN ASSISTANT

## 2021-01-01 PROCEDURE — 25010000002 EPINEPHRINE 1 MG/10ML SOLUTION PREFILLED SYRINGE: Performed by: SURGERY

## 2021-01-01 PROCEDURE — 63710000001 INSULIN REGULAR HUMAN PER 5 UNITS: Performed by: INTERNAL MEDICINE

## 2021-01-01 PROCEDURE — 82375 ASSAY CARBOXYHB QUANT: CPT | Performed by: PHYSICIAN ASSISTANT

## 2021-01-01 PROCEDURE — 5A1D70Z PERFORMANCE OF URINARY FILTRATION, INTERMITTENT, LESS THAN 6 HOURS PER DAY: ICD-10-PCS | Performed by: INTERNAL MEDICINE

## 2021-01-01 PROCEDURE — 70450 CT HEAD/BRAIN W/O DYE: CPT

## 2021-01-01 PROCEDURE — 82375 ASSAY CARBOXYHB QUANT: CPT | Performed by: SURGERY

## 2021-01-01 PROCEDURE — 87070 CULTURE OTHR SPECIMN AEROBIC: CPT | Performed by: FAMILY MEDICINE

## 2021-01-01 PROCEDURE — 93325 DOPPLER ECHO COLOR FLOW MAPG: CPT | Performed by: INTERNAL MEDICINE

## 2021-01-01 PROCEDURE — 25010000002 CEFTRIAXONE PER 250 MG: Performed by: NURSE PRACTITIONER

## 2021-01-01 PROCEDURE — 83540 ASSAY OF IRON: CPT | Performed by: STUDENT IN AN ORGANIZED HEALTH CARE EDUCATION/TRAINING PROGRAM

## 2021-01-01 PROCEDURE — 63710000001 DEXAMETHASONE PER 0.25 MG: Performed by: FAMILY MEDICINE

## 2021-01-01 PROCEDURE — 92950 HEART/LUNG RESUSCITATION CPR: CPT

## 2021-01-01 PROCEDURE — 25010000002 MAGNESIUM SULFATE IN D5W 1G/100ML (PREMIX) 1-5 GM/100ML-% SOLUTION: Performed by: FAMILY MEDICINE

## 2021-01-01 PROCEDURE — 87040 BLOOD CULTURE FOR BACTERIA: CPT | Performed by: EMERGENCY MEDICINE

## 2021-01-01 PROCEDURE — 99233 SBSQ HOSP IP/OBS HIGH 50: CPT | Performed by: STUDENT IN AN ORGANIZED HEALTH CARE EDUCATION/TRAINING PROGRAM

## 2021-01-01 PROCEDURE — 25010000002 FENTANYL CITRATE (PF) 1000 MCG/20ML SOLUTION: Performed by: SURGERY

## 2021-01-01 PROCEDURE — 82375 ASSAY CARBOXYHB QUANT: CPT | Performed by: HOSPITALIST

## 2021-01-01 PROCEDURE — 93005 ELECTROCARDIOGRAM TRACING: CPT | Performed by: EMERGENCY MEDICINE

## 2021-01-01 PROCEDURE — 82805 BLOOD GASES W/O2 SATURATION: CPT | Performed by: NURSE PRACTITIONER

## 2021-01-01 PROCEDURE — 87077 CULTURE AEROBIC IDENTIFY: CPT | Performed by: FAMILY MEDICINE

## 2021-01-01 PROCEDURE — 80053 COMPREHEN METABOLIC PANEL: CPT | Performed by: FAMILY MEDICINE

## 2021-01-01 PROCEDURE — 85379 FIBRIN DEGRADATION QUANT: CPT | Performed by: INTERNAL MEDICINE

## 2021-01-01 PROCEDURE — 80069 RENAL FUNCTION PANEL: CPT | Performed by: PHYSICIAN ASSISTANT

## 2021-01-01 PROCEDURE — 87186 SC STD MICRODIL/AGAR DIL: CPT | Performed by: FAMILY MEDICINE

## 2021-01-01 PROCEDURE — 71250 CT THORAX DX C-: CPT

## 2021-01-01 PROCEDURE — 93321 DOPPLER ECHO F-UP/LMTD STD: CPT

## 2021-01-01 PROCEDURE — 80202 ASSAY OF VANCOMYCIN: CPT | Performed by: INTERNAL MEDICINE

## 2021-01-01 PROCEDURE — 25010000002 PROPOFOL 10 MG/ML EMULSION

## 2021-01-01 PROCEDURE — 36600 WITHDRAWAL OF ARTERIAL BLOOD: CPT | Performed by: NURSE PRACTITIONER

## 2021-01-01 PROCEDURE — 87040 BLOOD CULTURE FOR BACTERIA: CPT | Performed by: NURSE PRACTITIONER

## 2021-01-01 PROCEDURE — 25010000002 FENTANYL CITRATE (PF) 2500 MCG/50ML SOLUTION: Performed by: SURGERY

## 2021-01-01 PROCEDURE — 93005 ELECTROCARDIOGRAM TRACING: CPT

## 2021-01-01 PROCEDURE — 83036 HEMOGLOBIN GLYCOSYLATED A1C: CPT | Performed by: INTERNAL MEDICINE

## 2021-01-01 PROCEDURE — 84466 ASSAY OF TRANSFERRIN: CPT | Performed by: STUDENT IN AN ORGANIZED HEALTH CARE EDUCATION/TRAINING PROGRAM

## 2021-01-01 PROCEDURE — 25010000002 ALBUMIN HUMAN 25% PER 50 ML: Performed by: PHYSICIAN ASSISTANT

## 2021-01-01 PROCEDURE — 87899 AGENT NOS ASSAY W/OPTIC: CPT | Performed by: INTERNAL MEDICINE

## 2021-01-01 PROCEDURE — 87205 SMEAR GRAM STAIN: CPT | Performed by: PHYSICIAN ASSISTANT

## 2021-01-01 PROCEDURE — 25010000002 ONDANSETRON PER 1 MG: Performed by: FAMILY MEDICINE

## 2021-01-01 PROCEDURE — 71045 X-RAY EXAM CHEST 1 VIEW: CPT | Performed by: RADIOLOGY

## 2021-01-01 PROCEDURE — 63710000001 INSULIN DETEMIR PER 5 UNITS: Performed by: STUDENT IN AN ORGANIZED HEALTH CARE EDUCATION/TRAINING PROGRAM

## 2021-01-01 PROCEDURE — 80061 LIPID PANEL: CPT | Performed by: FAMILY MEDICINE

## 2021-01-01 PROCEDURE — 87305 ASPERGILLUS AG IA: CPT | Performed by: PHYSICIAN ASSISTANT

## 2021-01-01 PROCEDURE — 99223 1ST HOSP IP/OBS HIGH 75: CPT | Performed by: GENERAL PRACTICE

## 2021-01-01 PROCEDURE — 83930 ASSAY OF BLOOD OSMOLALITY: CPT | Performed by: INTERNAL MEDICINE

## 2021-01-01 PROCEDURE — 93325 DOPPLER ECHO COLOR FLOW MAPG: CPT

## 2021-01-01 PROCEDURE — 87641 MR-STAPH DNA AMP PROBE: CPT | Performed by: PHYSICIAN ASSISTANT

## 2021-01-01 PROCEDURE — 84484 ASSAY OF TROPONIN QUANT: CPT | Performed by: EMERGENCY MEDICINE

## 2021-01-01 PROCEDURE — 25010000002 FUROSEMIDE PER 20 MG: Performed by: FAMILY MEDICINE

## 2021-01-01 PROCEDURE — 83935 ASSAY OF URINE OSMOLALITY: CPT | Performed by: INTERNAL MEDICINE

## 2021-01-01 PROCEDURE — 82375 ASSAY CARBOXYHB QUANT: CPT | Performed by: EMERGENCY MEDICINE

## 2021-01-01 PROCEDURE — 36430 TRANSFUSION BLD/BLD COMPNT: CPT

## 2021-01-01 PROCEDURE — 86900 BLOOD TYPING SEROLOGIC ABO: CPT | Performed by: INTERNAL MEDICINE

## 2021-01-01 PROCEDURE — 84300 ASSAY OF URINE SODIUM: CPT | Performed by: INTERNAL MEDICINE

## 2021-01-01 PROCEDURE — 87641 MR-STAPH DNA AMP PROBE: CPT | Performed by: FAMILY MEDICINE

## 2021-01-01 PROCEDURE — 0BH17EZ INSERTION OF ENDOTRACHEAL AIRWAY INTO TRACHEA, VIA NATURAL OR ARTIFICIAL OPENING: ICD-10-PCS | Performed by: SURGERY

## 2021-01-01 PROCEDURE — 83690 ASSAY OF LIPASE: CPT | Performed by: EMERGENCY MEDICINE

## 2021-01-01 PROCEDURE — 25010000002 ONDANSETRON PER 1 MG: Performed by: PHYSICIAN ASSISTANT

## 2021-01-01 PROCEDURE — 86606 ASPERGILLUS ANTIBODY: CPT | Performed by: PHYSICIAN ASSISTANT

## 2021-01-01 PROCEDURE — 84145 PROCALCITONIN (PCT): CPT | Performed by: PHYSICIAN ASSISTANT

## 2021-01-01 PROCEDURE — 02HV33Z INSERTION OF INFUSION DEVICE INTO SUPERIOR VENA CAVA, PERCUTANEOUS APPROACH: ICD-10-PCS | Performed by: INTERNAL MEDICINE

## 2021-01-01 PROCEDURE — 85027 COMPLETE CBC AUTOMATED: CPT | Performed by: FAMILY MEDICINE

## 2021-01-01 PROCEDURE — 85007 BL SMEAR W/DIFF WBC COUNT: CPT | Performed by: STUDENT IN AN ORGANIZED HEALTH CARE EDUCATION/TRAINING PROGRAM

## 2021-01-01 PROCEDURE — 87040 BLOOD CULTURE FOR BACTERIA: CPT | Performed by: INTERNAL MEDICINE

## 2021-01-01 PROCEDURE — 99232 SBSQ HOSP IP/OBS MODERATE 35: CPT | Performed by: STUDENT IN AN ORGANIZED HEALTH CARE EDUCATION/TRAINING PROGRAM

## 2021-01-01 PROCEDURE — 80069 RENAL FUNCTION PANEL: CPT | Performed by: INTERNAL MEDICINE

## 2021-01-01 PROCEDURE — 25010000002 CEFTRIAXONE PER 250 MG: Performed by: EMERGENCY MEDICINE

## 2021-01-01 PROCEDURE — B548ZZA ULTRASONOGRAPHY OF SUPERIOR VENA CAVA, GUIDANCE: ICD-10-PCS | Performed by: INTERNAL MEDICINE

## 2021-01-01 PROCEDURE — 0 IOHEXOL 12 MG/ML SOLUTION: Performed by: INTERNAL MEDICINE

## 2021-01-01 PROCEDURE — 86901 BLOOD TYPING SEROLOGIC RH(D): CPT

## 2021-01-01 PROCEDURE — 82565 ASSAY OF CREATININE: CPT | Performed by: INTERNAL MEDICINE

## 2021-01-01 PROCEDURE — 84484 ASSAY OF TROPONIN QUANT: CPT

## 2021-01-01 PROCEDURE — 92950 HEART/LUNG RESUSCITATION CPR: CPT | Performed by: SURGERY

## 2021-01-01 PROCEDURE — 83605 ASSAY OF LACTIC ACID: CPT | Performed by: NURSE PRACTITIONER

## 2021-01-01 PROCEDURE — 5A1955Z RESPIRATORY VENTILATION, GREATER THAN 96 CONSECUTIVE HOURS: ICD-10-PCS | Performed by: SURGERY

## 2021-01-01 PROCEDURE — 74018 RADEX ABDOMEN 1 VIEW: CPT

## 2021-01-01 PROCEDURE — 84478 ASSAY OF TRIGLYCERIDES: CPT | Performed by: PHYSICIAN ASSISTANT

## 2021-01-01 PROCEDURE — 25010000002 IMMUNE GLOBULIN (HUMAN) 20 GM/200ML SOLUTION: Performed by: INTERNAL MEDICINE

## 2021-01-01 PROCEDURE — 36415 COLL VENOUS BLD VENIPUNCTURE: CPT | Performed by: FAMILY MEDICINE

## 2021-01-01 PROCEDURE — 99239 HOSP IP/OBS DSCHRG MGMT >30: CPT | Performed by: STUDENT IN AN ORGANIZED HEALTH CARE EDUCATION/TRAINING PROGRAM

## 2021-01-01 PROCEDURE — 99239 HOSP IP/OBS DSCHRG MGMT >30: CPT | Performed by: INTERNAL MEDICINE

## 2021-01-01 PROCEDURE — 99222 1ST HOSP IP/OBS MODERATE 55: CPT | Performed by: INTERNAL MEDICINE

## 2021-01-01 PROCEDURE — 99223 1ST HOSP IP/OBS HIGH 75: CPT | Performed by: FAMILY MEDICINE

## 2021-01-01 PROCEDURE — 84145 PROCALCITONIN (PCT): CPT | Performed by: FAMILY MEDICINE

## 2021-01-01 PROCEDURE — 25010000002 ONDANSETRON PER 1 MG: Performed by: NURSE PRACTITIONER

## 2021-01-01 PROCEDURE — 82728 ASSAY OF FERRITIN: CPT | Performed by: INTERNAL MEDICINE

## 2021-01-01 PROCEDURE — 36600 WITHDRAWAL OF ARTERIAL BLOOD: CPT | Performed by: EMERGENCY MEDICINE

## 2021-01-01 PROCEDURE — 87449 NOS EACH ORGANISM AG IA: CPT | Performed by: PHYSICIAN ASSISTANT

## 2021-01-01 PROCEDURE — 76705 ECHO EXAM OF ABDOMEN: CPT

## 2021-01-01 PROCEDURE — 25010000002 VANCOMYCIN 5 G RECONSTITUTED SOLUTION: Performed by: FAMILY MEDICINE

## 2021-01-01 PROCEDURE — 31500 INSERT EMERGENCY AIRWAY: CPT | Performed by: SURGERY

## 2021-01-01 PROCEDURE — 72131 CT LUMBAR SPINE W/O DYE: CPT

## 2021-01-01 PROCEDURE — 25010000002 CEFEPIME PER 500 MG: Performed by: EMERGENCY MEDICINE

## 2021-01-01 PROCEDURE — 99283 EMERGENCY DEPT VISIT LOW MDM: CPT

## 2021-01-01 PROCEDURE — 81001 URINALYSIS AUTO W/SCOPE: CPT | Performed by: FAMILY MEDICINE

## 2021-01-01 PROCEDURE — 83605 ASSAY OF LACTIC ACID: CPT | Performed by: PHYSICIAN ASSISTANT

## 2021-01-01 PROCEDURE — 97161 PT EVAL LOW COMPLEX 20 MIN: CPT

## 2021-01-01 PROCEDURE — XW033E5 INTRODUCTION OF REMDESIVIR ANTI-INFECTIVE INTO PERIPHERAL VEIN, PERCUTANEOUS APPROACH, NEW TECHNOLOGY GROUP 5: ICD-10-PCS | Performed by: INTERNAL MEDICINE

## 2021-01-01 RX ORDER — LORAZEPAM 2 MG/ML
0.5 INJECTION INTRAMUSCULAR
Status: DISCONTINUED | OUTPATIENT
Start: 2021-01-01 | End: 2021-10-02 | Stop reason: HOSPADM

## 2021-01-01 RX ORDER — PREDNISONE 20 MG/1
40 TABLET ORAL
Status: DISCONTINUED | OUTPATIENT
Start: 2021-01-01 | End: 2021-01-01

## 2021-01-01 RX ORDER — DEXAMETHASONE SODIUM PHOSPHATE 10 MG/ML
10 INJECTION INTRAMUSCULAR; INTRAVENOUS DAILY
Status: DISCONTINUED | OUTPATIENT
Start: 2021-01-01 | End: 2021-01-01

## 2021-01-01 RX ORDER — EPINEPHRINE 0.1 MG/ML
SYRINGE (ML) INJECTION
Status: COMPLETED | OUTPATIENT
Start: 2021-01-01 | End: 2021-01-01

## 2021-01-01 RX ORDER — MORPHINE SULFATE 2 MG/ML
1 INJECTION, SOLUTION INTRAMUSCULAR; INTRAVENOUS EVERY 4 HOURS PRN
Status: DISCONTINUED | OUTPATIENT
Start: 2021-01-01 | End: 2021-01-01

## 2021-01-01 RX ORDER — GABAPENTIN 300 MG/1
300 CAPSULE ORAL EVERY 12 HOURS SCHEDULED
Status: DISCONTINUED | OUTPATIENT
Start: 2021-01-01 | End: 2021-01-01

## 2021-01-01 RX ORDER — CEFEPIME 1 G/50ML
2 INJECTION, SOLUTION INTRAVENOUS EVERY 8 HOURS
Status: DISCONTINUED | OUTPATIENT
Start: 2021-01-01 | End: 2021-01-01

## 2021-01-01 RX ORDER — SODIUM CHLORIDE 0.9 % (FLUSH) 0.9 %
10 SYRINGE (ML) INJECTION AS NEEDED
Status: DISCONTINUED | OUTPATIENT
Start: 2021-01-01 | End: 2021-01-01

## 2021-01-01 RX ORDER — FENTANYL CITRATE-0.9 % NACL/PF 10 MCG/ML
50-300 PLASTIC BAG, INJECTION (ML) INTRAVENOUS
Status: DISCONTINUED | OUTPATIENT
Start: 2021-01-01 | End: 2021-01-01

## 2021-01-01 RX ORDER — POTASSIUM CHLORIDE 1.5 G/1.77G
40 POWDER, FOR SOLUTION ORAL EVERY 4 HOURS
Status: COMPLETED | OUTPATIENT
Start: 2021-01-01 | End: 2021-01-01

## 2021-01-01 RX ORDER — ONDANSETRON 2 MG/ML
4 INJECTION INTRAMUSCULAR; INTRAVENOUS EVERY 6 HOURS PRN
Status: DISCONTINUED | OUTPATIENT
Start: 2021-01-01 | End: 2021-01-01

## 2021-01-01 RX ORDER — LISINOPRIL 10 MG/1
10 TABLET ORAL DAILY
Status: DISCONTINUED | OUTPATIENT
Start: 2021-01-01 | End: 2021-01-01

## 2021-01-01 RX ORDER — ATORVASTATIN CALCIUM 40 MG/1
40 TABLET, FILM COATED ORAL DAILY
Status: DISCONTINUED | OUTPATIENT
Start: 2021-01-01 | End: 2021-01-01 | Stop reason: HOSPADM

## 2021-01-01 RX ORDER — HALOPERIDOL 2 MG/ML
1 SOLUTION ORAL EVERY 4 HOURS PRN
Status: DISCONTINUED | OUTPATIENT
Start: 2021-01-01 | End: 2021-10-02 | Stop reason: HOSPADM

## 2021-01-01 RX ORDER — TRAMADOL HYDROCHLORIDE 50 MG/1
50 TABLET ORAL EVERY 6 HOURS PRN
Status: DISCONTINUED | OUTPATIENT
Start: 2021-01-01 | End: 2021-01-01

## 2021-01-01 RX ORDER — SODIUM POLYSTYRENE SULFONATE 15 G/60ML
30 SUSPENSION ORAL; RECTAL EVERY 6 HOURS
Status: DISCONTINUED | OUTPATIENT
Start: 2021-01-01 | End: 2021-01-01

## 2021-01-01 RX ORDER — BUDESONIDE AND FORMOTEROL FUMARATE DIHYDRATE 80; 4.5 UG/1; UG/1
2 AEROSOL RESPIRATORY (INHALATION)
Status: DISCONTINUED | OUTPATIENT
Start: 2021-01-01 | End: 2021-01-01

## 2021-01-01 RX ORDER — SODIUM CHLORIDE 0.9 % (FLUSH) 0.9 %
10 SYRINGE (ML) INJECTION AS NEEDED
Status: DISCONTINUED | OUTPATIENT
Start: 2021-01-01 | End: 2021-01-01 | Stop reason: HOSPADM

## 2021-01-01 RX ORDER — METHYLPREDNISOLONE SODIUM SUCCINATE 40 MG/ML
40 INJECTION, POWDER, LYOPHILIZED, FOR SOLUTION INTRAMUSCULAR; INTRAVENOUS 3 TIMES DAILY
Status: DISCONTINUED | OUTPATIENT
Start: 2021-01-01 | End: 2021-01-01

## 2021-01-01 RX ORDER — ONDANSETRON 4 MG/1
4 TABLET, FILM COATED ORAL EVERY 8 HOURS PRN
COMMUNITY

## 2021-01-01 RX ORDER — PHENYLEPHRINE HCL IN 0.9% NACL 0.5 MG/5ML
.5-3 SYRINGE (ML) INTRAVENOUS
Status: DISCONTINUED | OUTPATIENT
Start: 2021-01-01 | End: 2021-01-01

## 2021-01-01 RX ORDER — FUROSEMIDE 10 MG/ML
40 INJECTION INTRAMUSCULAR; INTRAVENOUS
Status: DISCONTINUED | OUTPATIENT
Start: 2021-01-01 | End: 2021-01-01 | Stop reason: HOSPADM

## 2021-01-01 RX ORDER — METOLAZONE 5 MG/1
10 TABLET ORAL ONCE
Status: COMPLETED | OUTPATIENT
Start: 2021-01-01 | End: 2021-01-01

## 2021-01-01 RX ORDER — NICOTINE POLACRILEX 4 MG
15 LOZENGE BUCCAL
Status: DISCONTINUED | OUTPATIENT
Start: 2021-01-01 | End: 2021-01-01

## 2021-01-01 RX ORDER — ALBUTEROL SULFATE 2.5 MG/3ML
2.5 SOLUTION RESPIRATORY (INHALATION) EVERY 4 HOURS PRN
Status: DISCONTINUED | OUTPATIENT
Start: 2021-01-01 | End: 2021-01-01

## 2021-01-01 RX ORDER — ACETAMINOPHEN 325 MG/1
975 TABLET ORAL ONCE
Status: COMPLETED | OUTPATIENT
Start: 2021-01-01 | End: 2021-01-01

## 2021-01-01 RX ORDER — SIMETHICONE 80 MG
80 TABLET,CHEWABLE ORAL 4 TIMES DAILY PRN
Qty: 30 TABLET | Refills: 0 | Status: SHIPPED | OUTPATIENT
Start: 2021-01-01 | End: 2021-10-03

## 2021-01-01 RX ORDER — IPRATROPIUM BROMIDE AND ALBUTEROL SULFATE 2.5; .5 MG/3ML; MG/3ML
3 SOLUTION RESPIRATORY (INHALATION)
Status: DISCONTINUED | OUTPATIENT
Start: 2021-01-01 | End: 2021-01-01

## 2021-01-01 RX ORDER — LORAZEPAM 2 MG/ML
1 CONCENTRATE ORAL
Status: DISCONTINUED | OUTPATIENT
Start: 2021-01-01 | End: 2021-10-02 | Stop reason: HOSPADM

## 2021-01-01 RX ORDER — MORPHINE SULFATE 20 MG/ML
20 SOLUTION ORAL
Status: DISCONTINUED | OUTPATIENT
Start: 2021-01-01 | End: 2021-10-02 | Stop reason: HOSPADM

## 2021-01-01 RX ORDER — ACETAMINOPHEN 325 MG/1
650 TABLET ORAL EVERY 4 HOURS PRN
Status: DISCONTINUED | OUTPATIENT
Start: 2021-01-01 | End: 2021-01-01

## 2021-01-01 RX ORDER — FUROSEMIDE 20 MG/1
40 TABLET ORAL DAILY
COMMUNITY
End: 2021-01-01 | Stop reason: SDUPTHER

## 2021-01-01 RX ORDER — MONTELUKAST SODIUM 10 MG/1
10 TABLET ORAL NIGHTLY
Status: DISCONTINUED | OUTPATIENT
Start: 2021-01-01 | End: 2021-01-01

## 2021-01-01 RX ORDER — LACTULOSE 10 G/15ML
30 SOLUTION ORAL 2 TIMES DAILY
Qty: 2700 ML | Refills: 0 | Status: SHIPPED | OUTPATIENT
Start: 2021-01-01 | End: 2021-10-03

## 2021-01-01 RX ORDER — LOPERAMIDE HYDROCHLORIDE 2 MG/1
2 CAPSULE ORAL 4 TIMES DAILY PRN
Status: DISCONTINUED | OUTPATIENT
Start: 2021-01-01 | End: 2021-01-01 | Stop reason: HOSPADM

## 2021-01-01 RX ORDER — FAMOTIDINE 20 MG/1
20 TABLET, FILM COATED ORAL ONCE
Status: COMPLETED | OUTPATIENT
Start: 2021-01-01 | End: 2021-01-01

## 2021-01-01 RX ORDER — ATORVASTATIN CALCIUM 20 MG/1
40 TABLET, FILM COATED ORAL DAILY
COMMUNITY

## 2021-01-01 RX ORDER — ALPRAZOLAM 0.25 MG/1
0.25 TABLET ORAL 4 TIMES DAILY PRN
Status: DISCONTINUED | OUTPATIENT
Start: 2021-01-01 | End: 2021-01-01

## 2021-01-01 RX ORDER — CALCIUM GLUCONATE 20 MG/ML
1 INJECTION, SOLUTION INTRAVENOUS ONCE
Status: COMPLETED | OUTPATIENT
Start: 2021-01-01 | End: 2021-01-01

## 2021-01-01 RX ORDER — ALPRAZOLAM 0.25 MG/1
1 TABLET ORAL 3 TIMES DAILY PRN
Status: DISCONTINUED | OUTPATIENT
Start: 2021-01-01 | End: 2021-01-01

## 2021-01-01 RX ORDER — ACETAMINOPHEN 650 MG/1
650 SUPPOSITORY RECTAL EVERY 4 HOURS PRN
Status: DISCONTINUED | OUTPATIENT
Start: 2021-01-01 | End: 2021-01-01

## 2021-01-01 RX ORDER — MAGNESIUM SULFATE HEPTAHYDRATE 40 MG/ML
2 INJECTION, SOLUTION INTRAVENOUS ONCE
Status: COMPLETED | OUTPATIENT
Start: 2021-01-01 | End: 2021-01-01

## 2021-01-01 RX ORDER — DEXAMETHASONE SODIUM PHOSPHATE 10 MG/ML
10 INJECTION INTRAMUSCULAR; INTRAVENOUS 2 TIMES DAILY
Status: DISCONTINUED | OUTPATIENT
Start: 2021-01-01 | End: 2021-01-01

## 2021-01-01 RX ORDER — IPRATROPIUM BROMIDE AND ALBUTEROL SULFATE 2.5; .5 MG/3ML; MG/3ML
3 SOLUTION RESPIRATORY (INHALATION) EVERY 4 HOURS PRN
Status: DISCONTINUED | OUTPATIENT
Start: 2021-01-01 | End: 2021-01-01 | Stop reason: HOSPADM

## 2021-01-01 RX ORDER — MAGNESIUM SULFATE 1 G/100ML
1 INJECTION INTRAVENOUS
Status: ACTIVE | OUTPATIENT
Start: 2021-01-01 | End: 2021-01-01

## 2021-01-01 RX ORDER — FENTANYL CITRATE-0.9 % NACL/PF 10 MCG/ML
50-300 PLASTIC BAG, INJECTION (ML) INTRAVENOUS
Status: DISCONTINUED | OUTPATIENT
Start: 2021-01-01 | End: 2021-01-01 | Stop reason: SDUPTHER

## 2021-01-01 RX ORDER — METOLAZONE 5 MG/1
5 TABLET ORAL ONCE
Status: COMPLETED | OUTPATIENT
Start: 2021-01-01 | End: 2021-01-01

## 2021-01-01 RX ORDER — ALBUMIN (HUMAN) 12.5 G/50ML
25 SOLUTION INTRAVENOUS ONCE
Status: COMPLETED | OUTPATIENT
Start: 2021-01-01 | End: 2021-01-01

## 2021-01-01 RX ORDER — MAGNESIUM SULFATE HEPTAHYDRATE 40 MG/ML
4 INJECTION, SOLUTION INTRAVENOUS ONCE
Status: DISCONTINUED | OUTPATIENT
Start: 2021-01-01 | End: 2021-01-01

## 2021-01-01 RX ORDER — CHOLECALCIFEROL (VITAMIN D3) 125 MCG
5 CAPSULE ORAL NIGHTLY PRN
Status: DISCONTINUED | OUTPATIENT
Start: 2021-01-01 | End: 2021-01-01

## 2021-01-01 RX ORDER — POLYETHYLENE GLYCOL 3350 17 G/17G
17 POWDER, FOR SOLUTION ORAL DAILY PRN
Status: DISCONTINUED | OUTPATIENT
Start: 2021-01-01 | End: 2021-01-01

## 2021-01-01 RX ORDER — IPRATROPIUM BROMIDE AND ALBUTEROL SULFATE 2.5; .5 MG/3ML; MG/3ML
3 SOLUTION RESPIRATORY (INHALATION) EVERY 4 HOURS PRN
Status: DISCONTINUED | OUTPATIENT
Start: 2021-01-01 | End: 2021-01-01

## 2021-01-01 RX ORDER — SIMETHICONE 80 MG
80 TABLET,CHEWABLE ORAL 4 TIMES DAILY PRN
Status: DISCONTINUED | OUTPATIENT
Start: 2021-01-01 | End: 2021-01-01 | Stop reason: HOSPADM

## 2021-01-01 RX ORDER — BETHANECHOL CHLORIDE 25 MG/1
25 TABLET ORAL 3 TIMES DAILY
Status: DISCONTINUED | OUTPATIENT
Start: 2021-01-01 | End: 2021-01-01

## 2021-01-01 RX ORDER — LORAZEPAM 2 MG/ML
2 CONCENTRATE ORAL
Status: DISCONTINUED | OUTPATIENT
Start: 2021-01-01 | End: 2021-10-02 | Stop reason: HOSPADM

## 2021-01-01 RX ORDER — ALPRAZOLAM 0.25 MG/1
1 TABLET ORAL 3 TIMES DAILY PRN
Status: ACTIVE | OUTPATIENT
Start: 2021-01-01 | End: 2021-01-01

## 2021-01-01 RX ORDER — SODIUM POLYSTYRENE SULFONATE 15 G/60ML
15 SUSPENSION ORAL; RECTAL ONCE
Status: COMPLETED | OUTPATIENT
Start: 2021-01-01 | End: 2021-01-01

## 2021-01-01 RX ORDER — METOLAZONE 5 MG/1
10 TABLET ORAL DAILY
Status: DISCONTINUED | OUTPATIENT
Start: 2021-01-01 | End: 2021-01-01

## 2021-01-01 RX ORDER — LACTULOSE 10 G/15ML
30 SOLUTION ORAL 2 TIMES DAILY
Status: DISCONTINUED | OUTPATIENT
Start: 2021-01-01 | End: 2021-01-01 | Stop reason: HOSPADM

## 2021-01-01 RX ORDER — SPIRONOLACTONE 100 MG/1
100 TABLET, FILM COATED ORAL DAILY
Status: DISCONTINUED | OUTPATIENT
Start: 2021-01-01 | End: 2021-01-01

## 2021-01-01 RX ORDER — ACETAMINOPHEN 160 MG/5ML
650 SOLUTION ORAL EVERY 4 HOURS PRN
Status: DISCONTINUED | OUTPATIENT
Start: 2021-01-01 | End: 2021-10-02 | Stop reason: HOSPADM

## 2021-01-01 RX ORDER — FLUTICASONE PROPIONATE 50 MCG
2 SPRAY, SUSPENSION (ML) NASAL DAILY
Status: DISCONTINUED | OUTPATIENT
Start: 2021-01-01 | End: 2021-01-01

## 2021-01-01 RX ORDER — ALBUMIN (HUMAN) 12.5 G/50ML
50 SOLUTION INTRAVENOUS ONCE
Status: DISCONTINUED | OUTPATIENT
Start: 2021-01-01 | End: 2021-01-01

## 2021-01-01 RX ORDER — LORAZEPAM 0.5 MG/1
0.5 TABLET ORAL
Status: DISCONTINUED | OUTPATIENT
Start: 2021-01-01 | End: 2021-10-02 | Stop reason: HOSPADM

## 2021-01-01 RX ORDER — PROPOFOL 10 MG/ML
VIAL (ML) INTRAVENOUS
Status: COMPLETED
Start: 2021-01-01 | End: 2021-01-01

## 2021-01-01 RX ORDER — PROPOFOL 10 MG/ML
VIAL (ML) INTRAVENOUS
Status: COMPLETED | OUTPATIENT
Start: 2021-01-01 | End: 2021-01-01

## 2021-01-01 RX ORDER — PREDNISONE 20 MG/1
20 TABLET ORAL
Status: DISCONTINUED | OUTPATIENT
Start: 2021-01-01 | End: 2021-01-01

## 2021-01-01 RX ORDER — LORAZEPAM 0.5 MG/1
1 TABLET ORAL
Status: DISCONTINUED | OUTPATIENT
Start: 2021-01-01 | End: 2021-10-02 | Stop reason: HOSPADM

## 2021-01-01 RX ORDER — DEXAMETHASONE SODIUM PHOSPHATE 10 MG/ML
6 INJECTION INTRAMUSCULAR; INTRAVENOUS DAILY
Status: DISCONTINUED | OUTPATIENT
Start: 2021-01-01 | End: 2021-01-01

## 2021-01-01 RX ORDER — PROMETHAZINE HYDROCHLORIDE 12.5 MG/1
6.25 TABLET ORAL EVERY 4 HOURS PRN
Status: DISCONTINUED | OUTPATIENT
Start: 2021-01-01 | End: 2021-10-02 | Stop reason: HOSPADM

## 2021-01-01 RX ORDER — LORAZEPAM 2 MG/ML
1 INJECTION INTRAMUSCULAR
Status: DISCONTINUED | OUTPATIENT
Start: 2021-01-01 | End: 2021-10-02 | Stop reason: HOSPADM

## 2021-01-01 RX ORDER — LACTULOSE 10 G/15ML
30 SOLUTION ORAL 2 TIMES DAILY
Status: DISCONTINUED | OUTPATIENT
Start: 2021-01-01 | End: 2021-01-01

## 2021-01-01 RX ORDER — LORAZEPAM 2 MG/ML
0.5 CONCENTRATE ORAL
Status: DISCONTINUED | OUTPATIENT
Start: 2021-01-01 | End: 2021-10-02 | Stop reason: HOSPADM

## 2021-01-01 RX ORDER — HALOPERIDOL 1 MG/1
1 TABLET ORAL EVERY 4 HOURS PRN
Status: DISCONTINUED | OUTPATIENT
Start: 2021-01-01 | End: 2021-10-02 | Stop reason: HOSPADM

## 2021-01-01 RX ORDER — TOBRAMYCIN INHALATION SOLUTION 300 MG/5ML
300 INHALANT RESPIRATORY (INHALATION)
Status: COMPLETED | OUTPATIENT
Start: 2021-01-01 | End: 2021-01-01

## 2021-01-01 RX ORDER — DEXTROSE MONOHYDRATE 100 MG/ML
25 INJECTION, SOLUTION INTRAVENOUS
Status: CANCELLED | OUTPATIENT
Start: 2021-01-01

## 2021-01-01 RX ORDER — FAMOTIDINE 20 MG/1
20 TABLET, FILM COATED ORAL
Status: DISCONTINUED | OUTPATIENT
Start: 2021-01-01 | End: 2021-01-01

## 2021-01-01 RX ORDER — ASPIRIN 81 MG/1
81 TABLET, CHEWABLE ORAL DAILY
Status: DISCONTINUED | OUTPATIENT
Start: 2021-01-01 | End: 2021-01-01

## 2021-01-01 RX ORDER — AMIODARONE HYDROCHLORIDE 200 MG/1
200 TABLET ORAL
Status: DISCONTINUED | OUTPATIENT
Start: 2021-01-01 | End: 2021-01-01

## 2021-01-01 RX ORDER — OXYCODONE AND ACETAMINOPHEN 7.5; 325 MG/1; MG/1
1 TABLET ORAL EVERY 4 HOURS PRN
Status: DISCONTINUED | OUTPATIENT
Start: 2021-01-01 | End: 2021-01-01

## 2021-01-01 RX ORDER — DEXTROSE MONOHYDRATE 100 MG/ML
50-250 INJECTION, SOLUTION INTRAVENOUS
Status: DISCONTINUED | OUTPATIENT
Start: 2021-01-01 | End: 2021-01-01

## 2021-01-01 RX ORDER — PROMETHAZINE HYDROCHLORIDE 6.25 MG/5ML
6.25 SYRUP ORAL EVERY 4 HOURS PRN
Status: DISCONTINUED | OUTPATIENT
Start: 2021-01-01 | End: 2021-10-02 | Stop reason: HOSPADM

## 2021-01-01 RX ORDER — ALBUMIN (HUMAN) 12.5 G/50ML
12.5 SOLUTION INTRAVENOUS AS NEEDED
Status: CANCELLED | OUTPATIENT
Start: 2021-01-01 | End: 2021-01-01

## 2021-01-01 RX ORDER — ATORVASTATIN CALCIUM 40 MG/1
40 TABLET, FILM COATED ORAL NIGHTLY
Status: DISCONTINUED | OUTPATIENT
Start: 2021-01-01 | End: 2021-01-01

## 2021-01-01 RX ORDER — ONDANSETRON 4 MG/1
4 TABLET, FILM COATED ORAL EVERY 6 HOURS PRN
Status: DISCONTINUED | OUTPATIENT
Start: 2021-01-01 | End: 2021-01-01

## 2021-01-01 RX ORDER — GUAIFENESIN 600 MG/1
600 TABLET, EXTENDED RELEASE ORAL EVERY 12 HOURS SCHEDULED
Qty: 60 TABLET | Refills: 0 | Status: SHIPPED | OUTPATIENT
Start: 2021-01-01 | End: 2021-10-03

## 2021-01-01 RX ORDER — ACETAMINOPHEN 325 MG/1
650 TABLET ORAL EVERY 6 HOURS PRN
Status: DISCONTINUED | OUTPATIENT
Start: 2021-01-01 | End: 2021-01-01 | Stop reason: HOSPADM

## 2021-01-01 RX ORDER — TRAMADOL HYDROCHLORIDE 50 MG/1
50 TABLET ORAL ONCE
Status: COMPLETED | OUTPATIENT
Start: 2021-01-01 | End: 2021-01-01

## 2021-01-01 RX ORDER — INSULIN GLARGINE 100 [IU]/ML
INJECTION, SOLUTION SUBCUTANEOUS
COMMUNITY

## 2021-01-01 RX ORDER — HYDROCORTISONE 25 MG/G
CREAM TOPICAL 2 TIMES DAILY
Qty: 28 G | Refills: 0 | Status: SHIPPED | OUTPATIENT
Start: 2021-01-01 | End: 2021-10-03

## 2021-01-01 RX ORDER — NICOTINE POLACRILEX 4 MG
15 LOZENGE BUCCAL
Status: DISCONTINUED | OUTPATIENT
Start: 2021-01-01 | End: 2021-01-01 | Stop reason: HOSPADM

## 2021-01-01 RX ORDER — OXYCODONE HYDROCHLORIDE 5 MG/1
10 TABLET ORAL ONCE
Status: COMPLETED | OUTPATIENT
Start: 2021-01-01 | End: 2021-01-01

## 2021-01-01 RX ORDER — BUDESONIDE 0.5 MG/2ML
0.5 INHALANT ORAL
Qty: 120 ML | Refills: 0 | Status: SHIPPED | OUTPATIENT
Start: 2021-01-01 | End: 2021-01-01 | Stop reason: HOSPADM

## 2021-01-01 RX ORDER — METOLAZONE 5 MG/1
10 TABLET ORAL DAILY
Status: DISCONTINUED | OUTPATIENT
Start: 2021-01-01 | End: 2021-01-01 | Stop reason: HOSPADM

## 2021-01-01 RX ORDER — DIPHENHYDRAMINE HYDROCHLORIDE AND LIDOCAINE HYDROCHLORIDE AND ALUMINUM HYDROXIDE AND MAGNESIUM HYDRO
5 KIT EVERY 6 HOURS
Status: DISCONTINUED | OUTPATIENT
Start: 2021-01-01 | End: 2021-01-01 | Stop reason: HOSPADM

## 2021-01-01 RX ORDER — MORPHINE SULFATE 2 MG/ML
1 INJECTION, SOLUTION INTRAMUSCULAR; INTRAVENOUS EVERY 4 HOURS PRN
Status: DISPENSED | OUTPATIENT
Start: 2021-01-01 | End: 2021-01-01

## 2021-01-01 RX ORDER — SODIUM CHLORIDE 0.9 % (FLUSH) 0.9 %
10 SYRINGE (ML) INJECTION EVERY 12 HOURS SCHEDULED
Status: DISCONTINUED | OUTPATIENT
Start: 2021-01-01 | End: 2021-01-01

## 2021-01-01 RX ORDER — DEXTROSE MONOHYDRATE 25 G/50ML
25 INJECTION, SOLUTION INTRAVENOUS ONCE
Status: COMPLETED | OUTPATIENT
Start: 2021-01-01 | End: 2021-01-01

## 2021-01-01 RX ORDER — ARFORMOTEROL TARTRATE 15 UG/2ML
15 SOLUTION RESPIRATORY (INHALATION)
Qty: 120 ML | Refills: 0 | Status: SHIPPED | OUTPATIENT
Start: 2021-01-01 | End: 2021-01-01 | Stop reason: HOSPADM

## 2021-01-01 RX ORDER — BETHANECHOL CHLORIDE 25 MG/1
25 TABLET ORAL 3 TIMES DAILY
COMMUNITY

## 2021-01-01 RX ORDER — ARFORMOTEROL TARTRATE 15 UG/2ML
15 SOLUTION RESPIRATORY (INHALATION)
Status: DISCONTINUED | OUTPATIENT
Start: 2021-01-01 | End: 2021-01-01 | Stop reason: HOSPADM

## 2021-01-01 RX ORDER — FUROSEMIDE 10 MG/ML
40 INJECTION INTRAMUSCULAR; INTRAVENOUS
Status: DISCONTINUED | OUTPATIENT
Start: 2021-01-01 | End: 2021-01-01

## 2021-01-01 RX ORDER — ARFORMOTEROL TARTRATE 15 UG/2ML
15 SOLUTION RESPIRATORY (INHALATION)
Status: DISCONTINUED | OUTPATIENT
Start: 2021-01-01 | End: 2021-01-01

## 2021-01-01 RX ORDER — ALBUTEROL SULFATE 2.5 MG/3ML
2.5 SOLUTION RESPIRATORY (INHALATION) ONCE AS NEEDED
Status: COMPLETED | OUTPATIENT
Start: 2021-01-01 | End: 2021-01-01

## 2021-01-01 RX ORDER — IPRATROPIUM BROMIDE AND ALBUTEROL SULFATE 2.5; .5 MG/3ML; MG/3ML
3 SOLUTION RESPIRATORY (INHALATION) ONCE
Status: COMPLETED | OUTPATIENT
Start: 2021-01-01 | End: 2021-01-01

## 2021-01-01 RX ORDER — AMOXICILLIN 250 MG
2 CAPSULE ORAL 2 TIMES DAILY
Status: DISCONTINUED | OUTPATIENT
Start: 2021-01-01 | End: 2021-01-01

## 2021-01-01 RX ORDER — BUPROPION HYDROCHLORIDE 100 MG/1
100 TABLET ORAL EVERY 12 HOURS SCHEDULED
Status: DISCONTINUED | OUTPATIENT
Start: 2021-01-01 | End: 2021-01-01

## 2021-01-01 RX ORDER — BISACODYL 5 MG/1
5 TABLET, DELAYED RELEASE ORAL DAILY PRN
Status: DISCONTINUED | OUTPATIENT
Start: 2021-01-01 | End: 2021-01-01

## 2021-01-01 RX ORDER — ALUMINA, MAGNESIA, AND SIMETHICONE 2400; 2400; 240 MG/30ML; MG/30ML; MG/30ML
15 SUSPENSION ORAL EVERY 6 HOURS PRN
Status: DISCONTINUED | OUTPATIENT
Start: 2021-01-01 | End: 2021-01-01 | Stop reason: HOSPADM

## 2021-01-01 RX ORDER — POTASSIUM CHLORIDE 750 MG/1
40 CAPSULE, EXTENDED RELEASE ORAL 2 TIMES DAILY WITH MEALS
Status: COMPLETED | OUTPATIENT
Start: 2021-01-01 | End: 2021-01-01

## 2021-01-01 RX ORDER — ONDANSETRON 2 MG/ML
4 INJECTION INTRAMUSCULAR; INTRAVENOUS EVERY 6 HOURS PRN
Status: DISCONTINUED | OUTPATIENT
Start: 2021-01-01 | End: 2021-01-01 | Stop reason: HOSPADM

## 2021-01-01 RX ORDER — NICOTINE POLACRILEX 4 MG
15 LOZENGE BUCCAL
Status: DISCONTINUED | OUTPATIENT
Start: 2021-01-01 | End: 2021-01-01 | Stop reason: SDUPTHER

## 2021-01-01 RX ORDER — LACTULOSE 10 G/15ML
30 SOLUTION ORAL 3 TIMES DAILY
Status: DISCONTINUED | OUTPATIENT
Start: 2021-01-01 | End: 2021-01-01

## 2021-01-01 RX ORDER — ETOMIDATE 2 MG/ML
0.3 INJECTION INTRAVENOUS ONCE
Status: COMPLETED | OUTPATIENT
Start: 2021-01-01 | End: 2021-01-01

## 2021-01-01 RX ORDER — LANCETS 28 GAUGE
1 EACH MISCELLANEOUS 3 TIMES DAILY PRN
COMMUNITY

## 2021-01-01 RX ORDER — FLUCONAZOLE 2 MG/ML
400 INJECTION, SOLUTION INTRAVENOUS DAILY
Status: CANCELLED | OUTPATIENT
Start: 2021-01-01

## 2021-01-01 RX ORDER — SPIRONOLACTONE 100 MG/1
100 TABLET, FILM COATED ORAL DAILY
Qty: 90 TABLET | Refills: 1
Start: 2021-01-01

## 2021-01-01 RX ORDER — DIPHENOXYLATE HYDROCHLORIDE AND ATROPINE SULFATE 2.5; .025 MG/1; MG/1
1 TABLET ORAL
Status: DISCONTINUED | OUTPATIENT
Start: 2021-01-01 | End: 2021-10-02 | Stop reason: HOSPADM

## 2021-01-01 RX ORDER — METOLAZONE 5 MG/1
10 TABLET ORAL ONCE
Status: DISCONTINUED | OUTPATIENT
Start: 2021-01-01 | End: 2021-01-01

## 2021-01-01 RX ORDER — CEPHALEXIN 500 MG/1
500 CAPSULE ORAL 3 TIMES DAILY
Qty: 21 CAPSULE | Refills: 0 | Status: SHIPPED | OUTPATIENT
Start: 2021-01-01

## 2021-01-01 RX ORDER — LORAZEPAM 2 MG/ML
2 INJECTION INTRAMUSCULAR
Status: DISCONTINUED | OUTPATIENT
Start: 2021-01-01 | End: 2021-10-02 | Stop reason: HOSPADM

## 2021-01-01 RX ORDER — DEXTROSE MONOHYDRATE 100 MG/ML
25 INJECTION, SOLUTION INTRAVENOUS
Status: DISCONTINUED | OUTPATIENT
Start: 2021-01-01 | End: 2021-01-01 | Stop reason: SDUPTHER

## 2021-01-01 RX ORDER — MAGNESIUM SULFATE 1 G/100ML
1 INJECTION INTRAVENOUS
Status: COMPLETED | OUTPATIENT
Start: 2021-01-01 | End: 2021-01-01

## 2021-01-01 RX ORDER — BLOOD-GLUCOSE METER
1 KIT MISCELLANEOUS 3 TIMES DAILY PRN
Status: ON HOLD | COMMUNITY
End: 2021-01-01

## 2021-01-01 RX ORDER — HYDROXYZINE HYDROCHLORIDE 25 MG/1
25 TABLET, FILM COATED ORAL 3 TIMES DAILY PRN
Status: DISCONTINUED | OUTPATIENT
Start: 2021-01-01 | End: 2021-01-01

## 2021-01-01 RX ORDER — ACETAMINOPHEN 500 MG
1000 TABLET ORAL EVERY 6 HOURS PRN
COMMUNITY

## 2021-01-01 RX ORDER — DEXAMETHASONE SODIUM PHOSPHATE 10 MG/ML
8 INJECTION INTRAMUSCULAR; INTRAVENOUS ONCE
Status: COMPLETED | OUTPATIENT
Start: 2021-01-01 | End: 2021-01-01

## 2021-01-01 RX ORDER — FAMOTIDINE 10 MG/ML
20 INJECTION, SOLUTION INTRAVENOUS EVERY 12 HOURS SCHEDULED
Status: DISCONTINUED | OUTPATIENT
Start: 2021-01-01 | End: 2021-01-01

## 2021-01-01 RX ORDER — NOREPINEPHRINE BIT/0.9 % NACL 16MG/250ML
.02-1 INFUSION BOTTLE (ML) INTRAVENOUS
Status: DISCONTINUED | OUTPATIENT
Start: 2021-01-01 | End: 2021-01-01

## 2021-01-01 RX ORDER — SODIUM POLYSTYRENE SULFONATE 15 G/60ML
30 SUSPENSION ORAL; RECTAL ONCE
Status: COMPLETED | OUTPATIENT
Start: 2021-01-01 | End: 2021-01-01

## 2021-01-01 RX ORDER — DEXTROSE MONOHYDRATE 25 G/50ML
INJECTION, SOLUTION INTRAVENOUS
Status: COMPLETED
Start: 2021-01-01 | End: 2021-01-01

## 2021-01-01 RX ORDER — SPIRONOLACTONE 25 MG/1
25 TABLET ORAL DAILY
Status: DISCONTINUED | OUTPATIENT
Start: 2021-01-01 | End: 2021-01-01 | Stop reason: HOSPADM

## 2021-01-01 RX ORDER — GUAIFENESIN 600 MG/1
600 TABLET, EXTENDED RELEASE ORAL EVERY 12 HOURS SCHEDULED
Status: DISCONTINUED | OUTPATIENT
Start: 2021-01-01 | End: 2021-01-01 | Stop reason: HOSPADM

## 2021-01-01 RX ORDER — BUMETANIDE 0.25 MG/ML
4 INJECTION INTRAMUSCULAR; INTRAVENOUS EVERY 8 HOURS
Status: COMPLETED | OUTPATIENT
Start: 2021-01-01 | End: 2021-01-01

## 2021-01-01 RX ORDER — DEXTROSE MONOHYDRATE 100 MG/ML
25 INJECTION, SOLUTION INTRAVENOUS
Status: DISCONTINUED | OUTPATIENT
Start: 2021-01-01 | End: 2021-01-01

## 2021-01-01 RX ORDER — PROMETHAZINE HYDROCHLORIDE 12.5 MG/1
6.25 SUPPOSITORY RECTAL EVERY 4 HOURS PRN
Status: DISCONTINUED | OUTPATIENT
Start: 2021-01-01 | End: 2021-10-02 | Stop reason: HOSPADM

## 2021-01-01 RX ORDER — OXYCODONE AND ACETAMINOPHEN 7.5; 325 MG/1; MG/1
1 TABLET ORAL EVERY 4 HOURS PRN
Qty: 42 TABLET | Refills: 0 | Status: SHIPPED | OUTPATIENT
Start: 2021-01-01 | End: 2021-01-01

## 2021-01-01 RX ORDER — KETOROLAC TROMETHAMINE 30 MG/ML
60 INJECTION, SOLUTION INTRAMUSCULAR; INTRAVENOUS ONCE
Status: COMPLETED | OUTPATIENT
Start: 2021-01-01 | End: 2021-01-01

## 2021-01-01 RX ORDER — ACETAMINOPHEN 160 MG/5ML
650 SOLUTION ORAL EVERY 4 HOURS PRN
Status: DISCONTINUED | OUTPATIENT
Start: 2021-01-01 | End: 2021-01-01

## 2021-01-01 RX ORDER — SODIUM CHLORIDE 0.9 % (FLUSH) 0.9 %
3 SYRINGE (ML) INJECTION EVERY 12 HOURS SCHEDULED
Status: DISCONTINUED | OUTPATIENT
Start: 2021-01-01 | End: 2021-01-01

## 2021-01-01 RX ORDER — CALCIUM CARBONATE 200(500)MG
2 TABLET,CHEWABLE ORAL 2 TIMES DAILY PRN
Status: DISCONTINUED | OUTPATIENT
Start: 2021-01-01 | End: 2021-01-01

## 2021-01-01 RX ORDER — LIDOCAINE HYDROCHLORIDE 20 MG/ML
15 SOLUTION OROPHARYNGEAL ONCE
Status: COMPLETED | OUTPATIENT
Start: 2021-01-01 | End: 2021-01-01

## 2021-01-01 RX ORDER — ROCURONIUM BROMIDE 10 MG/ML
500 INJECTION, SOLUTION INTRAVENOUS ONCE
Status: COMPLETED | OUTPATIENT
Start: 2021-01-01 | End: 2021-01-01

## 2021-01-01 RX ORDER — CYCLOBENZAPRINE HCL 10 MG
10 TABLET ORAL 3 TIMES DAILY PRN
Qty: 60 TABLET | Refills: 0 | Status: SHIPPED | OUTPATIENT
Start: 2021-01-01 | End: 2021-01-01

## 2021-01-01 RX ORDER — OXYCODONE HYDROCHLORIDE 5 MG/1
5 TABLET ORAL EVERY 6 HOURS PRN
Status: DISPENSED | OUTPATIENT
Start: 2021-01-01 | End: 2021-01-01

## 2021-01-01 RX ORDER — HYDROCORTISONE 25 MG/G
CREAM TOPICAL 2 TIMES DAILY
Status: DISCONTINUED | OUTPATIENT
Start: 2021-01-01 | End: 2021-01-01 | Stop reason: HOSPADM

## 2021-01-01 RX ORDER — FAMOTIDINE 20 MG/1
20 TABLET, FILM COATED ORAL
Qty: 60 TABLET | Refills: 0 | Status: SHIPPED | OUTPATIENT
Start: 2021-01-01 | End: 2021-10-03

## 2021-01-01 RX ORDER — LORAZEPAM 2 MG/1
2 TABLET ORAL
Status: DISCONTINUED | OUTPATIENT
Start: 2021-01-01 | End: 2021-10-02 | Stop reason: HOSPADM

## 2021-01-01 RX ORDER — SODIUM CHLORIDE FOR INHALATION 3 %
4 VIAL, NEBULIZER (ML) INHALATION ONCE AS NEEDED
Status: COMPLETED | OUTPATIENT
Start: 2021-01-01 | End: 2021-01-01

## 2021-01-01 RX ORDER — ACETAMINOPHEN 650 MG/1
650 SUPPOSITORY RECTAL EVERY 4 HOURS PRN
Status: DISCONTINUED | OUTPATIENT
Start: 2021-01-01 | End: 2021-10-02 | Stop reason: HOSPADM

## 2021-01-01 RX ORDER — DICLOFENAC SODIUM 75 MG/1
75 TABLET, DELAYED RELEASE ORAL 2 TIMES DAILY
Qty: 180 TABLET | Refills: 0 | Status: ON HOLD | OUTPATIENT
Start: 2021-01-01 | End: 2021-01-01

## 2021-01-01 RX ORDER — POTASSIUM CHLORIDE 750 MG/1
40 CAPSULE, EXTENDED RELEASE ORAL EVERY 4 HOURS
Status: COMPLETED | OUTPATIENT
Start: 2021-01-01 | End: 2021-01-01

## 2021-01-01 RX ORDER — METOPROLOL TARTRATE 5 MG/5ML
INJECTION INTRAVENOUS
Status: COMPLETED
Start: 2021-01-01 | End: 2021-01-01

## 2021-01-01 RX ORDER — POTASSIUM CHLORIDE 750 MG/1
40 CAPSULE, EXTENDED RELEASE ORAL ONCE
Status: COMPLETED | OUTPATIENT
Start: 2021-01-01 | End: 2021-01-01

## 2021-01-01 RX ORDER — ACETAMINOPHEN 325 MG/1
650 TABLET ORAL EVERY 4 HOURS PRN
Status: DISCONTINUED | OUTPATIENT
Start: 2021-01-01 | End: 2021-10-02 | Stop reason: HOSPADM

## 2021-01-01 RX ORDER — BUDESONIDE 0.5 MG/2ML
0.5 INHALANT ORAL
Status: DISCONTINUED | OUTPATIENT
Start: 2021-01-01 | End: 2021-01-01

## 2021-01-01 RX ORDER — DEXTROSE MONOHYDRATE 100 MG/ML
50-250 INJECTION, SOLUTION INTRAVENOUS
Status: DISCONTINUED | OUTPATIENT
Start: 2021-01-01 | End: 2021-01-01 | Stop reason: SDUPTHER

## 2021-01-01 RX ORDER — HEPARIN SODIUM 5000 [USP'U]/ML
5000 INJECTION, SOLUTION INTRAVENOUS; SUBCUTANEOUS EVERY 8 HOURS SCHEDULED
Status: DISCONTINUED | OUTPATIENT
Start: 2021-01-01 | End: 2021-01-01

## 2021-01-01 RX ORDER — NOREPINEPHRINE BIT/0.9 % NACL 8 MG/250ML
.02-.3 INFUSION BOTTLE (ML) INTRAVENOUS
Status: DISCONTINUED | OUTPATIENT
Start: 2021-01-01 | End: 2021-01-01

## 2021-01-01 RX ORDER — ATROPINE SULFATE 10 MG/ML
2 SOLUTION/ DROPS OPHTHALMIC 2 TIMES DAILY PRN
Status: DISCONTINUED | OUTPATIENT
Start: 2021-01-01 | End: 2021-10-02 | Stop reason: HOSPADM

## 2021-01-01 RX ORDER — ALUMINA, MAGNESIA, AND SIMETHICONE 2400; 2400; 240 MG/30ML; MG/30ML; MG/30ML
15 SUSPENSION ORAL ONCE
Status: COMPLETED | OUTPATIENT
Start: 2021-01-01 | End: 2021-01-01

## 2021-01-01 RX ORDER — BUDESONIDE 0.5 MG/2ML
0.5 INHALANT ORAL
Status: DISCONTINUED | OUTPATIENT
Start: 2021-01-01 | End: 2021-01-01 | Stop reason: HOSPADM

## 2021-01-01 RX ORDER — POTASSIUM CHLORIDE 750 MG/1
20 CAPSULE, EXTENDED RELEASE ORAL DAILY
Status: DISCONTINUED | OUTPATIENT
Start: 2021-01-01 | End: 2021-01-01

## 2021-01-01 RX ORDER — CEFEPIME 1 G/50ML
2 INJECTION, SOLUTION INTRAVENOUS ONCE
Status: COMPLETED | OUTPATIENT
Start: 2021-01-01 | End: 2021-01-01

## 2021-01-01 RX ORDER — POTASSIUM CHLORIDE 1.5 G/1.77G
40 POWDER, FOR SOLUTION ORAL ONCE
Status: COMPLETED | OUTPATIENT
Start: 2021-01-01 | End: 2021-01-01

## 2021-01-01 RX ORDER — DEXTROSE MONOHYDRATE 100 MG/ML
25 INJECTION, SOLUTION INTRAVENOUS
Status: DISCONTINUED | OUTPATIENT
Start: 2021-01-01 | End: 2021-01-01 | Stop reason: HOSPADM

## 2021-01-01 RX ORDER — ONDANSETRON 2 MG/ML
4 INJECTION INTRAMUSCULAR; INTRAVENOUS ONCE
Status: COMPLETED | OUTPATIENT
Start: 2021-01-01 | End: 2021-01-01

## 2021-01-01 RX ORDER — IPRATROPIUM BROMIDE AND ALBUTEROL SULFATE 2.5; .5 MG/3ML; MG/3ML
3 SOLUTION RESPIRATORY (INHALATION) EVERY 4 HOURS PRN
Status: DISCONTINUED | OUTPATIENT
Start: 2021-01-01 | End: 2021-10-02 | Stop reason: HOSPADM

## 2021-01-01 RX ORDER — HALOPERIDOL 5 MG/ML
1 INJECTION INTRAMUSCULAR EVERY 4 HOURS PRN
Status: DISCONTINUED | OUTPATIENT
Start: 2021-01-01 | End: 2021-10-02 | Stop reason: HOSPADM

## 2021-01-01 RX ORDER — BISACODYL 10 MG
10 SUPPOSITORY, RECTAL RECTAL DAILY PRN
Status: DISCONTINUED | OUTPATIENT
Start: 2021-01-01 | End: 2021-01-01

## 2021-01-01 RX ORDER — CEFEPIME 1 G/50ML
2 INJECTION, SOLUTION INTRAVENOUS ONCE
Status: DISCONTINUED | OUTPATIENT
Start: 2021-01-01 | End: 2021-01-01

## 2021-01-01 RX ORDER — FAMOTIDINE 20 MG/1
20 TABLET, FILM COATED ORAL NIGHTLY
Status: DISCONTINUED | OUTPATIENT
Start: 2021-01-01 | End: 2021-01-01

## 2021-01-01 RX ORDER — METHYLPREDNISOLONE SODIUM SUCCINATE 40 MG/ML
20 INJECTION, POWDER, LYOPHILIZED, FOR SOLUTION INTRAMUSCULAR; INTRAVENOUS 3 TIMES DAILY
Status: DISCONTINUED | OUTPATIENT
Start: 2021-01-01 | End: 2021-01-01

## 2021-01-01 RX ORDER — FAMOTIDINE 20 MG/1
20 TABLET, FILM COATED ORAL
Status: DISCONTINUED | OUTPATIENT
Start: 2021-01-01 | End: 2021-01-01 | Stop reason: HOSPADM

## 2021-01-01 RX ORDER — POTASSIUM CHLORIDE 750 MG/1
40 CAPSULE, EXTENDED RELEASE ORAL ONCE
Status: DISCONTINUED | OUTPATIENT
Start: 2021-01-01 | End: 2021-01-01

## 2021-01-01 RX ORDER — DEXTROSE MONOHYDRATE 25 G/50ML
50 INJECTION, SOLUTION INTRAVENOUS ONCE
Status: COMPLETED | OUTPATIENT
Start: 2021-01-01 | End: 2021-01-01

## 2021-01-01 RX ORDER — BUDESONIDE AND FORMOTEROL FUMARATE DIHYDRATE 80; 4.5 UG/1; UG/1
2 AEROSOL RESPIRATORY (INHALATION)
Qty: 6.9 G | Refills: 0 | Status: SHIPPED | OUTPATIENT
Start: 2021-01-01 | End: 2021-10-03

## 2021-01-01 RX ORDER — OXYCODONE AND ACETAMINOPHEN 7.5; 325 MG/1; MG/1
1 TABLET ORAL EVERY 4 HOURS PRN
COMMUNITY

## 2021-01-01 RX ORDER — DEXAMETHASONE 4 MG/1
6 TABLET ORAL DAILY
Status: DISCONTINUED | OUTPATIENT
Start: 2021-01-01 | End: 2021-01-01

## 2021-01-01 RX ADMIN — IPRATROPIUM BROMIDE AND ALBUTEROL SULFATE 3 ML: .5; 2.5 SOLUTION RESPIRATORY (INHALATION) at 01:14

## 2021-01-01 RX ADMIN — CEFEPIME 2 G: 1 INJECTION, SOLUTION INTRAVENOUS at 18:07

## 2021-01-01 RX ADMIN — INSULIN HUMAN 5.7 UNITS/HR: 1 INJECTION, SOLUTION INTRAVENOUS at 08:56

## 2021-01-01 RX ADMIN — BUMETANIDE 4 MG: 0.25 INJECTION INTRAMUSCULAR; INTRAVENOUS at 11:05

## 2021-01-01 RX ADMIN — ENOXAPARIN SODIUM 40 MG: 40 INJECTION SUBCUTANEOUS at 21:18

## 2021-01-01 RX ADMIN — DIPHENHYDRAMINE HYDROCHLORIDE AND LIDOCAINE HYDROCHLORIDE AND ALUMINUM HYDROXIDE AND MAGNESIUM HYDRO 5 ML: KIT at 20:13

## 2021-01-01 RX ADMIN — ACETAMINOPHEN 650 MG: 325 TABLET ORAL at 22:56

## 2021-01-01 RX ADMIN — BUDESONIDE 0.5 MG: 0.5 INHALANT ORAL at 18:59

## 2021-01-01 RX ADMIN — INSULIN HUMAN 10 UNITS: 100 INJECTION, SOLUTION PARENTERAL at 23:15

## 2021-01-01 RX ADMIN — SODIUM POLYSTYRENE SULFONATE 15 G: 15 SUSPENSION ORAL; RECTAL at 00:39

## 2021-01-01 RX ADMIN — OXYCODONE HYDROCHLORIDE 10 MG: 5 TABLET ORAL at 16:22

## 2021-01-01 RX ADMIN — ACETAMINOPHEN 650 MG: 325 TABLET ORAL at 20:32

## 2021-01-01 RX ADMIN — EPINEPHRINE 1 MG: 0.1 INJECTION, SOLUTION ENDOTRACHEAL; INTRACARDIAC; INTRAVENOUS at 09:28

## 2021-01-01 RX ADMIN — INSULIN DETEMIR 40 UNITS: 100 INJECTION, SOLUTION SUBCUTANEOUS at 08:20

## 2021-01-01 RX ADMIN — ARFORMOTEROL TARTRATE 15 MCG: 15 SOLUTION RESPIRATORY (INHALATION) at 21:45

## 2021-01-01 RX ADMIN — BETHANECHOL CHLORIDE 25 MG: 25 TABLET ORAL at 17:23

## 2021-01-01 RX ADMIN — INSULIN DETEMIR 35 UNITS: 100 INJECTION, SOLUTION SUBCUTANEOUS at 09:05

## 2021-01-01 RX ADMIN — ALBUTEROL SULFATE 2.5 MG: 2.5 SOLUTION RESPIRATORY (INHALATION) at 02:33

## 2021-01-01 RX ADMIN — BUDESONIDE 0.5 MG: 0.5 INHALANT ORAL at 08:50

## 2021-01-01 RX ADMIN — INSULIN DETEMIR 50 UNITS: 100 INJECTION, SOLUTION SUBCUTANEOUS at 08:24

## 2021-01-01 RX ADMIN — FAMOTIDINE 20 MG: 20 TABLET ORAL at 17:08

## 2021-01-01 RX ADMIN — BUDESONIDE 0.5 MG: 0.5 INHALANT ORAL at 07:46

## 2021-01-01 RX ADMIN — SODIUM CHLORIDE, PRESERVATIVE FREE 3 ML: 5 INJECTION INTRAVENOUS at 08:33

## 2021-01-01 RX ADMIN — HEPARIN SODIUM 5000 UNITS: 5000 INJECTION, SOLUTION INTRAVENOUS; SUBCUTANEOUS at 15:00

## 2021-01-01 RX ADMIN — ARFORMOTEROL TARTRATE 15 MCG: 15 SOLUTION RESPIRATORY (INHALATION) at 07:45

## 2021-01-01 RX ADMIN — IMMUNE GLOBULIN INFUSION (HUMAN) 30 G: 100 INJECTION, SOLUTION INTRAVENOUS; SUBCUTANEOUS at 10:15

## 2021-01-01 RX ADMIN — GUAIFENESIN 600 MG: 600 TABLET, EXTENDED RELEASE ORAL at 20:19

## 2021-01-01 RX ADMIN — LACTULOSE 30 G: 20 SOLUTION ORAL at 08:31

## 2021-01-01 RX ADMIN — DEXAMETHASONE 6 MG: 4 TABLET ORAL at 08:25

## 2021-01-01 RX ADMIN — INSULIN DETEMIR 40 UNITS: 100 INJECTION, SOLUTION SUBCUTANEOUS at 08:51

## 2021-01-01 RX ADMIN — CALCIUM GLUCONATE 1 G: 20 INJECTION, SOLUTION INTRAVENOUS at 00:39

## 2021-01-01 RX ADMIN — BUDESONIDE 0.5 MG: 0.5 INHALANT ORAL at 06:27

## 2021-01-01 RX ADMIN — IPRATROPIUM BROMIDE AND ALBUTEROL SULFATE 3 ML: .5; 2.5 SOLUTION RESPIRATORY (INHALATION) at 23:38

## 2021-01-01 RX ADMIN — TOBRAMYCIN 300 MG: 300 SOLUTION ORAL at 09:21

## 2021-01-01 RX ADMIN — ATORVASTATIN CALCIUM 40 MG: 40 TABLET, FILM COATED ORAL at 08:53

## 2021-01-01 RX ADMIN — AMPICILLIN SODIUM AND SULBACTAM SODIUM 3 G: 2; 1 INJECTION, POWDER, FOR SOLUTION INTRAMUSCULAR; INTRAVENOUS at 17:01

## 2021-01-01 RX ADMIN — IPRATROPIUM BROMIDE AND ALBUTEROL SULFATE 3 ML: .5; 2.5 SOLUTION RESPIRATORY (INHALATION) at 01:18

## 2021-01-01 RX ADMIN — LISINOPRIL 10 MG: 10 TABLET ORAL at 08:30

## 2021-01-01 RX ADMIN — INSULIN DETEMIR 35 UNITS: 100 INJECTION, SOLUTION SUBCUTANEOUS at 23:23

## 2021-01-01 RX ADMIN — FAMOTIDINE 20 MG: 20 TABLET ORAL at 16:54

## 2021-01-01 RX ADMIN — Medication 0.26 MCG/KG/MIN: at 15:15

## 2021-01-01 RX ADMIN — ALUMINUM HYDROXIDE, MAGNESIUM HYDROXIDE, AND DIMETHICONE 15 ML: 400; 400; 40 SUSPENSION ORAL at 15:37

## 2021-01-01 RX ADMIN — ALUMINUM HYDROXIDE, MAGNESIUM HYDROXIDE, AND DIMETHICONE 15 ML: 400; 400; 40 SUSPENSION ORAL at 17:29

## 2021-01-01 RX ADMIN — ATORVASTATIN CALCIUM 40 MG: 40 TABLET, FILM COATED ORAL at 08:30

## 2021-01-01 RX ADMIN — BUDESONIDE 0.5 MG: 0.5 INHALANT ORAL at 09:26

## 2021-01-01 RX ADMIN — FAMOTIDINE 20 MG: 20 TABLET ORAL at 20:31

## 2021-01-01 RX ADMIN — DEXAMETHASONE SODIUM PHOSPHATE 10 MG: 10 INJECTION INTRAMUSCULAR; INTRAVENOUS at 20:31

## 2021-01-01 RX ADMIN — GUAIFENESIN 600 MG: 600 TABLET, EXTENDED RELEASE ORAL at 20:35

## 2021-01-01 RX ADMIN — GUAIFENESIN 600 MG: 600 TABLET, EXTENDED RELEASE ORAL at 20:54

## 2021-01-01 RX ADMIN — METOLAZONE 10 MG: 5 TABLET ORAL at 08:17

## 2021-01-01 RX ADMIN — INSULIN HUMAN 14 UNITS: 100 INJECTION, SOLUTION PARENTERAL at 05:41

## 2021-01-01 RX ADMIN — GUAIFENESIN 600 MG: 600 TABLET, EXTENDED RELEASE ORAL at 09:49

## 2021-01-01 RX ADMIN — BUDESONIDE 0.5 MG: 0.5 INHALANT ORAL at 10:27

## 2021-01-01 RX ADMIN — HYDROCORTISONE 2.5%: 25 CREAM TOPICAL at 09:22

## 2021-01-01 RX ADMIN — FUROSEMIDE 40 MG: 10 INJECTION INTRAMUSCULAR; INTRAVENOUS at 17:51

## 2021-01-01 RX ADMIN — ARFORMOTEROL TARTRATE 15 MCG: 15 SOLUTION RESPIRATORY (INHALATION) at 21:25

## 2021-01-01 RX ADMIN — IPRATROPIUM BROMIDE AND ALBUTEROL SULFATE 3 ML: .5; 2.5 SOLUTION RESPIRATORY (INHALATION) at 06:27

## 2021-01-01 RX ADMIN — INSULIN DETEMIR 90 UNITS: 100 INJECTION, SOLUTION SUBCUTANEOUS at 09:03

## 2021-01-01 RX ADMIN — BUDESONIDE 0.5 MG: 0.5 INHALANT ORAL at 18:51

## 2021-01-01 RX ADMIN — INSULIN DETEMIR 40 UNITS: 100 INJECTION, SOLUTION SUBCUTANEOUS at 20:25

## 2021-01-01 RX ADMIN — SODIUM CHLORIDE, PRESERVATIVE FREE 10 ML: 5 INJECTION INTRAVENOUS at 21:02

## 2021-01-01 RX ADMIN — IPRATROPIUM BROMIDE AND ALBUTEROL SULFATE 3 ML: .5; 2.5 SOLUTION RESPIRATORY (INHALATION) at 00:56

## 2021-01-01 RX ADMIN — GUAIFENESIN 600 MG: 600 TABLET, EXTENDED RELEASE ORAL at 20:42

## 2021-01-01 RX ADMIN — FUROSEMIDE 40 MG: 10 INJECTION INTRAMUSCULAR; INTRAVENOUS at 10:11

## 2021-01-01 RX ADMIN — SODIUM CHLORIDE, PRESERVATIVE FREE 3 ML: 5 INJECTION INTRAVENOUS at 20:04

## 2021-01-01 RX ADMIN — METHYLPREDNISOLONE SODIUM SUCCINATE 40 MG: 40 INJECTION, POWDER, FOR SOLUTION INTRAMUSCULAR; INTRAVENOUS at 20:14

## 2021-01-01 RX ADMIN — INSULIN LISPRO 12 UNITS: 100 INJECTION, SOLUTION INTRAVENOUS; SUBCUTANEOUS at 17:54

## 2021-01-01 RX ADMIN — FAMOTIDINE 20 MG: 20 TABLET ORAL at 10:48

## 2021-01-01 RX ADMIN — INSULIN LISPRO 10 UNITS: 100 INJECTION, SOLUTION INTRAVENOUS; SUBCUTANEOUS at 17:39

## 2021-01-01 RX ADMIN — METHYLPREDNISOLONE SODIUM SUCCINATE 20 MG: 40 INJECTION, POWDER, FOR SOLUTION INTRAMUSCULAR; INTRAVENOUS at 09:11

## 2021-01-01 RX ADMIN — PROPOFOL 30 MCG/KG/MIN: 10 INJECTION, EMULSION INTRAVENOUS at 18:31

## 2021-01-01 RX ADMIN — CEFEPIME HYDROCHLORIDE 2 G: 2 INJECTION, POWDER, FOR SOLUTION INTRAVENOUS at 12:36

## 2021-01-01 RX ADMIN — LACTULOSE 30 G: 20 SOLUTION ORAL at 21:01

## 2021-01-01 RX ADMIN — ENOXAPARIN SODIUM 40 MG: 40 INJECTION SUBCUTANEOUS at 21:48

## 2021-01-01 RX ADMIN — HYDROCORTISONE 2.5%: 25 CREAM TOPICAL at 10:50

## 2021-01-01 RX ADMIN — LACTULOSE 30 G: 20 SOLUTION ORAL at 08:49

## 2021-01-01 RX ADMIN — BUDESONIDE 0.5 MG: 0.5 INHALANT ORAL at 18:43

## 2021-01-01 RX ADMIN — BUDESONIDE 0.5 MG: 0.5 INHALANT ORAL at 19:35

## 2021-01-01 RX ADMIN — SODIUM CHLORIDE, PRESERVATIVE FREE 10 ML: 5 INJECTION INTRAVENOUS at 21:49

## 2021-01-01 RX ADMIN — TOBRAMYCIN 300 MG: 300 SOLUTION ORAL at 20:55

## 2021-01-01 RX ADMIN — CEFEPIME HYDROCHLORIDE 2 G: 2 INJECTION, POWDER, FOR SOLUTION INTRAVENOUS at 20:35

## 2021-01-01 RX ADMIN — INSULIN HUMAN 14 UNITS: 100 INJECTION, SOLUTION PARENTERAL at 00:25

## 2021-01-01 RX ADMIN — SPIRONOLACTONE 25 MG: 25 TABLET ORAL at 09:13

## 2021-01-01 RX ADMIN — VASOPRESSIN 0.05 UNITS/MIN: 20 INJECTION INTRAVENOUS at 01:53

## 2021-01-01 RX ADMIN — LACTULOSE 30 G: 20 SOLUTION ORAL at 10:08

## 2021-01-01 RX ADMIN — DIPHENHYDRAMINE HYDROCHLORIDE AND LIDOCAINE HYDROCHLORIDE AND ALUMINUM HYDROXIDE AND MAGNESIUM HYDRO 5 ML: KIT at 14:29

## 2021-01-01 RX ADMIN — INSULIN DETEMIR 90 UNITS: 100 INJECTION, SOLUTION SUBCUTANEOUS at 20:29

## 2021-01-01 RX ADMIN — IPRATROPIUM BROMIDE AND ALBUTEROL SULFATE 3 ML: .5; 2.5 SOLUTION RESPIRATORY (INHALATION) at 06:15

## 2021-01-01 RX ADMIN — DOCUSATE SODIUM 50MG AND SENNOSIDES 8.6MG 2 TABLET: 8.6; 5 TABLET, FILM COATED ORAL at 20:32

## 2021-01-01 RX ADMIN — ATORVASTATIN CALCIUM 40 MG: 40 TABLET, FILM COATED ORAL at 08:52

## 2021-01-01 RX ADMIN — FUROSEMIDE 40 MG: 10 INJECTION INTRAMUSCULAR; INTRAVENOUS at 17:41

## 2021-01-01 RX ADMIN — GUAIFENESIN 600 MG: 600 TABLET, EXTENDED RELEASE ORAL at 08:17

## 2021-01-01 RX ADMIN — INSULIN HUMAN 76.9 UNITS/HR: 1 INJECTION, SOLUTION INTRAVENOUS at 00:44

## 2021-01-01 RX ADMIN — LISINOPRIL 10 MG: 10 TABLET ORAL at 09:01

## 2021-01-01 RX ADMIN — ARFORMOTEROL TARTRATE 15 MCG: 15 SOLUTION RESPIRATORY (INHALATION) at 19:35

## 2021-01-01 RX ADMIN — INSULIN LISPRO 3 UNITS: 100 INJECTION, SOLUTION INTRAVENOUS; SUBCUTANEOUS at 17:08

## 2021-01-01 RX ADMIN — GUAIFENESIN 600 MG: 600 TABLET, EXTENDED RELEASE ORAL at 20:25

## 2021-01-01 RX ADMIN — IPRATROPIUM BROMIDE AND ALBUTEROL SULFATE 3 ML: .5; 2.5 SOLUTION RESPIRATORY (INHALATION) at 06:29

## 2021-01-01 RX ADMIN — DEXAMETHASONE SODIUM PHOSPHATE 10 MG: 10 INJECTION INTRAMUSCULAR; INTRAVENOUS at 21:11

## 2021-01-01 RX ADMIN — Medication 0.3 MCG/KG/MIN: at 00:00

## 2021-01-01 RX ADMIN — INSULIN HUMAN 10 UNITS: 100 INJECTION, SOLUTION PARENTERAL at 08:10

## 2021-01-01 RX ADMIN — ARFORMOTEROL TARTRATE 15 MCG: 15 SOLUTION RESPIRATORY (INHALATION) at 06:33

## 2021-01-01 RX ADMIN — METHYLPREDNISOLONE SODIUM SUCCINATE 40 MG: 40 INJECTION, POWDER, FOR SOLUTION INTRAMUSCULAR; INTRAVENOUS at 21:01

## 2021-01-01 RX ADMIN — BUDESONIDE 0.5 MG: 0.5 INHALANT ORAL at 19:55

## 2021-01-01 RX ADMIN — IPRATROPIUM BROMIDE AND ALBUTEROL SULFATE 3 ML: .5; 2.5 SOLUTION RESPIRATORY (INHALATION) at 12:24

## 2021-01-01 RX ADMIN — INSULIN HUMAN 10 UNITS: 100 INJECTION, SOLUTION PARENTERAL at 15:36

## 2021-01-01 RX ADMIN — INSULIN DETEMIR 55 UNITS: 100 INJECTION, SOLUTION SUBCUTANEOUS at 09:07

## 2021-01-01 RX ADMIN — HYDROCORTISONE 2.5%: 25 CREAM TOPICAL at 22:20

## 2021-01-01 RX ADMIN — DIPHENHYDRAMINE HYDROCHLORIDE AND LIDOCAINE HYDROCHLORIDE AND ALUMINUM HYDROXIDE AND MAGNESIUM HYDRO 5 ML: KIT at 15:23

## 2021-01-01 RX ADMIN — PROPOFOL 15 MCG/KG/MIN: 10 INJECTION, EMULSION INTRAVENOUS at 05:01

## 2021-01-01 RX ADMIN — PROPOFOL 15 MCG/KG/MIN: 10 INJECTION, EMULSION INTRAVENOUS at 19:43

## 2021-01-01 RX ADMIN — BUPROPION HYDROCHLORIDE 100 MG: 100 TABLET, FILM COATED ORAL at 20:00

## 2021-01-01 RX ADMIN — BUDESONIDE 0.5 MG: 0.5 INHALANT ORAL at 11:07

## 2021-01-01 RX ADMIN — IPRATROPIUM BROMIDE AND ALBUTEROL SULFATE 3 ML: .5; 2.5 SOLUTION RESPIRATORY (INHALATION) at 19:19

## 2021-01-01 RX ADMIN — CEFEPIME HYDROCHLORIDE 2 G: 2 INJECTION, POWDER, FOR SOLUTION INTRAVENOUS at 20:23

## 2021-01-01 RX ADMIN — SODIUM CHLORIDE 1 G: 900 INJECTION INTRAVENOUS at 04:59

## 2021-01-01 RX ADMIN — ATORVASTATIN CALCIUM 40 MG: 40 TABLET, FILM COATED ORAL at 08:32

## 2021-01-01 RX ADMIN — IPRATROPIUM BROMIDE AND ALBUTEROL SULFATE 3 ML: .5; 2.5 SOLUTION RESPIRATORY (INHALATION) at 08:17

## 2021-01-01 RX ADMIN — GUAIFENESIN 600 MG: 600 TABLET, EXTENDED RELEASE ORAL at 08:53

## 2021-01-01 RX ADMIN — DEXAMETHASONE 6 MG: 4 TABLET ORAL at 08:35

## 2021-01-01 RX ADMIN — FAMOTIDINE 20 MG: 20 TABLET ORAL at 09:14

## 2021-01-01 RX ADMIN — LACTULOSE 30 G: 20 SOLUTION ORAL at 09:05

## 2021-01-01 RX ADMIN — GUAIFENESIN 600 MG: 600 TABLET, EXTENDED RELEASE ORAL at 09:01

## 2021-01-01 RX ADMIN — ALUMINUM HYDROXIDE, MAGNESIUM HYDROXIDE, AND DIMETHICONE 15 ML: 400; 400; 40 SUSPENSION ORAL at 12:47

## 2021-01-01 RX ADMIN — INSULIN LISPRO 8 UNITS: 100 INJECTION, SOLUTION INTRAVENOUS; SUBCUTANEOUS at 03:11

## 2021-01-01 RX ADMIN — SPIRONOLACTONE 25 MG: 25 TABLET ORAL at 09:47

## 2021-01-01 RX ADMIN — ACETAMINOPHEN 650 MG: 325 TABLET ORAL at 05:08

## 2021-01-01 RX ADMIN — INSULIN DETEMIR 50 UNITS: 100 INJECTION, SOLUTION SUBCUTANEOUS at 20:42

## 2021-01-01 RX ADMIN — FUROSEMIDE 40 MG: 10 INJECTION INTRAMUSCULAR; INTRAVENOUS at 17:19

## 2021-01-01 RX ADMIN — GUAIFENESIN 600 MG: 600 TABLET, EXTENDED RELEASE ORAL at 21:57

## 2021-01-01 RX ADMIN — FAMOTIDINE 20 MG: 20 TABLET ORAL at 08:57

## 2021-01-01 RX ADMIN — ALPRAZOLAM 0.25 MG: 0.25 TABLET ORAL at 12:18

## 2021-01-01 RX ADMIN — PROPOFOL 20 MCG/KG/MIN: 10 INJECTION, EMULSION INTRAVENOUS at 09:54

## 2021-01-01 RX ADMIN — BUPROPION HYDROCHLORIDE 100 MG: 100 TABLET, FILM COATED ORAL at 20:13

## 2021-01-01 RX ADMIN — INSULIN LISPRO 5 UNITS: 100 INJECTION, SOLUTION INTRAVENOUS; SUBCUTANEOUS at 17:48

## 2021-01-01 RX ADMIN — BUDESONIDE 0.5 MG: 0.5 INHALANT ORAL at 21:14

## 2021-01-01 RX ADMIN — MINERAL OIL, PETROLATUM: 425; 568 OINTMENT OPHTHALMIC at 10:43

## 2021-01-01 RX ADMIN — DIPHENHYDRAMINE HYDROCHLORIDE AND LIDOCAINE HYDROCHLORIDE AND ALUMINUM HYDROXIDE AND MAGNESIUM HYDRO 5 ML: KIT at 20:54

## 2021-01-01 RX ADMIN — DEXAMETHASONE SODIUM PHOSPHATE 10 MG: 10 INJECTION INTRAMUSCULAR; INTRAVENOUS at 08:32

## 2021-01-01 RX ADMIN — KETOROLAC TROMETHAMINE 60 MG: 30 INJECTION, SOLUTION INTRAMUSCULAR; INTRAVENOUS at 09:10

## 2021-01-01 RX ADMIN — BUDESONIDE 0.5 MG: 0.5 INHALANT ORAL at 22:09

## 2021-01-01 RX ADMIN — CEFEPIME 2 G: 1 INJECTION, SOLUTION INTRAVENOUS at 16:19

## 2021-01-01 RX ADMIN — INSULIN LISPRO 12 UNITS: 100 INJECTION, SOLUTION INTRAVENOUS; SUBCUTANEOUS at 08:19

## 2021-01-01 RX ADMIN — Medication 0.14 MCG/KG/MIN: at 03:27

## 2021-01-01 RX ADMIN — IPRATROPIUM BROMIDE AND ALBUTEROL SULFATE 3 ML: .5; 2.5 SOLUTION RESPIRATORY (INHALATION) at 20:27

## 2021-01-01 RX ADMIN — ASPIRIN 81 MG: 81 TABLET, CHEWABLE ORAL at 08:59

## 2021-01-01 RX ADMIN — FAMOTIDINE 20 MG: 10 INJECTION INTRAVENOUS at 20:32

## 2021-01-01 RX ADMIN — METOLAZONE 10 MG: 5 TABLET ORAL at 10:23

## 2021-01-01 RX ADMIN — METHYLPREDNISOLONE SODIUM SUCCINATE 20 MG: 40 INJECTION, POWDER, FOR SOLUTION INTRAMUSCULAR; INTRAVENOUS at 17:01

## 2021-01-01 RX ADMIN — ENOXAPARIN SODIUM 40 MG: 40 INJECTION SUBCUTANEOUS at 10:08

## 2021-01-01 RX ADMIN — SODIUM CHLORIDE, PRESERVATIVE FREE 10 ML: 5 INJECTION INTRAVENOUS at 20:22

## 2021-01-01 RX ADMIN — FUROSEMIDE 40 MG: 10 INJECTION INTRAMUSCULAR; INTRAVENOUS at 18:14

## 2021-01-01 RX ADMIN — BUMETANIDE 4 MG: 0.25 INJECTION INTRAMUSCULAR; INTRAVENOUS at 17:20

## 2021-01-01 RX ADMIN — SODIUM CHLORIDE, PRESERVATIVE FREE 3 ML: 5 INJECTION INTRAVENOUS at 08:51

## 2021-01-01 RX ADMIN — TOBRAMYCIN 300 MG: 300 SOLUTION ORAL at 21:25

## 2021-01-01 RX ADMIN — SODIUM CHLORIDE, PRESERVATIVE FREE 10 ML: 5 INJECTION INTRAVENOUS at 09:05

## 2021-01-01 RX ADMIN — LACTULOSE 30 G: 20 SOLUTION ORAL at 20:11

## 2021-01-01 RX ADMIN — FAMOTIDINE 20 MG: 20 TABLET ORAL at 17:48

## 2021-01-01 RX ADMIN — DEXAMETHASONE SODIUM PHOSPHATE 6 MG: 10 INJECTION INTRAMUSCULAR; INTRAVENOUS at 08:52

## 2021-01-01 RX ADMIN — GUAIFENESIN 600 MG: 600 TABLET, EXTENDED RELEASE ORAL at 20:11

## 2021-01-01 RX ADMIN — INSULIN LISPRO 10 UNITS: 100 INJECTION, SOLUTION INTRAVENOUS; SUBCUTANEOUS at 12:39

## 2021-01-01 RX ADMIN — INSULIN LISPRO 3 UNITS: 100 INJECTION, SOLUTION INTRAVENOUS; SUBCUTANEOUS at 08:58

## 2021-01-01 RX ADMIN — ARFORMOTEROL TARTRATE 15 MCG: 15 SOLUTION RESPIRATORY (INHALATION) at 20:15

## 2021-01-01 RX ADMIN — FUROSEMIDE 40 MG: 10 INJECTION INTRAMUSCULAR; INTRAVENOUS at 10:49

## 2021-01-01 RX ADMIN — DEXAMETHASONE SODIUM PHOSPHATE 10 MG: 10 INJECTION INTRAMUSCULAR; INTRAVENOUS at 08:52

## 2021-01-01 RX ADMIN — BUPROPION HYDROCHLORIDE 100 MG: 100 TABLET, FILM COATED ORAL at 09:10

## 2021-01-01 RX ADMIN — LISINOPRIL 10 MG: 10 TABLET ORAL at 08:53

## 2021-01-01 RX ADMIN — SODIUM BICARBONATE 50 MEQ: 84 INJECTION, SOLUTION INTRAVENOUS at 09:40

## 2021-01-01 RX ADMIN — INSULIN LISPRO 3 UNITS: 100 INJECTION, SOLUTION INTRAVENOUS; SUBCUTANEOUS at 12:21

## 2021-01-01 RX ADMIN — FUROSEMIDE 40 MG: 10 INJECTION INTRAMUSCULAR; INTRAVENOUS at 18:09

## 2021-01-01 RX ADMIN — ATORVASTATIN CALCIUM 40 MG: 40 TABLET, FILM COATED ORAL at 09:09

## 2021-01-01 RX ADMIN — METHYLPREDNISOLONE SODIUM SUCCINATE 20 MG: 40 INJECTION, POWDER, FOR SOLUTION INTRAMUSCULAR; INTRAVENOUS at 08:42

## 2021-01-01 RX ADMIN — ARFORMOTEROL TARTRATE 15 MCG: 15 SOLUTION RESPIRATORY (INHALATION) at 07:00

## 2021-01-01 RX ADMIN — INSULIN LISPRO 10 UNITS: 100 INJECTION, SOLUTION INTRAVENOUS; SUBCUTANEOUS at 08:26

## 2021-01-01 RX ADMIN — BUDESONIDE 0.5 MG: 0.5 INHALANT ORAL at 07:24

## 2021-01-01 RX ADMIN — DEXAMETHASONE SODIUM PHOSPHATE 10 MG: 10 INJECTION INTRAMUSCULAR; INTRAVENOUS at 08:50

## 2021-01-01 RX ADMIN — INSULIN LISPRO 8 UNITS: 100 INJECTION, SOLUTION INTRAVENOUS; SUBCUTANEOUS at 17:36

## 2021-01-01 RX ADMIN — METHYLPREDNISOLONE SODIUM SUCCINATE 40 MG: 40 INJECTION, POWDER, FOR SOLUTION INTRAMUSCULAR; INTRAVENOUS at 17:54

## 2021-01-01 RX ADMIN — MONTELUKAST 10 MG: 10 TABLET, FILM COATED ORAL at 20:36

## 2021-01-01 RX ADMIN — IPRATROPIUM BROMIDE AND ALBUTEROL SULFATE 3 ML: .5; 2.5 SOLUTION RESPIRATORY (INHALATION) at 00:42

## 2021-01-01 RX ADMIN — FUROSEMIDE 40 MG: 10 INJECTION INTRAMUSCULAR; INTRAVENOUS at 17:17

## 2021-01-01 RX ADMIN — FUROSEMIDE 40 MG: 10 INJECTION INTRAMUSCULAR; INTRAVENOUS at 18:12

## 2021-01-01 RX ADMIN — FAMOTIDINE 20 MG: 20 TABLET ORAL at 08:32

## 2021-01-01 RX ADMIN — ASPIRIN 81 MG CHEWABLE TABLET 81 MG: 81 TABLET CHEWABLE at 09:01

## 2021-01-01 RX ADMIN — GUAIFENESIN 600 MG: 600 TABLET, EXTENDED RELEASE ORAL at 08:32

## 2021-01-01 RX ADMIN — VANCOMYCIN HYDROCHLORIDE 1250 MG: 5 INJECTION, POWDER, LYOPHILIZED, FOR SOLUTION INTRAVENOUS at 11:56

## 2021-01-01 RX ADMIN — INSULIN DETEMIR 35 UNITS: 100 INJECTION, SOLUTION SUBCUTANEOUS at 20:55

## 2021-01-01 RX ADMIN — GUAIFENESIN 600 MG: 600 TABLET, EXTENDED RELEASE ORAL at 09:10

## 2021-01-01 RX ADMIN — ACETAMINOPHEN 650 MG: 325 TABLET ORAL at 16:21

## 2021-01-01 RX ADMIN — IPRATROPIUM BROMIDE AND ALBUTEROL SULFATE 3 ML: .5; 2.5 SOLUTION RESPIRATORY (INHALATION) at 01:17

## 2021-01-01 RX ADMIN — ACETAMINOPHEN 650 MG: 325 TABLET ORAL at 15:00

## 2021-01-01 RX ADMIN — ALUMINUM HYDROXIDE, MAGNESIUM HYDROXIDE, AND DIMETHICONE 15 ML: 400; 400; 40 SUSPENSION ORAL at 16:39

## 2021-01-01 RX ADMIN — GUAIFENESIN 600 MG: 600 TABLET, EXTENDED RELEASE ORAL at 20:38

## 2021-01-01 RX ADMIN — FUROSEMIDE 40 MG: 10 INJECTION INTRAMUSCULAR; INTRAVENOUS at 08:17

## 2021-01-01 RX ADMIN — FLUTICASONE PROPIONATE 2 SPRAY: 50 SPRAY, METERED NASAL at 09:00

## 2021-01-01 RX ADMIN — ATORVASTATIN CALCIUM 40 MG: 40 TABLET, FILM COATED ORAL at 09:10

## 2021-01-01 RX ADMIN — GUAIFENESIN 600 MG: 600 TABLET, EXTENDED RELEASE ORAL at 10:47

## 2021-01-01 RX ADMIN — BUDESONIDE 0.5 MG: 0.5 INHALANT ORAL at 07:01

## 2021-01-01 RX ADMIN — GUAIFENESIN 600 MG: 600 TABLET, EXTENDED RELEASE ORAL at 10:23

## 2021-01-01 RX ADMIN — BUPROPION HYDROCHLORIDE 100 MG: 100 TABLET, FILM COATED ORAL at 08:29

## 2021-01-01 RX ADMIN — INSULIN LISPRO 5 UNITS: 100 INJECTION, SOLUTION INTRAVENOUS; SUBCUTANEOUS at 17:54

## 2021-01-01 RX ADMIN — ATORVASTATIN CALCIUM 40 MG: 40 TABLET, FILM COATED ORAL at 08:17

## 2021-01-01 RX ADMIN — ACETAMINOPHEN 650 MG: 325 TABLET ORAL at 22:09

## 2021-01-01 RX ADMIN — AMPICILLIN SODIUM AND SULBACTAM SODIUM 3 G: 2; 1 INJECTION, POWDER, FOR SOLUTION INTRAMUSCULAR; INTRAVENOUS at 05:41

## 2021-01-01 RX ADMIN — TOBRAMYCIN 300 MG: 300 SOLUTION ORAL at 09:37

## 2021-01-01 RX ADMIN — LACTULOSE 30 G: 20 SOLUTION ORAL at 21:06

## 2021-01-01 RX ADMIN — ARFORMOTEROL TARTRATE 15 MCG: 15 SOLUTION RESPIRATORY (INHALATION) at 22:05

## 2021-01-01 RX ADMIN — FUROSEMIDE 40 MG: 10 INJECTION INTRAMUSCULAR; INTRAVENOUS at 08:57

## 2021-01-01 RX ADMIN — ATORVASTATIN CALCIUM 40 MG: 40 TABLET, FILM COATED ORAL at 08:58

## 2021-01-01 RX ADMIN — INSULIN LISPRO 24 UNITS: 100 INJECTION, SOLUTION INTRAVENOUS; SUBCUTANEOUS at 05:33

## 2021-01-01 RX ADMIN — ASPIRIN 81 MG: 81 TABLET, CHEWABLE ORAL at 08:49

## 2021-01-01 RX ADMIN — ARFORMOTEROL TARTRATE 15 MCG: 15 SOLUTION RESPIRATORY (INHALATION) at 07:24

## 2021-01-01 RX ADMIN — IPRATROPIUM BROMIDE AND ALBUTEROL SULFATE 3 ML: .5; 2.5 SOLUTION RESPIRATORY (INHALATION) at 19:14

## 2021-01-01 RX ADMIN — ENOXAPARIN SODIUM 40 MG: 100 INJECTION SUBCUTANEOUS at 05:47

## 2021-01-01 RX ADMIN — SPIRONOLACTONE 25 MG: 25 TABLET ORAL at 08:40

## 2021-01-01 RX ADMIN — FUROSEMIDE 40 MG: 10 INJECTION INTRAMUSCULAR; INTRAVENOUS at 17:36

## 2021-01-01 RX ADMIN — ARFORMOTEROL TARTRATE 15 MCG: 15 SOLUTION RESPIRATORY (INHALATION) at 07:01

## 2021-01-01 RX ADMIN — ARFORMOTEROL TARTRATE 15 MCG: 15 SOLUTION RESPIRATORY (INHALATION) at 06:30

## 2021-01-01 RX ADMIN — MONTELUKAST 10 MG: 10 TABLET, FILM COATED ORAL at 20:55

## 2021-01-01 RX ADMIN — Medication 0.3 MCG/KG/MIN: at 06:54

## 2021-01-01 RX ADMIN — BUDESONIDE 0.5 MG: 0.5 INHALANT ORAL at 20:48

## 2021-01-01 RX ADMIN — ENOXAPARIN SODIUM 40 MG: 40 INJECTION SUBCUTANEOUS at 09:10

## 2021-01-01 RX ADMIN — SPIRONOLACTONE 100 MG: 25 TABLET ORAL at 09:13

## 2021-01-01 RX ADMIN — BUDESONIDE 0.5 MG: 0.5 INHALANT ORAL at 21:25

## 2021-01-01 RX ADMIN — CEFEPIME HYDROCHLORIDE 2 G: 2 INJECTION, POWDER, FOR SOLUTION INTRAVENOUS at 04:15

## 2021-01-01 RX ADMIN — ARFORMOTEROL TARTRATE 15 MCG: 15 SOLUTION RESPIRATORY (INHALATION) at 07:39

## 2021-01-01 RX ADMIN — HYDROCORTISONE 2.5%: 25 CREAM TOPICAL at 20:55

## 2021-01-01 RX ADMIN — IOPAMIDOL 100 ML: 755 INJECTION, SOLUTION INTRAVENOUS at 16:15

## 2021-01-01 RX ADMIN — LACTULOSE 30 G: 20 SOLUTION ORAL at 08:17

## 2021-01-01 RX ADMIN — AMIODARONE HYDROCHLORIDE 0.5 MG/MIN: 1.8 INJECTION, SOLUTION INTRAVENOUS at 08:05

## 2021-01-01 RX ADMIN — POTASSIUM CHLORIDE 40 MEQ: 1.5 POWDER, FOR SOLUTION ORAL at 09:04

## 2021-01-01 RX ADMIN — HYDROCORTISONE 2.5%: 25 CREAM TOPICAL at 20:20

## 2021-01-01 RX ADMIN — BUDESONIDE 0.5 MG: 0.5 INHALANT ORAL at 18:55

## 2021-01-01 RX ADMIN — INSULIN LISPRO 5 UNITS: 100 INJECTION, SOLUTION INTRAVENOUS; SUBCUTANEOUS at 12:35

## 2021-01-01 RX ADMIN — FUROSEMIDE 40 MG: 10 INJECTION INTRAMUSCULAR; INTRAVENOUS at 09:22

## 2021-01-01 RX ADMIN — LACTULOSE 30 G: 20 SOLUTION ORAL at 09:14

## 2021-01-01 RX ADMIN — BUDESONIDE 0.5 MG: 0.5 INHALANT ORAL at 18:06

## 2021-01-01 RX ADMIN — INSULIN DETEMIR 35 UNITS: 100 INJECTION, SOLUTION SUBCUTANEOUS at 08:50

## 2021-01-01 RX ADMIN — FUROSEMIDE 40 MG: 10 INJECTION INTRAMUSCULAR; INTRAVENOUS at 17:39

## 2021-01-01 RX ADMIN — CEFEPIME HYDROCHLORIDE 2 G: 2 INJECTION, POWDER, FOR SOLUTION INTRAVENOUS at 12:47

## 2021-01-01 RX ADMIN — BUDESONIDE 0.5 MG: 0.5 INHALANT ORAL at 20:42

## 2021-01-01 RX ADMIN — FAMOTIDINE 20 MG: 20 TABLET, FILM COATED ORAL at 08:49

## 2021-01-01 RX ADMIN — DEXAMETHASONE 6 MG: 4 TABLET ORAL at 09:10

## 2021-01-01 RX ADMIN — INSULIN LISPRO 20 UNITS: 100 INJECTION, SOLUTION INTRAVENOUS; SUBCUTANEOUS at 01:29

## 2021-01-01 RX ADMIN — ATORVASTATIN CALCIUM 40 MG: 40 TABLET, FILM COATED ORAL at 09:49

## 2021-01-01 RX ADMIN — LISINOPRIL 10 MG: 10 TABLET ORAL at 08:52

## 2021-01-01 RX ADMIN — INSULIN DETEMIR 30 UNITS: 100 INJECTION, SOLUTION SUBCUTANEOUS at 13:04

## 2021-01-01 RX ADMIN — ASPIRIN 81 MG CHEWABLE TABLET 81 MG: 81 TABLET CHEWABLE at 08:53

## 2021-01-01 RX ADMIN — LACTULOSE 30 G: 20 SOLUTION ORAL at 20:47

## 2021-01-01 RX ADMIN — FAMOTIDINE 20 MG: 20 TABLET ORAL at 16:03

## 2021-01-01 RX ADMIN — ARFORMOTEROL TARTRATE 15 MCG: 15 SOLUTION RESPIRATORY (INHALATION) at 06:40

## 2021-01-01 RX ADMIN — ARFORMOTEROL TARTRATE 15 MCG: 15 SOLUTION RESPIRATORY (INHALATION) at 06:27

## 2021-01-01 RX ADMIN — IPRATROPIUM BROMIDE AND ALBUTEROL SULFATE 3 ML: .5; 2.5 SOLUTION RESPIRATORY (INHALATION) at 00:18

## 2021-01-01 RX ADMIN — INSULIN LISPRO 5 UNITS: 100 INJECTION, SOLUTION INTRAVENOUS; SUBCUTANEOUS at 08:53

## 2021-01-01 RX ADMIN — CEFEPIME HYDROCHLORIDE 2 G: 2 INJECTION, POWDER, FOR SOLUTION INTRAVENOUS at 20:17

## 2021-01-01 RX ADMIN — LIDOCAINE HYDROCHLORIDE 15 ML: 20 SOLUTION ORAL; TOPICAL at 17:39

## 2021-01-01 RX ADMIN — HYDROCORTISONE 2.5%: 25 CREAM TOPICAL at 09:50

## 2021-01-01 RX ADMIN — GUAIFENESIN 600 MG: 600 TABLET, EXTENDED RELEASE ORAL at 20:36

## 2021-01-01 RX ADMIN — FLUTICASONE PROPIONATE 2 SPRAY: 50 SPRAY, METERED NASAL at 09:15

## 2021-01-01 RX ADMIN — LACTULOSE 30 G: 20 SOLUTION ORAL at 15:15

## 2021-01-01 RX ADMIN — IPRATROPIUM BROMIDE AND ALBUTEROL SULFATE 3 ML: .5; 2.5 SOLUTION RESPIRATORY (INHALATION) at 21:06

## 2021-01-01 RX ADMIN — SODIUM CHLORIDE, PRESERVATIVE FREE 3 ML: 5 INJECTION INTRAVENOUS at 21:03

## 2021-01-01 RX ADMIN — FUROSEMIDE 40 MG: 10 INJECTION INTRAMUSCULAR; INTRAVENOUS at 17:08

## 2021-01-01 RX ADMIN — FUROSEMIDE 40 MG: 10 INJECTION INTRAMUSCULAR; INTRAVENOUS at 09:03

## 2021-01-01 RX ADMIN — IPRATROPIUM BROMIDE AND ALBUTEROL SULFATE 3 ML: .5; 2.5 SOLUTION RESPIRATORY (INHALATION) at 06:30

## 2021-01-01 RX ADMIN — FENTANYL CITRATE 25 MCG/HR: 50 INJECTION, SOLUTION INTRAMUSCULAR; INTRAVENOUS at 12:56

## 2021-01-01 RX ADMIN — INSULIN LISPRO 14 UNITS: 100 INJECTION, SOLUTION INTRAVENOUS; SUBCUTANEOUS at 17:19

## 2021-01-01 RX ADMIN — IOPAMIDOL 100 ML: 755 INJECTION, SOLUTION INTRAVENOUS at 17:04

## 2021-01-01 RX ADMIN — ARFORMOTEROL TARTRATE 15 MCG: 15 SOLUTION RESPIRATORY (INHALATION) at 18:06

## 2021-01-01 RX ADMIN — FAMOTIDINE 20 MG: 20 TABLET, FILM COATED ORAL at 18:07

## 2021-01-01 RX ADMIN — CEFEPIME HYDROCHLORIDE 2 G: 2 INJECTION, POWDER, FOR SOLUTION INTRAVENOUS at 13:05

## 2021-01-01 RX ADMIN — ACETAMINOPHEN 650 MG: 325 TABLET ORAL at 22:58

## 2021-01-01 RX ADMIN — FAMOTIDINE 20 MG: 20 TABLET, FILM COATED ORAL at 17:00

## 2021-01-01 RX ADMIN — INSULIN HUMAN 13.8 UNITS/HR: 1 INJECTION, SOLUTION INTRAVENOUS at 06:07

## 2021-01-01 RX ADMIN — PROPOFOL 15 MCG/KG/MIN: 10 INJECTION, EMULSION INTRAVENOUS at 17:00

## 2021-01-01 RX ADMIN — IPRATROPIUM BROMIDE AND ALBUTEROL SULFATE 3 ML: .5; 2.5 SOLUTION RESPIRATORY (INHALATION) at 12:15

## 2021-01-01 RX ADMIN — SODIUM CHLORIDE, PRESERVATIVE FREE 10 ML: 5 INJECTION INTRAVENOUS at 20:34

## 2021-01-01 RX ADMIN — INSULIN DETEMIR 60 UNITS: 100 INJECTION, SOLUTION SUBCUTANEOUS at 08:34

## 2021-01-01 RX ADMIN — CEFEPIME 2 G: 1 INJECTION, SOLUTION INTRAVENOUS at 13:26

## 2021-01-01 RX ADMIN — ASPIRIN 81 MG CHEWABLE TABLET 81 MG: 81 TABLET CHEWABLE at 09:10

## 2021-01-01 RX ADMIN — DEXAMETHASONE SODIUM PHOSPHATE 10 MG: 10 INJECTION INTRAMUSCULAR; INTRAVENOUS at 20:26

## 2021-01-01 RX ADMIN — BUDESONIDE 0.5 MG: 0.5 INHALANT ORAL at 18:17

## 2021-01-01 RX ADMIN — BETHANECHOL CHLORIDE 25 MG: 25 TABLET ORAL at 20:03

## 2021-01-01 RX ADMIN — HEPARIN SODIUM 5000 UNITS: 5000 INJECTION, SOLUTION INTRAVENOUS; SUBCUTANEOUS at 13:41

## 2021-01-01 RX ADMIN — HEPARIN SODIUM 5000 UNITS: 5000 INJECTION, SOLUTION INTRAVENOUS; SUBCUTANEOUS at 06:27

## 2021-01-01 RX ADMIN — BUDESONIDE 0.5 MG: 0.5 INHALANT ORAL at 22:05

## 2021-01-01 RX ADMIN — ACETAMINOPHEN 650 MG: 325 TABLET ORAL at 08:13

## 2021-01-01 RX ADMIN — ALUMINUM HYDROXIDE, MAGNESIUM HYDROXIDE, AND DIMETHICONE 15 ML: 400; 400; 40 SUSPENSION ORAL at 11:28

## 2021-01-01 RX ADMIN — DIPHENHYDRAMINE HYDROCHLORIDE AND LIDOCAINE HYDROCHLORIDE AND ALUMINUM HYDROXIDE AND MAGNESIUM HYDRO 5 ML: KIT at 10:11

## 2021-01-01 RX ADMIN — ARFORMOTEROL TARTRATE 15 MCG: 15 SOLUTION RESPIRATORY (INHALATION) at 07:15

## 2021-01-01 RX ADMIN — ENOXAPARIN SODIUM 40 MG: 100 INJECTION SUBCUTANEOUS at 05:41

## 2021-01-01 RX ADMIN — CEFTRIAXONE 1 G: 10 INJECTION, POWDER, FOR SOLUTION INTRAVENOUS at 09:04

## 2021-01-01 RX ADMIN — ARFORMOTEROL TARTRATE 15 MCG: 15 SOLUTION RESPIRATORY (INHALATION) at 19:48

## 2021-01-01 RX ADMIN — BETHANECHOL CHLORIDE 25 MG: 25 TABLET ORAL at 17:01

## 2021-01-01 RX ADMIN — SPIRONOLACTONE 25 MG: 25 TABLET ORAL at 10:07

## 2021-01-01 RX ADMIN — PROPOFOL: 10 INJECTION, EMULSION INTRAVENOUS at 09:54

## 2021-01-01 RX ADMIN — FAMOTIDINE 20 MG: 20 TABLET ORAL at 06:44

## 2021-01-01 RX ADMIN — INSULIN DETEMIR 50 UNITS: 100 INJECTION, SOLUTION SUBCUTANEOUS at 08:54

## 2021-01-01 RX ADMIN — BUDESONIDE 0.5 MG: 0.5 INHALANT ORAL at 19:04

## 2021-01-01 RX ADMIN — Medication 0.3 MCG/KG/MIN: at 21:53

## 2021-01-01 RX ADMIN — DIPHENHYDRAMINE HYDROCHLORIDE AND LIDOCAINE HYDROCHLORIDE AND ALUMINUM HYDROXIDE AND MAGNESIUM HYDRO 5 ML: KIT at 20:46

## 2021-01-01 RX ADMIN — FLUTICASONE PROPIONATE 2 SPRAY: 50 SPRAY, METERED NASAL at 09:51

## 2021-01-01 RX ADMIN — DOCUSATE SODIUM 50MG AND SENNOSIDES 8.6MG 2 TABLET: 8.6; 5 TABLET, FILM COATED ORAL at 21:02

## 2021-01-01 RX ADMIN — FAMOTIDINE 20 MG: 20 TABLET, FILM COATED ORAL at 17:49

## 2021-01-01 RX ADMIN — SODIUM BICARBONATE 150 MEQ: 84 INJECTION, SOLUTION INTRAVENOUS at 13:40

## 2021-01-01 RX ADMIN — BUDESONIDE 0.5 MG: 0.5 INHALANT ORAL at 19:11

## 2021-01-01 RX ADMIN — ARFORMOTEROL TARTRATE 15 MCG: 15 SOLUTION RESPIRATORY (INHALATION) at 18:51

## 2021-01-01 RX ADMIN — BUPROPION HYDROCHLORIDE 100 MG: 100 TABLET, FILM COATED ORAL at 09:03

## 2021-01-01 RX ADMIN — ARFORMOTEROL TARTRATE 15 MCG: 15 SOLUTION RESPIRATORY (INHALATION) at 09:14

## 2021-01-01 RX ADMIN — SODIUM CHLORIDE, PRESERVATIVE FREE 10 ML: 5 INJECTION INTRAVENOUS at 20:14

## 2021-01-01 RX ADMIN — TOBRAMYCIN 300 MG: 300 SOLUTION ORAL at 20:42

## 2021-01-01 RX ADMIN — ENOXAPARIN SODIUM 40 MG: 40 INJECTION SUBCUTANEOUS at 08:53

## 2021-01-01 RX ADMIN — METHYLPREDNISOLONE SODIUM SUCCINATE 20 MG: 40 INJECTION, POWDER, FOR SOLUTION INTRAMUSCULAR; INTRAVENOUS at 20:00

## 2021-01-01 RX ADMIN — ARFORMOTEROL TARTRATE 15 MCG: 15 SOLUTION RESPIRATORY (INHALATION) at 18:55

## 2021-01-01 RX ADMIN — FUROSEMIDE 40 MG: 10 INJECTION INTRAMUSCULAR; INTRAVENOUS at 17:55

## 2021-01-01 RX ADMIN — SODIUM CHLORIDE, PRESERVATIVE FREE 10 ML: 5 INJECTION INTRAVENOUS at 20:56

## 2021-01-01 RX ADMIN — HYDROCODONE POLISTIREX AND CHLORPHENIRAMINE POLISTIREX 2.5 ML: 10; 8 SUSPENSION, EXTENDED RELEASE ORAL at 17:32

## 2021-01-01 RX ADMIN — DIPHENHYDRAMINE HYDROCHLORIDE AND LIDOCAINE HYDROCHLORIDE AND ALUMINUM HYDROXIDE AND MAGNESIUM HYDRO 5 ML: KIT at 08:49

## 2021-01-01 RX ADMIN — DIPHENHYDRAMINE HYDROCHLORIDE AND LIDOCAINE HYDROCHLORIDE AND ALUMINUM HYDROXIDE AND MAGNESIUM HYDRO 5 ML: KIT at 15:09

## 2021-01-01 RX ADMIN — CEFEPIME HYDROCHLORIDE 2 G: 2 INJECTION, POWDER, FOR SOLUTION INTRAVENOUS at 05:08

## 2021-01-01 RX ADMIN — DIPHENHYDRAMINE HYDROCHLORIDE AND LIDOCAINE HYDROCHLORIDE AND ALUMINUM HYDROXIDE AND MAGNESIUM HYDRO 5 ML: KIT at 22:19

## 2021-01-01 RX ADMIN — DOCUSATE SODIUM 50MG AND SENNOSIDES 8.6MG 2 TABLET: 8.6; 5 TABLET, FILM COATED ORAL at 08:59

## 2021-01-01 RX ADMIN — INSULIN LISPRO 4 UNITS: 100 INJECTION, SOLUTION INTRAVENOUS; SUBCUTANEOUS at 13:47

## 2021-01-01 RX ADMIN — INSULIN LISPRO 3 UNITS: 100 INJECTION, SOLUTION INTRAVENOUS; SUBCUTANEOUS at 12:14

## 2021-01-01 RX ADMIN — ATORVASTATIN CALCIUM 40 MG: 40 TABLET, FILM COATED ORAL at 08:59

## 2021-01-01 RX ADMIN — GUAIFENESIN 600 MG: 600 TABLET, EXTENDED RELEASE ORAL at 20:32

## 2021-01-01 RX ADMIN — DEXAMETHASONE 6 MG: 4 TABLET ORAL at 09:35

## 2021-01-01 RX ADMIN — ENOXAPARIN SODIUM 40 MG: 100 INJECTION SUBCUTANEOUS at 06:02

## 2021-01-01 RX ADMIN — ARFORMOTEROL TARTRATE 15 MCG: 15 SOLUTION RESPIRATORY (INHALATION) at 09:21

## 2021-01-01 RX ADMIN — MONTELUKAST 10 MG: 10 TABLET, FILM COATED ORAL at 20:16

## 2021-01-01 RX ADMIN — INSULIN LISPRO 5 UNITS: 100 INJECTION, SOLUTION INTRAVENOUS; SUBCUTANEOUS at 17:38

## 2021-01-01 RX ADMIN — ACETAMINOPHEN 650 MG: 325 TABLET ORAL at 14:15

## 2021-01-01 RX ADMIN — SODIUM CHLORIDE, PRESERVATIVE FREE 3 ML: 5 INJECTION INTRAVENOUS at 20:32

## 2021-01-01 RX ADMIN — FAMOTIDINE 20 MG: 20 TABLET ORAL at 06:49

## 2021-01-01 RX ADMIN — BETHANECHOL CHLORIDE 25 MG: 25 TABLET ORAL at 20:32

## 2021-01-01 RX ADMIN — DEXAMETHASONE SODIUM PHOSPHATE 10 MG: 10 INJECTION INTRAMUSCULAR; INTRAVENOUS at 20:24

## 2021-01-01 RX ADMIN — DOCUSATE SODIUM 50MG AND SENNOSIDES 8.6MG 2 TABLET: 8.6; 5 TABLET, FILM COATED ORAL at 09:10

## 2021-01-01 RX ADMIN — ARFORMOTEROL TARTRATE 15 MCG: 15 SOLUTION RESPIRATORY (INHALATION) at 20:55

## 2021-01-01 RX ADMIN — BUDESONIDE 0.5 MG: 0.5 INHALANT ORAL at 21:45

## 2021-01-01 RX ADMIN — FUROSEMIDE 40 MG: 10 INJECTION INTRAMUSCULAR; INTRAVENOUS at 08:52

## 2021-01-01 RX ADMIN — FUROSEMIDE 40 MG: 10 INJECTION INTRAMUSCULAR; INTRAVENOUS at 08:13

## 2021-01-01 RX ADMIN — HYDROCORTISONE 2.5%: 25 CREAM TOPICAL at 09:23

## 2021-01-01 RX ADMIN — DIPHENHYDRAMINE HYDROCHLORIDE AND LIDOCAINE HYDROCHLORIDE AND ALUMINUM HYDROXIDE AND MAGNESIUM HYDRO 5 ML: KIT at 09:10

## 2021-01-01 RX ADMIN — INSULIN LISPRO 5 UNITS: 100 INJECTION, SOLUTION INTRAVENOUS; SUBCUTANEOUS at 10:49

## 2021-01-01 RX ADMIN — INSULIN LISPRO 10 UNITS: 100 INJECTION, SOLUTION INTRAVENOUS; SUBCUTANEOUS at 00:35

## 2021-01-01 RX ADMIN — ONDANSETRON 4 MG: 2 INJECTION INTRAMUSCULAR; INTRAVENOUS at 06:29

## 2021-01-01 RX ADMIN — SODIUM BICARBONATE 150 MEQ: 84 INJECTION, SOLUTION INTRAVENOUS at 20:51

## 2021-01-01 RX ADMIN — SPIRONOLACTONE 25 MG: 25 TABLET ORAL at 08:58

## 2021-01-01 RX ADMIN — INSULIN DETEMIR 90 UNITS: 100 INJECTION, SOLUTION SUBCUTANEOUS at 21:44

## 2021-01-01 RX ADMIN — INSULIN LISPRO 8 UNITS: 100 INJECTION, SOLUTION INTRAVENOUS; SUBCUTANEOUS at 17:31

## 2021-01-01 RX ADMIN — Medication 3 ML: at 20:56

## 2021-01-01 RX ADMIN — ATORVASTATIN CALCIUM 40 MG: 40 TABLET, FILM COATED ORAL at 08:21

## 2021-01-01 RX ADMIN — FAMOTIDINE 20 MG: 20 TABLET ORAL at 09:22

## 2021-01-01 RX ADMIN — DEXAMETHASONE 6 MG: 4 TABLET ORAL at 09:01

## 2021-01-01 RX ADMIN — INSULIN LISPRO 10 UNITS: 100 INJECTION, SOLUTION INTRAVENOUS; SUBCUTANEOUS at 17:17

## 2021-01-01 RX ADMIN — BUDESONIDE 0.5 MG: 0.5 INHALANT ORAL at 06:28

## 2021-01-01 RX ADMIN — HYDROCORTISONE 2.5%: 25 CREAM TOPICAL at 10:11

## 2021-01-01 RX ADMIN — DIPHENHYDRAMINE HYDROCHLORIDE AND LIDOCAINE HYDROCHLORIDE AND ALUMINUM HYDROXIDE AND MAGNESIUM HYDRO 5 ML: KIT at 20:18

## 2021-01-01 RX ADMIN — BUDESONIDE 0.5 MG: 0.5 INHALANT ORAL at 20:55

## 2021-01-01 RX ADMIN — ASPIRIN 81 MG CHEWABLE TABLET 81 MG: 81 TABLET CHEWABLE at 08:25

## 2021-01-01 RX ADMIN — ACETAMINOPHEN 650 MG: 325 TABLET ORAL at 14:50

## 2021-01-01 RX ADMIN — CEFEPIME HYDROCHLORIDE 2 G: 2 INJECTION, POWDER, FOR SOLUTION INTRAVENOUS at 12:29

## 2021-01-01 RX ADMIN — SODIUM BICARBONATE 75 MEQ: 84 INJECTION, SOLUTION INTRAVENOUS at 01:00

## 2021-01-01 RX ADMIN — CEFEPIME HYDROCHLORIDE 2 G: 2 INJECTION, POWDER, FOR SOLUTION INTRAVENOUS at 12:31

## 2021-01-01 RX ADMIN — BETHANECHOL CHLORIDE 25 MG: 25 TABLET ORAL at 21:02

## 2021-01-01 RX ADMIN — INSULIN HUMAN 10 UNITS: 100 INJECTION, SOLUTION PARENTERAL at 18:04

## 2021-01-01 RX ADMIN — GUAIFENESIN 600 MG: 600 TABLET, EXTENDED RELEASE ORAL at 20:26

## 2021-01-01 RX ADMIN — ENOXAPARIN SODIUM 40 MG: 40 INJECTION SUBCUTANEOUS at 09:37

## 2021-01-01 RX ADMIN — CEFEPIME HYDROCHLORIDE 2 G: 2 INJECTION, POWDER, FOR SOLUTION INTRAVENOUS at 20:08

## 2021-01-01 RX ADMIN — INSULIN LISPRO 8 UNITS: 100 INJECTION, SOLUTION INTRAVENOUS; SUBCUTANEOUS at 09:14

## 2021-01-01 RX ADMIN — POTASSIUM CHLORIDE 40 MEQ: 10 CAPSULE, COATED, EXTENDED RELEASE ORAL at 09:48

## 2021-01-01 RX ADMIN — GUAIFENESIN 600 MG: 600 TABLET, EXTENDED RELEASE ORAL at 21:26

## 2021-01-01 RX ADMIN — ATORVASTATIN CALCIUM 40 MG: 40 TABLET, FILM COATED ORAL at 09:14

## 2021-01-01 RX ADMIN — PROPOFOL 50 MCG/KG/MIN: 10 INJECTION, EMULSION INTRAVENOUS at 18:07

## 2021-01-01 RX ADMIN — HYDROCORTISONE 2.5%: 25 CREAM TOPICAL at 09:05

## 2021-01-01 RX ADMIN — GUAIFENESIN 600 MG: 600 TABLET, EXTENDED RELEASE ORAL at 10:48

## 2021-01-01 RX ADMIN — VANCOMYCIN HYDROCHLORIDE 1250 MG: 5 INJECTION, POWDER, LYOPHILIZED, FOR SOLUTION INTRAVENOUS at 21:10

## 2021-01-01 RX ADMIN — BUDESONIDE 0.5 MG: 0.5 INHALANT ORAL at 19:14

## 2021-01-01 RX ADMIN — VASOPRESSIN 0.05 UNITS/MIN: 20 INJECTION INTRAVENOUS at 10:55

## 2021-01-01 RX ADMIN — FAMOTIDINE 20 MG: 20 TABLET, FILM COATED ORAL at 08:29

## 2021-01-01 RX ADMIN — INSULIN DETEMIR 40 UNITS: 100 INJECTION, SOLUTION SUBCUTANEOUS at 21:03

## 2021-01-01 RX ADMIN — SODIUM CHLORIDE, PRESERVATIVE FREE 10 ML: 5 INJECTION INTRAVENOUS at 08:29

## 2021-01-01 RX ADMIN — BUDESONIDE 0.5 MG: 0.5 INHALANT ORAL at 19:33

## 2021-01-01 RX ADMIN — BUDESONIDE 0.5 MG: 0.5 INHALANT ORAL at 07:18

## 2021-01-01 RX ADMIN — FUROSEMIDE 40 MG: 10 INJECTION INTRAMUSCULAR; INTRAVENOUS at 17:24

## 2021-01-01 RX ADMIN — AMPICILLIN SODIUM AND SULBACTAM SODIUM 3 G: 2; 1 INJECTION, POWDER, FOR SOLUTION INTRAMUSCULAR; INTRAVENOUS at 05:45

## 2021-01-01 RX ADMIN — ENOXAPARIN SODIUM 40 MG: 40 INJECTION SUBCUTANEOUS at 09:12

## 2021-01-01 RX ADMIN — INSULIN LISPRO 5 UNITS: 100 INJECTION, SOLUTION INTRAVENOUS; SUBCUTANEOUS at 18:44

## 2021-01-01 RX ADMIN — INSULIN LISPRO 12 UNITS: 100 INJECTION, SOLUTION INTRAVENOUS; SUBCUTANEOUS at 12:46

## 2021-01-01 RX ADMIN — DIPHENHYDRAMINE HYDROCHLORIDE AND LIDOCAINE HYDROCHLORIDE AND ALUMINUM HYDROXIDE AND MAGNESIUM HYDRO 5 ML: KIT at 02:33

## 2021-01-01 RX ADMIN — INSULIN LISPRO 6 UNITS: 100 INJECTION, SOLUTION INTRAVENOUS; SUBCUTANEOUS at 09:02

## 2021-01-01 RX ADMIN — BUDESONIDE 0.5 MG: 0.5 INHALANT ORAL at 09:10

## 2021-01-01 RX ADMIN — TOBRAMYCIN 300 MG: 300 SOLUTION ORAL at 20:15

## 2021-01-01 RX ADMIN — INSULIN DETEMIR 15 UNITS: 100 INJECTION, SOLUTION SUBCUTANEOUS at 20:27

## 2021-01-01 RX ADMIN — BUDESONIDE 0.5 MG: 0.5 INHALANT ORAL at 19:17

## 2021-01-01 RX ADMIN — ARFORMOTEROL TARTRATE 15 MCG: 15 SOLUTION RESPIRATORY (INHALATION) at 06:52

## 2021-01-01 RX ADMIN — GUAIFENESIN 600 MG: 600 TABLET, EXTENDED RELEASE ORAL at 08:18

## 2021-01-01 RX ADMIN — INSULIN HUMAN 10.4 UNITS/HR: 1 INJECTION, SOLUTION INTRAVENOUS at 04:16

## 2021-01-01 RX ADMIN — BUDESONIDE 0.5 MG: 0.5 INHALANT ORAL at 06:30

## 2021-01-01 RX ADMIN — FAMOTIDINE 20 MG: 20 TABLET, FILM COATED ORAL at 08:59

## 2021-01-01 RX ADMIN — SODIUM CHLORIDE, PRESERVATIVE FREE 10 ML: 5 INJECTION INTRAVENOUS at 09:15

## 2021-01-01 RX ADMIN — INSULIN LISPRO 3 UNITS: 100 INJECTION, SOLUTION INTRAVENOUS; SUBCUTANEOUS at 17:48

## 2021-01-01 RX ADMIN — IPRATROPIUM BROMIDE AND ALBUTEROL SULFATE 3 ML: .5; 2.5 SOLUTION RESPIRATORY (INHALATION) at 13:44

## 2021-01-01 RX ADMIN — METHYLPREDNISOLONE SODIUM SUCCINATE 20 MG: 40 INJECTION, POWDER, FOR SOLUTION INTRAMUSCULAR; INTRAVENOUS at 15:47

## 2021-01-01 RX ADMIN — ARFORMOTEROL TARTRATE 15 MCG: 15 SOLUTION RESPIRATORY (INHALATION) at 11:07

## 2021-01-01 RX ADMIN — CEFEPIME HYDROCHLORIDE 2 G: 2 INJECTION, POWDER, FOR SOLUTION INTRAVENOUS at 21:11

## 2021-01-01 RX ADMIN — INSULIN DETEMIR 40 UNITS: 100 INJECTION, SOLUTION SUBCUTANEOUS at 09:06

## 2021-01-01 RX ADMIN — FAMOTIDINE 20 MG: 20 TABLET ORAL at 06:38

## 2021-01-01 RX ADMIN — CEFEPIME HYDROCHLORIDE 2 G: 2 INJECTION, POWDER, FOR SOLUTION INTRAVENOUS at 05:04

## 2021-01-01 RX ADMIN — GUAIFENESIN 600 MG: 600 TABLET, EXTENDED RELEASE ORAL at 20:47

## 2021-01-01 RX ADMIN — ONDANSETRON 4 MG: 2 INJECTION INTRAMUSCULAR; INTRAVENOUS at 22:01

## 2021-01-01 RX ADMIN — SODIUM CHLORIDE, PRESERVATIVE FREE 3 ML: 5 INJECTION INTRAVENOUS at 20:17

## 2021-01-01 RX ADMIN — INSULIN DETEMIR 85 UNITS: 100 INJECTION, SOLUTION SUBCUTANEOUS at 21:17

## 2021-01-01 RX ADMIN — INSULIN DETEMIR 35 UNITS: 100 INJECTION, SOLUTION SUBCUTANEOUS at 08:16

## 2021-01-01 RX ADMIN — INSULIN LISPRO 10 UNITS: 100 INJECTION, SOLUTION INTRAVENOUS; SUBCUTANEOUS at 17:50

## 2021-01-01 RX ADMIN — BUDESONIDE AND FORMOTEROL FUMARATE DIHYDRATE 2 PUFF: 80; 4.5 AEROSOL RESPIRATORY (INHALATION) at 08:32

## 2021-01-01 RX ADMIN — INSULIN HUMAN 87.7 UNITS/HR: 1 INJECTION, SOLUTION INTRAVENOUS at 22:38

## 2021-01-01 RX ADMIN — ARFORMOTEROL TARTRATE 15 MCG: 15 SOLUTION RESPIRATORY (INHALATION) at 19:54

## 2021-01-01 RX ADMIN — INSULIN DETEMIR 85 UNITS: 100 INJECTION, SOLUTION SUBCUTANEOUS at 20:30

## 2021-01-01 RX ADMIN — PROPOFOL 17.9 MCG/KG/MIN: 10 INJECTION, EMULSION INTRAVENOUS at 21:07

## 2021-01-01 RX ADMIN — PROPOFOL 15 MCG/KG/MIN: 10 INJECTION, EMULSION INTRAVENOUS at 01:29

## 2021-01-01 RX ADMIN — GUAIFENESIN 600 MG: 600 TABLET, EXTENDED RELEASE ORAL at 20:37

## 2021-01-01 RX ADMIN — CEFEPIME HYDROCHLORIDE 2 G: 2 INJECTION, POWDER, FOR SOLUTION INTRAVENOUS at 12:38

## 2021-01-01 RX ADMIN — BUPROPION HYDROCHLORIDE 100 MG: 100 TABLET, FILM COATED ORAL at 20:36

## 2021-01-01 RX ADMIN — MAGNESIUM SULFATE HEPTAHYDRATE 1 G: 1 INJECTION, SOLUTION INTRAVENOUS at 21:09

## 2021-01-01 RX ADMIN — BETHANECHOL CHLORIDE 25 MG: 25 TABLET ORAL at 18:06

## 2021-01-01 RX ADMIN — ARFORMOTEROL TARTRATE 15 MCG: 15 SOLUTION RESPIRATORY (INHALATION) at 06:15

## 2021-01-01 RX ADMIN — BETHANECHOL CHLORIDE 25 MG: 25 TABLET ORAL at 09:03

## 2021-01-01 RX ADMIN — FUROSEMIDE 40 MG: 10 INJECTION INTRAMUSCULAR; INTRAVENOUS at 08:31

## 2021-01-01 RX ADMIN — FUROSEMIDE 40 MG: 10 INJECTION, SOLUTION INTRAMUSCULAR; INTRAVENOUS at 18:06

## 2021-01-01 RX ADMIN — ENOXAPARIN SODIUM 40 MG: 40 INJECTION SUBCUTANEOUS at 21:11

## 2021-01-01 RX ADMIN — HYDROCORTISONE 2.5% 1 APPLICATION: 25 CREAM TOPICAL at 20:54

## 2021-01-01 RX ADMIN — PROPOFOL 30 MCG/KG/MIN: 10 INJECTION, EMULSION INTRAVENOUS at 11:09

## 2021-01-01 RX ADMIN — ARFORMOTEROL TARTRATE 15 MCG: 15 SOLUTION RESPIRATORY (INHALATION) at 07:11

## 2021-01-01 RX ADMIN — IPRATROPIUM BROMIDE AND ALBUTEROL SULFATE 3 ML: .5; 2.5 SOLUTION RESPIRATORY (INHALATION) at 19:17

## 2021-01-01 RX ADMIN — FUROSEMIDE 40 MG: 10 INJECTION INTRAMUSCULAR; INTRAVENOUS at 10:47

## 2021-01-01 RX ADMIN — INSULIN DETEMIR 50 UNITS: 100 INJECTION, SOLUTION SUBCUTANEOUS at 20:48

## 2021-01-01 RX ADMIN — AMIODARONE HYDROCHLORIDE 1 MG/MIN: 1.8 INJECTION, SOLUTION INTRAVENOUS at 14:02

## 2021-01-01 RX ADMIN — INSULIN LISPRO 5 UNITS: 100 INJECTION, SOLUTION INTRAVENOUS; SUBCUTANEOUS at 17:58

## 2021-01-01 RX ADMIN — BUDESONIDE 0.5 MG: 0.5 INHALANT ORAL at 07:39

## 2021-01-01 RX ADMIN — INSULIN LISPRO 8 UNITS: 100 INJECTION, SOLUTION INTRAVENOUS; SUBCUTANEOUS at 12:47

## 2021-01-01 RX ADMIN — FUROSEMIDE 40 MG: 10 INJECTION INTRAMUSCULAR; INTRAVENOUS at 18:05

## 2021-01-01 RX ADMIN — BARICITINIB 4 MG: 2 TABLET, FILM COATED ORAL at 12:31

## 2021-01-01 RX ADMIN — BUDESONIDE 0.5 MG: 0.5 INHALANT ORAL at 06:15

## 2021-01-01 RX ADMIN — LACTULOSE 30 G: 20 SOLUTION ORAL at 21:59

## 2021-01-01 RX ADMIN — ARFORMOTEROL TARTRATE 15 MCG: 15 SOLUTION RESPIRATORY (INHALATION) at 06:48

## 2021-01-01 RX ADMIN — INSULIN LISPRO 8 UNITS: 100 INJECTION, SOLUTION INTRAVENOUS; SUBCUTANEOUS at 12:22

## 2021-01-01 RX ADMIN — ATORVASTATIN CALCIUM 40 MG: 40 TABLET, FILM COATED ORAL at 09:01

## 2021-01-01 RX ADMIN — INSULIN HUMAN 99.2 UNITS/HR: 1 INJECTION, SOLUTION INTRAVENOUS at 23:36

## 2021-01-01 RX ADMIN — INSULIN LISPRO 3 UNITS: 100 INJECTION, SOLUTION INTRAVENOUS; SUBCUTANEOUS at 18:09

## 2021-01-01 RX ADMIN — INSULIN HUMAN 57.4 UNITS/HR: 1 INJECTION, SOLUTION INTRAVENOUS at 19:15

## 2021-01-01 RX ADMIN — ATORVASTATIN CALCIUM 40 MG: 40 TABLET, FILM COATED ORAL at 21:02

## 2021-01-01 RX ADMIN — BUDESONIDE 0.5 MG: 0.5 INHALANT ORAL at 06:42

## 2021-01-01 RX ADMIN — INSULIN DETEMIR 40 UNITS: 100 INJECTION, SOLUTION SUBCUTANEOUS at 09:11

## 2021-01-01 RX ADMIN — INSULIN LISPRO 8 UNITS: 100 INJECTION, SOLUTION INTRAVENOUS; SUBCUTANEOUS at 12:17

## 2021-01-01 RX ADMIN — BUDESONIDE 0.5 MG: 0.5 INHALANT ORAL at 19:29

## 2021-01-01 RX ADMIN — IPRATROPIUM BROMIDE AND ALBUTEROL SULFATE 3 ML: .5; 2.5 SOLUTION RESPIRATORY (INHALATION) at 07:06

## 2021-01-01 RX ADMIN — PROPOFOL 1000 MG: 10 INJECTION, EMULSION INTRAVENOUS at 14:18

## 2021-01-01 RX ADMIN — INSULIN DETEMIR 35 UNITS: 100 INJECTION, SOLUTION SUBCUTANEOUS at 20:36

## 2021-01-01 RX ADMIN — SIMETHICONE 80 MG: 80 TABLET, CHEWABLE ORAL at 17:02

## 2021-01-01 RX ADMIN — VASOPRESSIN 0.1 UNITS/MIN: 20 INJECTION INTRAVENOUS at 00:54

## 2021-01-01 RX ADMIN — REMDESIVIR 200 MG: 100 INJECTION, POWDER, LYOPHILIZED, FOR SOLUTION INTRAVENOUS at 14:55

## 2021-01-01 RX ADMIN — INSULIN LISPRO 5 UNITS: 100 INJECTION, SOLUTION INTRAVENOUS; SUBCUTANEOUS at 05:41

## 2021-01-01 RX ADMIN — CEFTRIAXONE 1 G: 10 INJECTION, POWDER, FOR SOLUTION INTRAVENOUS at 09:38

## 2021-01-01 RX ADMIN — ONDANSETRON 4 MG: 2 INJECTION INTRAMUSCULAR; INTRAVENOUS at 06:24

## 2021-01-01 RX ADMIN — ARFORMOTEROL TARTRATE 15 MCG: 15 SOLUTION RESPIRATORY (INHALATION) at 06:42

## 2021-01-01 RX ADMIN — LACTULOSE 30 G: 20 SOLUTION ORAL at 20:13

## 2021-01-01 RX ADMIN — FLUTICASONE PROPIONATE 2 SPRAY: 50 SPRAY, METERED NASAL at 08:31

## 2021-01-01 RX ADMIN — BUDESONIDE 0.5 MG: 0.5 INHALANT ORAL at 07:15

## 2021-01-01 RX ADMIN — ENOXAPARIN SODIUM 40 MG: 40 INJECTION SUBCUTANEOUS at 10:34

## 2021-01-01 RX ADMIN — TOBRAMYCIN 300 MG: 300 SOLUTION ORAL at 19:34

## 2021-01-01 RX ADMIN — INSULIN LISPRO 5 UNITS: 100 INJECTION, SOLUTION INTRAVENOUS; SUBCUTANEOUS at 17:30

## 2021-01-01 RX ADMIN — FAMOTIDINE 20 MG: 20 TABLET ORAL at 20:36

## 2021-01-01 RX ADMIN — FAMOTIDINE 20 MG: 20 TABLET, FILM COATED ORAL at 09:05

## 2021-01-01 RX ADMIN — INSULIN LISPRO 3 UNITS: 100 INJECTION, SOLUTION INTRAVENOUS; SUBCUTANEOUS at 12:29

## 2021-01-01 RX ADMIN — AMIODARONE HYDROCHLORIDE 0.5 MG/MIN: 1.8 INJECTION, SOLUTION INTRAVENOUS at 20:15

## 2021-01-01 RX ADMIN — LACTULOSE 30 G: 20 SOLUTION ORAL at 22:18

## 2021-01-01 RX ADMIN — BUDESONIDE 0.5 MG: 0.5 INHALANT ORAL at 09:21

## 2021-01-01 RX ADMIN — CEFTRIAXONE 1 G: 10 INJECTION, POWDER, FOR SOLUTION INTRAVENOUS at 08:54

## 2021-01-01 RX ADMIN — FUROSEMIDE 40 MG: 10 INJECTION, SOLUTION INTRAMUSCULAR; INTRAVENOUS at 09:13

## 2021-01-01 RX ADMIN — METOLAZONE 10 MG: 5 TABLET ORAL at 09:47

## 2021-01-01 RX ADMIN — IPRATROPIUM BROMIDE AND ALBUTEROL SULFATE 3 ML: .5; 2.5 SOLUTION RESPIRATORY (INHALATION) at 07:18

## 2021-01-01 RX ADMIN — ARFORMOTEROL TARTRATE 15 MCG: 15 SOLUTION RESPIRATORY (INHALATION) at 07:27

## 2021-01-01 RX ADMIN — DOCUSATE SODIUM 50MG AND SENNOSIDES 8.6MG 2 TABLET: 8.6; 5 TABLET, FILM COATED ORAL at 08:49

## 2021-01-01 RX ADMIN — ALUMINUM HYDROXIDE, MAGNESIUM HYDROXIDE, AND DIMETHICONE 15 ML: 400; 400; 40 SUSPENSION ORAL at 21:10

## 2021-01-01 RX ADMIN — IPRATROPIUM BROMIDE AND ALBUTEROL SULFATE 3 ML: .5; 2.5 SOLUTION RESPIRATORY (INHALATION) at 19:29

## 2021-01-01 RX ADMIN — FAMOTIDINE 20 MG: 20 TABLET ORAL at 17:39

## 2021-01-01 RX ADMIN — ARFORMOTEROL TARTRATE 15 MCG: 15 SOLUTION RESPIRATORY (INHALATION) at 08:43

## 2021-01-01 RX ADMIN — FAMOTIDINE 20 MG: 20 TABLET ORAL at 16:45

## 2021-01-01 RX ADMIN — POTASSIUM CHLORIDE 40 MEQ: 10 CAPSULE, COATED, EXTENDED RELEASE ORAL at 15:23

## 2021-01-01 RX ADMIN — METOLAZONE 5 MG: 5 TABLET ORAL at 10:50

## 2021-01-01 RX ADMIN — FUROSEMIDE 40 MG: 10 INJECTION INTRAMUSCULAR; INTRAVENOUS at 09:13

## 2021-01-01 RX ADMIN — INSULIN LISPRO 5 UNITS: 100 INJECTION, SOLUTION INTRAVENOUS; SUBCUTANEOUS at 17:02

## 2021-01-01 RX ADMIN — MORPHINE SULFATE 6 MG: 4 INJECTION, SOLUTION INTRAMUSCULAR; INTRAVENOUS at 15:45

## 2021-01-01 RX ADMIN — BUDESONIDE 0.5 MG: 0.5 INHALANT ORAL at 19:22

## 2021-01-01 RX ADMIN — INSULIN DETEMIR 40 UNITS: 100 INJECTION, SOLUTION SUBCUTANEOUS at 20:32

## 2021-01-01 RX ADMIN — ARFORMOTEROL TARTRATE 15 MCG: 15 SOLUTION RESPIRATORY (INHALATION) at 07:06

## 2021-01-01 RX ADMIN — INSULIN LISPRO 14 UNITS: 100 INJECTION, SOLUTION INTRAVENOUS; SUBCUTANEOUS at 21:21

## 2021-01-01 RX ADMIN — CEFEPIME 2 G: 1 INJECTION, SOLUTION INTRAVENOUS at 21:04

## 2021-01-01 RX ADMIN — FUROSEMIDE 40 MG: 10 INJECTION, SOLUTION INTRAMUSCULAR; INTRAVENOUS at 17:23

## 2021-01-01 RX ADMIN — ARFORMOTEROL TARTRATE 15 MCG: 15 SOLUTION RESPIRATORY (INHALATION) at 09:26

## 2021-01-01 RX ADMIN — ARFORMOTEROL TARTRATE 15 MCG: 15 SOLUTION RESPIRATORY (INHALATION) at 06:29

## 2021-01-01 RX ADMIN — BETHANECHOL CHLORIDE 25 MG: 25 TABLET ORAL at 08:30

## 2021-01-01 RX ADMIN — ASPIRIN 81 MG CHEWABLE TABLET 81 MG: 81 TABLET CHEWABLE at 09:04

## 2021-01-01 RX ADMIN — IPRATROPIUM BROMIDE AND ALBUTEROL SULFATE 3 ML: .5; 2.5 SOLUTION RESPIRATORY (INHALATION) at 07:27

## 2021-01-01 RX ADMIN — ARFORMOTEROL TARTRATE 15 MCG: 15 SOLUTION RESPIRATORY (INHALATION) at 22:09

## 2021-01-01 RX ADMIN — ACETAMINOPHEN 650 MG: 325 TABLET ORAL at 16:36

## 2021-01-01 RX ADMIN — FLUTICASONE PROPIONATE 2 SPRAY: 50 SPRAY, METERED NASAL at 08:11

## 2021-01-01 RX ADMIN — CEFEPIME HYDROCHLORIDE 2 G: 2 INJECTION, POWDER, FOR SOLUTION INTRAVENOUS at 04:49

## 2021-01-01 RX ADMIN — METHYLPREDNISOLONE SODIUM SUCCINATE 40 MG: 40 INJECTION, POWDER, FOR SOLUTION INTRAMUSCULAR; INTRAVENOUS at 08:49

## 2021-01-01 RX ADMIN — HYDROCORTISONE 2.5%: 25 CREAM TOPICAL at 23:22

## 2021-01-01 RX ADMIN — ASPIRIN 81 MG CHEWABLE TABLET 81 MG: 81 TABLET CHEWABLE at 08:30

## 2021-01-01 RX ADMIN — INSULIN LISPRO 12 UNITS: 100 INJECTION, SOLUTION INTRAVENOUS; SUBCUTANEOUS at 13:06

## 2021-01-01 RX ADMIN — BETHANECHOL CHLORIDE 25 MG: 25 TABLET ORAL at 20:00

## 2021-01-01 RX ADMIN — SODIUM CHLORIDE, PRESERVATIVE FREE 3 ML: 5 INJECTION INTRAVENOUS at 09:58

## 2021-01-01 RX ADMIN — ARFORMOTEROL TARTRATE 15 MCG: 15 SOLUTION RESPIRATORY (INHALATION) at 06:28

## 2021-01-01 RX ADMIN — BETHANECHOL CHLORIDE 25 MG: 25 TABLET ORAL at 20:21

## 2021-01-01 RX ADMIN — IPRATROPIUM BROMIDE AND ALBUTEROL SULFATE 3 ML: .5; 2.5 SOLUTION RESPIRATORY (INHALATION) at 00:10

## 2021-01-01 RX ADMIN — SPIRONOLACTONE 25 MG: 25 TABLET ORAL at 09:22

## 2021-01-01 RX ADMIN — SODIUM BICARBONATE 150 MEQ: 84 INJECTION, SOLUTION INTRAVENOUS at 10:55

## 2021-01-01 RX ADMIN — CEFEPIME HYDROCHLORIDE 2 G: 2 INJECTION, POWDER, FOR SOLUTION INTRAVENOUS at 03:53

## 2021-01-01 RX ADMIN — HEPARIN SODIUM 5000 UNITS: 5000 INJECTION, SOLUTION INTRAVENOUS; SUBCUTANEOUS at 05:47

## 2021-01-01 RX ADMIN — CEFEPIME 2 G: 1 INJECTION, SOLUTION INTRAVENOUS at 21:08

## 2021-01-01 RX ADMIN — INSULIN DETEMIR 40 UNITS: 100 INJECTION, SOLUTION SUBCUTANEOUS at 21:26

## 2021-01-01 RX ADMIN — PROPOFOL 30 MCG/KG/MIN: 10 INJECTION, EMULSION INTRAVENOUS at 13:16

## 2021-01-01 RX ADMIN — INSULIN HUMAN 10 UNITS: 100 INJECTION, SOLUTION PARENTERAL at 00:39

## 2021-01-01 RX ADMIN — AMPICILLIN SODIUM AND SULBACTAM SODIUM 3 G: 2; 1 INJECTION, POWDER, FOR SOLUTION INTRAMUSCULAR; INTRAVENOUS at 05:46

## 2021-01-01 RX ADMIN — INSULIN LISPRO 5 UNITS: 100 INJECTION, SOLUTION INTRAVENOUS; SUBCUTANEOUS at 12:44

## 2021-01-01 RX ADMIN — GUAIFENESIN 600 MG: 600 TABLET, EXTENDED RELEASE ORAL at 09:14

## 2021-01-01 RX ADMIN — BUDESONIDE 0.5 MG: 0.5 INHALANT ORAL at 08:15

## 2021-01-01 RX ADMIN — SPIRONOLACTONE 25 MG: 25 TABLET ORAL at 08:17

## 2021-01-01 RX ADMIN — INSULIN DETEMIR 50 UNITS: 100 INJECTION, SOLUTION SUBCUTANEOUS at 20:31

## 2021-01-01 RX ADMIN — CEFEPIME HYDROCHLORIDE 2 G: 2 INJECTION, POWDER, FOR SOLUTION INTRAVENOUS at 03:59

## 2021-01-01 RX ADMIN — FAMOTIDINE 20 MG: 10 INJECTION INTRAVENOUS at 21:57

## 2021-01-01 RX ADMIN — ENOXAPARIN SODIUM 40 MG: 40 INJECTION SUBCUTANEOUS at 09:40

## 2021-01-01 RX ADMIN — INSULIN LISPRO 5 UNITS: 100 INJECTION, SOLUTION INTRAVENOUS; SUBCUTANEOUS at 17:41

## 2021-01-01 RX ADMIN — ENOXAPARIN SODIUM 40 MG: 40 INJECTION SUBCUTANEOUS at 22:53

## 2021-01-01 RX ADMIN — DEXAMETHASONE SODIUM PHOSPHATE 10 MG: 10 INJECTION INTRAMUSCULAR; INTRAVENOUS at 20:23

## 2021-01-01 RX ADMIN — REMDESIVIR 100 MG: 100 INJECTION, POWDER, LYOPHILIZED, FOR SOLUTION INTRAVENOUS at 12:49

## 2021-01-01 RX ADMIN — MAGNESIUM SULFATE HEPTAHYDRATE 1 G: 1 INJECTION, SOLUTION INTRAVENOUS at 17:48

## 2021-01-01 RX ADMIN — CEFEPIME HYDROCHLORIDE 2 G: 2 INJECTION, POWDER, FOR SOLUTION INTRAVENOUS at 12:34

## 2021-01-01 RX ADMIN — FUROSEMIDE 40 MG: 10 INJECTION INTRAMUSCULAR; INTRAVENOUS at 08:18

## 2021-01-01 RX ADMIN — ENOXAPARIN SODIUM 40 MG: 40 INJECTION SUBCUTANEOUS at 09:05

## 2021-01-01 RX ADMIN — GUAIFENESIN 600 MG: 600 TABLET, EXTENDED RELEASE ORAL at 22:18

## 2021-01-01 RX ADMIN — FENTANYL CITRATE 50 MCG/HR: 50 INJECTION, SOLUTION INTRAMUSCULAR; INTRAVENOUS at 17:51

## 2021-01-01 RX ADMIN — BETHANECHOL CHLORIDE 25 MG: 25 TABLET ORAL at 15:39

## 2021-01-01 RX ADMIN — BUPROPION HYDROCHLORIDE 100 MG: 100 TABLET, FILM COATED ORAL at 08:49

## 2021-01-01 RX ADMIN — DEXAMETHASONE SODIUM PHOSPHATE 10 MG: 10 INJECTION INTRAMUSCULAR; INTRAVENOUS at 09:49

## 2021-01-01 RX ADMIN — INSULIN DETEMIR 40 UNITS: 100 INJECTION, SOLUTION SUBCUTANEOUS at 12:21

## 2021-01-01 RX ADMIN — INSULIN LISPRO 3 UNITS: 100 INJECTION, SOLUTION INTRAVENOUS; SUBCUTANEOUS at 12:22

## 2021-01-01 RX ADMIN — ARFORMOTEROL TARTRATE 15 MCG: 15 SOLUTION RESPIRATORY (INHALATION) at 08:41

## 2021-01-01 RX ADMIN — METHYLPREDNISOLONE SODIUM SUCCINATE 20 MG: 40 INJECTION, POWDER, FOR SOLUTION INTRAMUSCULAR; INTRAVENOUS at 15:15

## 2021-01-01 RX ADMIN — BARICITINIB 4 MG: 2 TABLET, FILM COATED ORAL at 14:10

## 2021-01-01 RX ADMIN — TOBRAMYCIN 300 MG: 300 SOLUTION ORAL at 22:45

## 2021-01-01 RX ADMIN — FUROSEMIDE 40 MG: 10 INJECTION INTRAMUSCULAR; INTRAVENOUS at 17:31

## 2021-01-01 RX ADMIN — ATORVASTATIN CALCIUM 40 MG: 40 TABLET, FILM COATED ORAL at 20:14

## 2021-01-01 RX ADMIN — INSULIN DETEMIR 55 UNITS: 100 INJECTION, SOLUTION SUBCUTANEOUS at 20:11

## 2021-01-01 RX ADMIN — CALCIUM GLUCONATE 1 G: 20 INJECTION, SOLUTION INTRAVENOUS at 15:36

## 2021-01-01 RX ADMIN — IPRATROPIUM BROMIDE AND ALBUTEROL SULFATE 3 ML: .5; 2.5 SOLUTION RESPIRATORY (INHALATION) at 14:34

## 2021-01-01 RX ADMIN — INSULIN DETEMIR 35 UNITS: 100 INJECTION, SOLUTION SUBCUTANEOUS at 22:22

## 2021-01-01 RX ADMIN — BUDESONIDE 0.5 MG: 0.5 INHALANT ORAL at 19:48

## 2021-01-01 RX ADMIN — SODIUM POLYSTYRENE SULFONATE 30 G: 15 SUSPENSION ORAL; RECTAL at 08:11

## 2021-01-01 RX ADMIN — FUROSEMIDE 40 MG: 10 INJECTION INTRAMUSCULAR; INTRAVENOUS at 17:25

## 2021-01-01 RX ADMIN — ALUMINUM HYDROXIDE, MAGNESIUM HYDROXIDE, AND DIMETHICONE 15 ML: 400; 400; 40 SUSPENSION ORAL at 18:14

## 2021-01-01 RX ADMIN — BUDESONIDE 0.5 MG: 0.5 INHALANT ORAL at 20:15

## 2021-01-01 RX ADMIN — CEFEPIME HYDROCHLORIDE 2 G: 2 INJECTION, POWDER, FOR SOLUTION INTRAVENOUS at 03:23

## 2021-01-01 RX ADMIN — BUDESONIDE 0.5 MG: 0.5 INHALANT ORAL at 06:48

## 2021-01-01 RX ADMIN — ENOXAPARIN SODIUM 40 MG: 40 INJECTION SUBCUTANEOUS at 09:09

## 2021-01-01 RX ADMIN — FUROSEMIDE 40 MG: 10 INJECTION INTRAMUSCULAR; INTRAVENOUS at 09:10

## 2021-01-01 RX ADMIN — SODIUM CHLORIDE, PRESERVATIVE FREE 3 ML: 5 INJECTION INTRAVENOUS at 21:08

## 2021-01-01 RX ADMIN — HYDROCORTISONE 2.5%: 25 CREAM TOPICAL at 20:47

## 2021-01-01 RX ADMIN — BUDESONIDE 0.5 MG: 0.5 INHALANT ORAL at 19:21

## 2021-01-01 RX ADMIN — IPRATROPIUM BROMIDE AND ALBUTEROL SULFATE 3 ML: .5; 2.5 SOLUTION RESPIRATORY (INHALATION) at 12:46

## 2021-01-01 RX ADMIN — TOBRAMYCIN 300 MG: 300 SOLUTION ORAL at 22:09

## 2021-01-01 RX ADMIN — FUROSEMIDE 40 MG: 10 INJECTION INTRAMUSCULAR; INTRAVENOUS at 08:32

## 2021-01-01 RX ADMIN — BETHANECHOL CHLORIDE 25 MG: 25 TABLET ORAL at 08:49

## 2021-01-01 RX ADMIN — HYDROCORTISONE 2.5%: 25 CREAM TOPICAL at 08:18

## 2021-01-01 RX ADMIN — TRAMADOL HYDROCHLORIDE 50 MG: 50 TABLET, FILM COATED ORAL at 11:12

## 2021-01-01 RX ADMIN — BUDESONIDE 0.5 MG: 0.5 INHALANT ORAL at 08:32

## 2021-01-01 RX ADMIN — FUROSEMIDE 40 MG: 10 INJECTION INTRAMUSCULAR; INTRAVENOUS at 17:48

## 2021-01-01 RX ADMIN — BUDESONIDE 0.5 MG: 0.5 INHALANT ORAL at 21:05

## 2021-01-01 RX ADMIN — ARFORMOTEROL TARTRATE 15 MCG: 15 SOLUTION RESPIRATORY (INHALATION) at 19:44

## 2021-01-01 RX ADMIN — TOBRAMYCIN 300 MG: 300 SOLUTION ORAL at 11:00

## 2021-01-01 RX ADMIN — IPRATROPIUM BROMIDE AND ALBUTEROL SULFATE 3 ML: .5; 2.5 SOLUTION RESPIRATORY (INHALATION) at 18:55

## 2021-01-01 RX ADMIN — ARFORMOTEROL TARTRATE 15 MCG: 15 SOLUTION RESPIRATORY (INHALATION) at 09:09

## 2021-01-01 RX ADMIN — INSULIN LISPRO 10 UNITS: 100 INJECTION, SOLUTION INTRAVENOUS; SUBCUTANEOUS at 17:58

## 2021-01-01 RX ADMIN — CEFEPIME HYDROCHLORIDE 2 G: 2 INJECTION, POWDER, FOR SOLUTION INTRAVENOUS at 12:16

## 2021-01-01 RX ADMIN — POTASSIUM CHLORIDE 20 MEQ: 10 CAPSULE, COATED, EXTENDED RELEASE ORAL at 09:13

## 2021-01-01 RX ADMIN — INSULIN LISPRO 5 UNITS: 100 INJECTION, SOLUTION INTRAVENOUS; SUBCUTANEOUS at 12:38

## 2021-01-01 RX ADMIN — BUDESONIDE 0.5 MG: 0.5 INHALANT ORAL at 19:45

## 2021-01-01 RX ADMIN — ATORVASTATIN CALCIUM 40 MG: 40 TABLET, FILM COATED ORAL at 20:03

## 2021-01-01 RX ADMIN — Medication 0.3 MCG/KG/MIN: at 08:59

## 2021-01-01 RX ADMIN — GUAIFENESIN 600 MG: 600 TABLET, EXTENDED RELEASE ORAL at 20:49

## 2021-01-01 RX ADMIN — INSULIN LISPRO 5 UNITS: 100 INJECTION, SOLUTION INTRAVENOUS; SUBCUTANEOUS at 08:32

## 2021-01-01 RX ADMIN — ARFORMOTEROL TARTRATE 15 MCG: 15 SOLUTION RESPIRATORY (INHALATION) at 10:30

## 2021-01-01 RX ADMIN — METOLAZONE 10 MG: 5 TABLET ORAL at 09:08

## 2021-01-01 RX ADMIN — PROPOFOL 10 MCG/KG/MIN: 10 INJECTION, EMULSION INTRAVENOUS at 13:00

## 2021-01-01 RX ADMIN — ARFORMOTEROL TARTRATE 15 MCG: 15 SOLUTION RESPIRATORY (INHALATION) at 21:51

## 2021-01-01 RX ADMIN — Medication 0.3 MCG/KG/MIN: at 10:15

## 2021-01-01 RX ADMIN — Medication 3 ML: at 11:05

## 2021-01-01 RX ADMIN — INSULIN DETEMIR 50 UNITS: 100 INJECTION, SOLUTION SUBCUTANEOUS at 08:55

## 2021-01-01 RX ADMIN — FAMOTIDINE 20 MG: 20 TABLET ORAL at 17:56

## 2021-01-01 RX ADMIN — GUAIFENESIN 600 MG: 600 TABLET, EXTENDED RELEASE ORAL at 21:59

## 2021-01-01 RX ADMIN — INSULIN LISPRO 3 UNITS: 100 INJECTION, SOLUTION INTRAVENOUS; SUBCUTANEOUS at 10:47

## 2021-01-01 RX ADMIN — AZITHROMYCIN 500 MG: 500 INJECTION, POWDER, LYOPHILIZED, FOR SOLUTION INTRAVENOUS at 20:51

## 2021-01-01 RX ADMIN — INSULIN LISPRO 5 UNITS: 100 INJECTION, SOLUTION INTRAVENOUS; SUBCUTANEOUS at 12:57

## 2021-01-01 RX ADMIN — HYDROCORTISONE 2.5%: 25 CREAM TOPICAL at 21:33

## 2021-01-01 RX ADMIN — ARFORMOTEROL TARTRATE 15 MCG: 15 SOLUTION RESPIRATORY (INHALATION) at 06:47

## 2021-01-01 RX ADMIN — FAMOTIDINE 20 MG: 10 INJECTION INTRAVENOUS at 09:06

## 2021-01-01 RX ADMIN — ARFORMOTEROL TARTRATE 15 MCG: 15 SOLUTION RESPIRATORY (INHALATION) at 19:14

## 2021-01-01 RX ADMIN — IPRATROPIUM BROMIDE AND ALBUTEROL SULFATE 3 ML: .5; 2.5 SOLUTION RESPIRATORY (INHALATION) at 00:27

## 2021-01-01 RX ADMIN — POTASSIUM CHLORIDE 40 MEQ: 10 CAPSULE, COATED, EXTENDED RELEASE ORAL at 17:08

## 2021-01-01 RX ADMIN — PREDNISONE 40 MG: 20 TABLET ORAL at 10:48

## 2021-01-01 RX ADMIN — INSULIN LISPRO 8 UNITS: 100 INJECTION, SOLUTION INTRAVENOUS; SUBCUTANEOUS at 10:03

## 2021-01-01 RX ADMIN — SODIUM CHLORIDE, PRESERVATIVE FREE 3 ML: 5 INJECTION INTRAVENOUS at 08:30

## 2021-01-01 RX ADMIN — ARFORMOTEROL TARTRATE 15 MCG: 15 SOLUTION RESPIRATORY (INHALATION) at 09:25

## 2021-01-01 RX ADMIN — HYDROCORTISONE 2.5%: 25 CREAM TOPICAL at 08:22

## 2021-01-01 RX ADMIN — FAMOTIDINE 20 MG: 20 TABLET, FILM COATED ORAL at 10:27

## 2021-01-01 RX ADMIN — CEFEPIME 2 G: 1 INJECTION, SOLUTION INTRAVENOUS at 05:16

## 2021-01-01 RX ADMIN — SPIRONOLACTONE 25 MG: 25 TABLET ORAL at 09:09

## 2021-01-01 RX ADMIN — ATORVASTATIN CALCIUM 40 MG: 40 TABLET, FILM COATED ORAL at 08:19

## 2021-01-01 RX ADMIN — FENTANYL CITRATE 50 MCG/HR: 50 INJECTION, SOLUTION INTRAMUSCULAR; INTRAVENOUS at 13:17

## 2021-01-01 RX ADMIN — SODIUM CHLORIDE SOLN NEBU 3% 4 ML: 3 NEBU SOLN at 05:01

## 2021-01-01 RX ADMIN — DEXTROSE MONOHYDRATE 25 ML: 25 INJECTION, SOLUTION INTRAVENOUS at 08:28

## 2021-01-01 RX ADMIN — CEFEPIME HYDROCHLORIDE 2 G: 2 INJECTION, POWDER, FOR SOLUTION INTRAVENOUS at 12:21

## 2021-01-01 RX ADMIN — ACETAMINOPHEN 650 MG: 325 TABLET ORAL at 01:32

## 2021-01-01 RX ADMIN — SODIUM CHLORIDE, POTASSIUM CHLORIDE, SODIUM LACTATE AND CALCIUM CHLORIDE 500 ML: 600; 310; 30; 20 INJECTION, SOLUTION INTRAVENOUS at 11:07

## 2021-01-01 RX ADMIN — FAMOTIDINE 20 MG: 20 TABLET ORAL at 17:58

## 2021-01-01 RX ADMIN — IPRATROPIUM BROMIDE AND ALBUTEROL SULFATE 3 ML: .5; 2.5 SOLUTION RESPIRATORY (INHALATION) at 12:55

## 2021-01-01 RX ADMIN — ARFORMOTEROL TARTRATE 15 MCG: 15 SOLUTION RESPIRATORY (INHALATION) at 19:04

## 2021-01-01 RX ADMIN — BUDESONIDE 0.5 MG: 0.5 INHALANT ORAL at 18:54

## 2021-01-01 RX ADMIN — SODIUM CHLORIDE, PRESERVATIVE FREE 3 ML: 5 INJECTION INTRAVENOUS at 21:50

## 2021-01-01 RX ADMIN — MORPHINE SULFATE 1 MG: 2 INJECTION, SOLUTION INTRAMUSCULAR; INTRAVENOUS at 01:15

## 2021-01-01 RX ADMIN — CEFEPIME HYDROCHLORIDE 2 G: 2 INJECTION, POWDER, FOR SOLUTION INTRAVENOUS at 21:48

## 2021-01-01 RX ADMIN — TOBRAMYCIN 300 MG: 300 SOLUTION ORAL at 19:22

## 2021-01-01 RX ADMIN — INSULIN DETEMIR 10 UNITS: 100 INJECTION, SOLUTION SUBCUTANEOUS at 09:42

## 2021-01-01 RX ADMIN — BETHANECHOL CHLORIDE 25 MG: 25 TABLET ORAL at 20:36

## 2021-01-01 RX ADMIN — FUROSEMIDE 40 MG: 10 INJECTION, SOLUTION INTRAMUSCULAR; INTRAVENOUS at 08:49

## 2021-01-01 RX ADMIN — BUDESONIDE 0.5 MG: 0.5 INHALANT ORAL at 06:47

## 2021-01-01 RX ADMIN — INSULIN LISPRO 8 UNITS: 100 INJECTION, SOLUTION INTRAVENOUS; SUBCUTANEOUS at 17:43

## 2021-01-01 RX ADMIN — DIPHENHYDRAMINE HYDROCHLORIDE AND LIDOCAINE HYDROCHLORIDE AND ALUMINUM HYDROXIDE AND MAGNESIUM HYDRO 5 ML: KIT at 02:10

## 2021-01-01 RX ADMIN — INSULIN LISPRO 14 UNITS: 100 INJECTION, SOLUTION INTRAVENOUS; SUBCUTANEOUS at 17:36

## 2021-01-01 RX ADMIN — METOLAZONE 10 MG: 5 TABLET ORAL at 12:14

## 2021-01-01 RX ADMIN — HYDROCORTISONE 2.5%: 25 CREAM TOPICAL at 10:57

## 2021-01-01 RX ADMIN — INSULIN LISPRO 8 UNITS: 100 INJECTION, SOLUTION INTRAVENOUS; SUBCUTANEOUS at 17:19

## 2021-01-01 RX ADMIN — ARFORMOTEROL TARTRATE 15 MCG: 15 SOLUTION RESPIRATORY (INHALATION) at 08:17

## 2021-01-01 RX ADMIN — TOBRAMYCIN 300 MG: 300 SOLUTION ORAL at 08:50

## 2021-01-01 RX ADMIN — TOBRAMYCIN 300 MG: 300 SOLUTION ORAL at 21:14

## 2021-01-01 RX ADMIN — IPRATROPIUM BROMIDE AND ALBUTEROL SULFATE 3 ML: .5; 2.5 SOLUTION RESPIRATORY (INHALATION) at 19:33

## 2021-01-01 RX ADMIN — BUDESONIDE 0.5 MG: 0.5 INHALANT ORAL at 06:29

## 2021-01-01 RX ADMIN — SODIUM CHLORIDE, PRESERVATIVE FREE 10 ML: 5 INJECTION INTRAVENOUS at 20:17

## 2021-01-01 RX ADMIN — BUPROPION HYDROCHLORIDE 100 MG: 100 TABLET, FILM COATED ORAL at 08:31

## 2021-01-01 RX ADMIN — MAGNESIUM SULFATE 2 G: 2 INJECTION INTRAVENOUS at 11:38

## 2021-01-01 RX ADMIN — INSULIN HUMAN 35.4 UNITS/HR: 1 INJECTION, SOLUTION INTRAVENOUS at 17:18

## 2021-01-01 RX ADMIN — ACETAMINOPHEN 650 MG: 325 TABLET ORAL at 02:53

## 2021-01-01 RX ADMIN — ATORVASTATIN CALCIUM 40 MG: 40 TABLET, FILM COATED ORAL at 10:47

## 2021-01-01 RX ADMIN — DEXAMETHASONE SODIUM PHOSPHATE 10 MG: 10 INJECTION INTRAMUSCULAR; INTRAVENOUS at 09:10

## 2021-01-01 RX ADMIN — OXYCODONE HYDROCHLORIDE 5 MG: 5 TABLET ORAL at 07:46

## 2021-01-01 RX ADMIN — LOPERAMIDE HYDROCHLORIDE 2 MG: 2 CAPSULE ORAL at 15:08

## 2021-01-01 RX ADMIN — INSULIN DETEMIR 80 UNITS: 100 INJECTION, SOLUTION SUBCUTANEOUS at 20:31

## 2021-01-01 RX ADMIN — INSULIN DETEMIR 40 UNITS: 100 INJECTION, SOLUTION SUBCUTANEOUS at 10:58

## 2021-01-01 RX ADMIN — DEXTROSE MONOHYDRATE 50 ML: 25 INJECTION, SOLUTION INTRAVENOUS at 08:10

## 2021-01-01 RX ADMIN — AMPICILLIN SODIUM AND SULBACTAM SODIUM 3 G: 2; 1 INJECTION, POWDER, FOR SOLUTION INTRAMUSCULAR; INTRAVENOUS at 16:47

## 2021-01-01 RX ADMIN — FAMOTIDINE 20 MG: 20 TABLET, FILM COATED ORAL at 16:33

## 2021-01-01 RX ADMIN — FUROSEMIDE 40 MG: 10 INJECTION INTRAMUSCULAR; INTRAVENOUS at 17:52

## 2021-01-01 RX ADMIN — LACTULOSE 30 G: 20 SOLUTION ORAL at 20:37

## 2021-01-01 RX ADMIN — SODIUM CHLORIDE, PRESERVATIVE FREE 10 ML: 5 INJECTION INTRAVENOUS at 08:58

## 2021-01-01 RX ADMIN — ASPIRIN 81 MG: 81 TABLET, CHEWABLE ORAL at 09:04

## 2021-01-01 RX ADMIN — SODIUM CHLORIDE, PRESERVATIVE FREE 3 ML: 5 INJECTION INTRAVENOUS at 20:01

## 2021-01-01 RX ADMIN — FUROSEMIDE 40 MG: 10 INJECTION INTRAMUSCULAR; INTRAVENOUS at 17:50

## 2021-01-01 RX ADMIN — FAMOTIDINE 20 MG: 20 TABLET ORAL at 16:37

## 2021-01-01 RX ADMIN — ARFORMOTEROL TARTRATE 15 MCG: 15 SOLUTION RESPIRATORY (INHALATION) at 08:15

## 2021-01-01 RX ADMIN — INSULIN DETEMIR 40 UNITS: 100 INJECTION, SOLUTION SUBCUTANEOUS at 20:36

## 2021-01-01 RX ADMIN — LACTULOSE 30 G: 20 SOLUTION ORAL at 20:14

## 2021-01-01 RX ADMIN — TOBRAMYCIN 300 MG: 300 SOLUTION ORAL at 09:26

## 2021-01-01 RX ADMIN — BUDESONIDE 0.5 MG: 0.5 INHALANT ORAL at 20:30

## 2021-01-01 RX ADMIN — BETHANECHOL CHLORIDE 25 MG: 25 TABLET ORAL at 08:29

## 2021-01-01 RX ADMIN — AMIODARONE HYDROCHLORIDE 200 MG: 200 TABLET ORAL at 15:15

## 2021-01-01 RX ADMIN — CEFEPIME HYDROCHLORIDE 2 G: 2 INJECTION, POWDER, FOR SOLUTION INTRAVENOUS at 04:53

## 2021-01-01 RX ADMIN — GUAIFENESIN 600 MG: 600 TABLET, EXTENDED RELEASE ORAL at 20:31

## 2021-01-01 RX ADMIN — LACTULOSE 30 G: 20 SOLUTION ORAL at 08:57

## 2021-01-01 RX ADMIN — IPRATROPIUM BROMIDE AND ALBUTEROL SULFATE 3 ML: .5; 2.5 SOLUTION RESPIRATORY (INHALATION) at 04:30

## 2021-01-01 RX ADMIN — ATORVASTATIN CALCIUM 40 MG: 40 TABLET, FILM COATED ORAL at 08:13

## 2021-01-01 RX ADMIN — ASPIRIN 81 MG CHEWABLE TABLET 81 MG: 81 TABLET CHEWABLE at 08:52

## 2021-01-01 RX ADMIN — ASPIRIN 81 MG: 81 TABLET, CHEWABLE ORAL at 09:13

## 2021-01-01 RX ADMIN — BUDESONIDE 0.5 MG: 0.5 INHALANT ORAL at 09:37

## 2021-01-01 RX ADMIN — DIPHENHYDRAMINE HYDROCHLORIDE AND LIDOCAINE HYDROCHLORIDE AND ALUMINUM HYDROXIDE AND MAGNESIUM HYDRO 5 ML: KIT at 08:41

## 2021-01-01 RX ADMIN — ENOXAPARIN SODIUM 100 MG: 100 INJECTION SUBCUTANEOUS at 15:01

## 2021-01-01 RX ADMIN — ASPIRIN 81 MG CHEWABLE TABLET 81 MG: 81 TABLET CHEWABLE at 09:03

## 2021-01-01 RX ADMIN — BUDESONIDE 0.5 MG: 0.5 INHALANT ORAL at 09:05

## 2021-01-01 RX ADMIN — HYDROCORTISONE 2.5%: 25 CREAM TOPICAL at 09:11

## 2021-01-01 RX ADMIN — ATORVASTATIN CALCIUM 40 MG: 40 TABLET, FILM COATED ORAL at 08:49

## 2021-01-01 RX ADMIN — INSULIN DETEMIR 30 UNITS: 100 INJECTION, SOLUTION SUBCUTANEOUS at 21:57

## 2021-01-01 RX ADMIN — SIMETHICONE 80 MG: 80 TABLET, CHEWABLE ORAL at 16:34

## 2021-01-01 RX ADMIN — CEFTRIAXONE 1 G: 10 INJECTION, POWDER, FOR SOLUTION INTRAVENOUS at 10:27

## 2021-01-01 RX ADMIN — INSULIN DETEMIR 40 UNITS: 100 INJECTION, SOLUTION SUBCUTANEOUS at 21:35

## 2021-01-01 RX ADMIN — ARFORMOTEROL TARTRATE 15 MCG: 15 SOLUTION RESPIRATORY (INHALATION) at 10:11

## 2021-01-01 RX ADMIN — LIDOCAINE HYDROCHLORIDE 15 ML: 20 SOLUTION ORAL; TOPICAL at 01:26

## 2021-01-01 RX ADMIN — SODIUM BICARBONATE 150 MEQ: 84 INJECTION, SOLUTION INTRAVENOUS at 07:12

## 2021-01-01 RX ADMIN — ARFORMOTEROL TARTRATE 15 MCG: 15 SOLUTION RESPIRATORY (INHALATION) at 18:53

## 2021-01-01 RX ADMIN — ACETAMINOPHEN 650 MG: 325 TABLET ORAL at 12:14

## 2021-01-01 RX ADMIN — INSULIN LISPRO 3 UNITS: 100 INJECTION, SOLUTION INTRAVENOUS; SUBCUTANEOUS at 17:31

## 2021-01-01 RX ADMIN — ASPIRIN 81 MG CHEWABLE TABLET 81 MG: 81 TABLET CHEWABLE at 10:46

## 2021-01-01 RX ADMIN — BUDESONIDE 0.5 MG: 0.5 INHALANT ORAL at 06:26

## 2021-01-01 RX ADMIN — IPRATROPIUM BROMIDE AND ALBUTEROL SULFATE 3 ML: .5; 2.5 SOLUTION RESPIRATORY (INHALATION) at 22:04

## 2021-01-01 RX ADMIN — ARFORMOTEROL TARTRATE 15 MCG: 15 SOLUTION RESPIRATORY (INHALATION) at 19:29

## 2021-01-01 RX ADMIN — HEPARIN SODIUM 5000 UNITS: 5000 INJECTION, SOLUTION INTRAVENOUS; SUBCUTANEOUS at 21:06

## 2021-01-01 RX ADMIN — INSULIN DETEMIR 80 UNITS: 100 INJECTION, SOLUTION SUBCUTANEOUS at 08:52

## 2021-01-01 RX ADMIN — DEXAMETHASONE SODIUM PHOSPHATE 10 MG: 10 INJECTION INTRAMUSCULAR; INTRAVENOUS at 09:03

## 2021-01-01 RX ADMIN — PROPOFOL 30 MCG/KG/MIN: 10 INJECTION, EMULSION INTRAVENOUS at 07:27

## 2021-01-01 RX ADMIN — ATORVASTATIN CALCIUM 40 MG: 40 TABLET, FILM COATED ORAL at 20:36

## 2021-01-01 RX ADMIN — FAMOTIDINE 20 MG: 20 TABLET ORAL at 21:05

## 2021-01-01 RX ADMIN — DEXAMETHASONE SODIUM PHOSPHATE 10 MG: 10 INJECTION INTRAMUSCULAR; INTRAVENOUS at 08:13

## 2021-01-01 RX ADMIN — POTASSIUM CHLORIDE 40 MEQ: 10 CAPSULE, COATED, EXTENDED RELEASE ORAL at 09:33

## 2021-01-01 RX ADMIN — BUDESONIDE 0.5 MG: 0.5 INHALANT ORAL at 18:53

## 2021-01-01 RX ADMIN — FUROSEMIDE 40 MG: 10 INJECTION INTRAMUSCULAR; INTRAVENOUS at 08:49

## 2021-01-01 RX ADMIN — CEFEPIME HYDROCHLORIDE 2 G: 2 INJECTION, POWDER, FOR SOLUTION INTRAVENOUS at 12:44

## 2021-01-01 RX ADMIN — FUROSEMIDE 40 MG: 10 INJECTION INTRAMUSCULAR; INTRAVENOUS at 08:25

## 2021-01-01 RX ADMIN — IPRATROPIUM BROMIDE AND ALBUTEROL SULFATE 3 ML: .5; 2.5 SOLUTION RESPIRATORY (INHALATION) at 07:04

## 2021-01-01 RX ADMIN — HYDROCORTISONE 2.5%: 25 CREAM TOPICAL at 20:37

## 2021-01-01 RX ADMIN — FUROSEMIDE 40 MG: 10 INJECTION INTRAMUSCULAR; INTRAVENOUS at 09:06

## 2021-01-01 RX ADMIN — ARFORMOTEROL TARTRATE 15 MCG: 15 SOLUTION RESPIRATORY (INHALATION) at 20:48

## 2021-01-01 RX ADMIN — INSULIN LISPRO 8 UNITS: 100 INJECTION, SOLUTION INTRAVENOUS; SUBCUTANEOUS at 12:30

## 2021-01-01 RX ADMIN — CEFEPIME 2 G: 1 INJECTION, SOLUTION INTRAVENOUS at 15:16

## 2021-01-01 RX ADMIN — BUDESONIDE 0.5 MG: 0.5 INHALANT ORAL at 08:41

## 2021-01-01 RX ADMIN — PROPOFOL 30 MCG/KG/MIN: 10 INJECTION, EMULSION INTRAVENOUS at 01:37

## 2021-01-01 RX ADMIN — INSULIN HUMAN 12 UNITS: 100 INJECTION, SOLUTION PARENTERAL at 06:17

## 2021-01-01 RX ADMIN — HYDROCORTISONE 2.5%: 25 CREAM TOPICAL at 09:02

## 2021-01-01 RX ADMIN — BUPROPION HYDROCHLORIDE 100 MG: 100 TABLET, FILM COATED ORAL at 20:03

## 2021-01-01 RX ADMIN — TOBRAMYCIN 300 MG: 300 SOLUTION ORAL at 09:00

## 2021-01-01 RX ADMIN — FUROSEMIDE 40 MG: 10 INJECTION INTRAMUSCULAR; INTRAVENOUS at 10:52

## 2021-01-01 RX ADMIN — HEPARIN SODIUM 5000 UNITS: 5000 INJECTION, SOLUTION INTRAVENOUS; SUBCUTANEOUS at 14:07

## 2021-01-01 RX ADMIN — GUAIFENESIN 600 MG: 600 TABLET, EXTENDED RELEASE ORAL at 21:33

## 2021-01-01 RX ADMIN — CEFEPIME HYDROCHLORIDE 2 G: 2 INJECTION, POWDER, FOR SOLUTION INTRAVENOUS at 05:37

## 2021-01-01 RX ADMIN — ACETAMINOPHEN 650 MG: 325 TABLET ORAL at 15:05

## 2021-01-01 RX ADMIN — INSULIN DETEMIR 85 UNITS: 100 INJECTION, SOLUTION SUBCUTANEOUS at 09:08

## 2021-01-01 RX ADMIN — PROPOFOL 15 MCG/KG/MIN: 10 INJECTION, EMULSION INTRAVENOUS at 14:07

## 2021-01-01 RX ADMIN — AMPICILLIN SODIUM AND SULBACTAM SODIUM 3 G: 2; 1 INJECTION, POWDER, FOR SOLUTION INTRAMUSCULAR; INTRAVENOUS at 04:50

## 2021-01-01 RX ADMIN — DEXAMETHASONE SODIUM PHOSPHATE 10 MG: 10 INJECTION INTRAMUSCULAR; INTRAVENOUS at 08:59

## 2021-01-01 RX ADMIN — ACETAMINOPHEN 650 MG: 325 TABLET ORAL at 16:03

## 2021-01-01 RX ADMIN — GABAPENTIN 300 MG: 300 CAPSULE ORAL at 20:14

## 2021-01-01 RX ADMIN — ARFORMOTEROL TARTRATE 15 MCG: 15 SOLUTION RESPIRATORY (INHALATION) at 19:52

## 2021-01-01 RX ADMIN — ARFORMOTEROL TARTRATE 15 MCG: 15 SOLUTION RESPIRATORY (INHALATION) at 19:17

## 2021-01-01 RX ADMIN — BUPROPION HYDROCHLORIDE 100 MG: 100 TABLET, FILM COATED ORAL at 09:57

## 2021-01-01 RX ADMIN — MAGNESIUM SULFATE HEPTAHYDRATE 1 G: 1 INJECTION, SOLUTION INTRAVENOUS at 20:39

## 2021-01-01 RX ADMIN — FAMOTIDINE 20 MG: 10 INJECTION INTRAVENOUS at 09:49

## 2021-01-01 RX ADMIN — POTASSIUM CHLORIDE 40 MEQ: 1.5 POWDER, FOR SOLUTION ORAL at 11:06

## 2021-01-01 RX ADMIN — CEFTRIAXONE 1 G: 10 INJECTION, POWDER, FOR SOLUTION INTRAVENOUS at 08:28

## 2021-01-01 RX ADMIN — FAMOTIDINE 20 MG: 20 TABLET ORAL at 08:21

## 2021-01-01 RX ADMIN — CEFEPIME HYDROCHLORIDE 2 G: 2 INJECTION, POWDER, FOR SOLUTION INTRAVENOUS at 13:30

## 2021-01-01 RX ADMIN — GABAPENTIN 300 MG: 300 CAPSULE ORAL at 10:27

## 2021-01-01 RX ADMIN — FAMOTIDINE 20 MG: 10 INJECTION INTRAVENOUS at 08:59

## 2021-01-01 RX ADMIN — GUAIFENESIN 600 MG: 600 TABLET, EXTENDED RELEASE ORAL at 20:34

## 2021-01-01 RX ADMIN — SPIRONOLACTONE 25 MG: 25 TABLET ORAL at 08:48

## 2021-01-01 RX ADMIN — CEFEPIME 2 G: 1 INJECTION, SOLUTION INTRAVENOUS at 05:01

## 2021-01-01 RX ADMIN — ARFORMOTEROL TARTRATE 15 MCG: 15 SOLUTION RESPIRATORY (INHALATION) at 22:45

## 2021-01-01 RX ADMIN — IPRATROPIUM BROMIDE AND ALBUTEROL SULFATE 3 ML: .5; 2.5 SOLUTION RESPIRATORY (INHALATION) at 07:01

## 2021-01-01 RX ADMIN — INSULIN LISPRO 8 UNITS: 100 INJECTION, SOLUTION INTRAVENOUS; SUBCUTANEOUS at 18:03

## 2021-01-01 RX ADMIN — GUAIFENESIN 600 MG: 600 TABLET, EXTENDED RELEASE ORAL at 09:35

## 2021-01-01 RX ADMIN — ARFORMOTEROL TARTRATE 15 MCG: 15 SOLUTION RESPIRATORY (INHALATION) at 19:45

## 2021-01-01 RX ADMIN — INSULIN HUMAN 22.7 UNITS/HR: 1 INJECTION, SOLUTION INTRAVENOUS at 05:04

## 2021-01-01 RX ADMIN — GUAIFENESIN 600 MG: 600 TABLET, EXTENDED RELEASE ORAL at 08:52

## 2021-01-01 RX ADMIN — FAMOTIDINE 20 MG: 20 TABLET ORAL at 09:09

## 2021-01-01 RX ADMIN — ENOXAPARIN SODIUM 40 MG: 40 INJECTION SUBCUTANEOUS at 09:02

## 2021-01-01 RX ADMIN — IPRATROPIUM BROMIDE AND ALBUTEROL SULFATE 3 ML: .5; 2.5 SOLUTION RESPIRATORY (INHALATION) at 13:29

## 2021-01-01 RX ADMIN — CEFEPIME HYDROCHLORIDE 2 G: 2 INJECTION, POWDER, FOR SOLUTION INTRAVENOUS at 20:21

## 2021-01-01 RX ADMIN — INSULIN DETEMIR 35 UNITS: 100 INJECTION, SOLUTION SUBCUTANEOUS at 20:44

## 2021-01-01 RX ADMIN — PROPOFOL 15 MCG/KG/MIN: 10 INJECTION, EMULSION INTRAVENOUS at 03:18

## 2021-01-01 RX ADMIN — SODIUM BICARBONATE 150 MEQ: 84 INJECTION, SOLUTION INTRAVENOUS at 04:22

## 2021-01-01 RX ADMIN — INSULIN HUMAN 19.1 UNITS/HR: 1 INJECTION, SOLUTION INTRAVENOUS at 19:44

## 2021-01-01 RX ADMIN — DEXAMETHASONE SODIUM PHOSPHATE 8 MG: 10 INJECTION INTRAMUSCULAR; INTRAVENOUS at 22:01

## 2021-01-01 RX ADMIN — METHYLPREDNISOLONE SODIUM SUCCINATE 20 MG: 40 INJECTION, POWDER, FOR SOLUTION INTRAMUSCULAR; INTRAVENOUS at 20:33

## 2021-01-01 RX ADMIN — METHYLPREDNISOLONE SODIUM SUCCINATE 20 MG: 40 INJECTION, POWDER, FOR SOLUTION INTRAMUSCULAR; INTRAVENOUS at 09:03

## 2021-01-01 RX ADMIN — FUROSEMIDE 40 MG: 10 INJECTION INTRAMUSCULAR; INTRAVENOUS at 18:46

## 2021-01-01 RX ADMIN — REMDESIVIR 100 MG: 100 INJECTION, POWDER, LYOPHILIZED, FOR SOLUTION INTRAVENOUS at 12:46

## 2021-01-01 RX ADMIN — FUROSEMIDE 40 MG: 10 INJECTION INTRAMUSCULAR; INTRAVENOUS at 09:37

## 2021-01-01 RX ADMIN — INSULIN LISPRO 10 UNITS: 100 INJECTION, SOLUTION INTRAVENOUS; SUBCUTANEOUS at 12:49

## 2021-01-01 RX ADMIN — METHYLPREDNISOLONE SODIUM SUCCINATE 20 MG: 40 INJECTION, POWDER, FOR SOLUTION INTRAMUSCULAR; INTRAVENOUS at 20:36

## 2021-01-01 RX ADMIN — DOCUSATE SODIUM 50MG AND SENNOSIDES 8.6MG 2 TABLET: 8.6; 5 TABLET, FILM COATED ORAL at 08:41

## 2021-01-01 RX ADMIN — ENOXAPARIN SODIUM 40 MG: 40 INJECTION SUBCUTANEOUS at 22:18

## 2021-01-01 RX ADMIN — CEFEPIME HYDROCHLORIDE 2 G: 2 INJECTION, POWDER, FOR SOLUTION INTRAVENOUS at 05:24

## 2021-01-01 RX ADMIN — ATORVASTATIN CALCIUM 40 MG: 40 TABLET, FILM COATED ORAL at 08:40

## 2021-01-01 RX ADMIN — AMPICILLIN SODIUM AND SULBACTAM SODIUM 3 G: 2; 1 INJECTION, POWDER, FOR SOLUTION INTRAMUSCULAR; INTRAVENOUS at 05:37

## 2021-01-01 RX ADMIN — INSULIN DETEMIR 80 UNITS: 100 INJECTION, SOLUTION SUBCUTANEOUS at 21:18

## 2021-01-01 RX ADMIN — INSULIN HUMAN 9.8 UNITS/HR: 1 INJECTION, SOLUTION INTRAVENOUS at 20:34

## 2021-01-01 RX ADMIN — Medication 3 ML: at 21:00

## 2021-01-01 RX ADMIN — IPRATROPIUM BROMIDE AND ALBUTEROL SULFATE 3 ML: .5; 2.5 SOLUTION RESPIRATORY (INHALATION) at 13:27

## 2021-01-01 RX ADMIN — INSULIN DETEMIR 40 UNITS: 100 INJECTION, SOLUTION SUBCUTANEOUS at 09:03

## 2021-01-01 RX ADMIN — DEXAMETHASONE SODIUM PHOSPHATE 10 MG: 10 INJECTION INTRAMUSCULAR; INTRAVENOUS at 20:35

## 2021-01-01 RX ADMIN — FAMOTIDINE 20 MG: 20 TABLET ORAL at 06:27

## 2021-01-01 RX ADMIN — GUAIFENESIN 600 MG: 600 TABLET, EXTENDED RELEASE ORAL at 20:16

## 2021-01-01 RX ADMIN — DEXAMETHASONE 6 MG: 4 TABLET ORAL at 08:52

## 2021-01-01 RX ADMIN — PREDNISONE 40 MG: 20 TABLET ORAL at 08:18

## 2021-01-01 RX ADMIN — BUDESONIDE 0.5 MG: 0.5 INHALANT ORAL at 09:25

## 2021-01-01 RX ADMIN — INSULIN HUMAN 3.6 UNITS/HR: 1 INJECTION, SOLUTION INTRAVENOUS at 13:16

## 2021-01-01 RX ADMIN — FAMOTIDINE 20 MG: 20 TABLET ORAL at 09:45

## 2021-01-01 RX ADMIN — CEFEPIME HYDROCHLORIDE 2 G: 2 INJECTION, POWDER, FOR SOLUTION INTRAVENOUS at 04:39

## 2021-01-01 RX ADMIN — INSULIN LISPRO 3 UNITS: 100 INJECTION, SOLUTION INTRAVENOUS; SUBCUTANEOUS at 12:48

## 2021-01-01 RX ADMIN — AZITHROMYCIN 500 MG: 500 INJECTION, POWDER, LYOPHILIZED, FOR SOLUTION INTRAVENOUS at 20:01

## 2021-01-01 RX ADMIN — VASOPRESSIN 0.05 UNITS/MIN: 20 INJECTION INTRAVENOUS at 13:10

## 2021-01-01 RX ADMIN — PROPOFOL 15 MCG/KG/MIN: 10 INJECTION, EMULSION INTRAVENOUS at 09:42

## 2021-01-01 RX ADMIN — AZITHROMYCIN 500 MG: 500 INJECTION, POWDER, LYOPHILIZED, FOR SOLUTION INTRAVENOUS at 20:47

## 2021-01-01 RX ADMIN — INSULIN DETEMIR 30 UNITS: 100 INJECTION, SOLUTION SUBCUTANEOUS at 08:58

## 2021-01-01 RX ADMIN — INSULIN LISPRO 5 UNITS: 100 INJECTION, SOLUTION INTRAVENOUS; SUBCUTANEOUS at 13:14

## 2021-01-01 RX ADMIN — ARFORMOTEROL TARTRATE 15 MCG: 15 SOLUTION RESPIRATORY (INHALATION) at 18:30

## 2021-01-01 RX ADMIN — SODIUM POLYSTYRENE SULFONATE 30 G: 15 SUSPENSION ORAL; RECTAL at 15:39

## 2021-01-01 RX ADMIN — FAMOTIDINE 20 MG: 20 TABLET ORAL at 17:51

## 2021-01-01 RX ADMIN — SODIUM CHLORIDE, PRESERVATIVE FREE 3 ML: 5 INJECTION INTRAVENOUS at 09:05

## 2021-01-01 RX ADMIN — CEFEPIME HYDROCHLORIDE 2 G: 2 INJECTION, POWDER, FOR SOLUTION INTRAVENOUS at 20:32

## 2021-01-01 RX ADMIN — FUROSEMIDE 40 MG: 10 INJECTION INTRAMUSCULAR; INTRAVENOUS at 17:47

## 2021-01-01 RX ADMIN — IPRATROPIUM BROMIDE AND ALBUTEROL SULFATE 3 ML: .5; 2.5 SOLUTION RESPIRATORY (INHALATION) at 19:54

## 2021-01-01 RX ADMIN — ATORVASTATIN CALCIUM 40 MG: 40 TABLET, FILM COATED ORAL at 10:48

## 2021-01-01 RX ADMIN — PROPOFOL 15 MCG/KG/MIN: 10 INJECTION, EMULSION INTRAVENOUS at 21:22

## 2021-01-01 RX ADMIN — ATORVASTATIN CALCIUM 40 MG: 40 TABLET, FILM COATED ORAL at 09:05

## 2021-01-01 RX ADMIN — MAGNESIUM SULFATE 2 G: 2 INJECTION INTRAVENOUS at 12:30

## 2021-01-01 RX ADMIN — BUDESONIDE 0.5 MG: 0.5 INHALANT ORAL at 07:06

## 2021-01-01 RX ADMIN — BETHANECHOL CHLORIDE 25 MG: 25 TABLET ORAL at 10:27

## 2021-01-01 RX ADMIN — FAMOTIDINE 20 MG: 20 TABLET ORAL at 08:15

## 2021-01-01 RX ADMIN — ASPIRIN 81 MG CHEWABLE TABLET 81 MG: 81 TABLET CHEWABLE at 08:13

## 2021-01-01 RX ADMIN — BUDESONIDE 0.5 MG: 0.5 INHALANT ORAL at 07:04

## 2021-01-01 RX ADMIN — ALBUMIN HUMAN 25 G: 0.25 SOLUTION INTRAVENOUS at 15:30

## 2021-01-01 RX ADMIN — INSULIN DETEMIR 50 UNITS: 100 INJECTION, SOLUTION SUBCUTANEOUS at 20:34

## 2021-01-01 RX ADMIN — METHYLPREDNISOLONE SODIUM SUCCINATE 20 MG: 40 INJECTION, POWDER, FOR SOLUTION INTRAMUSCULAR; INTRAVENOUS at 16:28

## 2021-01-01 RX ADMIN — INSULIN LISPRO 8 UNITS: 100 INJECTION, SOLUTION INTRAVENOUS; SUBCUTANEOUS at 09:54

## 2021-01-01 RX ADMIN — DIPHENHYDRAMINE HYDROCHLORIDE AND LIDOCAINE HYDROCHLORIDE AND ALUMINUM HYDROXIDE AND MAGNESIUM HYDRO 5 ML: KIT at 09:51

## 2021-01-01 RX ADMIN — POTASSIUM CHLORIDE 40 MEQ: 10 CAPSULE, COATED, EXTENDED RELEASE ORAL at 11:38

## 2021-01-01 RX ADMIN — DIPHENHYDRAMINE HYDROCHLORIDE AND LIDOCAINE HYDROCHLORIDE AND ALUMINUM HYDROXIDE AND MAGNESIUM HYDRO 5 ML: KIT at 08:18

## 2021-01-01 RX ADMIN — BISACODYL 5 MG: 5 TABLET, COATED ORAL at 18:44

## 2021-01-01 RX ADMIN — ALPRAZOLAM 0.25 MG: 0.25 TABLET ORAL at 20:53

## 2021-01-01 RX ADMIN — INSULIN DETEMIR 40 UNITS: 100 INJECTION, SOLUTION SUBCUTANEOUS at 20:38

## 2021-01-01 RX ADMIN — BUDESONIDE 0.5 MG: 0.5 INHALANT ORAL at 22:45

## 2021-01-01 RX ADMIN — ARFORMOTEROL TARTRATE 15 MCG: 15 SOLUTION RESPIRATORY (INHALATION) at 08:32

## 2021-01-01 RX ADMIN — ATORVASTATIN CALCIUM 40 MG: 40 TABLET, FILM COATED ORAL at 08:48

## 2021-01-01 RX ADMIN — FAMOTIDINE 20 MG: 10 INJECTION INTRAVENOUS at 20:25

## 2021-01-01 RX ADMIN — VASOPRESSIN 0.04 UNITS/MIN: 20 INJECTION INTRAVENOUS at 06:57

## 2021-01-01 RX ADMIN — ASPIRIN 81 MG: 81 TABLET, CHEWABLE ORAL at 08:30

## 2021-01-01 RX ADMIN — TOBRAMYCIN 300 MG: 300 SOLUTION ORAL at 09:34

## 2021-01-01 RX ADMIN — ATORVASTATIN CALCIUM 40 MG: 40 TABLET, FILM COATED ORAL at 20:32

## 2021-01-01 RX ADMIN — TOBRAMYCIN 300 MG: 300 SOLUTION ORAL at 15:32

## 2021-01-01 RX ADMIN — INSULIN DETEMIR 40 UNITS: 100 INJECTION, SOLUTION SUBCUTANEOUS at 20:56

## 2021-01-01 RX ADMIN — HYDROCORTISONE 2.5%: 25 CREAM TOPICAL at 22:00

## 2021-01-01 RX ADMIN — MAGNESIUM SULFATE HEPTAHYDRATE 1 G: 1 INJECTION, SOLUTION INTRAVENOUS at 20:33

## 2021-01-01 RX ADMIN — SPIRONOLACTONE 25 MG: 25 TABLET ORAL at 10:48

## 2021-01-01 RX ADMIN — AMIODARONE HYDROCHLORIDE 150 MG: 1.5 INJECTION, SOLUTION INTRAVENOUS at 13:55

## 2021-01-01 RX ADMIN — INSULIN DETEMIR 25 UNITS: 100 INJECTION, SOLUTION SUBCUTANEOUS at 08:49

## 2021-01-01 RX ADMIN — FUROSEMIDE 40 MG: 10 INJECTION INTRAMUSCULAR; INTRAVENOUS at 08:53

## 2021-01-01 RX ADMIN — CEFEPIME HYDROCHLORIDE 2 G: 2 INJECTION, POWDER, FOR SOLUTION INTRAVENOUS at 20:30

## 2021-01-01 RX ADMIN — INSULIN LISPRO 3 UNITS: 100 INJECTION, SOLUTION INTRAVENOUS; SUBCUTANEOUS at 12:31

## 2021-01-01 RX ADMIN — GUAIFENESIN 600 MG: 600 TABLET, EXTENDED RELEASE ORAL at 09:03

## 2021-01-01 RX ADMIN — INSULIN DETEMIR 30 UNITS: 100 INJECTION, SOLUTION SUBCUTANEOUS at 21:58

## 2021-01-01 RX ADMIN — MAGNESIUM SULFATE 2 G: 2 INJECTION INTRAVENOUS at 12:03

## 2021-01-01 RX ADMIN — MAGNESIUM SULFATE HEPTAHYDRATE 1 G: 1 INJECTION, SOLUTION INTRAVENOUS at 17:49

## 2021-01-01 RX ADMIN — ARFORMOTEROL TARTRATE 15 MCG: 15 SOLUTION RESPIRATORY (INHALATION) at 18:54

## 2021-01-01 RX ADMIN — INSULIN DETEMIR 85 UNITS: 100 INJECTION, SOLUTION SUBCUTANEOUS at 10:49

## 2021-01-01 RX ADMIN — TOBRAMYCIN 300 MG: 300 SOLUTION ORAL at 21:06

## 2021-01-01 RX ADMIN — CEFEPIME HYDROCHLORIDE 2 G: 2 INJECTION, POWDER, FOR SOLUTION INTRAVENOUS at 12:11

## 2021-01-01 RX ADMIN — INSULIN DETEMIR 50 UNITS: 100 INJECTION, SOLUTION SUBCUTANEOUS at 09:41

## 2021-01-01 RX ADMIN — METHYLPREDNISOLONE SODIUM SUCCINATE 20 MG: 40 INJECTION, POWDER, FOR SOLUTION INTRAMUSCULAR; INTRAVENOUS at 09:50

## 2021-01-01 RX ADMIN — ARFORMOTEROL TARTRATE 15 MCG: 15 SOLUTION RESPIRATORY (INHALATION) at 07:46

## 2021-01-01 RX ADMIN — INSULIN LISPRO 3 UNITS: 100 INJECTION, SOLUTION INTRAVENOUS; SUBCUTANEOUS at 13:29

## 2021-01-01 RX ADMIN — BUDESONIDE 0.5 MG: 0.5 INHALANT ORAL at 21:51

## 2021-01-01 RX ADMIN — CEFEPIME HYDROCHLORIDE 2 G: 2 INJECTION, POWDER, FOR SOLUTION INTRAVENOUS at 12:25

## 2021-01-01 RX ADMIN — HEPARIN SODIUM 5000 UNITS: 5000 INJECTION, SOLUTION INTRAVENOUS; SUBCUTANEOUS at 21:52

## 2021-01-01 RX ADMIN — INSULIN DETEMIR 40 UNITS: 100 INJECTION, SOLUTION SUBCUTANEOUS at 09:00

## 2021-01-01 RX ADMIN — POTASSIUM CHLORIDE 40 MEQ: 10 CAPSULE, COATED, EXTENDED RELEASE ORAL at 12:30

## 2021-01-01 RX ADMIN — ARFORMOTEROL TARTRATE 15 MCG: 15 SOLUTION RESPIRATORY (INHALATION) at 18:43

## 2021-01-01 RX ADMIN — CEFTRIAXONE 1 G: 10 INJECTION, POWDER, FOR SOLUTION INTRAVENOUS at 08:53

## 2021-01-01 RX ADMIN — TOBRAMYCIN 300 MG: 300 SOLUTION ORAL at 20:48

## 2021-01-01 RX ADMIN — INSULIN LISPRO 14 UNITS: 100 INJECTION, SOLUTION INTRAVENOUS; SUBCUTANEOUS at 17:37

## 2021-01-01 RX ADMIN — FENTANYL CITRATE 75 MCG/HR: 50 INJECTION, SOLUTION INTRAMUSCULAR; INTRAVENOUS at 00:08

## 2021-01-01 RX ADMIN — OXYCODONE HYDROCHLORIDE 5 MG: 5 TABLET ORAL at 11:27

## 2021-01-01 RX ADMIN — LACTULOSE 30 G: 20 SOLUTION ORAL at 23:20

## 2021-01-01 RX ADMIN — GUAIFENESIN 600 MG: 600 TABLET, EXTENDED RELEASE ORAL at 08:13

## 2021-01-01 RX ADMIN — BETHANECHOL CHLORIDE 25 MG: 25 TABLET ORAL at 08:59

## 2021-01-01 RX ADMIN — DEXAMETHASONE SODIUM PHOSPHATE 6 MG: 10 INJECTION INTRAMUSCULAR; INTRAVENOUS at 08:53

## 2021-01-01 RX ADMIN — CEFEPIME HYDROCHLORIDE 2 G: 2 INJECTION, POWDER, FOR SOLUTION INTRAVENOUS at 03:28

## 2021-01-01 RX ADMIN — INSULIN LISPRO 7 UNITS: 100 INJECTION, SOLUTION INTRAVENOUS; SUBCUTANEOUS at 13:04

## 2021-01-01 RX ADMIN — BUPROPION HYDROCHLORIDE 100 MG: 100 TABLET, FILM COATED ORAL at 10:27

## 2021-01-01 RX ADMIN — INSULIN LISPRO 10 UNITS: 100 INJECTION, SOLUTION INTRAVENOUS; SUBCUTANEOUS at 12:55

## 2021-01-01 RX ADMIN — DIPHENHYDRAMINE HYDROCHLORIDE AND LIDOCAINE HYDROCHLORIDE AND ALUMINUM HYDROXIDE AND MAGNESIUM HYDRO 5 ML: KIT at 20:39

## 2021-01-01 RX ADMIN — IPRATROPIUM BROMIDE AND ALBUTEROL SULFATE 3 ML: .5; 2.5 SOLUTION RESPIRATORY (INHALATION) at 11:08

## 2021-01-01 RX ADMIN — BUDESONIDE 0.5 MG: 0.5 INHALANT ORAL at 19:19

## 2021-01-01 RX ADMIN — INSULIN DETEMIR 80 UNITS: 100 INJECTION, SOLUTION SUBCUTANEOUS at 09:45

## 2021-01-01 RX ADMIN — BARICITINIB 4 MG: 2 TABLET, FILM COATED ORAL at 14:07

## 2021-01-01 RX ADMIN — SODIUM CHLORIDE 1 G: 9 INJECTION INTRAMUSCULAR; INTRAVENOUS; SUBCUTANEOUS at 21:59

## 2021-01-01 RX ADMIN — GUAIFENESIN 600 MG: 600 TABLET, EXTENDED RELEASE ORAL at 08:30

## 2021-01-01 RX ADMIN — ARFORMOTEROL TARTRATE 15 MCG: 15 SOLUTION RESPIRATORY (INHALATION) at 19:33

## 2021-01-01 RX ADMIN — SODIUM CHLORIDE, PRESERVATIVE FREE 10 ML: 5 INJECTION INTRAVENOUS at 09:58

## 2021-01-01 RX ADMIN — LISINOPRIL 10 MG: 10 TABLET ORAL at 09:36

## 2021-01-01 RX ADMIN — ATORVASTATIN CALCIUM 40 MG: 40 TABLET, FILM COATED ORAL at 10:05

## 2021-01-01 RX ADMIN — FAMOTIDINE 20 MG: 20 TABLET ORAL at 17:53

## 2021-01-01 RX ADMIN — IPRATROPIUM BROMIDE AND ALBUTEROL SULFATE 3 ML: .5; 2.5 SOLUTION RESPIRATORY (INHALATION) at 18:06

## 2021-01-01 RX ADMIN — CEFEPIME HYDROCHLORIDE 2 G: 2 INJECTION, POWDER, FOR SOLUTION INTRAVENOUS at 20:26

## 2021-01-01 RX ADMIN — BUDESONIDE 0.5 MG: 0.5 INHALANT ORAL at 19:54

## 2021-01-01 RX ADMIN — ARFORMOTEROL TARTRATE 15 MCG: 15 SOLUTION RESPIRATORY (INHALATION) at 19:11

## 2021-01-01 RX ADMIN — REMDESIVIR 100 MG: 100 INJECTION, POWDER, LYOPHILIZED, FOR SOLUTION INTRAVENOUS at 14:09

## 2021-01-01 RX ADMIN — INSULIN DETEMIR 7 UNITS: 100 INJECTION, SOLUTION SUBCUTANEOUS at 09:19

## 2021-01-01 RX ADMIN — ASPIRIN 81 MG CHEWABLE TABLET 81 MG: 81 TABLET CHEWABLE at 09:35

## 2021-01-01 RX ADMIN — ARFORMOTEROL TARTRATE 15 MCG: 15 SOLUTION RESPIRATORY (INHALATION) at 18:17

## 2021-01-01 RX ADMIN — ARFORMOTEROL TARTRATE 15 MCG: 15 SOLUTION RESPIRATORY (INHALATION) at 20:30

## 2021-01-01 RX ADMIN — ARFORMOTEROL TARTRATE 15 MCG: 15 SOLUTION RESPIRATORY (INHALATION) at 19:34

## 2021-01-01 RX ADMIN — ENOXAPARIN SODIUM 40 MG: 40 INJECTION SUBCUTANEOUS at 22:49

## 2021-01-01 RX ADMIN — FAMOTIDINE 20 MG: 10 INJECTION INTRAVENOUS at 17:40

## 2021-01-01 RX ADMIN — SODIUM CHLORIDE 1000 ML: 9 INJECTION, SOLUTION INTRAVENOUS at 22:01

## 2021-01-01 RX ADMIN — POTASSIUM CHLORIDE 40 MEQ: 10 CAPSULE, COATED, EXTENDED RELEASE ORAL at 17:56

## 2021-01-01 RX ADMIN — CEFEPIME HYDROCHLORIDE 2 G: 2 INJECTION, POWDER, FOR SOLUTION INTRAVENOUS at 03:51

## 2021-01-01 RX ADMIN — CEFEPIME HYDROCHLORIDE 2 G: 2 INJECTION, POWDER, FOR SOLUTION INTRAVENOUS at 20:47

## 2021-01-01 RX ADMIN — BUDESONIDE 0.5 MG: 0.5 INHALANT ORAL at 19:44

## 2021-01-01 RX ADMIN — ENOXAPARIN SODIUM 40 MG: 40 INJECTION SUBCUTANEOUS at 08:52

## 2021-01-01 RX ADMIN — CEFEPIME 2 G: 1 INJECTION, SOLUTION INTRAVENOUS at 21:10

## 2021-01-01 RX ADMIN — FUROSEMIDE 40 MG: 10 INJECTION INTRAMUSCULAR; INTRAVENOUS at 08:50

## 2021-01-01 RX ADMIN — FUROSEMIDE 40 MG: 10 INJECTION INTRAMUSCULAR; INTRAVENOUS at 11:59

## 2021-01-01 RX ADMIN — ENOXAPARIN SODIUM 40 MG: 100 INJECTION SUBCUTANEOUS at 08:58

## 2021-01-01 RX ADMIN — BUDESONIDE 0.5 MG: 0.5 INHALANT ORAL at 10:11

## 2021-01-01 RX ADMIN — LACTULOSE 30 G: 20 SOLUTION ORAL at 20:19

## 2021-01-01 RX ADMIN — IPRATROPIUM BROMIDE AND ALBUTEROL SULFATE 3 ML: .5; 2.5 SOLUTION RESPIRATORY (INHALATION) at 02:50

## 2021-01-01 RX ADMIN — BUDESONIDE 0.5 MG: 0.5 INHALANT ORAL at 07:00

## 2021-01-01 RX ADMIN — FLUTICASONE PROPIONATE 2 SPRAY: 50 SPRAY, METERED NASAL at 08:51

## 2021-01-01 RX ADMIN — TOBRAMYCIN 300 MG: 300 SOLUTION ORAL at 22:05

## 2021-01-01 RX ADMIN — BUDESONIDE 0.5 MG: 0.5 INHALANT ORAL at 07:11

## 2021-01-01 RX ADMIN — Medication 0.05 MCG/KG/MIN: at 09:35

## 2021-01-01 RX ADMIN — METHYLPREDNISOLONE SODIUM SUCCINATE 40 MG: 40 INJECTION, POWDER, FOR SOLUTION INTRAMUSCULAR; INTRAVENOUS at 20:13

## 2021-01-01 RX ADMIN — VASOPRESSIN 0.05 UNITS/MIN: 20 INJECTION INTRAVENOUS at 18:48

## 2021-01-01 RX ADMIN — CALCIUM GLUCONATE 1 G: 20 INJECTION, SOLUTION INTRAVENOUS at 08:10

## 2021-01-01 RX ADMIN — INSULIN LISPRO 5 UNITS: 100 INJECTION, SOLUTION INTRAVENOUS; SUBCUTANEOUS at 08:50

## 2021-01-01 RX ADMIN — FAMOTIDINE 20 MG: 20 TABLET ORAL at 17:02

## 2021-01-01 RX ADMIN — SODIUM CHLORIDE, PRESERVATIVE FREE 20 ML: 5 INJECTION INTRAVENOUS at 20:00

## 2021-01-01 RX ADMIN — ALUMINUM HYDROXIDE, MAGNESIUM HYDROXIDE, AND DIMETHICONE 15 ML: 400; 400; 40 SUSPENSION ORAL at 14:56

## 2021-01-01 RX ADMIN — INSULIN DETEMIR 40 UNITS: 100 INJECTION, SOLUTION SUBCUTANEOUS at 09:16

## 2021-01-01 RX ADMIN — CEFEPIME 2 G: 1 INJECTION, SOLUTION INTRAVENOUS at 22:44

## 2021-01-01 RX ADMIN — BUDESONIDE 0.5 MG: 0.5 INHALANT ORAL at 08:13

## 2021-01-01 RX ADMIN — TOBRAMYCIN 300 MG: 300 SOLUTION ORAL at 10:09

## 2021-01-01 RX ADMIN — PROPOFOL 10 MCG/KG/MIN: 10 INJECTION, EMULSION INTRAVENOUS at 14:36

## 2021-01-01 RX ADMIN — IPRATROPIUM BROMIDE AND ALBUTEROL SULFATE 3 ML: .5; 2.5 SOLUTION RESPIRATORY (INHALATION) at 19:11

## 2021-01-01 RX ADMIN — DEXTROSE MONOHYDRATE 70 ML: 100 INJECTION, SOLUTION INTRAVENOUS at 09:02

## 2021-01-01 RX ADMIN — ATORVASTATIN CALCIUM 40 MG: 40 TABLET, FILM COATED ORAL at 09:22

## 2021-01-01 RX ADMIN — ACETAMINOPHEN 650 MG: 325 TABLET ORAL at 09:44

## 2021-01-01 RX ADMIN — IPRATROPIUM BROMIDE AND ALBUTEROL SULFATE 3 ML: .5; 2.5 SOLUTION RESPIRATORY (INHALATION) at 19:57

## 2021-01-01 RX ADMIN — INSULIN LISPRO 5 UNITS: 100 INJECTION, SOLUTION INTRAVENOUS; SUBCUTANEOUS at 18:07

## 2021-01-01 RX ADMIN — GUAIFENESIN 600 MG: 600 TABLET, EXTENDED RELEASE ORAL at 20:24

## 2021-01-01 RX ADMIN — INSULIN DETEMIR 50 UNITS: 100 INJECTION, SOLUTION SUBCUTANEOUS at 20:36

## 2021-01-01 RX ADMIN — FAMOTIDINE 20 MG: 20 TABLET ORAL at 16:36

## 2021-01-01 RX ADMIN — FLUTICASONE PROPIONATE 2 SPRAY: 50 SPRAY, METERED NASAL at 08:41

## 2021-01-01 RX ADMIN — METOLAZONE 10 MG: 5 TABLET ORAL at 12:13

## 2021-01-01 RX ADMIN — HYDROCORTISONE 2.5%: 25 CREAM TOPICAL at 20:39

## 2021-01-01 RX ADMIN — ARFORMOTEROL TARTRATE 15 MCG: 15 SOLUTION RESPIRATORY (INHALATION) at 07:18

## 2021-01-01 RX ADMIN — INSULIN LISPRO 5 UNITS: 100 INJECTION, SOLUTION INTRAVENOUS; SUBCUTANEOUS at 12:11

## 2021-01-01 RX ADMIN — METOLAZONE 10 MG: 5 TABLET ORAL at 08:40

## 2021-01-01 RX ADMIN — IPRATROPIUM BROMIDE AND ALBUTEROL SULFATE 3 ML: .5; 2.5 SOLUTION RESPIRATORY (INHALATION) at 19:52

## 2021-01-01 RX ADMIN — ROCURONIUM BROMIDE 49.7 MG: 10 INJECTION INTRAVENOUS at 09:34

## 2021-01-01 RX ADMIN — FUROSEMIDE 40 MG: 10 INJECTION INTRAMUSCULAR; INTRAVENOUS at 08:40

## 2021-01-01 RX ADMIN — ASPIRIN 81 MG: 81 TABLET, CHEWABLE ORAL at 09:51

## 2021-01-01 RX ADMIN — ARFORMOTEROL TARTRATE 15 MCG: 15 SOLUTION RESPIRATORY (INHALATION) at 18:59

## 2021-01-01 RX ADMIN — INSULIN HUMAN 20.7 UNITS/HR: 1 INJECTION, SOLUTION INTRAVENOUS at 03:14

## 2021-01-01 RX ADMIN — POTASSIUM CHLORIDE 40 MEQ: 10 CAPSULE, COATED, EXTENDED RELEASE ORAL at 12:03

## 2021-01-01 RX ADMIN — ARFORMOTEROL TARTRATE 15 MCG: 15 SOLUTION RESPIRATORY (INHALATION) at 19:55

## 2021-01-01 RX ADMIN — IPRATROPIUM BROMIDE AND ALBUTEROL SULFATE 3 ML: .5; 2.5 SOLUTION RESPIRATORY (INHALATION) at 14:10

## 2021-01-01 RX ADMIN — METHYLPREDNISOLONE SODIUM SUCCINATE 40 MG: 40 INJECTION, POWDER, FOR SOLUTION INTRAMUSCULAR; INTRAVENOUS at 18:06

## 2021-01-01 RX ADMIN — DIPHENHYDRAMINE HYDROCHLORIDE AND LIDOCAINE HYDROCHLORIDE AND ALUMINUM HYDROXIDE AND MAGNESIUM HYDRO 5 ML: KIT at 01:32

## 2021-01-01 RX ADMIN — GUAIFENESIN 600 MG: 600 TABLET, EXTENDED RELEASE ORAL at 20:22

## 2021-01-01 RX ADMIN — CEFTRIAXONE 1 G: 10 INJECTION, POWDER, FOR SOLUTION INTRAVENOUS at 08:52

## 2021-01-01 RX ADMIN — ATORVASTATIN CALCIUM 40 MG: 40 TABLET, FILM COATED ORAL at 08:25

## 2021-01-01 RX ADMIN — BUDESONIDE 0.5 MG: 0.5 INHALANT ORAL at 18:30

## 2021-01-01 RX ADMIN — POTASSIUM CHLORIDE 40 MEQ: 1.5 POWDER, FOR SOLUTION ORAL at 12:54

## 2021-01-01 RX ADMIN — IOPAMIDOL 100 ML: 755 INJECTION, SOLUTION INTRAVENOUS at 12:34

## 2021-01-01 RX ADMIN — GUAIFENESIN 600 MG: 600 TABLET, EXTENDED RELEASE ORAL at 09:08

## 2021-01-01 RX ADMIN — ARFORMOTEROL TARTRATE 15 MCG: 15 SOLUTION RESPIRATORY (INHALATION) at 20:42

## 2021-01-01 RX ADMIN — ENOXAPARIN SODIUM 40 MG: 40 INJECTION SUBCUTANEOUS at 08:54

## 2021-01-01 RX ADMIN — ETOMIDATE 29.8 MG: 40 INJECTION, SOLUTION INTRAVENOUS at 09:34

## 2021-01-01 RX ADMIN — AMPICILLIN SODIUM AND SULBACTAM SODIUM 3 G: 2; 1 INJECTION, POWDER, FOR SOLUTION INTRAMUSCULAR; INTRAVENOUS at 17:10

## 2021-01-01 RX ADMIN — INSULIN LISPRO 10 UNITS: 100 INJECTION, SOLUTION INTRAVENOUS; SUBCUTANEOUS at 09:05

## 2021-01-01 RX ADMIN — GUAIFENESIN 600 MG: 600 TABLET, EXTENDED RELEASE ORAL at 12:39

## 2021-01-01 RX ADMIN — AMPICILLIN SODIUM AND SULBACTAM SODIUM 3 G: 2; 1 INJECTION, POWDER, FOR SOLUTION INTRAMUSCULAR; INTRAVENOUS at 17:55

## 2021-01-01 RX ADMIN — TOBRAMYCIN 300 MG: 300 SOLUTION ORAL at 10:11

## 2021-01-01 RX ADMIN — BETHANECHOL CHLORIDE 25 MG: 25 TABLET ORAL at 17:49

## 2021-01-01 RX ADMIN — METOLAZONE 10 MG: 5 TABLET ORAL at 11:38

## 2021-01-01 RX ADMIN — BUDESONIDE 0.5 MG: 0.5 INHALANT ORAL at 19:34

## 2021-01-01 RX ADMIN — IPRATROPIUM BROMIDE AND ALBUTEROL SULFATE 3 ML: .5; 2.5 SOLUTION RESPIRATORY (INHALATION) at 11:43

## 2021-01-01 RX ADMIN — DOCUSATE SODIUM 50MG AND SENNOSIDES 8.6MG 2 TABLET: 8.6; 5 TABLET, FILM COATED ORAL at 20:13

## 2021-01-01 RX ADMIN — INSULIN LISPRO 10 UNITS: 100 INJECTION, SOLUTION INTRAVENOUS; SUBCUTANEOUS at 17:52

## 2021-01-01 RX ADMIN — INSULIN LISPRO 5 UNITS: 100 INJECTION, SOLUTION INTRAVENOUS; SUBCUTANEOUS at 13:13

## 2021-01-01 RX ADMIN — LACTULOSE 30 G: 20 SOLUTION ORAL at 08:21

## 2021-01-01 RX ADMIN — HYDROCORTISONE 2.5%: 25 CREAM TOPICAL at 08:40

## 2021-01-01 RX ADMIN — ALPRAZOLAM 0.25 MG: 0.25 TABLET ORAL at 20:45

## 2021-01-01 RX ADMIN — ARFORMOTEROL TARTRATE 15 MCG: 15 SOLUTION RESPIRATORY (INHALATION) at 19:21

## 2021-01-01 RX ADMIN — INSULIN HUMAN 14 UNITS: 100 INJECTION, SOLUTION PARENTERAL at 17:57

## 2021-01-01 RX ADMIN — ARFORMOTEROL TARTRATE 15 MCG: 15 SOLUTION RESPIRATORY (INHALATION) at 07:04

## 2021-01-01 RX ADMIN — IPRATROPIUM BROMIDE AND ALBUTEROL SULFATE 3 ML: .5; 2.5 SOLUTION RESPIRATORY (INHALATION) at 19:24

## 2021-01-01 RX ADMIN — TRAMADOL HYDROCHLORIDE 50 MG: 50 TABLET, FILM COATED ORAL at 21:57

## 2021-01-01 RX ADMIN — IPRATROPIUM BROMIDE AND ALBUTEROL SULFATE 3 ML: .5; 2.5 SOLUTION RESPIRATORY (INHALATION) at 00:16

## 2021-01-01 RX ADMIN — GUAIFENESIN 600 MG: 600 TABLET, EXTENDED RELEASE ORAL at 09:23

## 2021-01-01 RX ADMIN — BUDESONIDE AND FORMOTEROL FUMARATE DIHYDRATE 2 PUFF: 80; 4.5 AEROSOL RESPIRATORY (INHALATION) at 03:11

## 2021-01-01 RX ADMIN — IPRATROPIUM BROMIDE AND ALBUTEROL SULFATE 3 ML: .5; 2.5 SOLUTION RESPIRATORY (INHALATION) at 19:21

## 2021-01-01 RX ADMIN — ATORVASTATIN CALCIUM 40 MG: 40 TABLET, FILM COATED ORAL at 09:04

## 2021-01-01 RX ADMIN — FLUTICASONE PROPIONATE 2 SPRAY: 50 SPRAY, METERED NASAL at 09:05

## 2021-01-01 RX ADMIN — CEFEPIME 2 G: 1 INJECTION, SOLUTION INTRAVENOUS at 06:07

## 2021-01-01 RX ADMIN — BETHANECHOL CHLORIDE 25 MG: 25 TABLET ORAL at 16:28

## 2021-01-01 RX ADMIN — IPRATROPIUM BROMIDE AND ALBUTEROL SULFATE 3 ML: .5; 2.5 SOLUTION RESPIRATORY (INHALATION) at 06:48

## 2021-01-01 RX ADMIN — METOLAZONE 10 MG: 5 TABLET ORAL at 10:06

## 2021-01-01 RX ADMIN — INSULIN HUMAN 76 UNITS/HR: 1 INJECTION, SOLUTION INTRAVENOUS at 21:50

## 2021-01-01 RX ADMIN — SODIUM CHLORIDE, PRESERVATIVE FREE 3 ML: 5 INJECTION INTRAVENOUS at 08:07

## 2021-01-01 RX ADMIN — CEFEPIME 2 G: 1 INJECTION, SOLUTION INTRAVENOUS at 05:07

## 2021-01-01 RX ADMIN — LACTULOSE 30 G: 20 SOLUTION ORAL at 11:26

## 2021-01-01 RX ADMIN — BUPROPION HYDROCHLORIDE 100 MG: 100 TABLET, FILM COATED ORAL at 21:01

## 2021-01-01 RX ADMIN — METHYLPREDNISOLONE SODIUM SUCCINATE 20 MG: 40 INJECTION, POWDER, FOR SOLUTION INTRAMUSCULAR; INTRAVENOUS at 20:16

## 2021-01-01 RX ADMIN — DEXAMETHASONE SODIUM PHOSPHATE 10 MG: 10 INJECTION INTRAMUSCULAR; INTRAVENOUS at 09:06

## 2021-01-01 RX ADMIN — FUROSEMIDE 40 MG: 10 INJECTION INTRAMUSCULAR; INTRAVENOUS at 17:38

## 2021-01-01 RX ADMIN — SPIRONOLACTONE 25 MG: 25 TABLET ORAL at 09:15

## 2021-01-01 RX ADMIN — ALPRAZOLAM 0.25 MG: 0.25 TABLET ORAL at 11:45

## 2021-01-01 RX ADMIN — FAMOTIDINE 20 MG: 20 TABLET ORAL at 20:16

## 2021-01-01 RX ADMIN — ARFORMOTEROL TARTRATE 15 MCG: 15 SOLUTION RESPIRATORY (INHALATION) at 08:13

## 2021-01-01 RX ADMIN — IPRATROPIUM BROMIDE AND ALBUTEROL SULFATE 3 ML: .5; 2.5 SOLUTION RESPIRATORY (INHALATION) at 07:24

## 2021-01-01 RX ADMIN — IPRATROPIUM BROMIDE AND ALBUTEROL SULFATE 3 ML: .5; 2.5 SOLUTION RESPIRATORY (INHALATION) at 01:06

## 2021-01-01 RX ADMIN — INSULIN LISPRO 8 UNITS: 100 INJECTION, SOLUTION INTRAVENOUS; SUBCUTANEOUS at 18:05

## 2021-01-01 RX ADMIN — Medication 3 ML: at 09:51

## 2021-01-01 RX ADMIN — INSULIN LISPRO 5 UNITS: 100 INJECTION, SOLUTION INTRAVENOUS; SUBCUTANEOUS at 09:09

## 2021-01-01 RX ADMIN — ARFORMOTEROL TARTRATE 15 MCG: 15 SOLUTION RESPIRATORY (INHALATION) at 08:45

## 2021-01-01 RX ADMIN — ARFORMOTEROL TARTRATE 15 MCG: 15 SOLUTION RESPIRATORY (INHALATION) at 09:37

## 2021-01-01 RX ADMIN — ACETAMINOPHEN 975 MG: 325 TABLET ORAL at 22:01

## 2021-01-01 RX ADMIN — HYDROCORTISONE 2.5%: 25 CREAM TOPICAL at 20:13

## 2021-01-01 RX ADMIN — INSULIN LISPRO 5 UNITS: 100 INJECTION, SOLUTION INTRAVENOUS; SUBCUTANEOUS at 17:51

## 2021-01-01 RX ADMIN — INSULIN DETEMIR 10 UNITS: 100 INJECTION, SOLUTION SUBCUTANEOUS at 10:01

## 2021-01-01 RX ADMIN — FUROSEMIDE 40 MG: 10 INJECTION INTRAMUSCULAR; INTRAVENOUS at 09:09

## 2021-01-01 RX ADMIN — BUPROPION HYDROCHLORIDE 100 MG: 100 TABLET, FILM COATED ORAL at 20:14

## 2021-01-01 RX ADMIN — ATORVASTATIN CALCIUM 40 MG: 40 TABLET, FILM COATED ORAL at 09:45

## 2021-01-01 RX ADMIN — INSULIN LISPRO 16 UNITS: 100 INJECTION, SOLUTION INTRAVENOUS; SUBCUTANEOUS at 17:10

## 2021-01-01 RX ADMIN — FUROSEMIDE 40 MG: 10 INJECTION INTRAMUSCULAR; INTRAVENOUS at 09:57

## 2021-01-01 RX ADMIN — INSULIN DETEMIR 65 UNITS: 100 INJECTION, SOLUTION SUBCUTANEOUS at 22:18

## 2021-01-01 RX ADMIN — SIMETHICONE 80 MG: 80 TABLET, CHEWABLE ORAL at 10:23

## 2021-01-01 RX ADMIN — ALUMINUM HYDROXIDE, MAGNESIUM HYDROXIDE, AND DIMETHICONE 15 ML: 400; 400; 40 SUSPENSION ORAL at 20:22

## 2021-01-01 RX ADMIN — INSULIN HUMAN 6.4 UNITS/HR: 1 INJECTION, SOLUTION INTRAVENOUS at 11:27

## 2021-01-01 RX ADMIN — FAMOTIDINE 20 MG: 20 TABLET ORAL at 17:25

## 2021-01-01 RX ADMIN — SODIUM CHLORIDE, PRESERVATIVE FREE 10 ML: 5 INJECTION INTRAVENOUS at 20:04

## 2021-01-01 RX ADMIN — DIPHENHYDRAMINE HYDROCHLORIDE AND LIDOCAINE HYDROCHLORIDE AND ALUMINUM HYDROXIDE AND MAGNESIUM HYDRO 5 ML: KIT at 08:23

## 2021-01-01 RX ADMIN — ASPIRIN 81 MG: 81 TABLET, CHEWABLE ORAL at 08:29

## 2021-01-01 RX ADMIN — IPRATROPIUM BROMIDE AND ALBUTEROL SULFATE 3 ML: .5; 2.5 SOLUTION RESPIRATORY (INHALATION) at 12:52

## 2021-01-01 RX ADMIN — CEFEPIME HYDROCHLORIDE 2 G: 2 INJECTION, POWDER, FOR SOLUTION INTRAVENOUS at 04:54

## 2021-01-01 RX ADMIN — ENOXAPARIN SODIUM 40 MG: 40 INJECTION SUBCUTANEOUS at 09:14

## 2021-01-01 RX ADMIN — GUAIFENESIN 600 MG: 600 TABLET, EXTENDED RELEASE ORAL at 09:51

## 2021-01-01 RX ADMIN — SIMETHICONE 80 MG: 80 TABLET, CHEWABLE ORAL at 15:37

## 2021-01-01 RX ADMIN — SODIUM CHLORIDE 1000 ML: 9 INJECTION, SOLUTION INTRAVENOUS at 04:56

## 2021-01-01 RX ADMIN — AMIODARONE HYDROCHLORIDE 200 MG: 200 TABLET ORAL at 09:51

## 2021-01-01 RX ADMIN — METHYLPREDNISOLONE SODIUM SUCCINATE 20 MG: 40 INJECTION, POWDER, FOR SOLUTION INTRAMUSCULAR; INTRAVENOUS at 09:00

## 2021-01-01 RX ADMIN — INSULIN DETEMIR 65 UNITS: 100 INJECTION, SOLUTION SUBCUTANEOUS at 08:14

## 2021-01-01 RX ADMIN — ONDANSETRON 4 MG: 2 INJECTION INTRAMUSCULAR; INTRAVENOUS at 00:40

## 2021-01-01 RX ADMIN — DIPHENHYDRAMINE HYDROCHLORIDE AND LIDOCAINE HYDROCHLORIDE AND ALUMINUM HYDROXIDE AND MAGNESIUM HYDRO 5 ML: KIT at 15:30

## 2021-01-01 RX ADMIN — INSULIN LISPRO 8 UNITS: 100 INJECTION, SOLUTION INTRAVENOUS; SUBCUTANEOUS at 13:05

## 2021-01-01 RX ADMIN — FAMOTIDINE 20 MG: 20 TABLET ORAL at 18:05

## 2021-01-01 RX ADMIN — CEFEPIME HYDROCHLORIDE 2 G: 2 INJECTION, POWDER, FOR SOLUTION INTRAVENOUS at 20:24

## 2021-01-01 RX ADMIN — FAMOTIDINE 20 MG: 10 INJECTION INTRAVENOUS at 09:04

## 2021-01-01 RX ADMIN — TOBRAMYCIN 300 MG: 300 SOLUTION ORAL at 10:25

## 2021-01-01 RX ADMIN — SODIUM BICARBONATE 50 MEQ: 84 INJECTION, SOLUTION INTRAVENOUS at 00:51

## 2021-01-01 RX ADMIN — IMMUNE GLOBULIN INFUSION (HUMAN) 30 G: 100 INJECTION, SOLUTION INTRAVENOUS; SUBCUTANEOUS at 13:45

## 2021-01-01 RX ADMIN — ALBUMIN HUMAN 25 G: 0.25 SOLUTION INTRAVENOUS at 15:48

## 2021-01-01 RX ADMIN — INSULIN HUMAN 12.1 UNITS/HR: 1 INJECTION, SOLUTION INTRAVENOUS at 20:01

## 2021-01-01 RX ADMIN — SODIUM CHLORIDE, PRESERVATIVE FREE 10 ML: 5 INJECTION INTRAVENOUS at 20:37

## 2021-01-01 RX ADMIN — TOBRAMYCIN 300 MG: 300 SOLUTION ORAL at 09:25

## 2021-01-01 RX ADMIN — TOBRAMYCIN 300 MG: 300 SOLUTION ORAL at 21:45

## 2021-01-01 RX ADMIN — BUDESONIDE 0.5 MG: 0.5 INHALANT ORAL at 19:52

## 2021-01-01 RX ADMIN — INSULIN DETEMIR 50 UNITS: 100 INJECTION, SOLUTION SUBCUTANEOUS at 08:51

## 2021-01-01 RX ADMIN — ACETAMINOPHEN 650 MG: 325 TABLET ORAL at 15:20

## 2021-01-01 RX ADMIN — LACTULOSE 30 G: 20 SOLUTION ORAL at 09:51

## 2021-01-01 RX ADMIN — HEPARIN SODIUM 5000 UNITS: 5000 INJECTION, SOLUTION INTRAVENOUS; SUBCUTANEOUS at 05:33

## 2021-01-01 RX ADMIN — INSULIN DETEMIR 40 UNITS: 100 INJECTION, SOLUTION SUBCUTANEOUS at 20:37

## 2021-01-01 RX ADMIN — ARFORMOTEROL TARTRATE 15 MCG: 15 SOLUTION RESPIRATORY (INHALATION) at 19:57

## 2021-01-01 RX ADMIN — GUAIFENESIN 600 MG: 600 TABLET, EXTENDED RELEASE ORAL at 21:03

## 2021-01-01 RX ADMIN — REMDESIVIR 100 MG: 100 INJECTION, POWDER, LYOPHILIZED, FOR SOLUTION INTRAVENOUS at 14:16

## 2021-01-01 RX ADMIN — FUROSEMIDE 40 MG: 10 INJECTION INTRAMUSCULAR; INTRAVENOUS at 08:59

## 2021-01-01 RX ADMIN — SIMETHICONE 80 MG: 80 TABLET, CHEWABLE ORAL at 12:48

## 2021-01-01 RX ADMIN — FUROSEMIDE 40 MG: 10 INJECTION INTRAMUSCULAR; INTRAVENOUS at 09:15

## 2021-01-01 RX ADMIN — ACETAMINOPHEN 650 MG: 325 TABLET ORAL at 07:28

## 2021-01-01 RX ADMIN — BUDESONIDE 0.5 MG: 0.5 INHALANT ORAL at 06:52

## 2021-01-01 RX ADMIN — LACTULOSE 30 G: 20 SOLUTION ORAL at 09:08

## 2021-01-01 RX ADMIN — DIPHENHYDRAMINE HYDROCHLORIDE AND LIDOCAINE HYDROCHLORIDE AND ALUMINUM HYDROXIDE AND MAGNESIUM HYDRO 5 ML: KIT at 02:18

## 2021-01-01 RX ADMIN — GUAIFENESIN 600 MG: 600 TABLET, EXTENDED RELEASE ORAL at 12:54

## 2021-01-01 RX ADMIN — METOLAZONE 10 MG: 5 TABLET ORAL at 09:14

## 2021-01-01 RX ADMIN — GUAIFENESIN 600 MG: 600 TABLET, EXTENDED RELEASE ORAL at 21:11

## 2021-01-01 RX ADMIN — INSULIN LISPRO 3 UNITS: 100 INJECTION, SOLUTION INTRAVENOUS; SUBCUTANEOUS at 09:06

## 2021-01-01 RX ADMIN — IOHEXOL 500 ML: 12 SOLUTION ORAL at 11:38

## 2021-01-01 RX ADMIN — DEXAMETHASONE 6 MG: 4 TABLET ORAL at 09:05

## 2021-01-01 RX ADMIN — SODIUM BICARBONATE 50 MEQ: 84 INJECTION, SOLUTION INTRAVENOUS at 15:35

## 2021-01-01 RX ADMIN — ATORVASTATIN CALCIUM 40 MG: 40 TABLET, FILM COATED ORAL at 20:00

## 2021-01-01 RX ADMIN — FENTANYL CITRATE 25 MCG/HR: 50 INJECTION, SOLUTION INTRAMUSCULAR; INTRAVENOUS at 19:40

## 2021-01-01 RX ADMIN — SODIUM CHLORIDE, PRESERVATIVE FREE 10 ML: 5 INJECTION INTRAVENOUS at 08:51

## 2021-01-01 RX ADMIN — PREDNISONE 40 MG: 20 TABLET ORAL at 14:12

## 2021-01-01 RX ADMIN — LACTULOSE 30 G: 20 SOLUTION ORAL at 20:32

## 2021-01-01 RX ADMIN — ASPIRIN 81 MG CHEWABLE TABLET 81 MG: 81 TABLET CHEWABLE at 08:32

## 2021-01-01 RX ADMIN — BUDESONIDE 0.5 MG: 0.5 INHALANT ORAL at 20:28

## 2021-01-01 RX ADMIN — METHYLPREDNISOLONE SODIUM SUCCINATE 20 MG: 40 INJECTION, POWDER, FOR SOLUTION INTRAMUSCULAR; INTRAVENOUS at 20:02

## 2021-01-01 RX ADMIN — LISINOPRIL 10 MG: 10 TABLET ORAL at 09:04

## 2021-01-01 RX ADMIN — ATORVASTATIN CALCIUM 40 MG: 40 TABLET, FILM COATED ORAL at 09:15

## 2021-01-01 RX ADMIN — ALUMINUM HYDROXIDE, MAGNESIUM HYDROXIDE, AND DIMETHICONE 15 ML: 400; 400; 40 SUSPENSION ORAL at 01:27

## 2021-01-01 RX ADMIN — METHYLPREDNISOLONE SODIUM SUCCINATE 20 MG: 40 INJECTION, POWDER, FOR SOLUTION INTRAMUSCULAR; INTRAVENOUS at 17:10

## 2021-01-01 RX ADMIN — ENOXAPARIN SODIUM 40 MG: 40 INJECTION SUBCUTANEOUS at 08:27

## 2021-01-01 RX ADMIN — IPRATROPIUM BROMIDE AND ALBUTEROL SULFATE 3 ML: .5; 2.5 SOLUTION RESPIRATORY (INHALATION) at 06:21

## 2021-01-01 RX ADMIN — METHYLPREDNISOLONE SODIUM SUCCINATE 20 MG: 40 INJECTION, POWDER, FOR SOLUTION INTRAMUSCULAR; INTRAVENOUS at 21:07

## 2021-01-01 RX ADMIN — INSULIN LISPRO 10 UNITS: 100 INJECTION, SOLUTION INTRAVENOUS; SUBCUTANEOUS at 17:47

## 2021-01-01 RX ADMIN — PROPOFOL 30 MCG/KG/MIN: 10 INJECTION, EMULSION INTRAVENOUS at 00:55

## 2021-01-01 RX ADMIN — LACTULOSE 30 G: 20 SOLUTION ORAL at 08:40

## 2021-01-01 RX ADMIN — LISINOPRIL 10 MG: 10 TABLET ORAL at 09:10

## 2021-01-01 RX ADMIN — DEXAMETHASONE SODIUM PHOSPHATE 10 MG: 10 INJECTION INTRAMUSCULAR; INTRAVENOUS at 10:47

## 2021-01-01 RX ADMIN — VANCOMYCIN HYDROCHLORIDE 2000 MG: 5 INJECTION, POWDER, LYOPHILIZED, FOR SOLUTION INTRAVENOUS at 21:58

## 2021-01-01 RX ADMIN — IPRATROPIUM BROMIDE AND ALBUTEROL SULFATE 3 ML: .5; 2.5 SOLUTION RESPIRATORY (INHALATION) at 13:16

## 2021-01-01 RX ADMIN — BUDESONIDE 0.5 MG: 0.5 INHALANT ORAL at 07:27

## 2021-01-01 RX ADMIN — INSULIN LISPRO 5 UNITS: 100 INJECTION, SOLUTION INTRAVENOUS; SUBCUTANEOUS at 18:12

## 2021-01-01 RX ADMIN — ACETAMINOPHEN 650 MG: 325 TABLET ORAL at 04:18

## 2021-01-01 RX ADMIN — BUDESONIDE 0.5 MG: 0.5 INHALANT ORAL at 07:45

## 2021-01-01 RX ADMIN — INSULIN LISPRO 3 UNITS: 100 INJECTION, SOLUTION INTRAVENOUS; SUBCUTANEOUS at 17:32

## 2021-01-01 RX ADMIN — FAMOTIDINE 20 MG: 20 TABLET ORAL at 07:37

## 2021-01-01 RX ADMIN — IPRATROPIUM BROMIDE AND ALBUTEROL SULFATE 3 ML: .5; 2.5 SOLUTION RESPIRATORY (INHALATION) at 18:54

## 2021-01-01 RX ADMIN — SODIUM CHLORIDE, PRESERVATIVE FREE 10 ML: 5 INJECTION INTRAVENOUS at 08:32

## 2021-01-01 RX ADMIN — SODIUM POLYSTYRENE SULFONATE 30 G: 15 SUSPENSION ORAL; RECTAL at 21:46

## 2021-01-01 RX ADMIN — INSULIN LISPRO 3 UNITS: 100 INJECTION, SOLUTION INTRAVENOUS; SUBCUTANEOUS at 09:15

## 2021-01-01 RX ADMIN — FAMOTIDINE 20 MG: 20 TABLET ORAL at 20:02

## 2021-01-01 RX ADMIN — INSULIN HUMAN 10 UNITS: 100 INJECTION, SOLUTION PARENTERAL at 13:23

## 2021-01-01 RX ADMIN — INSULIN LISPRO 12 UNITS: 100 INJECTION, SOLUTION INTRAVENOUS; SUBCUTANEOUS at 17:52

## 2021-01-01 RX ADMIN — IPRATROPIUM BROMIDE AND ALBUTEROL SULFATE 3 ML: .5; 2.5 SOLUTION RESPIRATORY (INHALATION) at 07:15

## 2021-01-01 RX ADMIN — IPRATROPIUM BROMIDE AND ALBUTEROL SULFATE 3 ML: .5; 2.5 SOLUTION RESPIRATORY (INHALATION) at 00:38

## 2021-01-01 RX ADMIN — ACETAMINOPHEN 650 MG: 325 TABLET ORAL at 14:53

## 2021-01-01 RX ADMIN — SPIRONOLACTONE 25 MG: 25 TABLET ORAL at 08:21

## 2021-01-01 RX ADMIN — AZITHROMYCIN 500 MG: 500 INJECTION, POWDER, LYOPHILIZED, FOR SOLUTION INTRAVENOUS at 20:35

## 2021-01-01 RX ADMIN — BUDESONIDE 0.5 MG: 0.5 INHALANT ORAL at 06:33

## 2021-01-01 RX ADMIN — FENTANYL CITRATE 50 MCG/HR: 50 INJECTION, SOLUTION INTRAMUSCULAR; INTRAVENOUS at 12:13

## 2021-01-01 RX ADMIN — CEFEPIME HYDROCHLORIDE 2 G: 2 INJECTION, POWDER, FOR SOLUTION INTRAVENOUS at 20:38

## 2021-01-01 RX ADMIN — FAMOTIDINE 20 MG: 20 TABLET ORAL at 18:07

## 2021-01-01 RX ADMIN — SODIUM CHLORIDE, PRESERVATIVE FREE 10 ML: 5 INJECTION INTRAVENOUS at 08:06

## 2021-01-01 RX ADMIN — GUAIFENESIN 600 MG: 600 TABLET, EXTENDED RELEASE ORAL at 10:05

## 2021-01-01 RX ADMIN — AMPICILLIN SODIUM AND SULBACTAM SODIUM 3 G: 2; 1 INJECTION, POWDER, FOR SOLUTION INTRAMUSCULAR; INTRAVENOUS at 17:57

## 2021-01-01 RX ADMIN — METHYLPREDNISOLONE SODIUM SUCCINATE 20 MG: 40 INJECTION, POWDER, FOR SOLUTION INTRAMUSCULAR; INTRAVENOUS at 08:29

## 2021-01-01 RX ADMIN — BETHANECHOL CHLORIDE 25 MG: 25 TABLET ORAL at 09:10

## 2021-01-01 RX ADMIN — FLUTICASONE PROPIONATE 2 SPRAY: 50 SPRAY, METERED NASAL at 08:07

## 2021-01-01 RX ADMIN — INSULIN LISPRO 12 UNITS: 100 INJECTION, SOLUTION INTRAVENOUS; SUBCUTANEOUS at 12:37

## 2021-01-01 RX ADMIN — INSULIN DETEMIR 40 UNITS: 100 INJECTION, SOLUTION SUBCUTANEOUS at 08:32

## 2021-01-01 RX ADMIN — LACTULOSE 30 G: 20 SOLUTION ORAL at 09:15

## 2021-01-01 RX ADMIN — ATORVASTATIN CALCIUM 40 MG: 40 TABLET, FILM COATED ORAL at 09:35

## 2021-01-01 RX ADMIN — BUDESONIDE 0.5 MG: 0.5 INHALANT ORAL at 19:57

## 2021-01-01 RX ADMIN — GUAIFENESIN 600 MG: 600 TABLET, EXTENDED RELEASE ORAL at 09:05

## 2021-01-01 RX ADMIN — BETHANECHOL CHLORIDE 25 MG: 25 TABLET ORAL at 16:26

## 2021-01-01 RX ADMIN — INSULIN HUMAN 76.9 UNITS/HR: 1 INJECTION, SOLUTION INTRAVENOUS at 01:41

## 2021-01-01 RX ADMIN — SODIUM CHLORIDE, PRESERVATIVE FREE 10 ML: 5 INJECTION INTRAVENOUS at 08:12

## 2021-01-01 RX ADMIN — IMMUNE GLOBULIN INFUSION (HUMAN) 30 G: 100 INJECTION, SOLUTION INTRAVENOUS; SUBCUTANEOUS at 13:05

## 2021-01-01 RX ADMIN — IPRATROPIUM BROMIDE AND ALBUTEROL SULFATE 3 ML: .5; 2.5 SOLUTION RESPIRATORY (INHALATION) at 08:40

## 2021-01-01 RX ADMIN — BETHANECHOL CHLORIDE 25 MG: 25 TABLET ORAL at 20:14

## 2021-01-01 RX ADMIN — METHYLPREDNISOLONE SODIUM SUCCINATE 40 MG: 40 INJECTION, POWDER, FOR SOLUTION INTRAMUSCULAR; INTRAVENOUS at 08:33

## 2021-01-01 RX ADMIN — IPRATROPIUM BROMIDE AND ALBUTEROL SULFATE 3 ML: .5; 2.5 SOLUTION RESPIRATORY (INHALATION) at 06:33

## 2021-01-01 RX ADMIN — SODIUM CHLORIDE, PRESERVATIVE FREE 3 ML: 5 INJECTION INTRAVENOUS at 08:12

## 2021-01-01 RX ADMIN — IPRATROPIUM BROMIDE AND ALBUTEROL SULFATE 3 ML: .5; 2.5 SOLUTION RESPIRATORY (INHALATION) at 12:26

## 2021-01-01 RX ADMIN — ENOXAPARIN SODIUM 100 MG: 100 INJECTION SUBCUTANEOUS at 00:29

## 2021-01-01 RX ADMIN — ASPIRIN 81 MG: 81 TABLET, CHEWABLE ORAL at 08:41

## 2021-01-01 RX ADMIN — METHYLPREDNISOLONE SODIUM SUCCINATE 40 MG: 40 INJECTION, POWDER, FOR SOLUTION INTRAMUSCULAR; INTRAVENOUS at 17:23

## 2021-01-01 RX ADMIN — ARFORMOTEROL TARTRATE 15 MCG: 15 SOLUTION RESPIRATORY (INHALATION) at 19:22

## 2021-01-01 RX ADMIN — ARFORMOTEROL TARTRATE 15 MCG: 15 SOLUTION RESPIRATORY (INHALATION) at 21:06

## 2021-01-01 RX ADMIN — TOBRAMYCIN 300 MG: 300 SOLUTION ORAL at 08:43

## 2021-01-01 RX ADMIN — ALUMINUM HYDROXIDE, MAGNESIUM HYDROXIDE, AND DIMETHICONE 15 ML: 400; 400; 40 SUSPENSION ORAL at 19:11

## 2021-01-01 RX ADMIN — BUDESONIDE 0.5 MG: 0.5 INHALANT ORAL at 08:17

## 2021-01-01 RX ADMIN — METHYLPREDNISOLONE SODIUM SUCCINATE 20 MG: 40 INJECTION, POWDER, FOR SOLUTION INTRAMUSCULAR; INTRAVENOUS at 16:26

## 2021-01-01 RX ADMIN — AZITHROMYCIN 500 MG: 500 INJECTION, POWDER, LYOPHILIZED, FOR SOLUTION INTRAVENOUS at 05:10

## 2021-01-01 RX ADMIN — LACTULOSE 30 G: 20 SOLUTION ORAL at 20:54

## 2021-01-01 RX ADMIN — IPRATROPIUM BROMIDE AND ALBUTEROL SULFATE 3 ML: .5; 2.5 SOLUTION RESPIRATORY (INHALATION) at 18:16

## 2021-01-01 RX ADMIN — ARFORMOTEROL TARTRATE 15 MCG: 15 SOLUTION RESPIRATORY (INHALATION) at 08:50

## 2021-01-01 RX ADMIN — INSULIN DETEMIR 55 UNITS: 100 INJECTION, SOLUTION SUBCUTANEOUS at 20:16

## 2021-01-01 RX ADMIN — FUROSEMIDE 40 MG: 10 INJECTION INTRAMUSCULAR; INTRAVENOUS at 09:49

## 2021-01-01 RX ADMIN — INSULIN LISPRO 10 UNITS: 100 INJECTION, SOLUTION INTRAVENOUS; SUBCUTANEOUS at 08:14

## 2021-01-01 RX ADMIN — GUAIFENESIN 600 MG: 600 TABLET, EXTENDED RELEASE ORAL at 09:38

## 2021-01-01 RX ADMIN — SODIUM BICARBONATE 50 MEQ: 84 INJECTION, SOLUTION INTRAVENOUS at 08:10

## 2021-01-01 RX ADMIN — TOBRAMYCIN 300 MG: 300 SOLUTION ORAL at 08:32

## 2021-01-01 RX ADMIN — ARFORMOTEROL TARTRATE 15 MCG: 15 SOLUTION RESPIRATORY (INHALATION) at 21:05

## 2021-01-01 RX ADMIN — Medication 0.3 MCG/KG/MIN: at 17:14

## 2021-01-01 RX ADMIN — DIPHENHYDRAMINE HYDROCHLORIDE AND LIDOCAINE HYDROCHLORIDE AND ALUMINUM HYDROXIDE AND MAGNESIUM HYDRO 5 ML: KIT at 14:50

## 2021-01-01 RX ADMIN — ARFORMOTEROL TARTRATE 15 MCG: 15 SOLUTION RESPIRATORY (INHALATION) at 20:28

## 2021-01-01 RX ADMIN — DIPHENHYDRAMINE HYDROCHLORIDE AND LIDOCAINE HYDROCHLORIDE AND ALUMINUM HYDROXIDE AND MAGNESIUM HYDRO 5 ML: KIT at 14:52

## 2021-01-01 RX ADMIN — GUAIFENESIN 600 MG: 600 TABLET, EXTENDED RELEASE ORAL at 08:48

## 2021-01-01 RX ADMIN — METOLAZONE 10 MG: 5 TABLET ORAL at 08:48

## 2021-01-01 RX ADMIN — METOPROLOL TARTRATE 5 MG: 1 INJECTION, SOLUTION INTRAVENOUS at 13:28

## 2021-01-01 RX ADMIN — ACETAMINOPHEN 650 MG: 325 TABLET ORAL at 03:36

## 2021-01-01 RX ADMIN — BUDESONIDE 0.5 MG: 0.5 INHALANT ORAL at 08:43

## 2021-01-01 RX ADMIN — ARFORMOTEROL TARTRATE 15 MCG: 15 SOLUTION RESPIRATORY (INHALATION) at 19:19

## 2021-01-01 RX ADMIN — FUROSEMIDE 40 MG: 10 INJECTION INTRAMUSCULAR; INTRAVENOUS at 17:20

## 2021-01-01 RX ADMIN — POTASSIUM CHLORIDE 40 MEQ: 10 CAPSULE, COATED, EXTENDED RELEASE ORAL at 15:20

## 2021-01-01 RX ADMIN — GUAIFENESIN 600 MG: 600 TABLET, EXTENDED RELEASE ORAL at 08:49

## 2021-01-01 RX ADMIN — OXYCODONE HYDROCHLORIDE 5 MG: 5 TABLET ORAL at 16:12

## 2021-01-01 RX ADMIN — INSULIN LISPRO 5 UNITS: 100 INJECTION, SOLUTION INTRAVENOUS; SUBCUTANEOUS at 08:31

## 2021-01-01 RX ADMIN — DIPHENHYDRAMINE HYDROCHLORIDE AND LIDOCAINE HYDROCHLORIDE AND ALUMINUM HYDROXIDE AND MAGNESIUM HYDRO 5 ML: KIT at 15:15

## 2021-01-01 RX ADMIN — FENTANYL CITRATE 50 MCG/HR: 50 INJECTION, SOLUTION INTRAMUSCULAR; INTRAVENOUS at 06:07

## 2021-01-01 RX ADMIN — GUAIFENESIN 600 MG: 600 TABLET, EXTENDED RELEASE ORAL at 23:20

## 2021-01-01 RX ADMIN — SODIUM CHLORIDE, PRESERVATIVE FREE 10 ML: 5 INJECTION INTRAVENOUS at 20:26

## 2021-01-01 RX ADMIN — METOLAZONE 10 MG: 5 TABLET ORAL at 14:28

## 2021-01-01 RX ADMIN — HYDROCORTISONE 2.5%: 25 CREAM TOPICAL at 20:38

## 2021-01-01 RX ADMIN — ASPIRIN 81 MG: 81 TABLET, CHEWABLE ORAL at 09:10

## 2021-01-01 RX ADMIN — DOCUSATE SODIUM 50MG AND SENNOSIDES 8.6MG 2 TABLET: 8.6; 5 TABLET, FILM COATED ORAL at 08:30

## 2021-01-01 RX ADMIN — BUDESONIDE 0.5 MG: 0.5 INHALANT ORAL at 06:40

## 2021-05-10 NOTE — H&P
History and Physical      Patient Name: Noemi Rasheed   Patient ID: [id number]   Sex: Female   YOB: 1955    Primary Care Provider: Navya Patel MD   Referring Provider: Navya Patel MD    Visit Date: September 24, 2020    Provider: Luisa Bravo PA-C   Location: [facility]   Location Address: [address] [place] [address]  [id number]   Location Phone: [phone]          Chief Complaint  · Neck pain      History Of Present Illness  The patient is a 65 year old /White female, who presents on referral from Navya Patel MD, for a neurosurgical evaluation for a history of neck pain and left arm pain.   The left arm pain is currently moderate and localized to the C5 distribution. The neck pain is localized to the posterior cervical region and has been present for 4 months. It is 9/10 in severity and radiates into the left C5 distribution. The pain is described as being constant and it is generally worse at night. She is having difficulty with sleep due to the pain. The patient states the pain is aggravated by lifting, straining, head turning, and working on cash register. She has not identified any alleviating factors.   The onset of the symptoms was not associated with any specific event or activity.   She denies bowel or bladder dysfunction. The patient has no prior history of neck or back surgery.   RECENT INTERVENTIONS:  She reports undergoing no recent treatment for the symptoms described above.   INFORMATION REVIEWED:  The following information was reviewed: radiology reports and radiographic images. The MRI of the cervical spine revealed spinal stenosis and facet arthropathy. The stenosis is present at multiple levels. The facet arthropathy is present at multiple levels.       Past Medical History  Aftercare;following joint replacement, right knee; Chronic Obstructive Pulmonary Disease; COPD; Diabetes; Diabetes mellitus, type 2;  "Enlarged liver; Frequency; Glucosuria; Hyperlipidemia; Limb Swelling; Localized edema; Lower back pain; Nocturia; Pain: Knee, right; Primary osteoarthritis of right knee; Right Knee Injury; Right knee pain, unspecified chronicity; Shortness of Breath; Stomach Disorder; Urge incontinence of urine; Urgency; Vitamin D deficiency         Past Surgical History  Appendectomy; Cardiac Catherization; Cesarian Section; Colonoscopy; EGD; Gallbladder; Hysterectomy; Knee replacement, left         Medication List  albuterol sulfate 2.5 mg/0.5 mL inhalation solution for nebulization; Aspir-81 81 mg oral tablet,delayed release (DR/EC); baclofen 10 mg oral tablet; Basaglar KwikPen U-100 Insulin 100 unit/mL (3 mL) subcutaneous insulin pen; BD Ultra-Fine Micro Pen Needle 32 gauge x \" miscellaneous needle; diclofenac sodium 75 mg oral tablet,delayed release (DR/EC); ergocalciferol (vitamin D2) 50,000 unit oral capsule; FreeStyle Lancets 28 gauge miscellaneous misc; FreeStyle Lite Meter miscellaneous kit; FreeStyle Lite Strips miscellaneous strip; gabapentin 300 mg oral capsule; Lasix 40 mg oral tablet; Lipitor 40 mg oral tablet; lorazepam 1 mg oral tablet; metformin 500 mg oral tablet; metolazone 5 mg oral tablet; MoviPrep 100-7.5-2.691 gram oral powder in packet; nitroglycerin 0.4 mg sublingual tablet, sublingual; potassium chloride 20 mEq oral tablet extended release; Refresh Plus 0.5 % ophthalmic (eye) dropperette; spironolactone 100 mg oral tablet; Tylenol Extra Strength 500 mg oral tablet; Vitamin D2 50,000 unit oral capsule         Allergy List  NO KNOWN DRUG ALLERGIES       Allergies Reconciled  Family Medical History  Cancer, Unspecified; Diabetes, unspecified type; - No Family History of Colorectal Cancer; Heart Attack (MI); Diabetes; Osteoporosis; Family history of Arthritis         Reproductive History   0 Para 0 0 0 0       Social History  Alcohol (Never); Alcohol Use (Never); lives with spouse; .; " "Recreational Drug Use (Never); Retired.; Tobacco (Former)         Immunizations  Name Date Admin   Influenza    Influenza    Influenza          Review of Systems  · Constitutional  o Admits  o : fatigue  o Denies  o : chills, excessive sweating, fever, sycope/passing out, weight gain, weight loss  · Eyes  o Admits  o : changes in vision, blurry vision  o Denies  o : double vision  · HENT  o Denies  o : loss of hearing, ringing in the ears, ear aches, sore throat, nasal congestion, sinus pain, nose bleeds, seasonal allergies  · Cardiovascular  o Admits  o : swollen legs, easy burising or bleeding  o Denies  o : blood clots, anemia, transfusions  · Respiratory  o Admits  o : COPD  o Denies  o : shortness of breath, dry cough, productive cough, pneumonia  · Gastrointestinal  o Denies  o : difficulty swallowing, reflux  · Genitourinary  o Denies  o : incontinence  · Neurologic  o Admits  o : headache, numbness/tingling/paresthesia   o Denies  o : seizure, stroke, tremor, loss of balance, falls, dizziness/vertigo, difficulty with sleep, difficulty with coordination, difficulty with dexterity, weakness  · Musculoskeletal  o Admits  o : neck stiffness/pain, muscle aches, joint pain, weakness, spasms, sciatica, pain radiating in arm, pain radiating in leg, low back pain  o Denies  o : swollen lymph nodes  · Endocrine  o Admits  o : diabetes  o Denies  o : thyroid disorder  · Psychiatric  o Denies  o : anxiety, depression      Vitals  Date Time BP Position Site L\R Cuff Size HR RR TEMP (F) WT  HT  BMI kg/m2 BSA m2 O2 Sat        09/24/2020 02:34 PM        96.9 265lbs 2oz 5'  6\" 42.79 2.37           Physical Examination  · Constitutional  o Appearance  o : well-nourished, well developed, alert, in no acute distress  · Neck  o Inspection/Palpation  o : normal appearance, no masses, trachea midline. Tenderness of Cspine paraspinals on left.   o Range of Motion  o : cervical range of motion within normal " limits  · Respiratory  o Respiratory Effort  o : breathing unlabored  · Cardiovascular  o Peripheral Vascular System  o :   § Extremities  § : no edema or cyanosis  · Musculoskeletal  o Spine  o :   § Stability  § : no subluxations present  § Muscle Strength/Tone  § : paraspinal muscle strength within normal limits, paraspinal muscle tone within normal limits  o Right Upper Extremity  o :   § Inspection/Palpation  § : no tenderness to palpation  § Joint Stability  § : shoulder, elbow and wrist joint stability normal  § Range of Motion  § : range of motion normal, no joint crepitus or pain with motion present,shoulder negative  o Left Upper Extremity  o :   § Inspection/Palpation  § : no tenderness to palpation  § Joint Stability  § : shoulder, elbow and wrist joint stability normal  § Range of Motion  § : range of motion normal, no joint crepitus present, no pain with joint motion, shoulder negative  · Skin and Subcutaneous Tissue  o Neck  o : no lesions or areas of discoloration  · Neurologic  o Mental Status Examination  o :   § Orientation  § : alert and oriented to person, place, time and events  o Motor Examination  o :   § RUE Strength  § : strength normal  § RUE Motor Function  § : tone normal, muscle bulk normal  § LUE Strength  § : strength normal  § LUE Motor Function  § : tone normal, muscle bulk normal  o Reflexes  o :   § RUE  § : 2/4 in biceps/triceps/brachioradialis, Comer sign negative  § LUE  § : 2/4 in biceps/triceps/brachioradialis, Comer sign negative  o Sensation  o :   § Light Touch  § : sensation intact to light touch in extremities  o Gait and Station  o :   § Gait Screening  § : normal gait, able to stand without difficulty  · Psychiatric  o Mood and Affect  o : mood normal, affect appropriate          Assessment  · Cervicalgia     723.1/M54.2  · Cervical radiculopathy     723.4/M54.12    Problems Reconciled  Plan  · Orders  o PHYSICAL THERAPY CONSULTATION (PHYT) - 723.1/M54.2,  723.4/M54.12 - 09/24/2020  · Medications  o Medications have been Reconciled  o Transition of Care or Provider Policy  · Instructions  o Encouraged to follow-up with Primary Care Provider for preventative care.  o The ROS and the PFSH were reviewed at today's visit.  o I have discussed the risks and benefits of surgery versus physical therapy and other conservative forms of treatment.  o Handouts provided: Cervical Exercises.  o Call or return if symptoms worsen or persist.  o Will send for [id number] and return afterwards. Could consider CESIs.   o Electronically Identified Patient Education Materials Provided Electronically  · Disposition  o Call or Return if symptoms worsen or persist.            Electronically Signed by: Luisa Bravo PA-C -Author on September 24, 2020 04:12:50 PM

## 2021-05-11 NOTE — H&P
History and Physical      Patient Name: Noemi Rasheed   Patient ID: [id number]   Sex: Female   YOB: 1955    Primary Care Provider: Navya Patel MD   Referring Provider: Navya Patel MD    Visit Date: April 8, 2021    Provider: Larry Godinez MD   Location: [facility]   Location Address: [address] [place] [address]  [id number]   Location Phone: [phone]          Chief Complaint  · Right knee pain   · Left knee pain       History Of Present Illness  Noemi Rasheed is a 66 year old /White female who presents today to [facility].      The patient presents here today for evaluation of bilateral knee pain. She is well known to us for a previous right knee replacement. She states she was doing well until about a week or two ago when she had a fall coming out of the shower. She bruised her left breast area and also hurt her knee. She is here today complaining of bilateral knee pain with the left being a bit worse than the right. We have done a previous right knee replacement on her that was doing well until the fall. She also had a left knee replacement done by Dr. Jaeger a year before the right knee, however she has not had it checked recently. Most of her pain is to the anterior medial aspect of right knee and the lateral left knee.       Past Medical History  Aftercare;following joint replacement, right knee; COPD; Diabetes mellitus, type 2; Enlarged liver; Frequency; Glucosuria; Hyperlipidemia; Limb Swelling; Localized edema; Lower back pain; Nocturia; Pain: Knee, right; Primary osteoarthritis of right knee; Right Knee Injury; Shortness of Breath; Stomach Disorder; Urge incontinence of urine; Vitamin D deficiency         Past Surgical History  Appendectomy; Cardiac Catherization; Cesarian Section; Cholecystectomy; Colonoscopy; EGD; Hysterectomy; Knee replacement, left         Medication List  albuterol sulfate 2.5 mg/0.5 mL inhalation solution for  "nebulization; baclofen 10 mg oral tablet; Basaglar KwikPen U-100 Insulin 100 unit/mL (3 mL) subcutaneous insulin pen; BD Ultra-Fine Micro Pen Needle 32 gauge x 1/4\" miscellaneous needle; cephalexin 500 mg oral capsule; diclofenac sodium 75 mg oral tablet,delayed release (DR/EC); ergocalciferol (vitamin D2) 50,000 unit oral capsule; FreeStyle Lancets 28 gauge miscellaneous misc; FreeStyle Lite Meter miscellaneous kit; FreeStyle Lite Strips miscellaneous strip; gabapentin 300 mg oral capsule; ibuprofen 800 mg oral tablet; Keflex 500 mg oral capsule; Lasix 40 mg oral tablet; Lipitor 40 mg oral tablet; lorazepam 1 mg oral tablet; metformin 500 mg oral tablet; nystatin 100,000 unit/gram topical powder; potassium chloride 20 mEq oral tablet extended release; Refresh Plus 0.5 % ophthalmic (eye) dropperette; spironolactone 100 mg oral tablet; tramadol 50 mg oral tablet; Tylenol Extra Strength 500 mg oral tablet; Vitamin D2 50,000 unit oral capsule; vitamin E 400 unit oral capsule         Allergy List  NO KNOWN DRUG ALLERGIES       Allergies Reconciled  Family Medical History  Cancer, Unspecified; Diabetes, unspecified type; - No Family History of Colorectal Cancer; Heart Attack (MI); Diabetes; Osteoporosis; Family history of Arthritis         Reproductive History   0 Para 0 0 0 0       Social History  Alcohol (Never); lives with spouse; ; Recreational Drug Use (Never); Retired; Tobacco (Former)         Review of Systems  · Constitutional  o Denies  o : fever, chills, weight loss  · Cardiovascular  o Denies  o : chest pain, shortness of breath  · Gastrointestinal  o Denies  o : liver disease, heartburn, nausea, blood in stools  · Genitourinary  o Denies  o : painful urination, blood in urine  · Integument  o Denies  o : rash, itching  · Neurologic  o Denies  o : headache, weakness, loss of consciousness  · Musculoskeletal  o Denies  o : painful, swollen joints  · Psychiatric  o Denies  o : drug/alcohol " "addiction, anxiety, depression      Vitals  Date Time BP Position Site L\R Cuff Size HR RR TEMP (F) WT  HT  BMI kg/m2 BSA m2 O2 Sat FR L/min FiO2 HC       04/08/2021 10:22 AM      89 - R   279lbs 6oz 5'  6\" 45.09 2.43 90 %            Physical Examination  · Constitutional  o Appearance  o : well developed, well-nourished, no obvious deformities present  · Head and Face  o Head  o :   § Inspection  § : normocephalic  o Face  o :   § Inspection  § : no facial lesions  · Eyes  o Conjunctivae  o : conjunctivae normal  o Sclerae  o : sclerae white  · Ears, Nose, Mouth and Throat  o Ears  o :   § External Ears  § : appearance within normal limits  § Hearing  § : intact  o Nose  o :   § External Nose  § : appearance normal  · Neck  o Inspection/Palpation  o : normal appearance  o Range of Motion  o : full range of motion  · Respiratory  o Respiratory Effort  o : breathing unlabored  o Inspection of Chest  o : normal appearance  o Auscultation of Lungs  o : no audible wheezing or rales  · Cardiovascular  o Heart  o : regular rate  · Gastrointestinal  o Abdominal Examination  o : soft and non-tender  · Skin and Subcutaneous Tissue  o General Inspection  o : intact, no rashes  · Psychiatric  o General  o : Alert and oriented x3  o Judgement and Insight  o : judgment and insight intact  o Mood and Affect  o : mood normal, affect appropriate  · Extremities  o Extremities  o : Bilateral knee- Flattening of the soft tissues on the lateral aspect of the left knee but the scar itself is well healed. There is no redness, no bruising. The right knee is normal appearing with well healed scar and no bruising. Right knee ROM -5 to 100 degrees. . Left knee ROM -5 to 100 degrees. Stable to varus and valgus stress. Stable to anterior and posterior drawer. Neurovascularly intact. Mild swelling bilaterally. No effusion.   · In Office Procedures  o View  o : AP/LATERAL/SUNRISE  o Site  o : bilateral, knee  o Indication  o : Bilateral knee " pain   o Study  o : X-rays ordered, taken in the office, and reviewed today.  o Xray  o : Right knee- intact right knee replacement with no evidence of subsidence, loosening or periprosthetic fracture. --Left knee- intact left knee replacement with no evidence of periprosthetic fracture, subsidence or loosening.   o Comparative Data  o : No comparative data found          Assessment  · S/P Bilateral Total Knee Arthroplasty     V54.81  · Pain in both knees, unspecified chronicity       Pain in right knee     719.46/M25.561  Pain in left knee     719.46/M25.562  · Right Knee contusion     924.11/S80.00XA      Plan  · Orders  o Knee (Left) [facility] ([id number]) - 719.46/M25.562 - 04/08/2021  o Knee (Right) [facility] ([id number]) - 719.46/M25.561 - 04/08/2021  · Medications  o Medications have been Reconciled  o Transition of Care or Provider Policy  · Instructions  o Reviewed the patient's Past Medical, Social, and Family history as well as the ROS at today's visit, no changes.  o Call or return if worsening symptoms.  o This note was transcribed by Jessy Buchanan. jsb.  o Discussed the treatment plan with the patient. Her knee replacements appear to be in good positions. Plan to continue with conservative treatment. Plan to continue activity as tolerated and home exercises. Follow up in 3 months to recheck.   o Electronically Identified Patient Education Materials Provided Electronically            Electronically Signed by: Jessy Buchanan MA -Author on April 12, 2021 09:09:38 AM  Electronically Co-signed by: Larry Godinez MD -Reviewer on April 12, 2021 09:10:57 PM

## 2021-05-12 NOTE — PROGRESS NOTES
Quick Note      Patient Name: Noemi Rasheed   Patient ID: [id number]   Sex: Female   YOB: 1955    Primary Care Provider: Navya Patel MD   Referring Provider: Navya Patel MD    Visit Date: April 2, 2020    Provider: ROSEMARY Thompson   Location: [facility]   Location Address: [address] [place] [address], [address]  [id number]   Location Phone: [phone]          History Of Present Illness  TELEHEALTH VISIT  Chief Complaint: ER follow-up   Noemi Rasheed is a 65 year old /White female who is presenting for evaluation via telehealth visit. Verbal consent obtained before beginning visit.   Provider spent 8 minutes with the patient during telehealth visit.   The following staff were present during this visit: Anya COLLADO   Past Medical History/Overview of Patient Symptoms     Patient went to the ER on 3/21/2020 for possible flu and sore throat.  She was diagnosed with a viral and bacterial pneumonia.  Atypical presentation with a delayed diagnosis.  She was treated with Levaquin 500 mg once daily for 10 days.  Albuterol inhaler as needed for shortness of breath.  She was placed on a 14-day quarantine.  Quarantine will end on 4/3/2020.  She reports she has been afebrile since leaving the hospital and she feels better.  She is remained home for the past 2 weeks.  Denies shortness of breath, coughing, wheezing, fever, chills, body aches.           Assessment  · Pneumonia     486/J18.9  May return to work on 4/4/2020. Repeat chest x-ray in 1 month      Plan  · Orders  o Chest (AP PA and Lateral) 2 views X-Ray [facility] ([id number]) - 486/J18.9 - 05/02/2020  · Instructions  o Plan Of Care:   o Patient instructed to seek medical attention urgently for new or worsening symptoms.  o Take all medications as prescribed/directed.  o Call the office with any concerns or questions.  · Disposition  o Call or Return if symptoms worsen or persist.            Electronically Signed by: ROSEMARY Thompson  -Author on April 2, 2020 01:06:03 PM

## 2021-05-13 NOTE — PROGRESS NOTES
Progress Note      Patient Name: Noemi Rasheed   Patient ID: [id number]   Sex: Female   YOB: 1955    Primary Care Provider: Navya Patel MD   Referring Provider: Navya Patel MD    Visit Date: October 19, 2020    Provider: ROSEMARY Aguilera   Location: [facility]   Location Address: [address] [place] [address], [address] [place] [address]  [id number]   Location Phone: [phone]          Chief Complaint  · Flu like symptoms       History Of Present Illness  Noemi Rasheed is a 65 year old /White female who presents for evaluation and treatment of:      Patient is a 65-year-old female patient of Dr. Patel, who comes in for fever, cough, dyspnea, and diarrhea.  This has been ongoing for the past 3 days.  Patient has a history of COPD, diabetes.  She states this feels like her early COPD exacerbation.  She denies any sick contacts.  She is a former smoker.  She has been taken Tylenol and NyQuil for the symptoms states she has had some mild improvement.  Fever subjective, she is hypertensive at 160/82, states she has sore throat and postnasal drip.       Past Medical History  Disease Name Date Onset Notes   Aftercare;following joint replacement, right knee 05/31/2018 --    Chronic Obstructive Pulmonary Disease --  --    COPD --  --    Diabetes --  --    Diabetes mellitus, type 2 07/18/2019 --    Enlarged liver --  --    Frequency 03/22/2016 --    Glucosuria 03/22/2016 --    Hyperlipidemia 07/18/2019 --    Limb Swelling --  --    Localized edema 07/18/2019 --    Lower back pain --  --    Nocturia 03/22/2016 --    Pain: Knee, right 03/14/2018 --    Primary osteoarthritis of right knee 01/23/2018 --    Right Knee Injury 10/15/2018 --    Right knee pain, unspecified chronicity 02/07/2018 --    Shortness of Breath --  --    Stomach Disorder --  --    Urge incontinence of urine 03/22/2016 --    Urgency 03/22/2016 --    Vitamin D deficiency 07/18/2019 --          Past Surgical  "History  Procedure Name Date Notes   Appendectomy --  --    Cardiac Catherization 4/2015 --    Cesarian Section --  --    Colonoscopy 2015 2017 2020 --    EGD 2012 2017 2020 --    Gallbladder --  --    Hysterectomy --  --    Knee replacement, left --  --          Medication List  Name Date Started Instructions   albuterol sulfate 2.5 mg/0.5 mL inhalation solution for nebulization 07/23/2019 inhale 0.5 milliliter (2.5 mg) by nebulization route 3 times per day as needed for 30 days   Aspir-81 81 mg oral tablet,delayed release (DR/EC)  take 1 tablet (81 mg) by oral route once daily   baclofen 10 mg oral tablet 08/14/2020 take 1 tablet (10 mg) by oral route 3 times per day as needed   Basaglar KwikPen U-100 Insulin 100 unit/mL (3 mL) subcutaneous insulin pen 09/21/2020 inject 80 units by subcutaneous route once   BD Ultra-Fine Micro Pen Needle 32 gauge x 1/4\" miscellaneous needle 07/18/2019 use as directed with insulin 5x/day   diclofenac sodium 75 mg oral tablet,delayed release (DR/EC) 06/30/2020 take 1 tablet (75 mg) by oral route 2 times per day for 14 days   ergocalciferol (vitamin D2) 50,000 unit oral capsule 07/18/2019 take 1 cap PO every monday   FreeStyle Lancets 28 gauge miscellaneous misc 07/18/2019 use as directed 3x/day   FreeStyle Lite Meter miscellaneous kit  use as directed for 30 days Check BS TID   FreeStyle Lite Strips miscellaneous strip 07/18/2019 check BS TID use as directed for 90 days   gabapentin 300 mg oral capsule 07/18/2019 take 1 capsule (300 mg) by oral route twice a day.   Lasix 40 mg oral tablet 10/19/2020 take 1 tablet (40 mg) by oral route 2 times per day for 90 days   Lipitor 40 mg oral tablet 07/18/2019 take 1 tablet (40 mg) by oral route once daily at bedtime for 90 days   lorazepam 1 mg oral tablet 02/13/2020 take 1 tablet (1 mg) by oral route 2 times per day as needed for 30 days   metformin 500 mg oral tablet 10/10/2019 take 1 tablet (500 mg) by oral route 2 times per day with " morning and evening meals for 30 days   metolazone 5 mg oral tablet 2019 take 1 tablet (5 mg) by oral route once daily 30 mins prior to Lasix dose in am   MoviPrep 100-7.5-2.691 gram oral powder in packet 2020 take as directed for bowel prep   nitroglycerin 0.4 mg sublingual tablet, sublingual 2019 place 1 tablet under tongue at the first sign of an attack; repeat every 5-10 minutes. No more than 3, go to ER   potassium chloride 20 mEq oral tablet extended release 2020 take 1 tablet (20 meq) by oral route once daily with food for 90 days   Refresh Plus 0.5 % ophthalmic (eye) dropperette 2019 instill 1 drop by ophthalmic route 4 times a day as needed for 90 days   spironolactone 100 mg oral tablet 2020 take 1 tablet (100 mg) by oral route once daily for 30 days   Tylenol Extra Strength 500 mg oral tablet  take 2 tablets (1,000 mg) by oral route every 6 hours as needed for pain or fever   Vitamin D2 50,000 unit oral capsule 2019 take 1 capsule (50,000 unit) by oral route once weekly for 90 days         Allergy List  Allergen Name Date Reaction Notes   NO KNOWN DRUG ALLERGIES --  --  --        Allergies Reconciled  Family Medical History  Disease Name Relative/Age Notes   Cancer, Unspecified Brother/  Father/  Sister/   Father; Sister; Brother  1 Sister-kidney 2nd sister-bladder brother- luing cancer   Diabetes, unspecified type Daughter/  Mother/  Sister/   Mother; Sister; Daughter   - No Family History of Colorectal Cancer  --    Heart Attack (MI) Father/   --    Diabetes Sister/   --    Osteoporosis Mother/   Mother   Family history of Arthritis Father/  Mother/  Sister/   Mother; Father; Sister         Reproductive History  Menstrual   Pregnancy Summary   Total Pregnancies: 0 Full Term: 0 Premature: 0   Ab Induced: 0 Ab Spontaneous: 0 Ectopics: 0   Multiples: 0 Livin         Social History  Finding Status Start/Stop Quantity Notes   Alcohol Never --/-- --  --   "  Alcohol Use Never --/-- --  does not drink   lives with spouse --  --/-- --  --    . --  --/-- --  --    Recreational Drug Use Never --/-- --  no   Retired. --  --/-- --  --    Tobacco Former --/-- --  former smoker, smoked 21-30 years         Immunizations  NameDate Admin Mfg Trade Name Lot Number Route Inj VIS Given VIS Publication   Hpkarbqug20/10/2019 [facility] FLUZONE-HIGH DOSE [id number] IM LD 10/10/2019    Comments: tolerated well,stable         Review of Systems  · Constitutional  o Denies  o : fever, fatigue, weight loss, weight gain  · HENT  o Admits  o : sinus pain, nasal congestion, nasal discharge, postnasal drip, sore throat  o Denies  o : headaches, vertigo, lightheadedness  · Cardiovascular  o Denies  o : lower extremity edema, claudication, chest pressure, palpitations  · Respiratory  o Admits  o : dyspnea on exertion  o Denies  o : shortness of breath, wheezing, cough, hemoptysis  · Gastrointestinal  o Admits  o : diarrhea  o Denies  o : nausea, vomiting, constipation, abdominal pain      Vitals  Date Time BP Position Site L\R Cuff Size HR RR TEMP (F) WT  HT  BMI kg/m2 BSA m2 O2 Sat FR L/min FiO2 HC       10/19/2020 02:09 /82 Sitting    75 - R  97.1 276lbs 4oz 5'  6\" 44.59 2.42 97 %      10/19/2020 02:09 /82 Sitting                       Physical Examination  · Constitutional  o Appearance  o : well-nourished, well developed, in no acute distress  · Eyes  o Conjunctivae  o : conjunctivae normal, no exudates present  o Sclerae  o : sclerae white  o Pupils and Irises  o : pupils equal and round, and reactive to light and accomodation bilaterally  o Eyelids/Ocular Adnexae  o : extra ocular movements intact  · Respiratory  o Respiratory Effort  o : breathing unlabored, no accessory muscle use  o Inspection of Chest  o : normal appearance, no retractions  o Auscultation of Lungs  o : normal breath sounds bilaterally  · Cardiovascular  o Heart  o :   § Auscultation of Heart  § : regular rate " and rhythm, no murmurs, gallops or rubs  o Peripheral Vascular System  o :   § Extremities  § : no edema  · Neurologic  o Mental Status Examination  o :   § Orientation  § : alert and oriented x3  § Speech/Language  § : normal speech pattern  o Gait and Station  o : normal gait, able to stand without difficulty  · Psychiatric  o Judgement and Insight  o : judgment and insight intact, judgement for everyday activities and social situations within normal limits, insight intact  o Thought Processes  o : rate of thoughts normal, thought content logical  o Mood and Affect  o : mood normal, affect appropriate          Results  · In-Office Procedures  o Lab procedure  § Rapid group A Streptococcus screen (49793)   § Beta Strep Gp A Culture: Negative   § Internal Control Verified?: Yes   § IOP - Influenza A/B Test (29798)   § Influenza A: Negative   § Influenza B: Negative   § Internal Control Verified?: Yes       Assessment  · Cough     786.2/R05  · Diarrhea     787.91/R19.7  · Dyspnea     786.09/R06.00  · Fever     780.60/R50.9  · Sore throat     462/J02.9  · COPD exacerbation     491.21/J44.1  Will prescribe Tessalon Perles, Mucinex, Levaquin. Encouraged to buy probiotics. Discussed return precautions. Patient verbalized understanding is agreeable treatment plan.      Plan  · Orders  o Chest xray 2 views [facility] (82743) - 786.2/R05 - 10/19/2020  o ACO-39: Current medications updated and reviewed (, 1159F) - - 10/19/2020  o ACO-14: Influenza immunization was not administered for reasons documented [facility] () - - 10/19/2020   Flu shot out of stock  o [facility] NCOV2 (send-out) (84046) - 786.09/R06.00, 780.60/R50.9, 786.2/R05, 787.91/R19.7 - 10/19/2020  · Medications  o benzonatate 200 mg oral capsule   SIG: take 1 capsule (200 mg) by oral route 3 times per day as needed for cough for 10 days   DISP: (30) Capsule with 0 refills  Prescribed on 10/19/2020     o Mucinex 600 mg oral tablet extended release 12hr    SIG: take 1 tablet (600 mg) by oral route every 12 hours as needed for 10 days   DISP: (20) Tablet with 0 refills  Prescribed on 10/19/2020     o Levaquin 500 mg oral tablet   SIG: take 1 tablet (500 mg) by oral route once daily for 10 days   DISP: (10) Tablet with 0 refills  Prescribed on 10/19/2020     o Lasix 40 mg oral tablet   SIG: take 1 tablet (40 mg) by oral route 2 times per day for 90 days   DISP: (180) Tablet with 0 refills  Adjusted on 10/19/2020     · Instructions  o Take all medications as prescribed/directed.  o Patient was educated/instructed on their diagnosis, treatment and medications prior to discharge from the clinic today.  · Disposition  o Call or Return if symptoms worsen or persist.  o follow up as needed  o call the office with any questions or concerns            Electronically Signed by: Bill Finn APRN -Author on October 19, 2020 04:44:48 PM

## 2021-05-13 NOTE — PROGRESS NOTES
"   Progress Note      Patient Name: Noemi Rasheed   Patient ID: [id number]   Sex: Female   YOB: 1955    Primary Care Provider: Navya Patel MD   Referring Provider: Gumaro RILEY    Visit Date: June 30, 2020    Provider: ROSEMARY Thompson   Location: [facility]   Location Address: [address] [place] [address], [address]  [id number]   Location Phone: [phone]          Chief Complaint  · neck pain      History Of Present Illness  Noemi Rasheed is a 65 year old /White female who presents for evaluation and treatment of:      Presents today for an acute visit for neck and left shoulder pain times x2 weeks.  She was walking and became dizzy fell to her knees and her left side.  No loss of consciousness.  Approximately 2 days later she developed pain in her left side of neck and left arm.  She has decreased range of motion.  Denies numbness tingling her left arm.  She does have some numbness in the mid back region. Rates pain 7/10. Decrease ROM when turn head left and ROM in left shoulder.       Past Medical History  Aftercare;following joint replacement, right knee; Chronic Obstructive Pulmonary Disease; COPD; Diabetes; Diabetes mellitus, type 2; Enlarged liver; Frequency; Glucosuria; Hyperlipidemia; Limb Swelling; Localized edema; Lower back pain; Nocturia; Pain: Knee, right; Primary osteoarthritis of right knee; Right Knee Injury; Right knee pain, unspecified chronicity; Shortness of Breath; Stomach Disorder; Urge incontinence of urine; Urgency; Vitamin D deficiency         Past Surgical History  Appendectomy; Cardiac Catherization; Cesarian Section; Colonoscopy; EGD; Gallbladder; Hysterectomy; Knee replacement, left         Medication List  albuterol sulfate 2.5 mg/0.5 mL inhalation solution for nebulization; Aspir-81 81 mg oral tablet,delayed release (DR/EC); BD Ultra-Fine Micro Pen Needle 32 gauge x 1/4\" miscellaneous needle; ergocalciferol (vitamin D2) 50,000 unit oral " capsule; FreeStyle Lancets 28 gauge miscellaneous misc; FreeStyle Lite Meter miscellaneous kit; FreeStyle Lite Strips miscellaneous strip; gabapentin 300 mg oral capsule; Lantus Solostar U-100 Insulin 100 unit/mL (3 mL) subcutaneous insulin pen; Lasix 40 mg oral tablet; Lipitor 40 mg oral tablet; lorazepam 1 mg oral tablet; metformin 500 mg oral tablet; metolazone 5 mg oral tablet; MoviPrep 100-7.5-2.691 gram oral powder in packet; nitroglycerin 0.4 mg sublingual tablet, sublingual; Novolog Flexpen U-100 Insulin 100 unit/mL (3 mL) subcutaneous insulin pen; potassium chloride 20 mEq oral tablet extended release; Refresh Plus 0.5 % ophthalmic (eye) dropperette; spironolactone 100 mg oral tablet; Tylenol Extra Strength 500 mg oral tablet; Vitamin D2 50,000 unit oral capsule         Allergy List  NO KNOWN DRUG ALLERGIES         Family Medical History  Cancer, Unspecified; Diabetes, unspecified type; - No Family History of Colorectal Cancer; Heart Attack (MI); Diabetes; Osteoporosis; Family history of Arthritis         Reproductive History   0 Para 0 0 0 0       Social History  Alcohol (Never); Alcohol Use (Never); lives with spouse; .; Recreational Drug Use (Never); Retired.; Tobacco (Former)         Immunizations  Name Date Admin   Influenza    Influenza    Influenza          Review of Systems  · Constitutional  o Denies  o : fatigue, fever, chills, night sweats  · HENT  o Admits  o : lightheadedness  o Denies  o : headaches, vertigo, recent head injury, nasal congestion  · Cardiovascular  o Denies  o : chest pain, irregular heart beats, rapid heart rate, dyspnea on exertion, lower extremity edema  · Respiratory  o Denies  o : shortness of breath, wheezing, cough, productive cough  · Gastrointestinal  o Denies  o : nausea, vomiting, diarrhea, constipation, abdominal pain  · Musculoskeletal  o Admits  o : neck pain, shoulder pain  o Denies  o : joint swelling, limitation of motion      Vitals  Date Time BP  "Position Site L\R Cuff Size HR RR TEMP (F) WT  HT  BMI kg/m2 BSA m2 O2 Sat        06/30/2020 01:58 /78 Sitting    76 - R  97.7 260lbs 6oz 5'  6\" 42.03 2.35 94 %          Physical Examination  · Constitutional  o Appearance  o : alert, in no acute distress  · Head and Face  o Head  o :   § Inspection  § : atraumatic, normocephalic  o Face  o :   § Inspection  § : no facial lesions  o HEENT  o : Unremarkable  · Eyes  o Conjunctivae  o : conjunctivae normal  o Sclerae  o : sclerae white  o Pupils and Irises  o : pupils equal and round, pupils reactive to light bilaterally  o Eyelids/Ocular Adnexae  o : eyelid appearance normal  · Neck  o Inspection/Palpation  o : normal appearance, no masses or tenderness, trachea midline  o Thyroid  o : gland size normal, nontender, no nodules or masses present on palpation  · Respiratory  o Respiratory Effort  o : breathing unlabored  o Auscultation of Lungs  o : normal breath sounds  · Cardiovascular  o Heart  o :   § Auscultation of Heart  § : regular rate, normal rhythm, no murmurs present  o Peripheral Vascular System  o :   § Extremities  § : no edema  · Gastrointestinal  o Abdominal Examination  o : abdomen nontender to palpation, normal bowel sounds, tone normal without rigidity or guarding, no masses present  o Liver and spleen  o : no hepatomegaly present  · Lymphatic  o Neck  o : no lymphadenopathy   o Supraclavicular Nodes  o : no supraclavicular nodes  · Cervical Spine  o Inspection  o : no redness, no swelling, no deformity  o Palpation  o : tednerness present   o Range of Motion  o : flexion normal 45 degrees, hyperextension abnormal 25 degrees, left lateral bending abnormal, right lateral bending normal, axial rotation abnormal 25 degrees   o Muscle Testing  o : normal shoulder shrug intact  · Left Shoulder  o Inspection  o : no redness, no scarring, no swelling  o Palpation  o : tenderness to palapate the trapizus muscle  o Range of Motion  o : flexion-120 " degrees, extension normal-50 degrees, abduction 150 degrees, adduction normal 90 degrees, external rotation normal 90 degrees, internal rotation normal 90 degrees  o Strength  o : decreased range of motion.              Assessment  · Cervical pain     723.1/M54.2  X-ray of the cervical spine. Give Toradol 60 mg IM today. Prescribe diclofenac sodium 75 mg twice daily and cyclobenzaprine 5 mg 3 times daily as needed for pain. Discussed cervical spine exercises. If pain persists follow-up in office.  · Myofascial muscle pain     729.1/M79.18  · Shoulder pain, left     719.41/M25.512    Problems Reconciled  Plan  · Orders  o IM/SQ - Injection Fee [facility] (70928) - 723.1/M54.2, 729.1/M79.18, 719.41/M25.512 - 07/07/2020  o ACO-39: Current medications updated and reviewed () - - 06/30/2020  o 4.00 - Toradol Injection 60mg (-7) - 723.1/M54.2 - 06/30/2020   Injection - Toradol 60 mg; Dose: 60 mg; Site: Left Gluteus; Route: intramuscular; Date: 06/30/2020 14:50:42; Exp: 10/20/2021; Lot: [id number]; Mfg: [employer]; TradeName: ketorolac; Location: [facility]; Administered By: Kerri Agarwal MA; Comment: tolerated inj well, left office stable.  o Xray cervical spine complete [facility] ([id number]) - 723.1/M54.2 - 06/30/2020  · Medications  o diclofenac sodium 75 mg oral tablet,delayed release (DR/EC)   SIG: take 1 tablet (75 mg) by oral route 2 times per day for 14 days   DISP: (28) tablets with 0 refills  Prescribed on 06/30/2020     o cyclobenzaprine 5 mg oral tablet   SIG: take 1 tablet (5 mg) by oral route 3 times per day for 14 days   DISP: (30) tablets with 1 refills  Prescribed on 06/30/2020     o meloxicam 15 mg oral tablet   SIG: take 1 tablet (15 mg) by oral route once daily for 90 days   DISP: (90) tablet with 0 refills  Discontinued on 06/30/2020     o Medications have been Reconciled  o Transition of Care or Provider Policy  · Instructions  o Patient was educated/instructed on their diagnosis, treatment  and medications prior to discharge from the clinic today.  o Patient instructed to seek medical attention urgently for new or worsening symptoms.  o Call the office with any concerns or questions.  · Disposition  o Call or Return if symptoms worsen or persist.            Electronically Signed by: ROSEMARY Thompson -Author on July 7, 2020 03:00:25 PM

## 2021-05-13 NOTE — PROGRESS NOTES
Progress Note      Patient Name: Noeim Rasheed   Patient ID: [id number]   Sex: Female   YOB: 1955    Primary Care Provider: Navya Patel MD   Referring Provider: Navya Patel MD    Visit Date: July 28, 2020    Provider: Navya Patel MD   Location: [facility]   Location Address: [address] [place] [address], [address]  [id number]   Location Phone: [phone]          Chief Complaint  · Follow up office visit within 7 calendar days of discharge from inpatient status (high complexity).      History Of Present Illness  Noemi Rasheed is a 65 year old /White female who presents for evaluation and treatment of:   FOLLOW UP OFFICE VISIT WITHIN 7 CALENDAR DAYS OF INPATIENT STATUS (SEVERE COMPLEXITY)  Noemi Rasheed presents to office for follow up post discharge from inpatient status within 7 calendar days. Patient was contacted within 2 business days via phone conversation. Documentation of that phone contact is present in the patient's electronic chart. Patient was admitted to an inpatient faciliity on 07/21/2020 and discharged on 07/22/2020 due to: progressing right-sided headache since Sunday followed by episode of confusion and numbness of the left side of the face and unsteadiness this morning.   Admitting MD: WHO WAS ATTENDING INPT MD   Her discharge summary has been reviewed and placed in the patient's electronic chart.   Patient's problem list is: Aftercare;following joint replacement, right knee, COPD, Diabetes, Diabetes mellitus, type 2, Enlarged liver, Frequency, Glucosuria, Hyperlipidemia, Localized edema, Lower back pain, Nocturia, Pain: Knee, right, Primary osteoarthritis of right knee, Right Knee Injury, Right knee pain, unspecified chronicity, Urge incontinence of urine, Urgency, and Vitamin D deficiency   Patient's outpatient medication list has been reconciled with the medication list from the discharge summary and has been reviewed with the patient.  "Current medication list is: albuterol sulfate 2.5 mg/0.5 mL inhalation solution for nebulization, Aspir-81 81 mg oral tablet,delayed release (DR/EC), BD Ultra-Fine Micro Pen Needle 32 gauge x 1/4\" miscellaneous needle, cyclobenzaprine 5 mg oral tablet, diclofenac sodium 75 mg oral tablet,delayed release (DR/EC), ergocalciferol (vitamin D2) 50,000 unit oral capsule, FreeStyle Lancets 28 gauge miscellaneous misc, FreeStyle Lite Meter miscellaneous kit, FreeStyle Lite Strips miscellaneous strip, gabapentin 300 mg oral capsule, Lantus Solostar U-100 Insulin 100 unit/mL (3 mL) subcutaneous insulin pen, Lasix 40 mg oral tablet, Lipitor 40 mg oral tablet, lorazepam 1 mg oral tablet, metformin 500 mg oral tablet, metolazone 5 mg oral tablet, MoviPrep 100-7.5-2.691 gram oral powder in packet, nitroglycerin 0.4 mg sublingual tablet, sublingual, Novolog Flexpen U-100 Insulin 100 unit/mL (3 mL) subcutaneous insulin pen, potassium chloride 20 mEq oral tablet extended release, Refresh Plus 0.5 % ophthalmic (eye) dropperette, spironolactone 100 mg oral tablet, Tylenol Extra Strength 500 mg oral tablet, and Vitamin D2 50,000 unit oral capsule      Rule out strokepatient was admitted to [facility] for possible strokelike symptoms.  Patient reports that she is having a lot of stress with her brother recently passing and trying to control her blood sugars.  Patient will be switched over to Tresiba 80 units once a day.  Patient still has her sliding scale insulin to help if she has higher than normal blood sugars.    Heart murmurpatient had an echocardiogram done at [facility] which showed ejection fraction of 69% normal left ventricular size left ventricular wall thickness consistent with mild to moderate concentric hypertrophy left ventricular diastolic dysfunction grade 1 consistent with impaired relaxation.  There also was a severe calcification that was seen in the aortic valve.  Patient also has severe MAC with trace mitral valve " "regurgitation.  Patient was previously seeing Dr. Velasco but I will be sending her over to Dr. Gurdeep Dolan a cardiologist that has managed her in the past.    Neck painpatient comes continues to have severe neck pain after having a fall almost 2 months ago.  X-ray showed that she had some narrowing around C2-C3 so I will be getting an MRI of the neck.       Past Medical History  Aftercare;following joint replacement, right knee; Chronic Obstructive Pulmonary Disease; COPD; Diabetes; Diabetes mellitus, type 2; Enlarged liver; Frequency; Glucosuria; Hyperlipidemia; Limb Swelling; Localized edema; Lower back pain; Nocturia; Pain: Knee, right; Primary osteoarthritis of right knee; Right Knee Injury; Right knee pain, unspecified chronicity; Shortness of Breath; Stomach Disorder; Urge incontinence of urine; Urgency; Vitamin D deficiency         Past Surgical History  Appendectomy; Cardiac Catherization; Cesarian Section; Colonoscopy; EGD; Gallbladder; Hysterectomy; Knee replacement, left         Medication List  albuterol sulfate 2.5 mg/0.5 mL inhalation solution for nebulization; Aspir-81 81 mg oral tablet,delayed release (DR/EC); BD Ultra-Fine Micro Pen Needle 32 gauge x 1/4\" miscellaneous needle; cyclobenzaprine 5 mg oral tablet; diclofenac sodium 75 mg oral tablet,delayed release (DR/EC); ergocalciferol (vitamin D2) 50,000 unit oral capsule; FreeStyle Lancets 28 gauge miscellaneous misc; FreeStyle Lite Meter miscellaneous kit; FreeStyle Lite Strips miscellaneous strip; gabapentin 300 mg oral capsule; Lantus Solostar U-100 Insulin 100 unit/mL (3 mL) subcutaneous insulin pen; Lasix 40 mg oral tablet; Lipitor 40 mg oral tablet; lorazepam 1 mg oral tablet; metformin 500 mg oral tablet; metolazone 5 mg oral tablet; MoviPrep 100-7.5-2.691 gram oral powder in packet; nitroglycerin 0.4 mg sublingual tablet, sublingual; Novolog Flexpen U-100 Insulin 100 unit/mL (3 mL) subcutaneous insulin pen; potassium chloride 20 mEq oral tablet " "extended release; Refresh Plus 0.5 % ophthalmic (eye) dropperette; spironolactone 100 mg oral tablet; Tylenol Extra Strength 500 mg oral tablet; Vitamin D2 50,000 unit oral capsule         Allergy List  NO KNOWN DRUG ALLERGIES         Family Medical History  Cancer, Unspecified; Diabetes, unspecified type; - No Family History of Colorectal Cancer; Heart Attack (MI); Diabetes; Osteoporosis; Family history of Arthritis         Reproductive History   0 Para 0 0 0 0       Social History  Alcohol (Never); Alcohol Use (Never); lives with spouse; .; Recreational Drug Use (Never); Retired.; Tobacco (Former)         Immunizations  Name Date Admin   Influenza    Influenza    Influenza          Review of Systems  · Constitutional  o Denies  o : fatigue, fever, weight loss, weight gain  · Eyes  o Denies  o : eye discomfort, eye pain  · HENT  o Admits  o : neck pain  o Denies  o : vertigo, nasal congestion  · Cardiovascular  o Denies  o : chest pain, irregular heart beats  · Respiratory  o Denies  o : shortness of breath, wheezing  · Gastrointestinal  o Denies  o : nausea, vomiting  · Genitourinary  o Denies  o : frequency, dysuria  · Integument  o Denies  o : rash, itching  · Neurologic  o Denies  o : muscular weakness, memory difficulties  · Musculoskeletal  o Denies  o : joint swelling, muscle pain  · Psychiatric  o Denies  o : anxiety, depression  · Heme-Lymph  o Denies  o : lightheadedness, easy bleeding  · Allergic-Immunologic  o Denies  o : sinus allergy symptoms, allergic dermatitis      Vitals  Date Time BP Position Site L\R Cuff Size HR RR TEMP (F) WT  HT  BMI kg/m2 BSA m2 O2 Sat        2020 08:21 /64 Sitting    78 - R  97.5 255lbs 0oz 5'  6\" 41.16 2.32 97 %          Physical Examination  · Constitutional  o Appearance  o : well-nourished, in no acute distress  · Eyes  o Conjunctivae  o : conjunctivae normal  o Sclerae  o : sclerae white  o Pupils and Irises  o : pupils equal, round, and " reactive to light and accommodation bilaterally  o Eyelids/Ocular Adnexae  o : eyelid appearance normal, no exudates present  · Ears, Nose, Mouth and Throat  o Ears  o :   § External Ears  § : external auditory canal appearance within normal limits, no discharge present  § Otoscopic Examination  § : tympanic membrane appearance within normal limits bilaterally  o Nose  o :   § External Nose  § : appearance normal  § Nasopharynx  § : no discharge present  o Oral Cavity  o :   § Oral Mucosa  § : oral mucosa normal  § Lips  § : lip appearance normal  o Throat  o :   § Oropharynx  § : no inflammation or lesions present, tonsils within normal limits  · Neck  o Inspection/Palpation  o : normal appearance, no masses or tenderness, trachea midline  o Range of Motion  o : range of motion reduced   o Thyroid  o : gland size normal, nontender, no nodules or masses present on palpation  o Jugular Veins  o : JVP normal  · Respiratory  o Respiratory Effort  o : breathing unlabored  o Inspection of Chest  o : normal appearance  o Auscultation of Lungs  o : normal breath sounds throughout  · Cardiovascular  o Heart  o :   § Auscultation of Heart  § : regular rate and rhythm, no murmurs, gallops or rubs  o Peripheral Vascular System  o :   § Extremities  § : no edema  · Gastrointestinal  o Abdominal Examination  o : abdomen nontender to palpation, tone normal without rigidity or guarding, no masses present, bowel sounds present in all four quadrants  o Liver and spleen  o : no hepatomegaly present, liver nontender to palpation, spleen not palpable  o Hernias  o : no hernias present  · Lymphatic  o Neck  o : no lymphadenopathy present  · Musculoskeletal  o Spine  o :   § Inspection/Palpation  § : no spinal tenderness, scoliosis or kyphosis present  § Stability  § : no subluxations present  § Range of Motion  § : spine range of motion normal  o Right Upper Extremity  o :   § Inspection/Palpation  § : no tenderness to palpation, ROM  normal  o Left Upper Extremity  o :   § Inspection/Palpation  § : no tenderness to palpation, ROM normal  o Right Lower Extremity  o :   § Inspection/Palpation  § : no joint or limb tenderness to palpation, ROM normal  o Left Lower Extremity  o :   § Inspection/Palpation  § : no joint or limb tenderness to palpation, ROM normal  · Skin and Subcutaneous Tissue  o General Inspection  o : no rashes or lesions present, no areas of discoloration  o Body Hair  o : scalp palpation normal, hair normal for age, general body hair distribution normal for age  o Digits and Nails  o : no clubbing, cyanosis, deformities or edema present, normal appearing nails  · Neurologic  o Mental Status Examination  o :   § Orientation  § : grossly oriented to person, place and time  o Gait and Station  o : normal gait, able to stand without difficulty  · Psychiatric  o Judgement and Insight  o : judgment and insight intact  o Mood and Affect  o : mood normal, affect appropriate          Assessment  · Cervical pain (neck)     723.1/M54.2  · Diabetes mellitus type 2, uncontrolled, with complications       Type 2 diabetes mellitus with unspecified complications     250.92/E11.8  Type 2 diabetes mellitus with hyperglycemia     250.92/E11.65  · Heart murmur     785.2/R01.1  · TIA (transient ischemic attack)     435.9/G45.9    Problems Reconciled  Plan  · Orders  o MRI cervical spine wo contrast (33033) - 723.1/M54.2 - 07/28/2020  o CMP [facility] ([id number]) - 250.92/E11.8, 250.92/E11.65 - 07/28/2020  o Hgb A1c [facility] ([id number]) - 250.92/E11.8, 250.92/E11.65 - 07/28/2020  o ACO-39: Current medications updated and reviewed () - - 07/28/2020  o Discharge medications reconciled with the current medication list (1111F) - - 07/28/2020  o CARDIOLOGY CONSULTATION (CARDI) - 785.2/R01.1 - 07/28/2020   Dr. Davalos  · Medications  o Medications have been Reconciled  o Transition of Care or Provider Policy  · Instructions  o Patient was educated/instructed on their  diagnosis, treatment and medications prior to discharge from the clinic today.  o Minutes spent with patient including greater than 50% in Education/Counseling/Care Coordination.  o Time spent with the patient was minutes, more than 50% face to face. 35 minutes  o Patient discharge summary has been reviewed and placed in patient's electronic medical record.  o Patient received a phone call from my office within 2 business days of discharge from inpatient status, and documented within the patient's chart.  o Also patient was seen (face to face) for follow up evaluation within 7 calendar days of discharge from inpatient status for a high complexity issue.  o Patient was educated on their diagnosis, treatment and any medication changes while being evaluated today.  · Disposition  o Return Visit Request in/on 1 month +/- 0 days ([id number]).            Electronically Signed by: Navya Patel MD -Author on July 28, 2020 09:09:56 AM

## 2021-05-13 NOTE — PROGRESS NOTES
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  Her liver biopsy was 2005.  She returns now after 18 months of absence.  She has previously had a normal gastric emptying study.  Her last EGD/colonoscopy was 02/2017 showed no evidence of esophageal varices although she had grade 2 external and internal hemorrhoids, few small angiectasia in the cecum, and a tubular adenoma 12 mm removed from the ascending colon.  Recommended a colonoscopy in 1 year..  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  She has been on a baby aspirin daily.  She is on spironolactone 50 mg and Lasix 40 mg.  She does complain of frequent edema in her legs.  Her most recent lab work showed hemoglobin 13.7, platelets 65,000, glucose 451, T bili 1.66, otherwise normal liver enzymes.       Past Medical History  Aftercare;following joint replacement, right knee; Chronic Obstructive Pulmonary Disease; COPD; Diabetes; Diabetes mellitus, type 2; Enlarged liver; Frequency; Glucosuria; Hyperlipidemia; Limb Swelling; Localized edema; Lower back pain; Nocturia; Pain: Knee, right; Primary osteoarthritis of right knee; Right Knee Injury; Right knee pain, unspecified chronicity; Shortness of Breath; Stomach Disorder; Urge incontinence of urine; Urgency; Vitamin D deficiency  "        Past Surgical History  Appendectomy; Cardiac Catherization; Cesarian Section; Colonoscopy; EGD; Gallbladder; Hysterectomy; Knee replacement, left         Medication List  albuterol sulfate 2.5 mg/0.5 mL inhalation solution for nebulization; Aspir-81 81 mg oral tablet,delayed release (DR/EC); BD Ultra-Fine Micro Pen Needle 32 gauge x 1/4\" miscellaneous needle; cyclobenzaprine 5 mg oral tablet; diclofenac sodium 75 mg oral tablet,delayed release (DR/EC); ergocalciferol (vitamin D2) 50,000 unit oral capsule; FreeStyle Lancets 28 gauge miscellaneous misc; FreeStyle Lite Meter miscellaneous kit; FreeStyle Lite Strips miscellaneous strip; gabapentin 300 mg oral capsule; Lantus Solostar U-100 Insulin 100 unit/mL (3 mL) subcutaneous insulin pen; Lasix 40 mg oral tablet; Lipitor 40 mg oral tablet; lorazepam 1 mg oral tablet; metformin 500 mg oral tablet; metolazone 5 mg oral tablet; MoviPrep 100-7.5-2.691 gram oral powder in packet; nitroglycerin 0.4 mg sublingual tablet, sublingual; Novolog Flexpen U-100 Insulin 100 unit/mL (3 mL) subcutaneous insulin pen; potassium chloride 20 mEq oral tablet extended release; Refresh Plus 0.5 % ophthalmic (eye) dropperette; Tylenol Extra Strength 500 mg oral tablet; Vitamin D2 50,000 unit oral capsule         Allergy List  NO KNOWN DRUG ALLERGIES         Family Medical History  Cancer, Unspecified; Diabetes, unspecified type; - No Family History of Colorectal Cancer; Heart Attack (MI); Diabetes; Osteoporosis; Family history of Arthritis         Reproductive History   0 Para 0 0 0 0       Social History  Alcohol (Never); Alcohol Use (Never); lives with spouse; .; Recreational Drug Use (Never); Retired.; Tobacco (Former)         Review of Systems  · Constitutional  o Denies  o : chills, fever  · Cardiovascular  o Denies  o : chest pain  · Respiratory  o Denies  o : shortness of breath  · Gastrointestinal  o Denies  o : nausea, vomiting, " "dysphagia  · Endocrine  o Denies  o : weight gain, weight loss      Vitals  Date Time BP Position Site L\R Cuff Size HR RR TEMP (F) WT  HT  BMI kg/m2 BSA m2 O2 Sat HC       07/07/2020 01:46 /48 Sitting    71 - R 16  264lbs 0oz 5'  6\" 42.61 2.36 97 %          Physical Examination  · Constitutional  o Appearance  o : Healthy-appearing, awake and alert in no acute distress  · Head and Face  o Head  o : Normocephalic with no worriesome skin lesions  · Eyes  o Vision  o :   § Visual Fields  § : eyes move symmetrical in all directions  o Sclerae  o : sclerae anicteric  · Neck  o Inspection/Palpation  o : Trachea is midline, no adenopathy  · Respiratory  o Respiratory Effort  o : Breathing is unlabored.  o Inspection of Chest  o : normal appearance  o Auscultation of Lungs  o : Chest is clear to auscultation bilaterally.  · Cardiovascular  o Heart  o :   § Auscultation of Heart  § : no murmurs, rubs, or gallops  o Peripheral Vascular System  o :   § Extremities  § : no cyanosis, clubbing . Bilateral edema noted.   · Gastrointestinal  o Abdominal Examination  o : Abdomen is soft, nontender to palpation, with normal active bowel sounds, no appreciable hepatosplenomegaly.          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 -Patient has been evaluated and can tolerate anesthesia and/or sedation. Risk, benefits, and alternatives to anesthesia and sedation have been explained to patient and/or legal guardian. (12050) - 571.5/K74.60 - 07/07/2020  o Consent for Colonoscopy Screening -Possible risk/complications, benefits, and alternatives to surgical or invasive procedure have been explained to " patient and/or legal guardian. -Patient has been evaluated and can tolerate anesthesia and/or sedation. Risks, benefits, and alternatives to anesthesia and sedation have been explained to patient and/or legal guardian. () - V12.72/Z86.010 - 07/07/2020  o CBC with Auto Diff [facility] (14468) - 571.5/K74.60, V12.72/Z86.010 - 07/07/2020  o CMP Non-fasting [facility] (27758) - 571.5/K74.60, V12.72/Z86.010 - 07/07/2020  o PT/INR (16300) - 571.5/K74.60, V12.72/Z86.010 - 07/07/2020  o Ammonia blood (59757) - 571.5/K74.60, V12.72/Z86.010 - 07/07/2020  o AFP (09138, 02986) - 571.5/K74.60, V12.72/Z86.010 - 07/07/2020  o CT Abdomen and Pelvis with IV Contrast [facility]; suggest Oral Prep (10462) - 571.5/K74.60, 557.0/K55.069, V12.72/Z86.010 - 07/07/2020   Pt with mesenteric vein partial thrombosis in December, on anticoagulant, please evaluate that area as well as rule out liver lesions. thank you.  o CMP Non-fasting [facility] (32724) - 571.5/K74.60, V12.72/Z86.010, 557.0/K55.069 - 07/17/2020  · Medications  o Medications have been Reconciled  o Transition of Care or Provider Policy  · Instructions  o 65-year-old female with cirrhosis. I will schedule her for an EGD/screening colonoscopy to evaluate for varices, polyps, etc. I will schedule her for a CT scan of the abdomen pelvis as her last CT scan December 2019 showed partial vein thrombosis of the superior mesenteric vein. She is on anticoagulant. I will order the lab work above and she will call for results. I will also increase her spironolactone to 100 mg and she will have a CMP in 10 days. She will follow-up shortly after the procedures.            Electronically Signed by: Stefano Carias PA-C -Author on July 7, 2020 02:03:30 PM  Electronically Co-signed by: Junaid Carr MD -Reviewer on July 11, 2020 11:30:30 AM

## 2021-05-13 NOTE — PROGRESS NOTES
"   Progress Note      Patient Name: Noemi Rasheed   Patient ID: [id number]   Sex: Female   YOB: 1955    Primary Care Provider: Navya Patel MD   Referring Provider: Navya Patel MD    Visit Date: November 9, 2020    Provider: Luisa Bravo PA-C   Location: [facility]   Location Address: [address] [place] [address]  [id number]   Location Phone: [phone]          Chief Complaint  · Follow-up      History Of Present Illness  The patient is a 65 year old /White female who is in the office for followup appointment.      Patient is here for a follow up. Patient states that due to her work she was unable to complete the ordered Physical Therapy and the pain has gotten significantly worse. She complains that the pain is unbearable, causes an intense burning sensation and is effecting daily life and mobility greatly.  She would like to try injections in neck. Pt is also complaining of hand cramping.       Past Medical History  Aftercare;following joint replacement, right knee; Chronic Obstructive Pulmonary Disease; COPD; Diabetes; Diabetes mellitus, type 2; Enlarged liver; Frequency; Glucosuria; Hyperlipidemia; Limb Swelling; Localized edema; Lower back pain; Nocturia; Pain: Knee, right; Primary osteoarthritis of right knee; Right Knee Injury; Right knee pain, unspecified chronicity; Shortness of Breath; Stomach Disorder; Urge incontinence of urine; Urgency; Vitamin D deficiency         Past Surgical History  Appendectomy; Cardiac Catherization; Cesarian Section; Colonoscopy; EGD; Gallbladder; Hysterectomy; Knee replacement, left         Medication List  albuterol sulfate 2.5 mg/0.5 mL inhalation solution for nebulization; Aspir-81 81 mg oral tablet,delayed release (DR/EC); baclofen 10 mg oral tablet; Basaglar KwikPen U-100 Insulin 100 unit/mL (3 mL) subcutaneous insulin pen; BD Ultra-Fine Micro Pen Needle 32 gauge x 1/4\" miscellaneous needle; " diclofenac sodium 75 mg oral tablet,delayed release (DR/EC); ergocalciferol (vitamin D2) 50,000 unit oral capsule; FreeStyle Lancets 28 gauge miscellaneous misc; FreeStyle Lite Meter miscellaneous kit; FreeStyle Lite Strips miscellaneous strip; gabapentin 300 mg oral capsule; Lasix 40 mg oral tablet; Levaquin 500 mg oral tablet; Lipitor 40 mg oral tablet; lorazepam 1 mg oral tablet; metformin 500 mg oral tablet; MoviPrep 100-7.5-2.691 gram oral powder in packet; Mucinex 600 mg oral tablet extended release 12hr; potassium chloride 20 mEq oral tablet extended release; Refresh Plus 0.5 % ophthalmic (eye) dropperette; spironolactone 100 mg oral tablet; Tylenol Extra Strength 500 mg oral tablet; Vitamin D2 50,000 unit oral capsule         Allergy List  NO KNOWN DRUG ALLERGIES       Allergies Reconciled  Family Medical History  Cancer, Unspecified; Diabetes, unspecified type; - No Family History of Colorectal Cancer; Heart Attack (MI); Diabetes; Osteoporosis; Family history of Arthritis         Reproductive History   0 Para 0 0 0 0       Social History  Alcohol (Never); Alcohol Use (Never); lives with spouse; .; Recreational Drug Use (Never); Retired.; Tobacco (Former)         Immunizations  Name Date Admin   Influenza 10/10/2019   Influenza 10/12/2018   Influenza 10/03/2017         Review of Systems  · Constitutional  o Admits  o : fatigue  o Denies  o : chills, excessive sweating, fever, sycope/passing out, weight gain, weight loss  · Eyes  o Admits  o : changes in vision, blurry vision, double vision  · HENT  o Denies  o : loss of hearing, ringing in the ears, ear aches, sore throat, nasal congestion, sinus pain, nose bleeds, seasonal allergies  · Cardiovascular  o Admits  o : swollen legs, easy burising or bleeding  o Denies  o : blood clots, anemia, transfusions  · Respiratory  o Admits  o : COPD  o Denies  o : shortness of breath, dry cough, productive cough,  "pneumonia  · Gastrointestinal  o Denies  o : difficulty swallowing, reflux  · Genitourinary  o Denies  o : incontinence  · Neurologic  o Admits  o : headache, difficulty with sleep, numbness/tingling/paresthesia   o Denies  o : seizure, stroke, tremor, loss of balance, falls, dizziness/vertigo, difficulty with coordination, difficulty with dexterity, weakness  · Musculoskeletal  o Admits  o : neck stiffness/pain, joint pain, pain radiating in arm, pain radiating in leg, low back pain  o Denies  o : swollen lymph nodes, muscle aches, weakness, spasms, sciatica  · Endocrine  o Admits  o : diabetes  o Denies  o : thyroid disorder  · Psychiatric  o Denies  o : anxiety, depression      Vitals  Date Time BP Position Site L\R Cuff Size HR RR TEMP (F) WT  HT  BMI kg/m2 BSA m2 O2 Sat FR L/min FiO2 HC       11/09/2020 02:04 /64 Sitting    72 - R  97.5 268lbs 5oz 5'  6\" 43.31 2.38             Physical Examination  · Constitutional  o Appearance  o : well-nourished, well developed, alert, in no acute distress  · Neck  o Inspection/Palpation  o : normal appearance, no masses, trachea midline. Tenderness of Cspine paraspinals on left.   o Range of Motion  o : cervical range of motion within normal limits  · Respiratory  o Respiratory Effort  o : breathing unlabored  · Cardiovascular  o Peripheral Vascular System  o :   § Extremities  § : no edema or cyanosis  · Musculoskeletal  o Spine  o :   § Stability  § : no subluxations present  § Muscle Strength/Tone  § : paraspinal muscle strength within normal limits, paraspinal muscle tone within normal limits  o Right Upper Extremity  o :   § Inspection/Palpation  § : no tenderness to palpation  § Joint Stability  § : shoulder, elbow and wrist joint stability normal  § Range of Motion  § : range of motion normal, no joint crepitus or pain with motion present,shoulder negative  o Left Upper Extremity  o :   § Inspection/Palpation  § : no tenderness to palpation  § Joint " Stability  § : shoulder, elbow and wrist joint stability normal  § Range of Motion  § : range of motion normal, no joint crepitus present, no pain with joint motion, shoulder negative  · Skin and Subcutaneous Tissue  o Neck  o : no lesions or areas of discoloration  · Neurologic  o Mental Status Examination  o :   § Orientation  § : alert and oriented to person, place, time and events  o Motor Examination  o :   § RUE Strength  § : strength normal  § RUE Motor Function  § : tone normal, muscle bulk normal  § LUE Strength  § : strength normal  § LUE Motor Function  § : tone normal, muscle bulk normal  o Reflexes  o :   § RUE  § : 2/4 in biceps/triceps/brachioradialis, Comer sign negative  § LUE  § : 2/4 in biceps/triceps/brachioradialis, Comer sign negative  o Sensation  o :   § Light Touch  § : sensation intact to light touch in extremities  o Gait and Station  o :   § Gait Screening  § : normal gait, able to stand without difficulty  · Psychiatric  o Mood and Affect  o : mood normal, affect appropriate          Assessment  · Cervicalgia     723.1/M54.2  · Cervical radiculopathy     723.4/M54.12      Plan  · Medications  o Medications have been Reconciled  o Transition of Care or Provider Policy  · Instructions  o The ROS and the PFSH were reviewed at today's visit.  o Call or return to office if symptoms worsen or persist.   o Will send for CESIs and return afterwards. She also complains of cramping hand pain. No Comer's today. Facet injections may give added benefit for left sided neck pain in future.   o Electronically Identified Patient Education Materials Provided Electronically  · Disposition  o Call or Return if symptoms worsen or persist.            Electronically Signed by: Luisa Bravo PA-C -Author on November 9, 2020 03:21:51 PM

## 2021-05-14 NOTE — PROGRESS NOTES
Progress Note      Patient Name: Noemi Rasheed   Patient ID: [id number]   Sex: Female   YOB: 1955    Primary Care Provider: Navya Patel MD   Referring Provider: Navya Patel MD    Visit Date: December 23, 2020    Provider: ROSEMARY Burgess   Location: [facility]   Location Address: [address] [place] [address], [address] [place] [address]  [id number]   Location Phone: [phone]          Chief Complaint  · feet pain/swelling      History Of Present Illness  Noemi Rasheed is a 65 year old /White female who presents for evaluation and treatment of:      Presents today for an acute visit for left ankle pain and swelling.  She reports she has been having foot and ankle pain for the past several months.  Over the past couple days she has had worsening left ankle pain.  She recently went to [facility] then the ER on 12/21/2020.  She had a venous Doppler that was negative.  She was diagnosed with acute nontraumatic pain in the left lower extremity, bilateral pedal edema, and paresthesia.  She is diabetic.  Blood sugar at the hospital was 360.  She takes Basaglar and fast acting insulin.  She has been wrapping her foot and ankle with an Ace wrap.  She reports the swelling goes down with the Ace wrap.  Hospital put her in a boot.  She did not wear the boot to the office today.       Past Medical History  Aftercare;following joint replacement, right knee; COPD; Diabetes mellitus, type 2; Enlarged liver; Frequency; Glucosuria; Hyperlipidemia; Limb Swelling; Localized edema; Lower back pain; Nocturia; Pain: Knee, right; Primary osteoarthritis of right knee; Right Knee Injury; Right knee pain, unspecified chronicity; Shortness of Breath; Stomach Disorder; Urge incontinence of urine; Vitamin D deficiency         Past Surgical History  Appendectomy; Cardiac Catherization; Cesarian Section; Cholecystectomy; Colonoscopy; EGD; Hysterectomy; Knee replacement, left         Medication  "List  albuterol sulfate 2.5 mg/0.5 mL inhalation solution for nebulization; baclofen 10 mg oral tablet; Basaglar KwikPen U-100 Insulin 100 unit/mL (3 mL) subcutaneous insulin pen; BD Ultra-Fine Micro Pen Needle 32 gauge x 1/4\" miscellaneous needle; diclofenac sodium 75 mg oral tablet,delayed release (DR/EC); ergocalciferol (vitamin D2) 50,000 unit oral capsule; FreeStyle Lancets 28 gauge miscellaneous misc; FreeStyle Lite Meter miscellaneous kit; FreeStyle Lite Strips miscellaneous strip; gabapentin 300 mg oral capsule; Lasix 40 mg oral tablet; Levaquin 500 mg oral tablet; Lipitor 40 mg oral tablet; lorazepam 1 mg oral tablet; metformin 500 mg oral tablet; MoviPrep 100-7.5-2.691 gram oral powder in packet; Mucinex 600 mg oral tablet extended release 12hr; potassium chloride 20 mEq oral tablet extended release; Refresh Plus 0.5 % ophthalmic (eye) dropperette; spironolactone 100 mg oral tablet; Tylenol Extra Strength 500 mg oral tablet; Vitamin D2 50,000 unit oral capsule         Allergy List  NO KNOWN DRUG ALLERGIES         Family Medical History  Cancer, Unspecified; Diabetes, unspecified type; - No Family History of Colorectal Cancer; Heart Attack (MI); Diabetes; Osteoporosis; Family history of Arthritis         Reproductive History   0 Para 0 0 0 0       Social History  Alcohol (Never); lives with spouse; ; Recreational Drug Use (Never); Retired; Tobacco (Former)         Immunizations  Name Date Admin   Influenza 10/10/2019   Influenza 10/12/2018   Influenza 10/03/2017         Review of Systems  · Constitutional  o Denies  o : fever, fatigue, weight loss, weight gain  · Cardiovascular  o Denies  o : lower extremity edema, claudication, chest pressure, palpitations  · Respiratory  o Denies  o : shortness of breath, wheezing, cough, hemoptysis, dyspnea on exertion  · Gastrointestinal  o Denies  o : nausea, vomiting, diarrhea, constipation, abdominal pain  · Neurologic  o Admits  o : tingling or " "numbness  o Denies  o : altered mental status, seizures  · Musculoskeletal  o Admits  o : joint pain, joint swelling, limitation of motion, ankle pain      Vitals  Date Time BP Position Site L\R Cuff Size HR RR TEMP (F) WT  HT  BMI kg/m2 BSA m2 O2 Sat FR L/min FiO2 HC       12/23/2020 11:47 /78 Sitting    73 - R  98 274lbs 8oz 5'  6\" 44.31 2.41 97 %            Physical Examination  · Constitutional  o Appearance  o : alert, in no acute distress  · Head and Face  o Head  o :   § Inspection  § : atraumatic, normocephalic  o Face  o :   § Inspection  § : no facial lesions  o HEENT  o : Unremarkable  · Eyes  o Conjunctivae  o : conjunctivae normal  o Sclerae  o : sclerae white  o Pupils and Irises  o : pupils equal and round, pupils reactive to light bilaterally  o Eyelids/Ocular Adnexae  o : eyelid appearance normal  · Neck  o Inspection/Palpation  o : normal appearance, no masses or tenderness, trachea midline  o Thyroid  o : gland size normal, nontender, no nodules or masses present on palpation  · Respiratory  o Respiratory Effort  o : breathing unlabored  o Auscultation of Lungs  o : normal breath sounds  · Cardiovascular  o Heart  o :   § Auscultation of Heart  § : regular rate, normal rhythm, no murmurs present  o Peripheral Vascular System  o :   § Extremities  § : no edema  · Lymphatic  o Neck  o : no lymphadenopathy   o Supraclavicular Nodes  o : no supraclavicular nodes  · Left Ankle/Foot  o Inspection  o : no redness, swelling present, no atrophy  o Palpation  o : Tenderness on the lateral side of the ankle  o Neurovascular  o : neurovascularly intact to L1-S1  o Range of Motion  o : Decreased range of motion          Assessment  · Ankle pain     719.47/M25.579  Order x-ray of left ankle. Discussed RICE, rest, ice, compress, and elevate.      Plan  · Orders  o ACO-39: Current medications updated and reviewed (1159F, ) - - 12/23/2020  o Xray ankle left [facility] ([id number]) - 719.47/M25.579 " - 12/23/2020  · Instructions  o Patient was educated/instructed on their diagnosis, treatment and medications prior to discharge from the clinic today.  o Patient instructed to seek medical attention urgently for new or worsening symptoms.  o Call the office with any concerns or questions.  · Disposition  o Call or Return if symptoms worsen or persist.            Electronically Signed by: ROSEMARY Burgess -Author on December 23, 2020 01:06:04 PM

## 2021-05-14 NOTE — PROGRESS NOTES
"   Progress Note      Patient Name: Noemi Rasheed   Patient ID: [id number]   Sex: Female   YOB: 1955    Primary Care Provider: Navya Patel MD   Referring Provider: Navya Patel MD    Visit Date: January 26, 2021    Provider: Navya Patel MD   Location: [facility] [place]   Location Address: [address] [place] [address], [address]  [id number]   Location Phone: [phone]          Chief Complaint  · Leg Swelling      History Of Present Illness  Noemi Rasheed is a 65 year old /White female who presents for evaluation and treatment of:      Lower extremity swelling- pt reports that she thinks She has an infection in her lower legs bilaterally.  Patient does have swelling in her lower legs with pitting edema and some areas of red streaks which appears to be secondary to the swelling in her skin cracking.  Patient reports that she took some \"leftover antibiotic\" for the last 4 days to get to this appointment.  Patient will be getting a 1 g Rocephin injection today and I will be sending her prescription for Keflex to take for the next 10 days.  Patient was previously hospitalized in November 2020 and was experiencing lower extremity edema secondary to hypoproteinemia secondary to Dickinson cirrhosis.  Patient has had a chronically low protein level due to her cirrhosis and I will be setting her up with her gastroenterologist to have that follow and I will be starting her on vitamin D to take once a day for liver health.       Past Medical History  Aftercare;following joint replacement, right knee; COPD; Diabetes mellitus, type 2; Enlarged liver; Frequency; Glucosuria; Hyperlipidemia; Limb Swelling; Localized edema; Lower back pain; Nocturia; Pain: Knee, right; Primary osteoarthritis of right knee; Right Knee Injury; Right knee pain, unspecified chronicity; Shortness of Breath; Stomach Disorder; Urge incontinence of urine; Vitamin D deficiency         Past Surgical " "History  Appendectomy; Cardiac Catherization; Cesarian Section; Cholecystectomy; Colonoscopy; EGD; Hysterectomy; Knee replacement, left         Medication List  albuterol sulfate 2.5 mg/0.5 mL inhalation solution for nebulization; baclofen 10 mg oral tablet; Basaglar KwikPen U-100 Insulin 100 unit/mL (3 mL) subcutaneous insulin pen; BD Ultra-Fine Micro Pen Needle 32 gauge x 1/4\" miscellaneous needle; diclofenac sodium 75 mg oral tablet,delayed release (DR/EC); ergocalciferol (vitamin D2) 50,000 unit oral capsule; FreeStyle Lancets 28 gauge miscellaneous misc; FreeStyle Lite Meter miscellaneous kit; FreeStyle Lite Strips miscellaneous strip; gabapentin 300 mg oral capsule; Lasix 40 mg oral tablet; Levaquin 500 mg oral tablet; Lipitor 40 mg oral tablet; lorazepam 1 mg oral tablet; metformin 500 mg oral tablet; MoviPrep 100-7.5-2.691 gram oral powder in packet; Mucinex 600 mg oral tablet extended release 12hr; potassium chloride 20 mEq oral tablet extended release; Refresh Plus 0.5 % ophthalmic (eye) dropperette; spironolactone 100 mg oral tablet; Tylenol Extra Strength 500 mg oral tablet; Vitamin D2 50,000 unit oral capsule         Allergy List  NO KNOWN DRUG ALLERGIES       Allergies Reconciled  Family Medical History  Cancer, Unspecified; Diabetes, unspecified type; - No Family History of Colorectal Cancer; Heart Attack (MI); Diabetes; Osteoporosis; Family history of Arthritis         Reproductive History   0 Para 0 0 0 0       Social History  Alcohol (Never); lives with spouse; ; Recreational Drug Use (Never); Retired; Tobacco (Former)         Immunizations  Name Date Admin   Influenza 10/10/2019   Influenza 10/12/2018   Influenza 10/03/2017         Review of Systems  · Constitutional  o Denies  o : fatigue, fever, weight loss, weight gain  · Eyes  o Denies  o : eye discomfort, eye pain  · HENT  o Denies  o : vertigo, nasal congestion  · Cardiovascular  o Admits  o : lower extremity " "edema  o Denies  o : chest pain  · Respiratory  o Denies  o : shortness of breath, wheezing  · Gastrointestinal  o Denies  o : nausea, vomiting  · Genitourinary  o Denies  o : frequency, dysuria  · Integument  o Denies  o : rash, itching  · Neurologic  o Denies  o : muscular weakness, memory difficulties  · Musculoskeletal  o Denies  o : joint swelling, muscle pain  · Psychiatric  o Denies  o : anxiety, depression  · Heme-Lymph  o Denies  o : lightheadedness, easy bleeding  · Allergic-Immunologic  o Denies  o : sinus allergy symptoms, allergic dermatitis      Vitals  Date Time BP Position Site L\R Cuff Size HR RR TEMP (F) WT  HT  BMI kg/m2 BSA m2 O2 Sat FR L/min FiO2 HC       01/26/2021 10:55 /74 Sitting    85 - R 18 99 276lbs 0oz 5'  6\" 44.55 2.41 93 %            Physical Examination  · Constitutional  o Appearance  o : well-nourished, in no acute distress  · Eyes  o Conjunctivae  o : conjunctivae normal  o Sclerae  o : sclerae white  o Pupils and Irises  o : pupils equal, round, and reactive to light and accommodation bilaterally  o Eyelids/Ocular Adnexae  o : eyelid appearance normal, no exudates present  · Ears, Nose, Mouth and Throat  o Ears  o :   § External Ears  § : external auditory canal appearance within normal limits, no discharge present  § Otoscopic Examination  § : tympanic membrane appearance within normal limits bilaterally  o Nose  o :   § External Nose  § : appearance normal  § Nasopharynx  § : no discharge present  o Oral Cavity  o :   § Oral Mucosa  § : oral mucosa normal  § Lips  § : lip appearance normal  o Throat  o :   § Oropharynx  § : no inflammation or lesions present, tonsils within normal limits  · Neck  o Range of Motion  o : cervical range of motion within normal limits  · Respiratory  o Respiratory Effort  o : breathing unlabored  o Inspection of Chest  o : normal appearance  o Auscultation of Lungs  o : normal breath sounds throughout  · Cardiovascular  o Heart  o : "   § Auscultation of Heart  § : regular rate and rhythm, no murmurs, gallops or rubs  o Peripheral Vascular System  o :   § Extremities  § : 3+ pitting edema present, no cyanosis, no distal hair loss, normal capillary refill  · Gastrointestinal  o Abdominal Examination  o : abdomen nontender to palpation, tone normal without rigidity or guarding, no masses present, bowel sounds present in all four quadrants  · Lymphatic  o Neck  o : no lymphadenopathy present  · Musculoskeletal  o Spine  o :   § Inspection/Palpation  § : no spinal tenderness, scoliosis or kyphosis present  § Stability  § : no subluxations present  § Range of Motion  § : spine range of motion normal  o Right Upper Extremity  o :   § Inspection/Palpation  § : no tenderness to palpation, ROM normal  o Left Upper Extremity  o :   § Inspection/Palpation  § : no tenderness to palpation, ROM normal  o Right Lower Extremity  o :   § Inspection/Palpation  § : edema present   o Left Lower Extremity  o :   § Inspection/Palpation  § : edema present   · Skin and Subcutaneous Tissue  o General Inspection  o : no rashes or lesions present, no areas of discoloration  o Body Hair  o : scalp palpation normal, hair normal for age, general body hair distribution normal for age  o Digits and Nails  o : no clubbing, cyanosis, deformities or edema present, normal appearing nails  · Neurologic  o Mental Status Examination  o :   § Orientation  § : grossly oriented to person, place and time  o Gait and Station  o : normal gait, able to stand without difficulty  · Psychiatric  o Judgement and Insight  o : judgment and insight intact  o Mood and Affect  o : mood normal, affect appropriate              Assessment  · Screening for depression     V79.0/Z13.89  · Hypoalbuminemia     273.8/E88.09  · BARRIENTOS (nonalcoholic steatohepatitis)     571.8/K75.81  · Cellulitis     682.9/L03.90  · Pitting edema     782.3/R60.9      Plan  · Orders  o [facility] ([id number]) - 273.8/E88.09, 571.8/K75.81  - 01/26/2021  o IM - Injection Fee [facility] ([id number]) - - 01/26/2021  o ACO-18: Negative screen for clinical depression using a standardized tool () - - 01/26/2021  o ACO-14: Influenza immunization administered or previously received [facility] () - - 01/26/2021  o ACO-39: Current medications updated and reviewed (1159F, ) - - 01/26/2021  o 4.00 - Rocephin Injection 1 Gram (1000mg) (-9) - - 01/26/2021   Injection - Rocephin 1 Gram; Dose: 1 Gram; Site: Right Hip; Route: intramuscular; Date: 01/26/2021 11:52:32; Exp: 06/01/2021; Lot: [id number]; Mfg: SAGENT/PREMIERP; TradeName: ceftriaxone; Location: [facility] [place]; Administered By: Kasandra Redding MA; Comment: tolerated well  o Gastroenterology Consultation (GASTR) - 571.8/K75.81 - 01/26/2021   pt already seeing Dr. Carr for colonoscopies wants to keep the same doc  · Medications  o cephalexin 500 mg oral capsule   SIG: take 1 capsule (500 mg) by oral route every 12 hours for 10 days   DISP: (20) Capsule with 0 refills  Prescribed on 01/26/2021     o vitamin E 400 unit oral capsule   SIG: take 1 capsule by oral route daily for 90 days   DISP: (90) Capsule with 1 refills  Prescribed on 01/26/2021     o Lasix 40 mg oral tablet   SIG: take 1 tablet (40 mg) by oral route 2 times per day for 90 days   DISP: (180) Tablet with 0 refills  Refilled on 01/26/2021     o spironolactone 100 mg oral tablet   SIG: take 1 tablet (100 mg) by oral route once daily for 90 days   DISP: (90) Tablet with 1 refills  Refilled on 01/26/2021     o Medications have been Reconciled  o Transition of Care or Provider Policy  · Instructions  o Depression Screen completed and scanned into the EMR under the designated folder within the patient's documents.  o Today's PHQ-9 result is __3_  o Patient was educated/instructed on their diagnosis, treatment and medications prior to discharge from the clinic today.  o Minutes spent with patient including greater than 50% in  Education/Counseling/Care Coordination.  o Time spent with the patient was minutes, more than 50% face to face. 35 minutes  · Disposition  o f/u 1 month            Electronically Signed by: Navya Patel MD -Author on January 26, 2021 12:36:09 PM

## 2021-05-14 NOTE — PROGRESS NOTES
Progress Note      Patient Name: Noemi Rasheed   Patient ID: [id number]   Sex: Female   YOB: 1955    Primary Care Provider: Navya Patel MD   Referring Provider: Navya Patel MD    Visit Date: March 16, 2021    Provider: Navya Patel MD   Location: [facility]   Location Address: [address] [place] [address], [address] [place] [address]  [id number]   Location Phone: [phone]          Chief Complaint  · Right Hip Pain      History Of Present Illness  Noemi Rasheed is a 66 year old /White female who presents for evaluation and treatment of:      Pt reports She fell in the shower approximately a week ago and had significant bruising her left breast which had an ultrasound that was normal and also has bruising in her epigastric area and significant amount of pain and her pain is now.  Patient is pannus is swollen which she is suspecting may be inflammation or infection.  Patient also has what appears to be cutaneous candidiasis under her pannus I will give her prescription for medication for that and antibiotic for cellulitis.  Due to patient having significant amount of right hip pain I will be getting a prescription for her pain medication and also sending her for a right hip x-ray.       Past Medical History  Aftercare;following joint replacement, right knee; COPD; Diabetes mellitus, type 2; Enlarged liver; Frequency; Glucosuria; Hyperlipidemia; Limb Swelling; Localized edema; Lower back pain; Nocturia; Pain: Knee, right; Primary osteoarthritis of right knee; Right Knee Injury; Shortness of Breath; Stomach Disorder; Urge incontinence of urine; Vitamin D deficiency         Past Surgical History  Appendectomy; Cardiac Catherization; Cesarian Section; Cholecystectomy; Colonoscopy; EGD; Hysterectomy; Knee replacement, left         Medication List  albuterol sulfate 2.5 mg/0.5 mL inhalation solution for nebulization; baclofen 10 mg oral tablet; Basaglar KwikPen U-100  "Insulin 100 unit/mL (3 mL) subcutaneous insulin pen; BD Ultra-Fine Micro Pen Needle 32 gauge x 1/4\" miscellaneous needle; cephalexin 500 mg oral capsule; diclofenac sodium 75 mg oral tablet,delayed release (DR/EC); ergocalciferol (vitamin D2) 50,000 unit oral capsule; FreeStyle Lancets 28 gauge miscellaneous misc; FreeStyle Lite Meter miscellaneous kit; FreeStyle Lite Strips miscellaneous strip; gabapentin 300 mg oral capsule; ibuprofen 800 mg oral tablet; Lasix 40 mg oral tablet; Lipitor 40 mg oral tablet; lorazepam 1 mg oral tablet; metformin 500 mg oral tablet; potassium chloride 20 mEq oral tablet extended release; Refresh Plus 0.5 % ophthalmic (eye) dropperette; spironolactone 100 mg oral tablet; tramadol 50 mg oral tablet; Tylenol Extra Strength 500 mg oral tablet; Vitamin D2 50,000 unit oral capsule; vitamin E 400 unit oral capsule         Allergy List  NO KNOWN DRUG ALLERGIES       Allergies Reconciled  Family Medical History  Cancer, Unspecified; Diabetes, unspecified type; - No Family History of Colorectal Cancer; Heart Attack (MI); Diabetes; Osteoporosis; Family history of Arthritis         Reproductive History   0 Para 0 0 0 0       Social History  Alcohol (Never); lives with spouse; ; Recreational Drug Use (Never); Retired; Tobacco (Former)         Immunizations  Name Date Admin   Influenza 10/10/2019   Influenza 10/12/2018   Influenza 10/03/2017         Review of Systems  · Constitutional  o Denies  o : fatigue, night sweats  · Eyes  o Denies  o : double vision, blurred vision  · HENT  o Denies  o : vertigo, recent head injury  · Breasts  o Denies  o : abnormal changes in breast size, additional breast symptoms except as noted in the HPI  · Cardiovascular  o Denies  o : chest pain, irregular heart beats  · Respiratory  o Denies  o : shortness of breath, productive cough  · Gastrointestinal  o Denies  o : nausea, vomiting  · Genitourinary  o Denies  o : dysuria, urinary " "retention  · Integument  o Admits  o : rash  o Denies  o : hair growth change, new skin lesions  · Neurologic  o Denies  o : altered mental status, seizures  · Musculoskeletal  o Admits  o : hip pain  o Denies  o : joint swelling, limitation of motion  · Endocrine  o Denies  o : cold intolerance, heat intolerance  · Heme-Lymph  o Denies  o : petechiae, lymph node enlargement or tenderness  · Allergic-Immunologic  o Denies  o : frequent illnesses      Vitals  Date Time BP Position Site L\R Cuff Size HR RR TEMP (F) WT  HT  BMI kg/m2 BSA m2 O2 Sat FR L/min FiO2 HC       03/16/2021 11:41 /90 Sitting    81 - R  97.8 284lbs 0oz 5'  6\" 45.84 2.45 94 %            Physical Examination  · Constitutional  o Appearance  o : well-nourished, in no acute distress  · Eyes  o Conjunctivae  o : conjunctivae normal  o Sclerae  o : sclerae white  o Pupils and Irises  o : pupils equal, round, and reactive to light and accommodation bilaterally  o Eyelids/Ocular Adnexae  o : eyelid appearance normal, no exudates present  · Ears, Nose, Mouth and Throat  o Ears  o :   § External Ears  § : external auditory canal appearance within normal limits, no discharge present  § Otoscopic Examination  § : tympanic membrane appearance within normal limits bilaterally  o Nose  o :   § External Nose  § : appearance normal  § Nasopharynx  § : no discharge present  o Oral Cavity  o :   § Oral Mucosa  § : oral mucosa normal  § Lips  § : lip appearance normal  o Throat  o :   § Oropharynx  § : no inflammation or lesions present, tonsils within normal limits  · Neck  o Inspection/Palpation  o : normal appearance, no masses or tenderness, trachea midline  o Range of Motion  o : cervical range of motion within normal limits  o Thyroid  o : gland size normal, nontender, no nodules or masses present on palpation  o Jugular Veins  o : JVP normal  · Respiratory  o Respiratory Effort  o : breathing unlabored  o Inspection of Chest  o : normal " appearance  o Auscultation of Lungs  o : normal breath sounds throughout  · Cardiovascular  o Heart  o :   § Auscultation of Heart  § : regular rate and rhythm, no murmurs, gallops or rubs  o Peripheral Vascular System  o :   § Extremities  § : no edema  · Gastrointestinal  o Abdominal Examination  o : abdomen nontender to palpation, tone normal without rigidity or guarding, no masses present, bowel sounds present in all four quadrants  o Liver and spleen  o : no hepatomegaly present, liver nontender to palpation, spleen not palpable  o Hernias  o : no hernias present  · Lymphatic  o Neck  o : no lymphadenopathy present  · Musculoskeletal  o Spine  o :   § Inspection/Palpation  § : no spinal tenderness, scoliosis or kyphosis present  § Stability  § : no subluxations present  § Range of Motion  § : spine range of motion normal  o Right Upper Extremity  o :   § Inspection/Palpation  § : no tenderness to palpation, ROM normal  o Left Upper Extremity  o :   § Inspection/Palpation  § : no tenderness to palpation, ROM normal  o Right Lower Extremity  o :   § Inspection/Palpation  § : no joint or limb tenderness to palpation, ROM normal  o Left Lower Extremity  o :   § Inspection/Palpation  § : no joint or limb tenderness to palpation, ROM normal  · Skin and Subcutaneous Tissue  o General Inspection  o : no rashes or lesions present, no areas of discoloration  o Body Hair  o : scalp palpation normal, hair normal for age, general body hair distribution normal for age  o Digits and Nails  o : no clubbing, cyanosis, deformities or edema present, normal appearing nails  · Neurologic  o Mental Status Examination  o :   § Orientation  § : grossly oriented to person, place and time  o Gait and Station  o : normal gait, able to stand without difficulty  · Psychiatric  o Judgement and Insight  o : judgment and insight intact  o Mood and Affect  o : mood normal, affect  appropriate          Assessment  · Cellulitis     682.9/L03.90  · Hip pain, right     719.45/M25.551  · Cutaneous candidiasis     112.3/B37.2      Plan  · Orders  o IM - Injection Fee [facility] ([id number]) - - 03/16/2021  o ACO-39: Current medications updated and reviewed (1159F, ) - - 03/16/2021  o Hip (Right) 2 or more views (includes AP Pelvis) X-Ray [facility] ([id number]) - 719.45/M25.551 - 03/16/2021  o 4.00 - Rocephin Injection 1 Gram (1000mg) (-9) - - 03/16/2021   Injection - Rocephin 1 Gram; Dose: 1 Gram; Site: Left Gluteus; Route: intramuscular; Date: 03/16/2021 12:15:00; Exp: 06/01/2021; Lot: [id number]; Mfg: [employer]; TradeName: ceftriaxone; Location: [facility]; Administered By: Vashti Barnes MA; Comment: Pt tolerated well, stable condition  · Medications  o Keflex 500 mg oral capsule   SIG: take 1 capsule (500 mg) by oral route every 12 hours for 10 days   DISP: (20) Capsule with 0 refills  Prescribed on 03/16/2021     o nystatin 100,000 unit/gram topical powder   SIG: apply to the affected area(s) by topical route 3 times per day for 21 days   DISP: (1) Bottle with 0 refills  Prescribed on 03/16/2021     o Medications have been Reconciled  o Transition of Care or Provider Policy  · Instructions  o Patient was educated/instructed on their diagnosis, treatment and medications prior to discharge from the clinic today.  o Minutes spent with patient including greater than 50% in Education/Counseling/Care Coordination.  o Time spent with the patient was minutes, more than 50% face to face. 25 minutes  · Disposition  o Call or Return if symptoms worsen or persist.  · Correspondence  o ****Return to Work or School (Noemi Rasheed) - 03/16/2021            Electronically Signed by: Navya Patel MD -Author on March 16, 2021 12:29:10 PM

## 2021-05-14 NOTE — PROGRESS NOTES
Progress Note      Patient Name: Noemi Rasheed   Patient ID: [id number]   Sex: Female   YOB: 1955    Primary Care Provider: Navya Patel MD   Referring Provider: Navya Patel MD    Visit Date: March 8, 2021    Provider: ROSEMARY Aguilera   Location: [facility]   Location Address: [address] [place] [address], [address] [place] [address]  [id number]   Location Phone: [phone]          Chief Complaint  · fall  · left breast pain      History Of Present Illness  Noemi Rasheed is a 65 year old /White female who presents for evaluation and treatment of:      Patient is a  65-year-old female who comes in for an acute appointment. Patient typically sees Dr. Coffee. Patient is complaining of left breast pain status post fall yesterday. Patient states she slipped and fell coming out of the shower and her left breast hit the commode. She denies any hitting of her head or loss of consciousness. She denies any other pain in her back, neck, or joints. She complains of severe bruising of the left breast with severe left breast tenderness and pain.       Past Medical History  Disease Name Date Onset Notes   Aftercare;following joint replacement, right knee 05/31/2018 --    COPD --  --    Diabetes mellitus, type 2 07/18/2019 --    Enlarged liver --  --    Frequency 03/22/2016 --    Glucosuria 03/22/2016 --    Hyperlipidemia 07/18/2019 --    Limb Swelling --  --    Localized edema 07/18/2019 --    Lower back pain --  --    Nocturia 03/22/2016 --    Pain: Knee, right 03/14/2018 --    Primary osteoarthritis of right knee 01/23/2018 --    Right Knee Injury 10/15/2018 --    Shortness of Breath --  --    Stomach Disorder --  --    Urge incontinence of urine 03/22/2016 --    Vitamin D deficiency 07/18/2019 --          Past Surgical History  Procedure Name Date Notes   Appendectomy --  --    Cardiac Catherization 4/2015 --    Cesarian Section --  --    Cholecystectomy --  --    Colonoscopy  "2015 2017 2020 --    EGD 2012 2017 2020 --    Hysterectomy --  --    Knee replacement, left --  --          Medication List  Name Date Started Instructions   albuterol sulfate 2.5 mg/0.5 mL inhalation solution for nebulization 07/23/2019 inhale 0.5 milliliter (2.5 mg) by nebulization route 3 times per day as needed for 30 days   baclofen 10 mg oral tablet 08/14/2020 take 1 tablet (10 mg) by oral route 3 times per day as needed   Basaglar KwikPen U-100 Insulin 100 unit/mL (3 mL) subcutaneous insulin pen 11/13/2020 inject 80 units by subcutaneous route 1X daily for 90 days   BD Ultra-Fine Micro Pen Needle 32 gauge x 1/4\" miscellaneous needle 07/18/2019 use as directed with insulin 5x/day   cephalexin 500 mg oral capsule 01/26/2021 take 1 capsule (500 mg) by oral route every 12 hours for 10 days   diclofenac sodium 75 mg oral tablet,delayed release (DR/EC) 06/30/2020 take 1 tablet (75 mg) by oral route 2 times per day for 14 days   ergocalciferol (vitamin D2) 50,000 unit oral capsule 07/18/2019 take 1 cap PO every monday   FreeStyle Lancets 28 gauge miscellaneous misc 07/18/2019 use as directed 3x/day   FreeStyle Lite Meter miscellaneous kit  use as directed for 30 days Check BS TID   FreeStyle Lite Strips miscellaneous strip 07/18/2019 check BS TID use as directed for 90 days   gabapentin 300 mg oral capsule 07/18/2019 take 1 capsule (300 mg) by oral route twice a day.   Lasix 40 mg oral tablet 01/26/2021 take 1 tablet (40 mg) by oral route 2 times per day for 90 days   Lipitor 40 mg oral tablet 07/18/2019 take 1 tablet (40 mg) by oral route once daily at bedtime for 90 days   lorazepam 1 mg oral tablet 02/13/2020 take 1 tablet (1 mg) by oral route 2 times per day as needed for 30 days   metformin 500 mg oral tablet 10/10/2019 take 1 tablet (500 mg) by oral route 2 times per day with morning and evening meals for 30 days   potassium chloride 20 mEq oral tablet extended release 06/25/2020 take 1 tablet (20 meq) by oral " route once daily with food for 90 days   Refresh Plus 0.5 % ophthalmic (eye) dropperette 2019 instill 1 drop by ophthalmic route 4 times a day as needed for 90 days   spironolactone 100 mg oral tablet 2021 take 1 tablet (100 mg) by oral route once daily for 90 days   Tylenol Extra Strength 500 mg oral tablet  take 2 tablets (1,000 mg) by oral route every 6 hours as needed for pain or fever   Vitamin D2 50,000 unit oral capsule 2019 take 1 capsule (50,000 unit) by oral route once weekly for 90 days   vitamin E 400 unit oral capsule 2021 take 1 capsule by oral route daily for 90 days         Allergy List  Allergen Name Date Reaction Notes   NO KNOWN DRUG ALLERGIES --  --  --        Allergies Reconciled  Family Medical History  Disease Name Relative/Age Notes   Cancer, Unspecified Brother/  Father/  Sister/   Father; Sister; Brother  1 Sister-kidney 2nd sister-bladder brother- luing cancer   Diabetes, unspecified type Daughter/  Mother/  Sister/   Mother; Sister; Daughter   - No Family History of Colorectal Cancer  --    Heart Attack (MI) Father/   --    Diabetes Sister/   --    Osteoporosis Mother/   Mother   Family history of Arthritis Father/  Mother/  Sister/   Mother; Father; Sister         Reproductive History  Menstrual   Pregnancy Summary   Total Pregnancies: 0 Full Term: 0 Premature: 0   Ab Induced: 0 Ab Spontaneous: 0 Ectopics: 0   Multiples: 0 Livin         Social History  Finding Status Start/Stop Quantity Notes   Alcohol Never --/-- --  --    lives with spouse --  --/-- --  --     --  --/-- --  --    Recreational Drug Use Never --/-- --  no   Retired --  --/-- --  --    Tobacco Former --/-- --  former smoker, smoked 21-30 years         Immunizations  NameDate Admin Mfg Trade Name Lot Number Route Inj VIS Given VIS Publication   Ttfvqojhu64/10/2019 [facility] FLUZONE-HIGH DOSE [id number] IM LD 10/10/2019    Comments: tolerated well,stable         Review of  "Systems  · Constitutional  o Denies  o : fever, fatigue, weight loss, weight gain  · Breasts  o Admits  o : tenderness, swelling, abnormal changes in breast size  o Denies  o : lumps, nipple discharge  · Cardiovascular  o Denies  o : lower extremity edema, claudication, chest pressure, palpitations  · Respiratory  o Denies  o : shortness of breath, wheezing, cough, hemoptysis, dyspnea on exertion  · Gastrointestinal  o Denies  o : nausea, vomiting, diarrhea, constipation, abdominal pain      Vitals  Date Time BP Position Site L\R Cuff Size HR RR TEMP (F) WT  HT  BMI kg/m2 BSA m2 O2 Sat FR L/min FiO2 HC       03/08/2021 09:46 /100 Sitting    84 - R   280lbs 6oz 5'  6\" 45.25 2.43 97 %            Physical Examination  · Constitutional  o Appearance  o : well-nourished, well developed, in no acute distress  · Eyes  o Conjunctivae  o : conjunctivae normal, no exudates present  o Sclerae  o : sclerae white  o Pupils and Irises  o : pupils equal and round, and reactive to light and accomodation bilaterally  o Eyelids/Ocular Adnexae  o : extra ocular movements intact  · Respiratory  o Respiratory Effort  o : breathing unlabored, no accessory muscle use  o Inspection of Chest  o : normal appearance, no retractions  o Auscultation of Lungs  o : normal breath sounds bilaterally  · Cardiovascular  o Heart  o :   § Auscultation of Heart  § : regular rate and rhythm, no murmurs, gallops or rubs  o Peripheral Vascular System  o :   § Extremities  § : no edema  · Breasts  o Inspection of Breasts  o : developmental state normal for age  o Palpation of Breasts, Axillae  o : no masses present on palpation, tenderness to palpation present, Skin color changes, ecchymosis involving almost the entire breast  · Neurologic  o Mental Status Examination  o :   § Orientation  § : alert and oriented x3  § Speech/Language  § : normal speech pattern  o Gait and Station  o : normal gait, able to stand without " difficulty              Assessment  · Pain in the breast     611.71/N64.4  · Breast edema     611.89/N64.89  · Traumatic hematoma of female breast, left, initial encounter     922.0/S20.02XA  Will give pain medicine and NSAIDs to help, give Toradol injection in office to help with the pain. Will get ultrasound of left breast, discussed with patient to get a sports bra with tight support to help support the breast along with ice for 20 minutes every 2 hours. Discussed return precautions. Patient verbalized understanding is agreeable treatment plan.      Plan  · Orders  o IM - Injection Fee [facility] (89777) - - 03/08/2021  o ACO-14: Influenza immunization administered or previously received [facility] () - - 03/08/2021   pt states received 2020  o ACO-39: Current medications updated and reviewed (1159F, ) - - 03/08/2021  o 4.00 - Toradol Injection 60mg (-5) - - 03/08/2021   Injection - Toradol 60 mg; Dose: 60 mg; Site: Right Gluteus; Route: intramuscular; Date: 03/08/2021 10:13:30; Exp: 02/01/2022; Lot: [id number]; Mfg: [id number]; TradeName: ketorolac; Location: [facility]; Administered By: Vashti Barnes MA; Comment: Pt tolerated well, stable condition  o Diagnostic mammogram 2D breast bilateral (w/US if needed) [facility]. ([id number], ) - 611.71/N64.4, 611.89/N64.89, 922.0/S20.02XA - 03/08/2021  · Medications  o ibuprofen 800 mg oral tablet   SIG: take 1 tablet (800 mg) by oral route 3 times per day with food for 15 days   DISP: (45) Tablet with 0 refills  Prescribed on 03/08/2021     o tramadol 50 mg oral tablet   SIG: take 1 tablet by oral route 2 times a day for 14 days   DISP: (28) Tablet with 0 refills  Adjusted on 03/08/2021     o Medications have been Reconciled  o Transition of Care or Provider Policy  · Instructions  o Take all medications as prescribed/directed.  o Patient was educated/instructed on their diagnosis, treatment and medications prior to discharge from the clinic today.  o Call  the office with any concerns or questions.  · Disposition  o Call or Return if symptoms worsen or persist.  o follow up as needed  o call the office with any questions or concerns            Electronically Signed by: Bill Finn APRN -Author on March 8, 2021 10:25:28 AM

## 2021-05-28 NOTE — PROGRESS NOTES
Patient: ERICK RASHEED     Acct: [id number]     Report: #[id number]  UNIT #: [id number]     : 1955    Encounter Date:10/30/2018  PRIMARY CARE: HAROLDO DUCKWORTH  ***Signed***  --------------------------------------------------------------------------------------------------------------------  NURSE INTAKE      Visit Type      New Patient Visit            Chief Complaint      THROMBOCYTOPENIA; 2nd opinion regarding etiology            Referring Provider/Copies To      Referring Provider:  HAROLDO DUCKWORTH      Primary Care Provider:  HAROLDO DUCKWORTH      Copies To:   HAROLDO DUCKWORTH ;            History and Present Illness      HPI - Hematology      1) Thrombocytopenia: Present since ; platelet count in 50-60 range since     ; most c/w sequestration from splenomegaly            2) Leukopenia: Etiology most c/w cirrhosis and splenomegaly            Treatment History:            1) Surveillance with intermittent platelet transfusion PRN            HPI: Ms. Rasheed is a 63-year-old female with a long-standing history of     cryptogenic cirrhosis and associated portal hypertension and splenomegaly.  She     is seen regarding a finding of thrombocytopenia and leukopenia.  She seeks a     second opinion regarding the etiology for findings and recommendations regarding    management.            Ms. Rasheed reports that she was seen by primary care recently for ongoing     surveillance and management of her several chronic medical issues.  In addition,    she does report a recent hospitalization secondary to hepatic encephalopathy.      She reports that she was treated with lactulose with improvement in her symptoms    .  She underwent a CT scan of the abdomen and pelvis, which identified ongoing     cirrhosis and splenomegaly, but was also notable for a hypodensity seen near the    right portal vein of unclear etiology.  It was recommended to consider an      ultrasound for further evaluation this.  She is referred to hematology for a     second opinion regarding her hematologic abnormalities and further     recommendations regarding management.            Ms. Rasheed reports that she was diagnosed with thrombocytopenia and leukopenia     many years ago.  She reports that she has undergone intermittent platelet     transfusions to treat her thrombocytopenia.  However, she reports that she     cannot remember being told a specific etiology for these findings.  She does     have a complaint of ongoing asthenia.  She describes it is profound fatigue and     weakness.  It is diffusely located and 10 out of 10 in severity.  She reports an    associated rash.  The rashes been present for roughly 1-2 months.  Is most     prominent on the arms and chest.  It is of moderate severity.  No other     modifying factors or associated signs or symptoms.  She reports that she has not    been seen by dermatology thus far regarding this.  She denies any recent     medication changes.  She reports that she is following with gastroenterology     regarding her cirrhosis, but reports that she has never been told a specific     etiology for this finding.  She reports that she underwent a liver biopsy     roughly 6 years ago, and it is unclear regarding the results of this.            Most Recent Lab Findings            Item Value  Date Time             Platelet Count 53.30 10*3/uL L 10/8/18 1646             White Blood Count 2.26 10*3/uL L 10/8/18 1646             Hemoglobin 12.60 g/dL 10/8/18 1646             Granulocytes # 1.30 10*3/uL L 10/8/18 1646             Lymphocytes # (Auto) 0.61 10*3/uL L 10/8/18 1646            Laboratory Tests      10/26/18 10:17            Most Recent Imaging Findings      PROCEDURE:   CT ABDOMEN PELVIS WITH CONTRAST             COMPARISON:   [facility], CT, ABD PEL W/O CONTRAST, 4/02/2017,     5:57.             INDICATIONS:   LOW ABDOMINAL  PAIN, URINARY FREQUENCY             TECHNIQUE:   After obtaining the patient's consent, CT images were created with     non-ionic intravenous       contrast material.               PROTOCOL:     Standard imaging protocol performed                RADIATION:     DLP: 2185 mGy*cm          Automated exposure control was utilized to minimize radiation dose.       CONTRAST:   100 cc Isovue 370 I.V.      LABS:     eGFR: >60 ml/min/1.73m2             FINDINGS:         There is stable opacity in the left lower lobe compatible atelectasis and or     scarring.  In small       fat containing umbilical hernia is stable.  There is stranding in the soft     tissues of the abdominal       wall similar to previous.  Slightly nodular contour of the liver is noted     suggestive of cirrhosis.        Previous cholecystectomy.  There is splenomegaly.  Spleen measures 16.5     centimeters in length.  No       pancreatic lesion.  Adrenals appear unremarkable.  There is no obstructive     uropathy or definite       stone.  Bladder is not fully distended.  Previous hysterectomy with multiple     phleboliths.  There is       gas posterior to the bladder which may be in the vaginal fornix.  Small amount     of fluid or small       cyst in the left ovary could be present.  Oral contrast extends into the distal     transverse colon.        Mild thickening versus incomplete distention of transverse colon.  The appendix     is normal.  Small       bowel appears unremarkable.  There is mild stranding adjacent to the inferior     mesenteric artery and       this is similar to previous exam.  Mild to moderate atherosclerosis.  No     significant adenopathy.             CONCLUSION:         1. Stable atelectasis or scarring left lung base.      2. Cirrhotic nodular liver.  Splenomegaly.  Dilated varices are seen in the     right abdomen and       adjacent to the spleen suggesting portal venous hypertension.  A similar finding    was seen        previously.  There is hypodensity in the region of the right  portal vein     without definite filling       defect.  Ultrasound may be useful for further evaluation.      3. No renal or ureteral calculi or obstructive uropathy.  Bladder is not fully     distended which may       account for the mild bladder wall thickening; mild cystitis possible given the     history of  urinary       frequency.      4. There is gas posterior to the bladder likely in the vaginal fornix.      5. Thickening versus incomplete distention of the transverse colon.  Oral     contrast extends into the       distal transverse colon.  Mild colitis difficult to exclude.              AKBAR HOWELL MD             Electronically Signed and Approved By: AKBAR HOWELL MD on 10/26/2018 at     15:55            PAST, FAMILY   Past Medical History      Past Medical History:  Arthritis, Bleeding Condition, COPD, High Cholesterol,     Liver Disease, Low Platelet Count, Low WBC Count      Hematology/Oncology (F):  Thrombocytopenia            Past Surgical History      Appendectomy, Cataract Surgery, Cholecystectomy, Hysterectomy, Joint Replacement    (BOTH KNEES)      HYSTERECTOMY            Family History      Family History:  Kidney Cancer (SISTER), Lung Cancer (PATERNAL GREAT UNCLE),     Oral Cancer (BROTHER), Prostate Cancer (BROTHER), Skin Cancer (FATHER)            Social History      Lives independently:  Yes            Tobacco Use      Tobacco status:  Former smoker (4 ppd x 35 y quit 13 years ago)      Smoking history:  > 50 pack years      Quit status:  Quit date established (OCTOBER 5 2002)            Alcohol Use      Alcohol intake:  None            Substance Use      Substance use:  Denies use            REVIEW OF SYSTEMS      General:  Admits: Appetite Change (GETTING FULL EASILY), Fatigue;          Denies: Fever, Night Sweats, Weight Gain, Weight Loss      Eye:  Admits: Blurred Vision;          Denies: Corrective Lenses,  Diplopia, Eye Irritation, Eye Pain, Eye Redness,     Spots in Vision, Vision Loss      ENT:  Admits: Headache;          Denies: Hearing Loss, Hoarseness, Seizures, Sinus Congestion, Sore Throat      Cardiovascular:  Denies: Chest Pain, Edema Ankles, Edema Legs, Irregular     Heartbeat, Palpitations      Respiratory:  Denies: Coughing Blood, Productive Cough, Shortness of Air,     Wheezing      Gastrointestinal:  Admits: Nausea;          Denies: Bloody Stools, Constipation, Diarrhea, Frequent Heartburn, Problem     Swallowing, Tarry Stools, Unable to Control Bowels, Vomiting      Genitourinary (female):  Denies: Blood in Urine, Decrease Urine Stream, Frequent    Urination, Incontinence, Painful Urination      Musculoskeletal:  Admits: Leg Cramps, Painful Joints;          Denies: Back Pain, Muscle Pain, Muscle Weakness, Swollen Joints      Integumentary:  Denies: Bleeds Easily, Bruises Easily, Hair Changes, Jaundice,     Lesions, Mole Changes, Nail Changes, Pigment Changes, Rash, Skin Discoloration      Neurologic:  Admits: Numbness\Tingling (FEET);          Denies: Dizziness, Fainting, Paralysis, Seizures      Psychiatric:  Admits: Depression;          Denies: Anxiety, Confused, Disoriented, Memory Loss      Endocrine:  Denies: Cold Intolerance, Diabetes, Excessive Sweating, Excessive     Thirst, Excessive Urination, Heat Intolerance, Flushing, Hyperthyroidism,     Hypothyroidism      Hematologic/Lymphatic:  Admits: Bruising;          Denies: Bleeding, Enlarged Lymph Nodes, Recurrent Infections, Transfusions      Reproductive:  Denies: Menopause, Heavy Periods, Pregnant, Still Menstruating            VITAL SIGNS AND SCORES      Vitals      Height 5 ft 6.00 in / 167.64 cm      Weight 262 lbs 9.086 oz / 119.1 kg      BSA 2.25 m2      BMI 42.4 kg/m2      Temperature 98.2 F / 36.78 C - Temporal      Pulse 135      Respirations 20      Blood Pressure 110/82 Sitting, Right Arm      Pulse Oximetry 98%, RM AIR             Pain Score      Experiencing any pain?:  No      Pain Scale Used:  Numerical      Pain Intensity:  0            Fatigue Score      Experiencing any fatigue?:  Yes      Fatigue (0-10 scale):  10            EXAM      General Appearance:  Positive for: Alert, Oriented x3      Eye:  Positive for: Anicteric Sclerae, PERRLA      HEENT:  Positive for: Oropharynx clear      Neck:  Positive for: Supple      Respiratory:  Positive for: CTAB, Normal Respiratory Effort      Abdomen/Gastro:  Positive for: Normal Active Bowel Sounds      Cardiovascular:  Positive for: Murmur, Normal PMI      Other      3/6 holosystolic murmur heard best at RUSB      Skin:  Positive for: Normal Texture and Turgor      Psychiatric:  Positive for: AAO X 3      Musculoskeletal:  Positive for: Full ROM Lower Extremety, Full ROM Upper     Extremety      Lymphatic:  Negative for: Axillary, Cervical            PREVENTION      Hx Influenza Vaccination:  Yes (FALL 2017)      Date Influenza Vaccine Given:  Oct 1, 2018      Influenza Vaccine Declined:  No      2 or More Falls Past Year?:  No      Fall Past Year with Injury?:  No      Hx Pneumococcal Vaccination:  Yes (FALL 2017)      Encouraged to follow-up with:  PCP regarding preventative exams.      Chart initiated by      HENOK VELAZQUEZ [facility]            ALLERGY/MEDS      Allergies      Coded Allergies:             NO KNOWN ALLERGIES (Unverified , 10/30/18)            Medications      Last Reconciled on 10/30/18 17:44 by IRMA LIN      Ciprofloxacin/Ciprofloxa Hcl (Ciprofloxacin ER) 500 Mg Tbmp.24hr      500 MG PO QDAY, TAB         Reported         10/30/18       oxyCODONE/Acetaminophen 7.5/325 Mg (oxyCODONE/Acetaminophen 7.5/325 Mg) 1 Each     Tablet      1 TAB PO Q4-6H PRN for PAIN, TAB 0 Refills         Reported         6/27/18       Folic Acid (Folic Acid*) 1 Mg Tablet      1 MG PO QDAY, #30 TAB 0 Refills         Reported         6/27/18       Atorvastatin Calcium (Lipitor*) 40 Mg Tablet       40 MG PO HS, #30 TAB 0 Refills         Reported         6/27/18       Insulin Glargine (Lantus VIAL) 100 Units/Ml Vial      60 UNITS SUBQ HS, #1 VIAL 0 Refills         Reported         5/22/18       Potassium Chloride (K-Dur*) 20 Meq Tab.er.prt      20 MEQ PO BID, #60 TAB 0 Refills         Reported         5/22/18       Spironolactone (Spironolactone*) 25 Mg Tablet      25 MG PO BID, #60 TAB 0 Refills         Reported         5/22/18       MDI-Advair 250/50 (Advair 250/50 Diskus) 1 Each Blst.w.dev      1 PUFF INH RTBID, #1 INH 0 Refills         Reported         1/25/18       Docusate Sodium (Colace) 100 Mg Capsule      100 MG PO BID, #60 CAP 0 Refills         Reported         11/27/17       Ergocalciferol (Vitamin D2) 50,000 Unit Capsule      45491 UNITS PO MO for 30 Days, #4 CAP         Reported         11/27/17       Carboxymethylcellulose Sodium (Refresh Plus) 1 Each Droperette      1 DROPS EYE EACH QID PRN for DRY EYES, #1 BOX         Reported         11/27/17       Ondansetron HCl (Zofran*) 4 Mg Tablet      4 MG PO Q6H PRN for NAUSEA AND/OR VOMITING, TAB 0 Refills         Reported         2/23/17       Cyclobenzaprine Hcl (Cyclobenzaprine*) 10 Mg Tablet      10 MG PO TID PRN for MUSCLE SPASMS, TAB 0 Refills         Reported         9/14/16       Furosemide* (Lasix*) 40 Mg Tablet      40 MG PO QDAY, #30 TAB 0 Refills         Reported         4/16/15       NEB-Albuterol Sulf (Albuterol) 2.5 Mg/3 Ml Vial.neb      2 PUFF INH BID PRN for SHORTNESS OF BREATH, #30 NEB         Reported         5/20/14       Insulin Human Aspart (NovoLOG VIAL) 1 Units/0.01 Ml Vial      40 UNITS SUBQ TID, VIAL         Reported         3/26/12            IMPRESSION/PLAN      Impression      1) Thrombocytopenia: Ms. Rasheed is a 63-year-old female with a finding of     thrombocytopenia.  I had an extensive discussion today with her regarding this     finding.  I reviewed with her previous blood work, and explained to her that     this  has been present since 2005 intermittently, and consistently present at the    current values dating back 7 years.  I agree with the previous assessment that     this is likely related to splenomegaly.  If so, intermittent transfusion as     necessary would be the recommended course of action.  Certainly, if her platelet    count continued to decline, one could consider further evaluation such as bone     marrow biopsy to evaluate for other possibilities such as myelodysplastic     syndrome or ITP.  She indicates understanding and her preference is to continue     with her current management of surveillance.  I would recommend following a CBC     on an every three-month basis for surveillance.  She was advised to call with     any new or worsening symptoms of bleeding to assess for the possibility of     worsening thrombocytopenia.            2) Leukopenia: This is of mild severity.  Again, is likely related to her other     medical issues including cirrhosis, splenomegaly, and potentially medication     side effect.  Given her recent difficulties with infection, I would recommend     proceeding with testing for quantitative immunoglobulins.  If she has     hypogammaglobulinemia, she could be treated with IVIG to address this.            3) Cirrhosis: Ms. Rasheed is requesting referral for a second opinion regarding     her cirrhosis.  I will defer to her primary care provider regarding this.  In     addition, we did discuss her recent CT report and recommendation for     consideration of ultrasound of the abdomen.  I encouraged her to discuss this     further with her primary care provider, whom she is scheduled to see in roughly     1 week.            Diagnosis      Leucopenia         Other neutropenia - D70.8         Leukopenia type: neutropenia         Neutropenia type: other            Thrombocytopenia - D69.6            Splenomegaly, congestive, chronic - D73.2            Notes      New Medications      *  Ciprofloxacin/Ciprofloxa Hcl (Ciprofloxacin ER) 500 MG TBMP.24HR: 500 MG PO       QDAY      New Diagnostics      * IMMUNOGLOBULIN QUANT IGAMQ, Routine         Dx: Leucopenia - D72.819            Plan      1) CBC q 3 months with PCP            2) Referral for 2nd opinion with GI regarding cirrhosis per PCP            3) Consider US of abdomen as per CT report            4) RTC 6 months with cbc/cmp 2-3 days prior            Patient Education      Patient Education Provided:  Yes                 Disclaimer: Converted document may not contain table formatting or lab diagrams. Please see [email or web address] for the authenticated document.

## 2021-06-05 NOTE — PROGRESS NOTES
Progress Note      Patient Name: Noemi Rasheed   Patient ID: [id number]   Sex: Female   YOB: 1955    Primary Care Provider: Navya Patel MD   Referring Provider: Navya Patel MD    Visit Date: May 6, 2021    Provider: Navya Patel MD   Location: [facility]   Location Address: [address] [place] [address], [address]  [id number]   Location Phone: [phone]          Chief Complaint  · Lower extremity edema      History Of Present Illness  Noemi Rasheed is a 66 year old /White female who presents for evaluation and treatment of:      CHFpatient has significant peripheral edema in her lower legs bilaterally.  Patient reports that her legs feel very tight and have been very stiff when she wakes up in the morning.  She feels that she is stiff all over.  Patient is also had a 5 pound weight gain since our last appointment and has a total of 9 pound total weight gain in the last couple months.  Patient felt that she had a possible infection in her legs but on exam did not see any signs of infection consolidation or fluid overload.  Patient also has some discomfort in her lower abdomen area which may also be fluid and wheezing in her lungs bilaterally and 93% oxygen sat.  Patient is currently taking furosemide 40 mg twice a day.  Patient currently sees Dr. Gurdeep Dolan her cardiologist and during this visit I placed a call with his office to inform them of her current state and that she is possibly fluid overloaded secondary to her CHF.  And they have decided to work her in today for 12:15pm appointment which I have informed the patient she will be heading over there by  Our office appointment.  We are also getting a CMP and an A1c in the office today.       Past Medical History  Aftercare;following joint replacement, right knee; COPD; Diabetes mellitus, type 2; Enlarged liver; Frequency; Glucosuria; Hyperlipidemia; Limb Swelling; Localized edema; Lower back  "pain; Nocturia; Pain: Knee, right; Primary osteoarthritis of right knee; Right Knee Injury; Shortness of Breath; Stomach Disorder; Urge incontinence of urine; Vitamin D deficiency         Past Surgical History  Appendectomy; Cardiac Catherization; Cesarian Section; Cholecystectomy; Colonoscopy; EGD; Hysterectomy; Knee replacement, left         Medication List  albuterol sulfate 2.5 mg/0.5 mL inhalation solution for nebulization; baclofen 10 mg oral tablet; Basaglar KwikPen U-100 Insulin 100 unit/mL (3 mL) subcutaneous insulin pen; BD Ultra-Fine Micro Pen Needle 32 gauge x 1/\" miscellaneous needle; cephalexin 500 mg oral capsule; diclofenac sodium 75 mg oral tablet,delayed release (DR/EC); ergocalciferol (vitamin D2) 50,000 unit oral capsule; FreeStyle Lancets 28 gauge miscellaneous misc; FreeStyle Lite Meter miscellaneous kit; FreeStyle Lite Strips miscellaneous strip; gabapentin 300 mg oral capsule; ibuprofen 800 mg oral tablet; Keflex 500 mg oral capsule; Lasix 40 mg oral tablet; Lipitor 40 mg oral tablet; lorazepam 1 mg oral tablet; metformin 500 mg oral tablet; nystatin 100,000 unit/gram topical powder; potassium chloride 20 mEq oral tablet extended release; Refresh Plus 0.5 % ophthalmic (eye) dropperette; spironolactone 100 mg oral tablet; tramadol 50 mg oral tablet; Tylenol Extra Strength 500 mg oral tablet; Vitamin D2 50,000 unit oral capsule; vitamin E 400 unit oral capsule         Allergy List  NO KNOWN DRUG ALLERGIES         Family Medical History  Cancer, Unspecified; Diabetes, unspecified type; - No Family History of Colorectal Cancer; Heart Attack (MI); Diabetes; Osteoporosis; Family history of Arthritis         Reproductive History   0 Para 0 0 0 0       Social History  Alcohol (Never); lives with spouse; ; Recreational Drug Use (Never); Retired; Tobacco (Former)         Immunizations  Name Date Admin   Influenza 10/10/2019   Influenza 10/12/2018   Influenza 10/03/2017         Review of " "Systems  · Constitutional  o Admits  o : weight gain  o Denies  o : fatigue, fever, weight loss  · Eyes  o Denies  o : eye discomfort, eye pain  · HENT  o Denies  o : vertigo, nasal congestion  · Cardiovascular  o Denies  o : chest pain, irregular heart beats  · Respiratory  o Admits  o : wheezing  o Denies  o : shortness of breath  · Gastrointestinal  o Denies  o : nausea, vomiting  · Genitourinary  o Denies  o : frequency, dysuria  · Integument  o Admits  o : rash, itching  · Neurologic  o Denies  o : muscular weakness, memory difficulties  · Musculoskeletal  o Denies  o : joint swelling, muscle pain  · Psychiatric  o Denies  o : anxiety, depression  · Heme-Lymph  o Denies  o : lightheadedness, easy bleeding  · Allergic-Immunologic  o Denies  o : sinus allergy symptoms, allergic dermatitis      Vitals  Date Time BP Position Site L\R Cuff Size HR RR TEMP (F) WT  HT  BMI kg/m2 BSA m2 O2 Sat FR L/min FiO2        05/06/2021 09:53 /90 Sitting    74 - R  98.1 289lbs 4oz 5'  6\" 46.69 2.47 93 %            Physical Examination  · Constitutional  o Appearance  o : well-nourished, in no acute distress  · Eyes  o Conjunctivae  o : conjunctivae normal  o Sclerae  o : sclerae white  o Pupils and Irises  o : pupils equal, round, and reactive to light and accommodation bilaterally  o Eyelids/Ocular Adnexae  o : eyelid appearance normal, no exudates present  · Ears, Nose, Mouth and Throat  o Ears  o :   § External Ears  § : external auditory canal appearance within normal limits, no discharge present  § Otoscopic Examination  § : tympanic membrane appearance within normal limits bilaterally  o Nose  o :   § External Nose  § : appearance normal  § Nasopharynx  § : no discharge present  o Oral Cavity  o :   § Oral Mucosa  § : oral mucosa normal  § Lips  § : lip appearance normal  o Throat  o :   § Oropharynx  § : no inflammation or lesions present, tonsils within normal limits  · Neck  o Inspection/Palpation  o : normal " appearance, no masses or tenderness, trachea midline  o Range of Motion  o : cervical range of motion within normal limits  o Thyroid  o : gland size normal, nontender, no nodules or masses present on palpation  o Jugular Veins  o : JVP normal  · Respiratory  o Respiratory Effort  o : breathing unlabored  o Inspection of Chest  o : normal appearance  o Auscultation of Lungs  o : wheezing present   · Cardiovascular  o Heart  o :   § Auscultation of Heart  § : regular rate and rhythm, no murmurs, gallops or rubs  o Peripheral Vascular System  o :   § Extremities  § : 3+ edema present, no cyanosis, no distal hair loss, normal capillary refill  · Gastrointestinal  o Abdominal Examination  o : abdomen nontender to palpation, tone normal without rigidity or guarding, no masses present, bowel sounds present in all four quadrants  o Liver and spleen  o : no hepatomegaly present, liver nontender to palpation, spleen not palpable  o Hernias  o : no hernias present  · Lymphatic  o Neck  o : no lymphadenopathy present  · Musculoskeletal  o Spine  o :   § Inspection/Palpation  § : no spinal tenderness, scoliosis or kyphosis present  § Stability  § : no subluxations present  § Range of Motion  § : spine range of motion normal  o Right Upper Extremity  o :   § Inspection/Palpation  § : no tenderness to palpation, ROM normal  o Left Upper Extremity  o :   § Inspection/Palpation  § : no tenderness to palpation, ROM normal  o Right Lower Extremity  o :   § Inspection/Palpation  § : diffuse pitting limb edema present   o Left Lower Extremity  o :   § Inspection/Palpation  § : diffuse pitting limb edema present   · Skin and Subcutaneous Tissue  o General Inspection  o : no rashes or lesions present, no areas of discoloration  o Body Hair  o : scalp palpation normal, hair normal for age, general body hair distribution normal for age  o Digits and Nails  o : no clubbing, cyanosis, deformities or edema present, normal appearing  nails  · Neurologic  o Mental Status Examination  o :   § Orientation  § : grossly oriented to person, place and time  o Gait and Station  o : normal gait, able to stand without difficulty  · Psychiatric  o Judgement and Insight  o : judgment and insight intact  o Mood and Affect  o : mood normal, affect appropriate          Assessment  · CHF (congestive heart failure)     428.0/I50.9  · Diabetes mellitus type 2, uncontrolled, with complications       Type 2 diabetes mellitus with unspecified complications     250.92/E11.8  Type 2 diabetes mellitus with hyperglycemia     250.92/E11.65  · Lower extremity edema     782.3/R60.0      Plan  · Orders  o CMP [facility] ([id number]) - 250.92/E11.8, 250.92/E11.65 - 05/06/2021  o Hgb A1c [facility] ([id number]) - 250.92/E11.8, 250.92/E11.65 - 05/06/2021  o ACO-39: Current medications updated and reviewed (, 1159F) - - 05/06/2021  · Medications  o Medications have been Reconciled  o Transition of Care or Provider Policy  · Instructions  o Patient was educated/instructed on their diagnosis, treatment and medications prior to discharge from the clinic today.  o Minutes spent with patient including greater than 50% in Education/Counseling/Care Coordination.  o Time spent with the patient was minutes, more than 50% face to face. 25 minutes  · Disposition  o Call or Return if symptoms worsen or persist.  · Correspondence  o ****Return to Work or School (Noemi Rasheed) - 05/06/2021            Electronically Signed by: Navya Patel MD -Author on May 6, 2021 11:02:37 AM

## 2021-06-08 PROBLEM — K63.5 COLON POLYP: Status: ACTIVE | Noted: 2021-01-01

## 2021-06-08 NOTE — PROGRESS NOTES
Chief Complaint  Follow-up (Weight Loss)    History of Present Illness  Noemi Rasheed is a 66 y.o. female who presents to [facility] for follow-up of cirrhosis.  She returns to our office after a period of long absence.  Reports she had pedal edema abdominal swelling approximately 2 months ago, but it resolved suddenly.  She has lost 30 pounds over the last month and a half.  She is taking Lasix 40 mg twice daily and Aldactone 25 mg through her PCP.  She denies jaundice, mental status change, hematemesis, and melena.  EGD/colonoscopy 2020 by Dr. Carr revealed gastritis, medium nonbleeding angiectasia's in the cecum, and grade 1 internal hemorrhoids.  MELD 10    Past Medical History:   Diagnosis Date   • Aftercare following right knee joint replacement surgery 2018   • Asthma    • COPD (chronic obstructive pulmonary disease) (CMS/HCC)    • Diabetes mellitus (CMS/HCC)    • Enlarged liver    • Frequency of urination 2016   • Glucosuria 2016   • Hyperlipidemia 2019   • Hypertension    • Limb swelling    • Liver cirrhosis secondary to BARRIENTOS (nonalcoholic steatohepatitis) (CMS/HCC)    • Local edema 2019   • Lower back pain    • Nocturia 2016   • Osteoarthritis of right knee 2018   • Right knee injury 10/15/2018   • Right knee pain 2018   • SOB (shortness of breath)    • Stomach disorder    • Stroke (CMS/HCC)    • Urge incontinence of urine 2016   • Vitamin D deficiency 2019       Past Surgical History:   Procedure Laterality Date   • APPENDECTOMY     • CARDIAC CATHETERIZATION  2015   •  SECTION     • CHOLECYSTECTOMY     • COLONOSCOPY      , ,   • ENDOSCOPY      , ,    • HYSTERECTOMY     • JOINT REPLACEMENT Bilateral     KNEE         Current Outpatient Medications:   •  acetaminophen (TYLENOL) 500 MG tablet, Tylenol Extra Strength 500 mg oral tablet take 2 tablets (1,000 mg) by  "oral route every 6 hours as needed for pain or fever   Active, Disp: , Rfl:   •  aspirin 81 MG chewable tablet, Chew 81 mg Daily., Disp: , Rfl:   •  atorvastatin (LIPITOR) 20 MG tablet, Take 40 mg by mouth Daily., Disp: , Rfl:   •  furosemide (Lasix) 20 MG tablet, Take 40 mg by mouth Daily., Disp: , Rfl:   •  furosemide (LASIX) 40 MG tablet, Take 40 mg by mouth 2 (Two) Times a Day., Disp: , Rfl:   •  insulin aspart (novoLOG) 100 UNIT/ML injection, Inject  under the skin into the appropriate area as directed 3 (Three) Times a Day Before Meals., Disp: , Rfl:   •  ipratropium-albuterol (DUO-NEB) 0.5-2.5 mg/3 ml nebulizer, Take 3 mL by nebulization Every 4 (Four) Hours As Needed for Wheezing., Disp: , Rfl:   •  potassium chloride (K-DUR,KLOR-CON) 10 MEQ CR tablet, Take 20 mEq by mouth 2 (Two) Times a Day., Disp: , Rfl:   •  spironolactone (ALDACTONE) 25 MG tablet, Take 50 mg by mouth Daily., Disp: , Rfl:   •  insulin glargine (LANTUS) 100 UNIT/ML injection, Inject 60 Units under the skin into the appropriate area as directed 2 (Two) Times a Day., Disp: , Rfl:      No Known Allergies    Family History   Problem Relation Age of Onset   • Diabetes Mother    • Osteoporosis Mother    • Arthritis Mother    • Cancer Father    • Heart attack Father    • Arthritis Father    • Cancer Sister         KIDNEY & BLADDER   • Diabetes Sister    • Arthritis Sister    • Lung cancer Brother    • Diabetes Daughter         Social History     Social History Narrative   • Not on file       Objective     Review of Systems     Vital Signs:   /60   Pulse 83   Ht 167.6 cm (66\")   Wt 117 kg (259 lb)   BMI 41.80 kg/m²       Physical Exam  Constitutional:       Appearance: Normal appearance. She is obese.   HENT:      Head: Normocephalic.   Cardiovascular:      Rate and Rhythm: Normal rate.   Pulmonary:      Effort: Pulmonary effort is normal.   Abdominal:      General: Abdomen is flat. There is no distension.      Palpations: Abdomen is " soft.   Musculoskeletal:      Right lower leg: No edema.      Left lower leg: No edema.   Skin:     General: Skin is warm and dry.   Neurological:      Mental Status: She is alert and oriented to person, place, and time.   Psychiatric:         Mood and Affect: Mood normal.         Behavior: Behavior normal.         Thought Content: Thought content normal.             Result Review :   The following data was reviewed by: ROSEMARY Marcelo on 06/15/2021:  Comprehensive Metabolic Panel (01/26/2021 11:35)  Protime-INR (12/21/2020 18:12)  CBC & Differential (12/21/2020 18:12)    Data reviewed: CT abd/pelvis 07/2020                   Assessment and Plan    Diagnoses and all orders for this visit:    1. Hepatic cirrhosis, unspecified hepatic cirrhosis type, unspecified whether ascites present (CMS/HCC) (Primary)  -     US Abdomen Limited; Future  -     Ammonia; Future  -     CBC Auto Differential; Future  -     Comprehensive Metabolic Panel; Future  -     Protime-INR; Future  -     AFP Tumor Marker; Future      We have discussed the need for daily weights.  Will order labs and a right upper quadrant ultrasound to further assess her liver.  She is going to follow-up in 6 months.        Follow Up   Return in about 6 months (around 12/15/2021).  Patient was given instructions and counseling regarding her condition or for health maintenance advice. Please see specific information pulled into the AVS if appropriate.

## 2021-06-15 PROBLEM — R09.02 HYPOXEMIA: Status: ACTIVE | Noted: 2021-01-01

## 2021-06-15 PROBLEM — K31.9 STOMACH DISORDER: Status: ACTIVE | Noted: 2021-01-01

## 2021-06-15 PROBLEM — K74.60 UNSPECIFIED CIRRHOSIS OF LIVER: Status: ACTIVE | Noted: 2021-01-01

## 2021-06-15 PROBLEM — Z47.1 AFTERCARE FOLLOWING JOINT REPLACEMENT: Status: ACTIVE | Noted: 2018-05-31

## 2021-06-15 PROBLEM — J15.212 PNEUMONIA DUE TO METHICILLIN RESISTANT STAPHYLOCOCCUS AUREUS (MRSA) (HCC): Status: ACTIVE | Noted: 2021-01-01

## 2021-06-15 PROBLEM — K31.9 STOMACH DISORDER: Status: RESOLVED | Noted: 2021-01-01 | Resolved: 2021-01-01

## 2021-06-15 PROBLEM — R06.02 SHORTNESS OF BREATH: Status: ACTIVE | Noted: 2021-01-01

## 2021-06-15 PROBLEM — G47.33 OBSTRUCTIVE SLEEP APNEA SYNDROME: Status: ACTIVE | Noted: 2021-01-01

## 2021-06-15 PROBLEM — E78.5 HYPERLIPIDEMIA: Status: ACTIVE | Noted: 2019-07-18

## 2021-06-15 PROBLEM — M54.50 LOWER BACK PAIN: Status: ACTIVE | Noted: 2021-01-01

## 2021-06-15 PROBLEM — D61.818 PANCYTOPENIA (HCC): Status: ACTIVE | Noted: 2021-01-01

## 2021-06-15 PROBLEM — Z01.811 ENCOUNTER FOR PREOPERATIVE PULMONARY EXAMINATION: Status: ACTIVE | Noted: 2021-01-01

## 2021-06-15 PROBLEM — Z01.812 BLOOD TESTS PRIOR TO TREATMENT OR PROCEDURE: Status: ACTIVE | Noted: 2021-01-01

## 2021-06-15 PROBLEM — K74.60 UNSPECIFIED CIRRHOSIS OF LIVER (HCC): Status: RESOLVED | Noted: 2021-01-01 | Resolved: 2021-01-01

## 2021-06-15 PROBLEM — M19.91 PRIMARY LOCALIZED OSTEOARTHRITIS: Status: ACTIVE | Noted: 2018-01-23

## 2021-06-15 PROBLEM — M79.89 LIMB SWELLING: Status: ACTIVE | Noted: 2021-01-01

## 2021-06-15 PROBLEM — E55.9 VITAMIN D DEFICIENCY: Status: ACTIVE | Noted: 2019-07-18

## 2021-06-15 PROBLEM — T14.90XA INJURY: Status: ACTIVE | Noted: 2018-10-15

## 2021-06-15 PROBLEM — I50.9 HEART FAILURE: Status: ACTIVE | Noted: 2021-01-01

## 2021-06-15 PROBLEM — E11.9 DIABETES MELLITUS, TYPE 2 (HCC): Status: ACTIVE | Noted: 2019-07-18

## 2021-06-15 PROBLEM — Z87.891 EX-SMOKER: Status: ACTIVE | Noted: 2021-01-01

## 2021-06-15 PROBLEM — R16.1 SPLENOMEGALY: Status: ACTIVE | Noted: 2021-01-01

## 2021-06-21 PROBLEM — M50.30 DDD (DEGENERATIVE DISC DISEASE), CERVICAL: Status: ACTIVE | Noted: 2021-01-01

## 2021-06-21 PROBLEM — R06.02 SHORTNESS OF BREATH: Status: RESOLVED | Noted: 2021-01-01 | Resolved: 2021-01-01

## 2021-06-21 PROBLEM — M54.50 LOWER BACK PAIN: Status: RESOLVED | Noted: 2021-01-01 | Resolved: 2021-01-01

## 2021-06-21 PROBLEM — IMO0002 HERNIATION OF INTERVERTEBRAL DISC: Status: ACTIVE | Noted: 2017-04-20

## 2021-06-21 PROBLEM — Z01.811 ENCOUNTER FOR PREOPERATIVE PULMONARY EXAMINATION: Status: RESOLVED | Noted: 2021-01-01 | Resolved: 2021-01-01

## 2021-06-21 NOTE — PROGRESS NOTES
"Chief Complaint  Back Pain (fell on back)    Subjective          Noemi Rasheed presents to [facility]  History of Present Illness  She is here for an acute visit today, she is a patient of Dr. Patel.  She has fallen at work on 6/15/2021.  She slipped and missed the chair and fell on her buttocks.  She went to the urgent care on 6/16/2021.  An x-ray was taken of her coccyx area which did not show any fractures.  She is complaining of pain from her coccyx area all the way up her spine to her neck.  She states ever since she is fallen she also has had a headache.  She states that she has an old prescription of tramadol from Dr. Maldonado and she has been taking this without any good relief.  She is also supposed to be taking gabapentin but states she has been out of this medication for 2 weeks.  She denies taking diclofenac 75 mg twice daily that is on her med list.  She states she is not been able to sleep at night and has been taking melatonin to help her sleep.  She states she is unable to sit down.  She denies any pain radiating down her hips or legs.  She is having difficulty walking, has to walk very slow.  Objective   Vital Signs:   /76   Pulse 83   Temp 98 °F (36.7 °C)   Ht 167.6 cm (66\")   Wt 118 kg (261 lb 3.2 oz)   SpO2 95%   BMI 42.16 kg/m²     Physical Exam  Vitals reviewed.   Constitutional:       Appearance: Normal appearance. She is well-developed.   Neck:      Thyroid: No thyroid mass, thyromegaly or thyroid tenderness.   Cardiovascular:      Rate and Rhythm: Normal rate and regular rhythm.      Heart sounds: No murmur heard.   No friction rub. No gallop.    Pulmonary:      Effort: Pulmonary effort is normal.      Breath sounds: Normal breath sounds. No wheezing or rhonchi.   Musculoskeletal:      Cervical back: Normal. No tenderness. Normal range of motion.      Thoracic back: Normal.      Lumbar back: Swelling, spasms and tenderness present.      " Comments: Patient is unable to sit down, she is leaning over the exam table and notably uncomfortable. It was noted that she was having difficulty walking in, gait normal but slow and steady.   Lymphadenopathy:      Cervical: No cervical adenopathy.   Skin:     General: Skin is warm and dry.   Neurological:      Mental Status: She is alert and oriented to person, place, and time.   Psychiatric:         Mood and Affect: Mood and affect normal.         Behavior: Behavior normal.         Thought Content: Thought content normal. Thought content does not include homicidal or suicidal ideation.         Judgment: Judgment normal.        Result Review :       Data reviewed: Radiologic studies Coccyx x-ray          Assessment and Plan    Diagnoses and all orders for this visit:    1. Fall from chair, sequela (Primary)  -     XR Spine Lumbar 2 or 3 View; Future  -     XR Spine Cervical Complete 4 or 5 View; Future  We will give her Toradol 60 mg IM today in the office.  I will have her resume taking diclofenac but advised not to take until later this afternoon due to the Toradol.  I will get her started on cyclobenzaprine 10 mg 3 times a day as needed, advised this would cause drowsiness and not to drive while taking medication.  We will also get Dr. Patel to refill her gabapentin today.  I will have her get x-rays of lumbar and cervical spine.  I will put her off work for 10 days.  We will have her follow-up with Dr. Maldonado in 1 week.    2. Coccyx pain  -     XR Spine Lumbar 2 or 3 View; Future  -     ketorolac (TORADOL) injection 60 mg    3. Acute midline low back pain without sciatica  -     XR Spine Lumbar 2 or 3 View; Future  -     ketorolac (TORADOL) injection 60 mg    4. Neck pain  -     XR Spine Cervical Complete 4 or 5 View; Future  -     ketorolac (TORADOL) injection 60 mg    Other orders  -     cyclobenzaprine (FLEXERIL) 10 MG tablet; Take 1 tablet by mouth 3 (Three) Times a Day As Needed for Muscle Spasms for up  to 21 days.  Dispense: 60 tablet; Refill: 0  -     diclofenac (VOLTAREN) 75 MG EC tablet; Take 1 tablet by mouth 2 (Two) Times a Day.  Dispense: 180 tablet; Refill: 0  -     spironolactone (ALDACTONE) 100 MG tablet; Take 1 tablet by mouth Daily.  Dispense: 90 tablet; Refill: 1        Follow Up   Return in about 1 week (around 6/28/2021) for With Dr. Patel for pain s/p fall.  Patient was given instructions and counseling regarding her condition or for health maintenance advice. Please see specific information pulled into the AVS if appropriate.

## 2021-06-21 NOTE — TELEPHONE ENCOUNTER
Hub staff attempted to follow warm transfer process and was unsuccessful     Caller: Noemi Rasheed    Relationship to patient: Self    Best call back number: [phone]    Patient is needing: THE PATIENT IS RETURNING A MISSED CALL FROM 06/21/2021. SHE WOULD LIKE A CALL BACK ASAP.

## 2021-06-24 NOTE — TELEPHONE ENCOUNTER
Hub staff attempted to follow warm transfer process and was unsuccessful     Caller: Noemi Rasheed    Relationship to patient: Self    Best call back number: [phone]    Patient is needing: PATIENT STATED ZAHRAA HAD CALLED HER AND ASKED HER TO RETURN THE CALL

## 2021-06-24 NOTE — TELEPHONE ENCOUNTER
PT WANTED TO KNOW ABOUT HER XRAY RESULTS. I LET PT KNOW THAT THE XRAY DIDN'T FIND ANYTHING. PT WAS UPSET THAT IT DIDN'T SHOW ANYTHING AND SAID SHE WAS GOING TO [facility] SO THEY CAN FIND OUT WHATS WRONG.

## 2021-06-24 NOTE — TELEPHONE ENCOUNTER
----- Message from Evelyn Henson, APRN sent at 6/21/2021  9:47 AM EDT -----  Please let her know that there were no liver masses seen on US and that her blood sugar was high.  Thanks.

## 2021-06-28 NOTE — PROGRESS NOTES
Chief Complaint  Chief Complaint   Patient presents with   • Tailbone Pain       HPI:  Noemi Rasheed presents to [facility]    Pt reports she fell at work.  Patient reports that when she went to urgent care on 2016 and had an x-ray done of her  sacrum and coccyx which were negative for fracture.  Patient had a follow-up appointment on 2021 in our office and had an x-ray of her neck and lumbar spine done at that time that was negative for fracture.  Patient has had severe low back pain radiating up her spine since her fall.  Patient reports that she is not able to get pain relief with over-the-counter anti-inflammatories.  I will give her a prescription for Percocet to help with her pain.  Patient reports that she is not able to sit down comfortably the only position that helps alleviate the pain is to stand and bend over the counter were in a curled position with her back.  Patient is very uncomfortable on exam.  I will be ordering a CT scan lumbar spine.    Medical History:  Past History:  Medical History: has a past medical history of Aftercare following right knee joint replacement surgery (2018), Asthma, COPD (chronic obstructive pulmonary disease) (CMS/Formerly [facility]), Diabetes mellitus (CMS/Formerly [facility]), Enlarged liver, Frequency of urination (2016), Glucosuria (2016), Hyperlipidemia (2019), Hypertension, Limb swelling, Liver cirrhosis secondary to BARRIENTOS (nonalcoholic steatohepatitis) (CMS/Formerly [facility]), Local edema (2019), Lower back pain, Nocturia (2016), Osteoarthritis of right knee (2018), Right knee injury (10/15/2018), Right knee pain (2018), SOB (shortness of breath), Stomach disorder, Stroke (CMS/HCC), Urge incontinence of urine (2016), and Vitamin D deficiency (2019).   Surgical History: has a past surgical history that includes Hysterectomy;  section; Joint replacement (Bilateral); Cholecystectomy;  Appendectomy; Cardiac catheterization (04/2015); Colonoscopy; and Esophagogastroduodenoscopy.   Family History: family history includes Arthritis in her father, mother, and sister; Cancer in her father and sister; Diabetes in her daughter, mother, and sister; Heart attack in her father; Lung cancer in her brother; Osteoporosis in her mother.   Social History: reports that she quit smoking about 20 years ago. Her smoking use included cigarettes. She has a 105.00 pack-year smoking history. She has never used smokeless tobacco. She reports previous drug use. She reports that she does not drink alcohol.  Immunization History   Administered Date(s) Administered   • Influenza, Unspecified 10/10/2019         Allergies: Patient has no known allergies.     Medications:  Current Outpatient Medications on File Prior to Visit   Medication Sig Dispense Refill   • acetaminophen (TYLENOL) 500 MG tablet Tylenol Extra Strength 500 mg oral tablet take 2 tablets (1,000 mg) by oral route every 6 hours as needed for pain or fever   Active     • albuterol sulfate  (90 Base) MCG/ACT inhaler albuterol sulfate HFA 90 mcg/actuation aerosol inhaler   INHALE 1 TO 2 PUFFS PO Q 4 TO 6 H PRF DIFFICULTY BREATHING     • aspirin 81 MG chewable tablet Chew 81 mg Daily.     • atorvastatin (LIPITOR) 20 MG tablet Take 40 mg by mouth Daily.     • cyclobenzaprine (FLEXERIL) 10 MG tablet Take 1 tablet by mouth 3 (Three) Times a Day As Needed for Muscle Spasms for up to 21 days. 60 tablet 0   • diclofenac (VOLTAREN) 75 MG EC tablet Take 1 tablet by mouth 2 (Two) Times a Day. 180 tablet 0   • furosemide (LASIX) 40 MG tablet Take 40 mg by mouth 2 (Two) Times a Day.     • gabapentin (NEURONTIN) 300 MG capsule Take 300 mg by mouth 2 (two) times a day.     • glucose blood (FREESTYLE LITE) test strip 1 each by Other route 3 (Three) Times a Day As Needed. Use as instructed     • insulin aspart (NovoLOG FlexPen) 100 UNIT/ML solution pen-injector sc pen  "Novolog Flexpen U-100 Insulin aspart 100 unit/mL (3 mL) subcutaneous     • Insulin Glargine (BASAGLAR KWIKPEN) 100 UNIT/ML injection pen Inject 80 Units under the skin into the appropriate area as directed Daily.     • ipratropium-albuterol (DUO-NEB) 0.5-2.5 mg/3 ml nebulizer Take 3 mL by nebulization Every 4 (Four) Hours As Needed for Wheezing.     • Lancets (freestyle) lancets 1 each by Other route 3 (Three) Times a Day As Needed. Use as instructed     • metFORMIN (GLUCOPHAGE) 500 MG tablet Take 500 mg by mouth 2 (Two) Times a Day With Meals.     • potassium chloride (K-DUR,KLOR-CON) 20 MEQ CR tablet potassium chloride ER 20 mEq tablet,extended release(part/cryst)     • spironolactone (ALDACTONE) 100 MG tablet Take 1 tablet by mouth Daily. 90 tablet 1   • traMADol (ULTRAM) 50 MG tablet tramadol 50 mg tablet   TAKE 1 TABLET BY MOUTH TWICE DAILY FOR 14 DAYS     • VITAMIN D, ERGOCALCIFEROL, PO Vitamin D       No current facility-administered medications on file prior to visit.        Health Maintenance Due   Topic Date Due   • DXA SCAN  Never done   • COVID-19 Vaccine (1) Never done   • Hepatitis B (1 of 3 - Risk 3-dose series) Never done   • TDAP/TD VACCINES (1 - Tdap) Never done   • ZOSTER VACCINE (1 of 2) Never done   • Pneumococcal Vaccine 65+ (1 of 1 - PPSV23) Never done   • URINE MICROALBUMIN  02/12/2020   • HEPATITIS C SCREENING  Never done   • ANNUAL WELLNESS VISIT  Never done   • DIABETIC FOOT EXAM  Never done   • DIABETIC EYE EXAM  Never done   • HEMOGLOBIN A1C  05/29/2021       Vital Signs:   Vitals:    06/28/21 1235   BP: 178/80   BP Location: Left arm   Patient Position: Sitting   Cuff Size: Adult   Pulse: 74   Temp: 98.1 °F (36.7 °C)   SpO2: 96%   Weight: 129 kg (284 lb)   Height: 157.5 cm (62\")      Body mass index is 51.94 kg/m².     ROS:  Review of Systems   Constitutional: Negative for fatigue and fever.   HENT: Negative for congestion, ear pain and sinus pressure.    Respiratory: Negative for " cough, chest tightness and shortness of breath.    Cardiovascular: Negative for chest pain, palpitations and leg swelling.   Gastrointestinal: Negative for abdominal pain and diarrhea.   Genitourinary: Negative for dysuria and frequency.   Musculoskeletal: Positive for back pain and gait problem.   Neurological: Negative for speech difficulty, headache and confusion.   Psychiatric/Behavioral: Negative for agitation and behavioral problems.          Physical Exam  Constitutional:       Appearance: Normal appearance.   HENT:      Right Ear: Tympanic membrane normal.      Left Ear: Tympanic membrane normal.      Nose: Nose normal.   Eyes:      Extraocular Movements: Extraocular movements intact.      Conjunctiva/sclera: Conjunctivae normal.      Pupils: Pupils are equal, round, and reactive to light.   Cardiovascular:      Rate and Rhythm: Normal rate and regular rhythm.   Pulmonary:      Effort: Pulmonary effort is normal.      Breath sounds: Normal breath sounds.   Abdominal:      General: Bowel sounds are normal.   Musculoskeletal:         General: Tenderness and signs of injury present. Normal range of motion.      Cervical back: Normal range of motion.   Skin:     General: Skin is warm and dry.   Neurological:      General: No focal deficit present.      Mental Status: She is alert and oriented to person, place, and time.   Psychiatric:         Mood and Affect: Mood normal.         Behavior: Behavior normal.          Result Review :   XR Spine Lumbar 2 or 3 View (06/23/2021 11:54)  XR Sacrum & Coccyx (06/16/2021 19:18)       Diagnoses and all orders for this visit:    1. Acute midline low back pain with bilateral sciatica (Primary)  -     CT Lumbar Spine Without Contrast; Future  -     oxyCODONE-acetaminophen (Percocet) 7.5-325 MG per tablet; Take 1 tablet by mouth Every 4 (Four) Hours As Needed for Moderate Pain  for up to 7 days.  Dispense: 42 tablet; Refill: 0          Smoking Cessation:    Noemi Rasheed   reports that she quit smoking about 20 years ago. Her smoking use included cigarettes. She has a 105.00 pack-year smoking history. She has never used smokeless tobacco.          Follow Up   Return if symptoms worsen or fail to improve.  Patient was given instructions and counseling regarding her condition or for health maintenance advice. Please see specific information pulled into the AVS if appropriate.       Navya Patel MD

## 2021-06-30 NOTE — TELEPHONE ENCOUNTER
Caller: Noemi Rasheed    Relationship: Self    Best call back number: [phone]    What form or medical record are you requesting: PAPERWORK STATING THAT PATIENT NEEDS TO CONTINUE TO BE OFF WORK UNTIL FURTHER NOTICE.     Who is requesting this form or medical record from you: WORK    How would you like to receive the form or medical records (pick-up, mail, fax):     Timeframe paperwork needed: PATIENT WOULD LIKE TO COME  PAPERWORK THIS AFTERNOON OR TOMORROW.     Additional notes: PLEASE CALL PATIENT WHEN READY AND TO ADVISE.

## 2021-07-06 NOTE — TELEPHONE ENCOUNTER
Caller: Noemi Rasheed    Relationship: Self    Best call back number: [phone]     Caller requesting test results: NOEMI RASHEED     What test was performed: CT SCAN     When was the test performed: 7/2/21    Where was the test performed: [facility]     Additional notes:     PATIENT WOULD LIKE RESULTS

## 2021-07-07 NOTE — TELEPHONE ENCOUNTER
Caller: ELEUTERIO    Relationship: [employer]     Best call back number: [phone]    What is the medical concern/diagnosis: N/A    What specialty or service is being requested: REFERRAL FOR ORTHO   What is the provider, practice or medical service name:     What is the office location:     What is the office phone number:     Any additional details: ELEUTERIO IS REQUESTING CORRECT BILLING ADDRESS,  LATEST OFFICE NOTES, AND INFORMATION ABOUT ORTHO REFERRAL

## 2021-07-08 NOTE — TELEPHONE ENCOUNTER
Caller: SANDRA VELAZQUEZ    Relationship: [employer]    Best call back number: [phone]    What form or medical record are you requestin2021 AND  VISIT NOTES    Who is requesting this form or medical record from you: [employer]    How would you like to receive the form or medical records (pick-up, mail, fax): FAX  If fax, what is the fax number: [phone] - ATTN: SANDRA VELAZQUEZ    Timeframe paperwork needed: ASAP

## 2021-07-09 NOTE — TELEPHONE ENCOUNTER
SANDRA CALLED BACK TO LET OFFICE KNOW THAT SHE ONLY RECEIVED THE PARTIAL NOTES FROM THE APPTS ON 6/21 AND NO NOTES ON 6/28. PLEASE FAX THE REST OF THE OFFICE NOTES AND THE CT REPORT AS WELL.

## 2021-07-09 NOTE — TELEPHONE ENCOUNTER
Caller: Noemi Rasheed    Relationship: Self    Best call back number: [phone]    What is the best time to reach you:ANYTIME     Who are you requesting to speak with (clinical staff, provider,  specific staff member): MEDICAL STAFF    What was the call regarding: PATIENT WOULD LIKE TO KNOW IF THE ORTHO APPT HAS BEEN SCHEDULED. PLEASE CALL TO ADVISE.

## 2021-07-12 NOTE — TELEPHONE ENCOUNTER
Caller: SANDRA VELAZQUEZ    Relationship:     Best call back number: [phone]    What is the best time to reach you: ANYTIME    Who are you requesting to speak with (clinical staff, provider,  specific staff member): DAMIEN/CLINICAL    What was the call regarding: NEEDS TO KNOW IF PATIENT HAS ORTHO APPOINTMENT SETUP THAT PERTAINS TO [employer]ENS[employer] CLAIM.  IF SO, WHAT IS THE PHYSICIANS NAME?    Do you require a callback: YES

## 2021-07-14 NOTE — TELEPHONE ENCOUNTER
Caller: Noemi Rasheed    Relationship: Self    Best call back number: [phone]     What was the call regarding: PATIENT STATES SHE NEEDS A REFILL ON MEDICATION FOR HER BACK AND ALSO AN EXCUSE FOR HER TO BE OFF WORK LONGER.    Do you require a callback: YES, PLEASE CALL AND ADVISE

## 2021-07-21 NOTE — TELEPHONE ENCOUNTER
Caller: JEREMIAH    Relationship: [employer]  REPRESENTATIVE    Best call back number: [phone]    What form or medical record are you requesting: OFFICE NOTES FROM July 16, 2021 IF AVAILABLE    Who is requesting this form or medical record from you: [employer]     How would you like to receive the form or medical records (pick-up, mail, fax):FAX    FAX NUMBER [phone]    Timeframe paperwork needed: ASAP     Additional notes:PLEASE USE CLAIM NUMBER  [id number] WHEN FAXING PAPERWORK    PLEASE ADVISE. THANKS.

## 2021-07-22 PROBLEM — Z96.653 HISTORY OF BILATERAL KNEE REPLACEMENT: Status: ACTIVE | Noted: 2021-01-01

## 2021-07-22 PROBLEM — M25.562 PAIN IN BOTH KNEES: Status: ACTIVE | Noted: 2021-01-01

## 2021-07-22 PROBLEM — M25.561 PAIN IN BOTH KNEES: Status: ACTIVE | Noted: 2021-01-01

## 2021-07-22 NOTE — TELEPHONE ENCOUNTER
Attempted to call patient to get some clarification on the last two calls but got the voicemail. She is requesting refill on medications and for a drs note. Patient will need an appointment before refill can be given. HUB PLEASE READ.

## 2021-07-23 NOTE — PROGRESS NOTES
"Chief Complaint  Pain of the Right Knee    Subjective          Noemi Rasheed is a 66 y.o. female  presents to [facility] for   History of Present Illness    Patient presents for follow-up evaluation bilateral knee pain, bilateral total knee arthroplasty, she was last seen on 2021 by Dr. Godinez due to a fall that she had, she states that her knee pain improved after that last visit, patient that about 2 weeks ago her car door caught some wind and the door hit her right knee she got a bruise to the anterior lateral knee which had some swelling and a \"knot \"in the anterior knee she is concerned about this and is here for evaluation of this.  Patient states she has been able to ambulate and without too much difficulty she states the knee swelling and bruising has improved, denies locking or buckling of the knee.  Patient states she has been working she states she is losing weight and her diabetes is controlled.  No Known Allergies     Social History     Socioeconomic History   • Marital status:      Spouse name: Not on file   • Number of children: Not on file   • Years of education: Not on file   • Highest education level: Not on file   Tobacco Use   • Smoking status: Former Smoker     Packs/day: 3.00     Years: 35.00     Pack years: 105.00     Types: Cigarettes     Quit date: 2000     Years since quittin.0   • Smokeless tobacco: Never Used   Vaping Use   • Vaping Use: Never used   Substance and Sexual Activity   • Alcohol use: Never   • Drug use: Not Currently   • Sexual activity: Defer     Birth control/protection: None        REVIEW OF SYSTEMS    Constitutional: Denies fevers, chills, weight loss  Cardiovascular: Denies chest pain, shortness of breath  Skin: Denies rashes, acute skin changes  Neurologic: Denies headache, loss of consciousness  MSK: Right knee pain      Objective   Vital Signs:   Pulse 78   Ht 167.6 cm (66\")   Wt 116 kg (255 lb 12.8 oz)   " SpO2 93%   BMI 41.29 kg/m²     Body mass index is 41.29 kg/m².    Physical Exam    Right knee: Resolving ecchymosis to the lateral anterior knee below the kneecap, mild tenderness to palpation at the site, no erythema, no fluctuance, no signs of infection or effusion.  Full extension, flexion 120, stable anterior/posterior drawer, stable to varus/valgus stress, no pain with range of motion.    Procedures    Imaging Results (Most Recent)     Procedure Component Value Units Date/Time    XR Knee 3+ View With Virgie Right [[id number]] Resulted: 07/23/21 1202     Updated: 07/23/21 1203    Narrative:      • View:AP, Lateral and Sunrise view(s)  • Site: Right knee  • Indication: Right knee pain  • Study: X-rays ordered, taken in the office, and reviewed today  • X-ray: Intact pain right shoulder arthroplasty, signs of periprosthetic   fracture, loosening, good alignment, no effusion.  • Comparative data: No comparative data found             Result Review :   The following data was reviewed by: ZOIE Conti on 07/23/2021:  Data reviewed: Radiologic studies Reviewed by me with the patient             Assessment and Plan    Diagnoses and all orders for this visit:    1. Pain in both knees, unspecified chronicity (Primary)  -     XR Knee 3+ View With Sunrise Right    2. History of bilateral knee replacement        Discussed diagnosis and treatment with the patient, she was advised to continue weightbearing and activity as tolerated, follow-up as needed.    Call or return if worsening symptoms.    Follow Up   Return if symptoms worsen or fail to improve.  Patient was given instructions and counseling regarding her condition or for health maintenance advice. Please see specific information pulled into the AVS if appropriate.

## 2021-07-28 NOTE — PROGRESS NOTES
"Chief Complaint  Back Pain    Subjective          Noemi Rasheed who is a 66 y.o. year old female who presents to [facility] for evaluation of her low back pain.     Pt with c/o low back pain following a fall on June 16th of this year. Pt reports that she fell and landed on her tailbone, causing severe pain in the sacral area and low back. She was evaluated in urgent care, who ordered XR of her sacrum and coccyx, showing no evidence of fracture. She had follow up with her PCP, who proceeded with XR of the lumbar spine showing no acute fracture though notable lumbar spondylosis. Recently had CT Lumbar Spine, showing advanced degenerative changes throughout the lumbar spine with disc bulging, most significant at L5/S1 causing moderate canal and foraminal narrowing. There is slight retrolisthesis of L5 on S1.     She is taking Percocet as needed for pain. She denies any recent physical therapy for her low back, reports she had this years ago and it did not help. She sees pain management for a neck and shoulder issue. She has not received any type of injections in her low back. Pain is worst when sitting or standing for prolonged periods of time. She denies any weakness in her legs.       Recent Interventions: Percocet      Review of Systems   Musculoskeletal: Positive for arthralgias, back pain, gait problem, joint swelling, myalgias and neck pain.   All other systems reviewed and are negative.       Objective   Vital Signs:   /59 (BP Location: Left arm, Patient Position: Sitting)   Ht 167.6 cm (66\")   Wt 115 kg (254 lb)   BMI 41.00 kg/m²       Physical Exam  Vitals reviewed.   Constitutional:       Appearance: Normal appearance.   Musculoskeletal:      Lumbar back: Tenderness present. Negative right straight leg raise test and negative left straight leg raise test.      Right hip: Tenderness present. Decreased range of motion.      Left hip: Tenderness present. " Decreased range of motion.   Neurological:      Mental Status: She is alert and oriented to person, place, and time.      Deep Tendon Reflexes: Strength normal.      Reflex Scores:       Patellar reflexes are 0 on the right side and 0 on the left side.       Achilles reflexes are 0 on the right side and 0 on the left side.       Neurologic Exam     Mental Status   Oriented to person, place, and time.   Level of consciousness: alert    Motor Exam   Muscle bulk: normal  Overall muscle tone: normal    Strength   Strength 5/5 throughout.     Sensory Exam   Light touch normal.     Gait, Coordination, and Reflexes     Gait  Gait: wide-based    Reflexes   Right patellar: 0  Left patellar: 0  Right achilles: 0  Left achilles: 0       Result Review :                 Assessment and Plan    Diagnoses and all orders for this visit:    1. Spondylosis of lumbosacral region without myelopathy or radiculopathy (Primary)  -     MRI Lumbar Spine Without Contrast; Future    2. Degenerative disc disease, lumbar  -     MRI Lumbar Spine Without Contrast; Future    3. Acquired spondylolisthesis  -     MRI Lumbar Spine Without Contrast; Future  -     XR Spine Lumbar Complete With Flex & Ext; Future    4. Spinal stenosis, lumbar region, without neurogenic claudication  -     MRI Lumbar Spine Without Contrast; Future    Pt with advanced lumbar spondylosis with worsening low back and sacral pain following a fall on her buttocks. Recent CT Lumbar Spine showing disc bulges at L4/5 and L5/S1, with retrolisthesis of L5 on S1. Will proceed with MRI Lumbar Spine to evaluate severity of canal stenosis. Consideration for sacral insufficiency fracture cannot be excluded. Will also order bending XR Lumbar Spine to evaluate for instability at L5 on S1. She will follow up in 6 weeks to review imaging.    At the end of the visit she requested a return to work note. 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 Reviewing her studies and exam today, I do not  see issue with returning to work. There is some confusion regarding her visit being under workman's comp today. Pt does not report her injury occurring at work. This will need to be clarified.     I spent 45 minutes caring for Noemi on this date of service. This time includes time spent by me in the following activities:preparing for the visit, reviewing tests, obtaining and/or reviewing a separately obtained history, performing a medically appropriate examination and/or evaluation , counseling and educating the patient/family/caregiver, ordering medications, tests, or procedures, referring and communicating with other health care professionals , documenting information in the medical record and independently interpreting results and communicating that information with the patient/family/caregiver     Follow Up   Return in about 6 weeks (around 9/8/2021) for Review imaging.  Patient was given instructions and counseling regarding her condition or for health maintenance advice.     -Mri Lumbar Spine  -XR Bending views to be done day of MRI  -Follow up in 6 weeks

## 2021-07-31 PROBLEM — J18.9 PNEUMONIA OF BOTH LUNGS DUE TO INFECTIOUS ORGANISM: Status: ACTIVE | Noted: 2021-01-01

## 2021-07-31 NOTE — TELEPHONE ENCOUNTER
Phone call to patient, who is now hospitalized. She is apparently stable and has been vaccinated.   with patient

## 2021-08-06 NOTE — TELEPHONE ENCOUNTER
Caller: SANDRA VELAZQUEZ    Relationship: [employer]    Best call back number: [phone]    What form or medical record are you requesting: LAST OFFICE NOTE 7/28    Who is requesting this form or medical record from you: SANDRA FROM [employer] ([employer])    How would you like to receive the form or medical records (pick-up, mail, fax): FAX  If fax, what is the fax number: [phone] ATTN.SANDRA VELAZQUEZ    Timeframe paperwork needed: ASAP    Additional notes:   PLEASE FAX PATIENT'S LAST OFFICE NOTE TO SANDRA VELAZQUEZ WITH [employer].

## 2021-08-10 PROBLEM — J15.1 PSEUDOMONAS PNEUMONIA: Status: ACTIVE | Noted: 2021-01-01

## 2021-08-10 PROBLEM — J44.1 COPD WITH ACUTE EXACERBATION: Status: ACTIVE | Noted: 2021-01-01

## 2021-08-10 PROBLEM — E66.01 OBESITY, CLASS III, BMI 40-49.9 (MORBID OBESITY) (HCC): Status: ACTIVE | Noted: 2021-01-01

## 2021-08-10 PROBLEM — J80 ACUTE RESPIRATORY DISTRESS SYNDROME (ARDS) DUE TO COVID-19 VIRUS (HCC): Status: ACTIVE | Noted: 2021-01-01

## 2021-08-10 PROBLEM — U07.1 ACUTE RESPIRATORY DISTRESS SYNDROME (ARDS) DUE TO COVID-19 VIRUS (HCC): Status: ACTIVE | Noted: 2021-01-01

## 2021-08-10 PROBLEM — D69.6 THROMBOCYTOPENIA: Status: ACTIVE | Noted: 2021-01-01

## 2021-08-11 NOTE — TELEPHONE ENCOUNTER
PATIENT DID NOT HAVVE ONE FOR ORTO IT WAS FOR NEURO PER JEANCARLOS DUCKWORTH MA. I AM GOING TO CALL THE PATIENT AND INFORM HER THAT IS WAS FOR NEUR.

## 2021-08-13 PROBLEM — R07.9 CHEST PAIN: Status: ACTIVE | Noted: 2021-01-01

## 2021-08-14 PROBLEM — R12 HEARTBURN: Status: ACTIVE | Noted: 2021-01-01

## 2021-08-14 PROBLEM — R07.89 NON-CARDIAC CHEST PAIN: Status: ACTIVE | Noted: 2021-01-01

## 2021-08-14 PROBLEM — K62.5 BRBPR (BRIGHT RED BLOOD PER RECTUM): Status: ACTIVE | Noted: 2021-01-01

## 2021-08-15 PROBLEM — R07.89 NON-CARDIAC CHEST PAIN: Status: RESOLVED | Noted: 2021-01-01 | Resolved: 2021-01-01

## 2021-08-15 PROBLEM — E16.2 HYPOGLYCEMIA: Status: ACTIVE | Noted: 2021-01-01

## 2021-08-15 PROBLEM — K62.5 BRBPR (BRIGHT RED BLOOD PER RECTUM): Status: RESOLVED | Noted: 2021-01-01 | Resolved: 2021-01-01

## 2021-08-16 PROBLEM — E16.2 HYPOGLYCEMIA: Status: RESOLVED | Noted: 2021-01-01 | Resolved: 2021-01-01

## 2021-09-01 NOTE — PLAN OF CARE
Goal Outcome Evaluation:  Patient decreased to 2 liters nasal cannula this shift with 02 saturations in mid 90's. Patient had dressing change applied to medial buttocks. Patient's blood sugars ranged from  this shift. Patient's levemir was discontinued. Patient frequently requested staff multiple times in shift. Patient was given tylenol for buttocks pain. Patient will be discharged tomorrow to SNF.   Problem: Adult Inpatient Plan of Care  Goal: Plan of Care Review  9/1/2021 1806 by Samantha Coyne RN  Outcome: Ongoing, Progressing  Flowsheets (Taken 9/1/2021 1806)  Progress: improving  9/1/2021 1522 by Samantha Coyne RN  Outcome: Ongoing, Progressing  Goal: Patient-Specific Goal (Individualized)  9/1/2021 1806 by Samantha Coyne RN  Outcome: Ongoing, Progressing  9/1/2021 1522 by Samantha Coyne RN  Outcome: Ongoing, Progressing  Goal: Absence of Hospital-Acquired Illness or Injury  9/1/2021 1806 by Samantha Coyne RN  Outcome: Ongoing, Progressing  9/1/2021 1522 by Samantha Coyne RN  Outcome: Ongoing, Progressing  Intervention: Identify and Manage Fall Risk  Description: Perform standard risk assessment with a validated tool or comprehensive approach appropriate to the patient on admission; reassess fall risk frequently, with change in status or transfer to another level of care.  Communicate fall injury risk to interprofessional healthcare team.  Determine need for increased observation, equipment and environmental modification, such as low bed and signage, as well as supportive, nonskid footwear.  Adjust safety measures to individual developmental age, stage and identified risk factors.  Reinforce the importance of safety and physical activity with patient and family.  Perform regular intentional rounding to assess need for position change, pain assessment, personal needs, including assistance with toileting.  Recent Flowsheet Documentation  Taken 9/1/2021 1736 by Samantha Coyne RN  Safety Promotion/Fall  Prevention:   safety round/check completed   room organization consistent   toileting scheduled   nonskid shoes/slippers when out of bed   fall prevention program maintained   assistive device/personal items within reach   clutter free environment maintained  Taken 9/1/2021 1621 by Samantha Coyne RN  Safety Promotion/Fall Prevention:   toileting scheduled   safety round/check completed   room organization consistent   nonskid shoes/slippers when out of bed   clutter free environment maintained   assistive device/personal items within reach   fall prevention program maintained  Taken 9/1/2021 1515 by Samantha Coyne RN  Safety Promotion/Fall Prevention:   toileting scheduled   safety round/check completed   room organization consistent   nonskid shoes/slippers when out of bed   fall prevention program maintained   clutter free environment maintained   assistive device/personal items within reach  Taken 9/1/2021 1448 by Samantha Coyne RN  Safety Promotion/Fall Prevention:   toileting scheduled   safety round/check completed   room organization consistent   nonskid shoes/slippers when out of bed   fall prevention program maintained   clutter free environment maintained   assistive device/personal items within reach  Taken 9/1/2021 1318 by Samantha Coyne RN  Safety Promotion/Fall Prevention:   safety round/check completed   toileting scheduled   room organization consistent   nonskid shoes/slippers when out of bed   fall prevention program maintained   clutter free environment maintained   assistive device/personal items within reach  Taken 9/1/2021 1231 by Samantha Coyne RN  Safety Promotion/Fall Prevention:   safety round/check completed   room organization consistent   toileting scheduled   nonskid shoes/slippers when out of bed   fall prevention program maintained   clutter free environment maintained   assistive device/personal items within reach  Taken 9/1/2021 1145 by Samantha Coyne RN  Safety Promotion/Fall  Prevention:   toileting scheduled   room organization consistent   safety round/check completed   nonskid shoes/slippers when out of bed   fall prevention program maintained   clutter free environment maintained   assistive device/personal items within reach  Taken 9/1/2021 1002 by Samantha Coyne RN  Safety Promotion/Fall Prevention:   toileting scheduled   safety round/check completed   room organization consistent   nonskid shoes/slippers when out of bed   fall prevention program maintained   clutter free environment maintained   assistive device/personal items within reach  Taken 9/1/2021 0847 by Samantha Coyne RN  Safety Promotion/Fall Prevention:   toileting scheduled   safety round/check completed   room organization consistent   nonskid shoes/slippers when out of bed   fall prevention program maintained   clutter free environment maintained   assistive device/personal items within reach  Taken 9/1/2021 0756 by Samantha Coyne RN  Safety Promotion/Fall Prevention:   toileting scheduled   safety round/check completed   room organization consistent   nonskid shoes/slippers when out of bed   fall prevention program maintained   clutter free environment maintained   assistive device/personal items within reach  Taken 9/1/2021 0733 by Samantha Coyne RN  Safety Promotion/Fall Prevention:   safety round/check completed   room organization consistent   nonskid shoes/slippers when out of bed   fall prevention program maintained   clutter free environment maintained   assistive device/personal items within reach  Intervention: Prevent Skin Injury  Description: Assess skin risk on admission and at regular intervals throughout hospital stay.  Keep all areas of skin (especially folds) clean and dry.  Maintain adequate skin hydration.  Relieve and redistribute pressure and protect bony prominences; implement measures based on patient-specific risk factors.  Match turning and repositioning schedule to clinical  condition.  Encourage weight shift frequently; assist with reposition if unable to complete independently.  Float heels off bed. Avoid pressure on the Achilles tendon.  Keep skin free from extended contact with medical devices.  Use aids (e.g., slide boards, mechanical lift) during transfer.  Recent Flowsheet Documentation  Taken 9/1/2021 0733 by Samantha Coyne RN  Body Position: weight shift assistance provided  Skin Protection:   tubing/devices free from skin contact   transparent dressing maintained  Intervention: Prevent Infection  Description: Maintain skin and mucous membrane integrity; promote hand, oral and pulmonary hygiene.  Optimize fluid balance, nutrition, sleep and glycemic control to maximize infection resistance.  Identify potential sources of infection early to prevent or mitigate progression of infection (e.g., wound, lines, devices).  Evaluate ongoing need for invasive devices; remove promptly when no longer indicated.  Recent Flowsheet Documentation  Taken 9/1/2021 1736 by Samantha Coyne RN  Infection Prevention:   visitors restricted/screened   single patient room provided   rest/sleep promoted   personal protective equipment utilized   hand hygiene promoted  Taken 9/1/2021 1621 by Samantha Cyone RN  Infection Prevention:   visitors restricted/screened   single patient room provided   rest/sleep promoted   personal protective equipment utilized   hand hygiene promoted  Taken 9/1/2021 1515 by aSmantha Coyne RN  Infection Prevention:   visitors restricted/screened   single patient room provided   rest/sleep promoted   personal protective equipment utilized   hand hygiene promoted  Taken 9/1/2021 1448 by Samantha Coyne RN  Infection Prevention:   visitors restricted/screened   single patient room provided   rest/sleep promoted   personal protective equipment utilized   hand hygiene promoted  Taken 9/1/2021 1318 by Samantha Coyne RN  Infection Prevention:   visitors restricted/screened   single patient  room provided   rest/sleep promoted   personal protective equipment utilized   hand hygiene promoted  Taken 9/1/2021 1231 by Samantha Coyne RN  Infection Prevention:   visitors restricted/screened   single patient room provided   rest/sleep promoted   personal protective equipment utilized   hand hygiene promoted  Taken 9/1/2021 1145 by Samantha Coyne RN  Infection Prevention:   visitors restricted/screened   single patient room provided   rest/sleep promoted   personal protective equipment utilized   hand hygiene promoted  Taken 9/1/2021 1002 by Samantha Coyne RN  Infection Prevention:   visitors restricted/screened   single patient room provided   rest/sleep promoted   personal protective equipment utilized   hand hygiene promoted  Taken 9/1/2021 0847 by Samantha Coyne RN  Infection Prevention:   visitors restricted/screened   single patient room provided   rest/sleep promoted   personal protective equipment utilized   hand hygiene promoted  Taken 9/1/2021 0756 by Samantha Coyne RN  Infection Prevention:   visitors restricted/screened   single patient room provided   rest/sleep promoted   personal protective equipment utilized   hand hygiene promoted  Taken 9/1/2021 0733 by Samantha Coyne RN  Infection Prevention:   visitors restricted/screened   single patient room provided   rest/sleep promoted   personal protective equipment utilized   hand hygiene promoted  Goal: Optimal Comfort and Wellbeing  9/1/2021 1806 by Samantha Coyne RN  Outcome: Ongoing, Progressing  9/1/2021 1522 by Samantha Coyne RN  Outcome: Ongoing, Progressing  Intervention: Provide Person-Centered Care  Description: Use a family-focused approach to care.  Develop trust and rapport by proactively providing information, encouraging questions, addressing concerns and offering reassurance.  Acknowledge emotional response to hospitalization.  Recognize and utilize personal coping strategies.  Honor spiritual and cultural preferences.  Recent Flowsheet  Documentation  Taken 9/1/2021 0733 by Samantha Coyne RN  Trust Relationship/Rapport:   thoughts/feelings acknowledged   reassurance provided   questions encouraged   questions answered   empathic listening provided   emotional support provided   choices provided   care explained  Goal: Readiness for Transition of Care  9/1/2021 1806 by Samantha Coyne RN  Outcome: Ongoing, Progressing  9/1/2021 1522 by Samantha Coyne RN  Outcome: Ongoing, Progressing     Problem: Fluid Imbalance (Pneumonia)  Goal: Fluid Balance  9/1/2021 1806 by Samantha Coyne RN  Outcome: Ongoing, Progressing  9/1/2021 1522 by Samantha Coyne RN  Outcome: Ongoing, Progressing  Intervention: Monitor and Manage Fluid Balance  Description: Assess fluid requirements to determine goal-directed fluid therapy.  Keep accurate intake, output and daily weight; monitor trends.  Monitor laboratory value trends and need for treatment adjustment.  Encourage oral intake when able; if not able to meet requirements, determine need for intravenous fluid therapy to achieve fluid balance.  Evaluate potential sources that may lead to fluid overload (e.g., oral, enteral, intravenous fluid, medication).  Evaluate need for and response to fluid restriction.  Anticipate need for diuretic agent; monitor effects if administered (e.g., blood pressure change, dysrhythmia, electrolyte alteration).  Monitor and maintain skin integrity; avoid constrictive devices and clothing if edema present.  Recent Flowsheet Documentation  Taken 9/1/2021 0733 by Samantha Coyne RN  Fluid/Electrolyte Management: fluids provided     Problem: Infection (Pneumonia)  Goal: Resolution of Infection Signs and Symptoms  9/1/2021 1806 by Samantha Coyne RN  Outcome: Ongoing, Progressing  9/1/2021 1522 by Samantha Coyne RN  Outcome: Ongoing, Progressing  Intervention: Prevent Infection Progression  Description: Implement transmission-based precautions and isolation, as indicated, to prevent spread of  infection.  Obtain cultures prior to initiating antimicrobial therapy. Do not delay treatment for laboratory results in the presence of high suspicion.  Administer ordered antimicrobial therapy promptly; reassess need regularly.  Identify and manage signs of early sepsis.  Provide fever-reduction and comfort measures.  Recent Flowsheet Documentation  Taken 9/1/2021 1736 by Samantha Coyne RN  Isolation Precautions: airborne precautions maintained  Taken 9/1/2021 1621 by Samantha Coyne RN  Isolation Precautions: airborne precautions maintained  Taken 9/1/2021 1515 by Samantha Coyne RN  Isolation Precautions: airborne precautions maintained  Taken 9/1/2021 1448 by Samantha Coyen RN  Isolation Precautions: airborne precautions maintained  Taken 9/1/2021 1318 by Samantha Coyne RN  Isolation Precautions: airborne precautions maintained  Taken 9/1/2021 1231 by Samantha Coyne RN  Isolation Precautions: airborne precautions maintained  Taken 9/1/2021 1145 by Samantha Coyne RN  Isolation Precautions: airborne precautions maintained  Taken 9/1/2021 1002 by Samantha Coyne RN  Isolation Precautions: airborne precautions maintained  Taken 9/1/2021 0847 by Samantha Coyne RN  Isolation Precautions: airborne precautions maintained  Taken 9/1/2021 0756 by Samantha Coyne RN  Isolation Precautions: airborne precautions maintained  Taken 9/1/2021 0733 by Samantha Coyne RN  Isolation Precautions: airborne precautions maintained     Problem: Respiratory Compromise (Pneumonia)  Goal: Effective Oxygenation and Ventilation  9/1/2021 1806 by Samantha Coyne RN  Outcome: Ongoing, Progressing  9/1/2021 1522 by Samantha Coyne RN  Outcome: Ongoing, Progressing  Intervention: Promote Airway Secretion Clearance  Description: Assess the effectiveness of pulmonary hygiene and ability to perform airway clearance techniques.  Promote early mobility/ambulation; match to ability and tolerance.  Encourage deep breathing and lung expansion therapy to prevent atelectasis  (e.g., incentive spirometry, positive airway pressure).  Anticipate the need to splint chest or abdominal wall with cough to minimize discomfort; assist if needed.  Initiate cough-enhancement and airway-clearance techniques with instruction (e.g., active cycle breathing, positive expiratory pressure, suction); consider mechanical insufflation-exsufflation in the presence of neuromuscular weakness.  Consider pharmacologic therapy (e.g., systemic corticosteroid, beta-2 agonist, mucolytic, antimicrobial) to improve mucus clearance, inflammation, cough response and air flow.  Initiate inspiratory muscle training to decrease the risk of pulmonary complications.  Recent Flowsheet Documentation  Taken 9/1/2021 0733 by Samantha Coyne RN  Cough And Deep Breathing: done independently per patient  Intervention: Optimize Oxygenation and Ventilation  Description: Establish oxygenation and ventilation parameters and goals; consider home baseline values for chronic cardiac and lung conditions.  Anticipate noninvasive and invasive monitoring (e.g., pulse oximetry, end-tidal carbon dioxide, blood gases, cardiovascular).  Maintain optimal position to relieve discomfort, breathlessness and ventilation/perfusion mismatch.  Provide oxygen therapy judiciously to avoid hyperoxemia; adjust to achieve oxygenation goal.  Monitor fluid balance closely to minimize the risk of fluid overload.  Implement noninvasive or invasive positive pressure to enhance alveolar ventilation.  Recent Flowsheet Documentation  Taken 9/1/2021 0733 by Samantha Coyne, RN  Head of Bed (HOB): HOB at 45 degrees     Problem: Fall Injury Risk  Goal: Absence of Fall and Fall-Related Injury  Intervention: Identify and Manage Contributors to Fall Injury Risk  Description: Reassess fall risk frequently and with change in status or transfer to another level of care.  Communicate fall injury risk to all healthcare team members (e.g., rounds, change of shift/provider, patient  transport).  Anticipate needs; perform regular intentional rounding to assess need for position change, pain assessment, personal needs (e.g., toileting) and placement of necessary items.  Provide reorientation, appropriate sensory stimulation and routines with changes in mental status to decrease risk of fall.  Promote use of personal vision and auditory aids (e.g., glasses, hearing aids).  Assess assistance level required for safe and effective care; provide support as needed (e.g., toileting, bathing, mobilization).  Define behavior and activity limits to patient and family.  If fall occurs, assess for and treat injury; determine cause; revise fall injury prevention plan.  Regularly review medication contribution to fall risk; adjust medication administration times to minimize risk of falling.  Consider risk related to polypharmacy and age.  Balance adequate pain management with potential for oversedation.  Recent Flowsheet Documentation  Taken 9/1/2021 1736 by Samantha Coyne RN  Medication Review/Management: medications reviewed  Taken 9/1/2021 1621 by Samantha Coyne RN  Medication Review/Management: medications reviewed  Taken 9/1/2021 1515 by Samantha Coyne RN  Medication Review/Management: medications reviewed  Taken 9/1/2021 1448 by Samantha Coyne RN  Medication Review/Management: medications reviewed  Taken 9/1/2021 1318 by Samantha Coyne RN  Medication Review/Management: medications reviewed  Taken 9/1/2021 1231 by Samantha Coyne RN  Medication Review/Management: medications reviewed  Taken 9/1/2021 1145 by Samantha Coyne RN  Medication Review/Management: medications reviewed  Taken 9/1/2021 1002 by Samantha Coyne RN  Medication Review/Management: medications reviewed  Taken 9/1/2021 0847 by Samantha Coyne RN  Medication Review/Management: medications reviewed  Taken 9/1/2021 0756 by Samantha Coyne RN  Medication Review/Management: medications reviewed  Taken 9/1/2021 0733 by Samantha Coyne RN  Medication  Review/Management: medications reviewed  Intervention: Promote Injury-Free Environment  Description: Provide a safe, barrier-free environment that encourages independent activity.  Keep care area uncluttered and well-lighted.  Determine need for increased observation or auditory alerts (e.g., bed or chair alarm).  Assess equipment and environmental modification needs (e.g., low bed, signage, nonskid footwear, grab bars).  Avoid use of restraints.  Recent Flowsheet Documentation  Taken 9/1/2021 1736 by Samantha Coyne RN  Safety Promotion/Fall Prevention:   safety round/check completed   room organization consistent   toileting scheduled   nonskid shoes/slippers when out of bed   fall prevention program maintained   assistive device/personal items within reach   clutter free environment maintained  Taken 9/1/2021 1621 by Samantha Coyne RN  Safety Promotion/Fall Prevention:   toileting scheduled   safety round/check completed   room organization consistent   nonskid shoes/slippers when out of bed   clutter free environment maintained   assistive device/personal items within reach   fall prevention program maintained  Taken 9/1/2021 1515 by Samantha Coyne RN  Safety Promotion/Fall Prevention:   toileting scheduled   safety round/check completed   room organization consistent   nonskid shoes/slippers when out of bed   fall prevention program maintained   clutter free environment maintained   assistive device/personal items within reach  Taken 9/1/2021 1448 by Samantha Coyne RN  Safety Promotion/Fall Prevention:   toileting scheduled   safety round/check completed   room organization consistent   nonskid shoes/slippers when out of bed   fall prevention program maintained   clutter free environment maintained   assistive device/personal items within reach  Taken 9/1/2021 1318 by Samantha Coyne RN  Safety Promotion/Fall Prevention:   safety round/check completed   toileting scheduled   room organization consistent   nonskid  shoes/slippers when out of bed   fall prevention program maintained   clutter free environment maintained   assistive device/personal items within reach  Taken 9/1/2021 1231 by Samantha Coyne RN  Safety Promotion/Fall Prevention:   safety round/check completed   room organization consistent   toileting scheduled   nonskid shoes/slippers when out of bed   fall prevention program maintained   clutter free environment maintained   assistive device/personal items within reach  Taken 9/1/2021 1145 by Samantha Coyne RN  Safety Promotion/Fall Prevention:   toileting scheduled   room organization consistent   safety round/check completed   nonskid shoes/slippers when out of bed   fall prevention program maintained   clutter free environment maintained   assistive device/personal items within reach  Taken 9/1/2021 1002 by Samantha Coyne RN  Safety Promotion/Fall Prevention:   toileting scheduled   safety round/check completed   room organization consistent   nonskid shoes/slippers when out of bed   fall prevention program maintained   clutter free environment maintained   assistive device/personal items within reach  Taken 9/1/2021 0847 by Samantha Coyne RN  Safety Promotion/Fall Prevention:   toileting scheduled   safety round/check completed   room organization consistent   nonskid shoes/slippers when out of bed   fall prevention program maintained   clutter free environment maintained   assistive device/personal items within reach  Taken 9/1/2021 0756 by Samantha Coyne, RN  Safety Promotion/Fall Prevention:   toileting scheduled   safety round/check completed   room organization consistent   nonskid shoes/slippers when out of bed   fall prevention program maintained   clutter free environment maintained   assistive device/personal items within reach  Taken 9/1/2021 0733 by Samantha Coyne RN  Safety Promotion/Fall Prevention:   safety round/check completed   room organization consistent   nonskid shoes/slippers when out of bed    fall prevention program maintained   clutter free environment maintained   assistive device/personal items within reach     Problem: Fall Injury Risk  Goal: Absence of Fall and Fall-Related Injury  9/1/2021 1806 by Samantha Coyne RN  Outcome: Ongoing, Progressing  9/1/2021 1522 by Samantha Coyne RN  Outcome: Ongoing, Progressing  Intervention: Identify and Manage Contributors to Fall Injury Risk  Description: Reassess fall risk frequently and with change in status or transfer to another level of care.  Communicate fall injury risk to all healthcare team members (e.g., rounds, change of shift/provider, patient transport).  Anticipate needs; perform regular intentional rounding to assess need for position change, pain assessment, personal needs (e.g., toileting) and placement of necessary items.  Provide reorientation, appropriate sensory stimulation and routines with changes in mental status to decrease risk of fall.  Promote use of personal vision and auditory aids (e.g., glasses, hearing aids).  Assess assistance level required for safe and effective care; provide support as needed (e.g., toileting, bathing, mobilization).  Define behavior and activity limits to patient and family.  If fall occurs, assess for and treat injury; determine cause; revise fall injury prevention plan.  Regularly review medication contribution to fall risk; adjust medication administration times to minimize risk of falling.  Consider risk related to polypharmacy and age.  Balance adequate pain management with potential for oversedation.  Recent Flowsheet Documentation  Taken 9/1/2021 1736 by Samantha Coyne RN  Medication Review/Management: medications reviewed  Taken 9/1/2021 1621 by Samantha Coyne RN  Medication Review/Management: medications reviewed  Taken 9/1/2021 1515 by Samantha Coyne RN  Medication Review/Management: medications reviewed  Taken 9/1/2021 1448 by Samantha Coyne RN  Medication Review/Management: medications reviewed  Taken  9/1/2021 1318 by Samantha Coyne RN  Medication Review/Management: medications reviewed  Taken 9/1/2021 1231 by Samantha Coyne RN  Medication Review/Management: medications reviewed  Taken 9/1/2021 1145 by Samantha Coyne RN  Medication Review/Management: medications reviewed  Taken 9/1/2021 1002 by Samantha Coyne RN  Medication Review/Management: medications reviewed  Taken 9/1/2021 0847 by Samantha Coyne RN  Medication Review/Management: medications reviewed  Taken 9/1/2021 0756 by Samantha Coyne RN  Medication Review/Management: medications reviewed  Taken 9/1/2021 0733 by Samantha Coyne RN  Medication Review/Management: medications reviewed  Intervention: Promote Injury-Free Environment  Description: Provide a safe, barrier-free environment that encourages independent activity.  Keep care area uncluttered and well-lighted.  Determine need for increased observation or auditory alerts (e.g., bed or chair alarm).  Assess equipment and environmental modification needs (e.g., low bed, signage, nonskid footwear, grab bars).  Avoid use of restraints.  Recent Flowsheet Documentation  Taken 9/1/2021 1736 by Samantha Coyne RN  Safety Promotion/Fall Prevention:   safety round/check completed   room organization consistent   toileting scheduled   nonskid shoes/slippers when out of bed   fall prevention program maintained   assistive device/personal items within reach   clutter free environment maintained  Taken 9/1/2021 1621 by Samantha Coyne RN  Safety Promotion/Fall Prevention:   toileting scheduled   safety round/check completed   room organization consistent   nonskid shoes/slippers when out of bed   clutter free environment maintained   assistive device/personal items within reach   fall prevention program maintained  Taken 9/1/2021 1515 by Samantha Coyne RN  Safety Promotion/Fall Prevention:   toileting scheduled   safety round/check completed   room organization consistent   nonskid shoes/slippers when out of bed   fall  prevention program maintained   clutter free environment maintained   assistive device/personal items within reach  Taken 9/1/2021 1448 by Samantha Coyne RN  Safety Promotion/Fall Prevention:   toileting scheduled   safety round/check completed   room organization consistent   nonskid shoes/slippers when out of bed   fall prevention program maintained   clutter free environment maintained   assistive device/personal items within reach  Taken 9/1/2021 1318 by Samantha Coyne RN  Safety Promotion/Fall Prevention:   safety round/check completed   toileting scheduled   room organization consistent   nonskid shoes/slippers when out of bed   fall prevention program maintained   clutter free environment maintained   assistive device/personal items within reach  Taken 9/1/2021 1231 by Samantha Coyne RN  Safety Promotion/Fall Prevention:   safety round/check completed   room organization consistent   toileting scheduled   nonskid shoes/slippers when out of bed   fall prevention program maintained   clutter free environment maintained   assistive device/personal items within reach  Taken 9/1/2021 1145 by Samantha Coyne RN  Safety Promotion/Fall Prevention:   toileting scheduled   room organization consistent   safety round/check completed   nonskid shoes/slippers when out of bed   fall prevention program maintained   clutter free environment maintained   assistive device/personal items within reach  Taken 9/1/2021 1002 by Samantha Coyne RN  Safety Promotion/Fall Prevention:   toileting scheduled   safety round/check completed   room organization consistent   nonskid shoes/slippers when out of bed   fall prevention program maintained   clutter free environment maintained   assistive device/personal items within reach  Taken 9/1/2021 0847 by Samantha Coyne RN  Safety Promotion/Fall Prevention:   toileting scheduled   safety round/check completed   room organization consistent   nonskid shoes/slippers when out of bed   fall prevention  program maintained   clutter free environment maintained   assistive device/personal items within reach  Taken 9/1/2021 0756 by Samantha Coyne RN  Safety Promotion/Fall Prevention:   toileting scheduled   safety round/check completed   room organization consistent   nonskid shoes/slippers when out of bed   fall prevention program maintained   clutter free environment maintained   assistive device/personal items within reach  Taken 9/1/2021 0733 by Samantha Coyne RN  Safety Promotion/Fall Prevention:   safety round/check completed   room organization consistent   nonskid shoes/slippers when out of bed   fall prevention program maintained   clutter free environment maintained   assistive device/personal items within reach     Problem: Skin Injury Risk Increased  Goal: Skin Health and Integrity  9/1/2021 1806 by Samantha Coyne RN  Outcome: Ongoing, Progressing  9/1/2021 1522 by Samantha Coyne RN  Outcome: Ongoing, Progressing  Intervention: Optimize Skin Protection  Description: Reassess skin injury risk and inspect skin frequently (e.g., scheduled interval, with turning, with change in condition) to provide optimal prevention.  Maintain adequate tissue perfusion (e.g., encourage fluid balance, avoid crossing legs, constrictive clothing or devices) to promote tissue oxygenation.  Maintain head of bed at lowest degree of elevation tolerated, considering medical condition and other restrictions. Limit amount of time head of bed is elevated greater than 30 degrees to prevent friction/shear injury.  Avoid positioning onto an area that remains reddened.  Minimize incontinence and moisture (e.g., toileting schedule; moisture-wicking pad, diaper or incontinence collection device, skin moisture barrier).  Cleanse skin promptly and gently when soiled utilizing a pH-balanced cleanser.  Relieve and redistribute pressure (e.g., schedule position changes; utilize higher specification foam mattress, chair cushion, constant low-pressure  or alternating-pressure support surface; medical device repositioning; protective dressing applicatio  Support increased progressive functional activity (e.g., therapeutic exercise) to decrease risk associated with immobility. Balance rest with activity.  Recent Flowsheet Documentation  Taken 9/1/2021 7659 by Samantha Coyne, RN  Pressure Reduction Techniques:   heels elevated off bed   frequent weight shift encouraged   rest period provided between sit times  Head of Bed (HOB): HOB at 45 degrees  Pressure Reduction Devices: chair cushion utilized  Skin Protection:   tubing/devices free from skin contact   transparent dressing maintained     Problem: Noninvasive Ventilation Acute  Goal: Effective Unassisted Ventilation and Oxygenation  9/1/2021 1806 by Samantha Coyne, RN  Outcome: Ongoing, Progressing  9/1/2021 1522 by Samantha Coyne, RN  Outcome: Ongoing, Progressing        Progress: improving      65 Abdominal Pain, N/V/D

## 2021-09-01 NOTE — PROGRESS NOTES
[facility]   Hospitalist Progress Note  Date: 2021  Patient Name: Noemi Rasheed  : 1955  MRN: [id number]  Date of admission: 2021      Subjective   Subjective     Chief Complaint: Shortness of breath    Summary:   Noemi Rasheed is a 66 y.o. female with past medical history of COPD that was admitted on 2021 with difficulty breathing.  Patient was found to have COVID-19 pneumonia and Pseudomonas pneumonia.  Patient treated with cefepime for 14 days. She completed treatment with remdesivir. Patient continues to be on Decadron as well as air Vo.  Patient slow wean from airvo. Pulmonology and hematology/oncology consulted to assist in care. Patient with thrombocytopenia and was treated with IVIG (received 2 doses).  Patient completed 14 days of cefepime.  Patient's submental O2 requirement weaned as tolerated.  PT/OT worked with patient throughout admission.  It was felt patient's best interest would be to pursue LTAC placement, currently waiting on bed availability.    Interval Followup: No events overnight.  Patient doing well overall.  She states that she is feeling much better today.  She says she does not feel short of breath currently.  She denies any chest pain, chest pressure, palpitations.  She has not had fevers or chills.  She is currently awaiting rehab placement.    Consultants:   Pulmonology/Critical Care  Hematology/Oncology          Review of Systems   10 point review of systems performed and negative unless stated otherwise under subjective.    Objective   Objective     Vitals:   Temp:  [97.3 °F (36.3 °C)-99.4 °F (37.4 °C)] 99.4 °F (37.4 °C)  Heart Rate:  [80-93] 93  Resp:  [16-24] 16  BP: (125-148)/(49-60) 136/60  Flow (L/min):  [3-4.5] 3  Physical Exam   Constitutional: Alert, awake, no acute distress  HEENT:  PERRLA, EOMI; No Scleral icterus  Neck:  Supple; No Mass, No Lymphadenopathy  Cardiovascular:  No Rubs, No Edema, No JVD  Respiratory: On high flow nasal cannula,  no respiratory distress, saturating well, rhonchi bilaterally, improved aeration  Abdomen:  Normal BS all 4 Quadrants; No Guarding, No Tenderness  Musculoskeletal:  Pulses Positive all 4 Ext; No Cyanosis, No Edema  Neurological:  Alert+Ox3, Mental status WNL; No Dysarthria  Skin:  No Rash, No Cellulitis, No Erythema    Result Review    Result Review:  I have personally reviewed the results from the time of this admission to 9/1/2021 07:48 EDT and agree with these findings:  [x]  Laboratory:   [x]  Microbiology:   [x]  Radiology:   [x]  EKG/Telemetry:  []  Cardiology/Vascular:     []  Pathology:  [x]  Old records:  []  Other:     Assessment/Plan   Assessment / Plan     Assessment:  COVID-19 pneumonia  Acute hypoxic respiratory failure secondary to above  Hypokalemia  Hyponatremia  Pancytopenia  BARRIENTOS  Decompensated cirrhosis with ascites  Acute cardiogenic pulmonary edema  Small bilateral pleural effusions  Acute metabolic encephalopathy  History of Pseudomonas pneumonia    Plan:  -Pulmonology and hematology/oncology consulted and following, appreciate assistance and recommendations in the care of this patient.  -Continue LETICIA nebulizer twice daily   -Continue Lasix and Aldactone, strict I's and O's  -Cotinue Metolazone 10mg daily  -Continue nebulizers and bronchopulmonary hygiene protocol  -Continue supplemental O2 to maintain sats greater than 90%, wean as tolerated  -Platelet count improved, continue to monitor, recheck CBC in a.m.  -Continue current insulin regimen, monitor SSI requirement and make further adjustments accordingly  -Discussed with case management, patient can likely be discharged to SNF on 9/2/2021  -Continue to replenish potassium as needed  -Labs reviewed, records reviewed, image reviewed, medications reviewed, vital signs reviewed  -Further recommendations pending clinical course     DVT Prophylaxis: SCDs  GI Prophylaxis: Pepcid  Diet: Diabetic  Dispo: Awaiting bed availability at [facility]  Status: Full     Discussed with nursing.    DVT prophylaxis:  Mechanical DVT prophylaxis orders are present.    CODE STATUS:   Level Of Support Discussed With: Patient  Code Status: CPR  Medical Interventions (Level of Support Prior to Arrest): Full      Electronically signed by Ivan Jarrett DO, 09/01/21, 7:48 AM EDT.

## 2021-09-01 NOTE — PLAN OF CARE
Goal Outcome Evaluation:  Plan of Care Reviewed With: patient        Progress: improving  Outcome Summary: patient maintain sats above 90% on 3L nasal cannula at rest. Sats down to 85% with exertion to BSC. Patient recovers quickly. No reports of shortness of air. Patient reports headache, pain managed with tylenol. No complaints of abdominal cra,ping during shift.

## 2021-09-01 NOTE — SIGNIFICANT NOTE
09/01/21 1608   Wound 09/01/21 1608 Right medial gluteal Other (comment)   Placement Date/Time: 09/01/21 1608   Present on Hospital Admission: No  Side: Right  Orientation: medial  Location: gluteal  Primary Wound Type: (c) Other (comment)   Wound Image    Base dry;red   Periwound dry   Patient with small friction area to right buttock.  Recommend aquacel ag and optifoam qday with skin prevention measures.  Cb,rn,wcc

## 2021-09-01 NOTE — SIGNIFICANT NOTE
09/01/21 1136   Plan   Plan Comments SW discussed discharge plan with patient. [facility] pt but they have not had an available bed. Pt is currently on 3 liters of oxygen and still needing inpatient rehab. Pt is agreeable for local referrals to be sent out. SW sent referrals.

## 2021-09-01 NOTE — SIGNIFICANT NOTE
09/01/21 1352   Plan   Plan Comments Per Rosa with SNF they are able to offer her a rehab bed this Friday. SW spoke with patient and she accepted. YULIYA updated MD.

## 2021-09-01 NOTE — PROGRESS NOTES
[facility]     Progress Note    Patient Name: Noemi Rasheed  : 1955  MRN: [id number]  Primary Care Physician:  Navya Patel MD  Date of admission: 2021    Subjective   Subjective     Chief Complaint: Patient admitted with Covid pneumonia she has markedly improved only on 3 L/min of oxygen now platelet count has gone up to 50,000 without any bleeding without any hematological problem is known to have cirrhosis of liver stable and doing well    HPI:  Patient Reports admitted with Covid pneumonia and has markedly improved pancytopenia has improved    Review of Systems   All systems were reviewed and negative except for: Been reviewed    Objective   Objective     Vitals:   Temp:  [97.3 °F (36.3 °C)-99.4 °F (37.4 °C)] 99.4 °F (37.4 °C)  Heart Rate:  [80-93] 93  Resp:  [16-24] 16  BP: (125-148)/(49-60) 136/60  Flow (L/min):  [3-4.5] 3    Physical Exam    Constitutional: Awake, alert   Eyes: PERRLA, sclerae anicteric, no conjunctival injection   HENT: NCAT, mucous membranes moist   Neck: Supple, no thyromegaly, no lymphadenopathy, trachea midline   Respiratory: Clear to auscultation bilaterally, nonlabored respirations    Cardiovascular: RRR, no murmurs, rubs, or gallops, palpable pedal pulses bilaterally   Gastrointestinal: Positive bowel sounds, soft, nontender, nondistended   Musculoskeletal: No bilateral ankle edema, no clubbing or cyanosis to extremities   Psychiatric: Appropriate affect, cooperative   Neurologic: Oriented x 3, strength symmetric in all extremities, Cranial Nerves grossly intact to confrontation, speech clear   Skin: No rashes     Result Review    Result Review:  I have personally reviewed the results from the time of this admission to 2021 07:56 EDT and agree with these findings:  [x]  Laboratory  []  Microbiology  []  Radiology  []  EKG/Telemetry   []  Cardiology/Vascular   []  Pathology  []  Old records  []  Other reviewed:  Most notable findings include: Thrombo  cytopenia anemia    Assessment/Plan   Assessment / Plan     Brief Patient Summary:  Noemi Rasheed is a 66 y.o. female who mated with Covid pneumonia cirrhosis of liver    Active Hospital Problems:  Active Hospital Problems    Diagnosis    • **Acute respiratory distress syndrome (ARDS) due to COVID-19 virus (CMS/[facility])    • Heartburn    • COPD with acute exacerbation (CMS/[facility])    • Thrombocytopenia (CMS/[facility])    • Obesity, Class III, BMI 40-49.9 (morbid obesity) (CMS/[facility])    • Pseudomonas pneumonia (CMS/[facility])    • Pneumonia of both lungs due to infectious organism    • Hypertension    • Diabetes mellitus, type 2 (CMS/[facility])    • Hyperlipidemia        Plan:   Tinea as per primary care    DVT prophylaxis:  Mechanical DVT prophylaxis orders are present.    CODE STATUS:   Level Of Support Discussed With: Patient  Code Status: CPR  Medical Interventions (Level of Support Prior to Arrest): Full    Disposition:  I expect patient to be discharged when patient respiratory status is stable.    Electronically signed by Marty Garrett MD, 09/01/21, 7:56 AM EDT.

## 2021-09-01 NOTE — THERAPY TREATMENT NOTE
Acute Care - Physical Therapy Treatment Note  RAFA Saleem     Patient Name: Noemi Rashede  : 1955  MRN: [id number]  Today's Date: 2021      Visit Dx:     ICD-10-CM ICD-9-CM   1. Pneumonia of both lungs due to infectious organism, unspecified part of lung  J18.9 483.8   2. COVID-19 virus test result unknown  Z20.822 V01.79   3. Hypoxemia  R09.02 799.02   4. Decreased activities of daily living (ADL)  Z78.9 V49.89   5. Difficulty walking  R26.2 719.7     Patient Active Problem List   Diagnosis   • Asthma   • COPD (chronic obstructive pulmonary disease) (CMS/HCC)   • Diabetes mellitus (CMS/HCC)   • Hypertension   • Liver cirrhosis secondary to BARRIENTOS (nonalcoholic steatohepatitis) (CMS/HCC)   • Stroke (CMS/HCC)   • CVA (cerebral vascular accident) (CMS/HCC)   • Colon polyp   • Glucosuria   • Aftercare following joint replacement   • Hyperlipidemia   • Injury   • Limb swelling   • Lumbar stenosis   • Urge incontinence of urine   • Nocturia   • Primary localized osteoarthritis   • Vitamin D deficiency   • Diabetes mellitus, type 2 (CMS/HCC)   • Blood tests prior to treatment or procedure   • Body mass index (BMI)40.0-44.9, adult (CMS/[facility])   • Ex-smoker   • Heart failure (CMS/HCC)   • Hypoxemia   • Obstructive sleep apnea syndrome   • Pancytopenia (CMS/HCC)   • Pneumonia due to methicillin resistant Staphylococcus aureus (MRSA) (CMS/HCC)   • Splenomegaly   • DDD (degenerative disc disease), cervical   • Herniation of intervertebral disc   • History of bilateral knee replacement   • Pain in both knees   • Pneumonia of both lungs due to infectious organism   • Acute respiratory distress syndrome (ARDS) due to COVID-19 virus (CMS/HCC)   • COPD with acute exacerbation (CMS/HCC)   • Thrombocytopenia (CMS/HCC)   • Obesity, Class III, BMI 40-49.9 (morbid obesity) (CMS/HCC)   • Pseudomonas pneumonia (CMS/HCC)   • Heartburn     Past Medical History:   Diagnosis Date   • Aftercare following right knee joint replacement  surgery 2018   • Asthma    • COPD (chronic obstructive pulmonary disease) (CMS/HCC)    • Diabetes mellitus (CMS/HCC)    • Enlarged liver    • Frequency of urination 2016   • Glucosuria 2016   • Hyperlipidemia 2019   • Hypertension    • Limb swelling    • Liver cirrhosis secondary to BARRIENTOS (nonalcoholic steatohepatitis) (CMS/HCC)    • Local edema 2019   • Lower back pain    • Nocturia 2016   • Osteoarthritis of right knee 2018   • Right knee injury 10/15/2018   • Right knee pain 2018   • SOB (shortness of breath)    • Stomach disorder    • Stroke (CMS/HCC)    • Urge incontinence of urine 2016   • Vitamin D deficiency 2019     Past Surgical History:   Procedure Laterality Date   • APPENDECTOMY     • CARDIAC CATHETERIZATION  2015   •  SECTION     • CHOLECYSTECTOMY     • COLONOSCOPY      , ,   • ENDOSCOPY      , ,    • HYSTERECTOMY     • JOINT REPLACEMENT Bilateral     KNEE        PT Assessment (last 12 hours)      PT Evaluation and Treatment     Row Name 21 1153          Physical Therapy Time and Intention    Subjective Information  complains of;weakness;fatigue  -DK     Document Type  therapy note (daily note)  -DK     Mode of Treatment  individual therapy;physical therapy  -DK     Patient Effort  good  -DK     Symptoms Noted During/After Treatment  fatigue;shortness of breath  -DK     Row Name 21 1150          Cognition    Affect/Mental Status (Cognitive)  WNL  -DK     Orientation Status (Cognition)  oriented x 4  -DK     Follows Commands (Cognition)  WNL  -DK     Cognitive Function (Cognitive)  WNL  -DK     Safety Deficit (Cognitive)  safety precautions awareness  -DK     Personal Safety Interventions  nonskid shoes/slippers when out of bed;supervised activity  -DK     Row Name 21 4845          Pain Scale: Word Pre/Post-Treatment    Pain: Word Scale, Pretreatment  0 - no pain  -DK     Posttreatment Pain  Rating  0 - no pain  -DK     Row Name 09/01/21 1153          Transfers    Transfers  sit-stand transfer;stand-sit transfer  -DK     Bed-Chair [place] (Transfers)  standby assist;contact guard;1 person assist  -DK     Sit-Stand [place] (Transfers)  standby assist;contact guard;1 person assist  -DK     Stand-Sit [place] (Transfers)  standby assist;contact guard;1 person assist  -DK     [place] Level (Toilet Transfer)  standby assist;contact guard;1 person assist  -DK     Assistive Device (Toilet Transfer)  commode, bedside without drop arms  -     Row Name 09/01/21 1153          Toilet Transfer    Type (Toilet Transfer)  sit-stand  -     Row Name 09/01/21 1153          Gait/Stairs (Locomotion)    Gait/Stairs Locomotion  gait/ambulation independence;gait/ambulation assistive device;distance ambulated;gait pattern  -DK     [place] Level (Gait)  standby assist;contact guard;1 person assist  -DK     Assistive Device (Gait)  -- No assistive device  -DK     Distance in Feet (Gait)  6 3' x 2 reps  -DK     Pattern (Gait)  step-through  -DK     Deviations/Abnormal Patterns (Gait)  festinating/shuffling  -DK     Bilateral Gait Deviations  forward flexed posture  -     Row Name 09/01/21 1153          Safety Issues, Functional Mobility    Safety Issues Affecting Function (Mobility)  safety precaution awareness  -DK     Impairments Affecting Function (Mobility)  balance;strength;endurance/activity tolerance  -     Row Name 09/01/21 1153          Balance    Balance Assessment  standing dynamic balance  -     Dynamic Standing Balance  WFL  -     Balance Interventions  standing;dynamic;tandem gait  -     Row Name 09/01/21 1153          Motor Skills    Motor Skills  therapeutic exercise  -DK     Therapeutic Exercise  hip;knee;ankle  -     Row Name 09/01/21 1153          Hip (Therapeutic Exercise)    Hip (Therapeutic Exercise)  AROM (active range of motion)  -     Hip AROM (Therapeutic  Exercise)  bilateral;flexion;extension;aBduction;aDduction;sitting;10 repetitions 2 sets  -     Row Name 09/01/21 1153          Knee (Therapeutic Exercise)    Knee (Therapeutic Exercise)  AROM (active range of motion)  -     Knee AROM (Therapeutic Exercise)  bilateral;flexion;extension;LAQ (long arc quad);sitting;10 repetitions;2 sets  -     Row Name 09/01/21 1153          Ankle (Therapeutic Exercise)    Ankle (Therapeutic Exercise)  AROM (active range of motion)  -     Ankle AROM (Therapeutic Exercise)  bilateral;dorsiflexion;plantarflexion;sitting;10 repetitions;2 sets  -     Row Name 09/01/21 1153          Plan of Care Review    Plan of Care Reviewed With  patient  -     Progress  improving  -     Row Name 09/01/21 1153          Positioning and Restraints    Pre-Treatment Position  sitting in chair/recliner  -     Post Treatment Position  chair  -DK     In Chair  reclined;call light within reach;encouraged to call for assist;exit alarm on;heels elevated;legs elevated  -     Row Name 09/01/21 1153          Therapy Assessment/Plan (PT)    Rehab Potential (PT)  good, to achieve stated therapy goals  -     Criteria for Skilled Interventions Met (PT)  skilled treatment is necessary  -     Row Name 09/01/21 1153          Progress Summary (PT)    Progress Toward Functional Goals (PT)  progress toward functional goals is good  -       User Key  (r) = Recorded By, (t) = Taken By, (c) = Cosigned By    Initials Name Provider Type    Torie Bustos PTA Physical Therapy Assistant          PT Recommendation and Plan  Planned Therapy Interventions (PT): balance training, bed mobility training, gait training, strengthening, transfer training  Therapy Frequency (PT): daily  Progress Summary (PT)  Progress Toward Functional Goals (PT): progress toward functional goals is good  Plan of Care Reviewed With: patient  Progress: improving  Outcome Summary:  (Pt confused, agitated, kept telling PTA to  stop.)  Outcome Measures     Row Name 09/01/21 1152 08/31/21 1245 08/30/21 1100       How much help from another person do you currently need...    Turning from your back to your side while in flat bed without using bedrails?  4  -DK  4  -DK  3  -DANIELE    Moving from lying on back to sitting on the side of a flat bed without bedrails?  4  -DK  3  -DK  3  -DANIELE    Moving to and from a bed to a chair (including a wheelchair)?  3  -DK  3  -DK  3  -DANIELE    Standing up from a chair using your arms (e.g., wheelchair, bedside chair)?  4  -DK  3  -DK  3  -DANIELE    Climbing 3-5 steps with a railing?  3  -DK  2  -DK  2  -DANIELE    To walk in hospital room?  3  -DK  3  -DK  3  -DANIELE    AM-PAC 6 Clicks Score (PT)  21  -DK  18  -DK  17  -DANIELE       Functional Assessment    Outcome Measure Options  AM-PAC 6 Clicks Basic Mobility (PT)  -DK  AM-PAC 6 Clicks Basic Mobility (PT)  -DK  AM-PAC 6 Clicks Basic Mobility (PT)  -DANIELE      User Key  (r) = Recorded By, (t) = Taken By, (c) = Cosigned By    Initials Name Provider Type    Torie Bustos PTA Physical Therapy Assistant    Dillon Joe, PT Physical Therapist           Time Calculation:   PT Charges     Row Name 09/01/21 1158             Time Calculation    PT Received On  09/01/21  -DK      PT Goal Re-Cert Due Date  09/09/21  -DK         Timed Charges    39902 - PT Therapeutic Exercise Minutes  12  -DK      81267 - Gait Training Minutes   4  -DK      73535 - PT Therapeutic Activity Minutes  8  -DK         Total Minutes    Timed Charges Total Minutes  24  -DK       Total Minutes  24  -DK        User Key  (r) = Recorded By, (t) = Taken By, (c) = Cosigned By    Initials Name Provider Type    Torie Bustos PTA Physical Therapy Assistant        Therapy Charges for Today     Code Description Service Date Service Provider Modifiers Qty    51112511120 HC PT THER PROC EA 15 MIN 8/31/2021 Torie Deshpande PTA GP 1    60706369068 HC PT THER PROC EA 15 MIN 9/1/2021 Torie Deshpande PTA GP 1     85987180423  PT THERAPEUTIC ACT EA 15 MIN 9/1/2021 Torie Deshpande, PTA GP 1          PT G-Codes  Outcome Measure Options: AM-PAC 6 Clicks Basic Mobility (PT)  AM-PAC 6 Clicks Score (PT): 21  AM-PAC 6 Clicks Score (OT): 15    Torie Deshpande PTA  9/1/2021

## 2021-09-01 NOTE — SIGNIFICANT NOTE
09/01/21 1440   Plan   Plan Comments [facility] is able to accept pt tomorrow (Thursday) afternoon now. SW notified MD. YULIYA will continue to follow up.

## 2021-09-02 NOTE — THERAPY TREATMENT NOTE
Acute Care - Physical Therapy Treatment Note  RAFA Saleem     Patient Name: Noemi Rasheed  : 1955  MRN: [id number]  Today's Date: 2021      Visit Dx:     ICD-10-CM ICD-9-CM   1. Pneumonia of both lungs due to infectious organism, unspecified part of lung  J18.9 483.8   2. COVID-19 virus test result unknown  Z20.822 V01.79   3. Hypoxemia  R09.02 799.02   4. Decreased activities of daily living (ADL)  Z78.9 V49.89   5. Difficulty walking  R26.2 719.7     Patient Active Problem List   Diagnosis   • Asthma   • COPD (chronic obstructive pulmonary disease) (CMS/HCC)   • Diabetes mellitus (CMS/HCC)   • Hypertension   • Liver cirrhosis secondary to BARRIENTOS (nonalcoholic steatohepatitis) (CMS/HCC)   • Stroke (CMS/HCC)   • CVA (cerebral vascular accident) (CMS/HCC)   • Colon polyp   • Glucosuria   • Aftercare following joint replacement   • Hyperlipidemia   • Injury   • Limb swelling   • Lumbar stenosis   • Urge incontinence of urine   • Nocturia   • Primary localized osteoarthritis   • Vitamin D deficiency   • Diabetes mellitus, type 2 (CMS/HCC)   • Blood tests prior to treatment or procedure   • Body mass index (BMI)40.0-44.9, adult (CMS/[facility])   • Ex-smoker   • Heart failure (CMS/HCC)   • Hypoxemia   • Obstructive sleep apnea syndrome   • Pancytopenia (CMS/HCC)   • Pneumonia due to methicillin resistant Staphylococcus aureus (MRSA) (CMS/HCC)   • Splenomegaly   • DDD (degenerative disc disease), cervical   • Herniation of intervertebral disc   • History of bilateral knee replacement   • Pain in both knees   • Pneumonia of both lungs due to infectious organism   • Acute respiratory distress syndrome (ARDS) due to COVID-19 virus (CMS/HCC)   • COPD with acute exacerbation (CMS/HCC)   • Thrombocytopenia (CMS/HCC)   • Obesity, Class III, BMI 40-49.9 (morbid obesity) (CMS/HCC)   • Pseudomonas pneumonia (CMS/HCC)   • Heartburn     Past Medical History:   Diagnosis Date   • Aftercare following right knee joint replacement  surgery 2018   • Asthma    • COPD (chronic obstructive pulmonary disease) (CMS/HCC)    • Diabetes mellitus (CMS/HCC)    • Enlarged liver    • Frequency of urination 2016   • Glucosuria 2016   • Hyperlipidemia 2019   • Hypertension    • Limb swelling    • Liver cirrhosis secondary to BARRIENTOS (nonalcoholic steatohepatitis) (CMS/HCC)    • Local edema 2019   • Lower back pain    • Nocturia 2016   • Osteoarthritis of right knee 2018   • Right knee injury 10/15/2018   • Right knee pain 2018   • SOB (shortness of breath)    • Stomach disorder    • Stroke (CMS/HCC)    • Urge incontinence of urine 2016   • Vitamin D deficiency 2019     Past Surgical History:   Procedure Laterality Date   • APPENDECTOMY     • CARDIAC CATHETERIZATION  2015   •  SECTION     • CHOLECYSTECTOMY     • COLONOSCOPY      , ,   • ENDOSCOPY      , ,    • HYSTERECTOMY     • JOINT REPLACEMENT Bilateral     KNEE        PT Assessment (last 12 hours)      PT Evaluation and Treatment     Row Name 21 1458 21 0724       Physical Therapy Time and Intention    Subjective Information  complains of;weakness;fatigue;pain;dyspnea  -DK  --    Document Type  therapy note (daily note)  -DK  --    Mode of Treatment  individual therapy;physical therapy  -DK  --    Session Not Performed  --  patient unavailable for treatment  -DK    Comment, Session Not Performed  --  Pt to be DC'd to [facility] today.   -DK    Patient Effort  good  -DK  --    Symptoms Noted During/After Treatment  fatigue;shortness of breath  -DK  --    Comment  -- Pt reports back pain, difficulty urinating today.  -DK  --    Row Name 21 1458          Cognition    Affect/Mental Status (Cognitive)  WNL  -DK     Orientation Status (Cognition)  oriented x 4  -DK     Follows Commands (Cognition)  WNL  -DK     Cognitive Function (Cognitive)  WNL  -DK     Row Name 21 1458          Pain Scale: Word  Pre/Post-Treatment    Pain: Word Scale, Pretreatment  0 - no pain  -DK     Posttreatment Pain Rating  0 - no pain  -DK     Row Name 09/02/21 1458          Transfers    Transfers  sit-stand transfer;stand-sit transfer  -DK     Bed-Chair [place] (Transfers)  standby assist;contact guard;1 person assist  -DK     Sit-Stand [place] (Transfers)  standby assist;contact guard;1 person assist  -DK     Stand-Sit [place] (Transfers)  standby assist;contact guard;1 person assist  -DK     [place] Level (Toilet Transfer)  standby assist;contact guard;1 person assist  -DK     Assistive Device (Toilet Transfer)  commode, bedside without drop arms  -DK     Row Name 09/02/21 1458          Toilet Transfer    Type (Toilet Transfer)  sit-stand  -     Row Name 09/02/21 1458          Gait/Stairs (Locomotion)    Gait/Stairs Locomotion  gait/ambulation independence;gait/ambulation assistive device;distance ambulated;gait pattern  -DK     [place] Level (Gait)  standby assist;contact guard;1 person assist  -DK     Assistive Device (Gait)  -- No assistive device  -DK     Distance in Feet (Gait)  6 3' x 2 reps  -DK     Pattern (Gait)  step-through  -DK     Deviations/Abnormal Patterns (Gait)  festinating/shuffling  -DK     Bilateral Gait Deviations  forward flexed posture  -DK     Comment (Gait/Stairs)  SATs to 85% with exertion.   -     Row Name 09/02/21 1458          Safety Issues, Functional Mobility    Safety Issues Affecting Function (Mobility)  judgment;safety precaution awareness;awareness of need for assistance  -DK     Impairments Affecting Function (Mobility)  balance;strength;endurance/activity tolerance  -     Row Name 09/02/21 1458          Balance    Balance Assessment  standing dynamic balance  -DK     Dynamic Standing Balance  WFL  -DK     Balance Interventions  standing;dynamic;tandem gait  -     Row Name 09/02/21 1458          Motor Skills    Motor Skills  therapeutic exercise  -      Coordination  WFL  -DK     Therapeutic Exercise  hip;knee;ankle  -DK     Row Name 09/02/21 1458          Shoulder (Therapeutic Exercise)    Shoulder (Therapeutic Exercise)  --  -DK     Row Name 09/02/21 1458          Hip (Therapeutic Exercise)    Hip (Therapeutic Exercise)  AROM (active range of motion)  -DK     Hip AROM (Therapeutic Exercise)  bilateral;flexion;extension;aBduction;aDduction;sitting;10 repetitions 2 sets  -DK     Row Name 09/02/21 1458          Knee (Therapeutic Exercise)    Knee (Therapeutic Exercise)  AROM (active range of motion)  -DK     Knee AROM (Therapeutic Exercise)  bilateral;flexion;extension;LAQ (long arc quad);sitting;10 repetitions;2 sets  -DK     Row Name 09/02/21 1458          Ankle (Therapeutic Exercise)    Ankle (Therapeutic Exercise)  AROM (active range of motion)  -DK     Ankle AROM (Therapeutic Exercise)  bilateral;dorsiflexion;plantarflexion;sitting;10 repetitions;2 sets  -DK     Row Name             Wound 09/01/21 1608 Right medial gluteal Other (comment)    Wound - Properties Group Placement Date: 09/01/21  -CB Placement Time: 1608  -CB Present on Hospital Admission: N  -CB Side: Right  -CB Orientation: medial  -CB Location: gluteal  -CB Primary Wound Type: Other  -CB, shearing     Retired Wound - Properties Group Date first assessed: 09/01/21  -CB Time first assessed: 1608  -CB Present on Hospital Admission: N  -CB Side: Right  -CB Location: gluteal  -CB Primary Wound Type: Other  -CB, shearing     Row Name 09/02/21 1458          Plan of Care Review    Plan of Care Reviewed With  patient  -DK     Progress  no change  -     Row Name 09/02/21 1458          Positioning and Restraints    Pre-Treatment Position  sitting in chair/recliner  -DK     Post Treatment Position  chair  -DK     In Chair  reclined;call light within reach;encouraged to call for assist;legs elevated;heels elevated  -     Row Name 09/02/21 1458          Therapy Assessment/Plan (PT)    Rehab Potential (PT)   good, to achieve stated therapy goals  -DK     Criteria for Skilled Interventions Met (PT)  skilled treatment is necessary  -DK     Problem List (PT)  problems related to;balance;strength;mobility;hearing  -DK     Activity Limitations Related to Problem List (PT)  unable to ambulate safely  -DK     Row Name 09/02/21 1458          Progress Summary (PT)    Progress Toward Functional Goals (PT)  progress toward functional goals is good  -DK       User Key  (r) = Recorded By, (t) = Taken By, (c) = Cosigned By    Initials Name Provider Type    Cristin Summers, RN Registered Nurse    Torie Bustos, INOC[employer]IA Physical Therapy Assistant          PT Recommendation and Plan  Planned Therapy Interventions (PT): balance training, bed mobility training, gait training, strengthening, transfer training  Therapy Frequency (PT): daily  Progress Summary (PT)  Progress Toward Functional Goals (PT): progress toward functional goals is good  Plan of Care Reviewed With: patient  Progress: no change  Outcome Summary:  (Pt confused, agitated, kept telling PTA to stop.)  Outcome Measures     Row Name 09/02/21 1458 09/01/21 1152 08/31/21 1245       How much help from another person do you currently need...    Turning from your back to your side while in flat bed without using bedrails?  4  -DK  4  -DK  4  -DK    Moving from lying on back to sitting on the side of a flat bed without bedrails?  4  -DK  4  -DK  3  -DK    Moving to and from a bed to a chair (including a wheelchair)?  3  -DK  3  -DK  3  -DK    Standing up from a chair using your arms (e.g., wheelchair, bedside chair)?  3  -DK  4  -DK  3  -DK    Climbing 3-5 steps with a railing?  3  -DK  3  -DK  2  -DK    To walk in hospital room?  3  -DK  3  -DK  3  -DK    AM-PAC 6 Clicks Score (PT)  20  -DK  21  -DK  18  -DK       Functional Assessment    Outcome Measure Options  AM-PAC 6 Clicks Basic Mobility (PT)  -DK  AM-PAC 6 Clicks Basic Mobility (PT)  -DK  AM-PAC 6 Clicks Basic  Mobility (PT)  -DK      User Key  (r) = Recorded By, (t) = Taken By, (c) = Cosigned By    Initials Name Provider Type    Torie Bustos PTA Physical Therapy Assistant           Time Calculation:   PT Charges     Row Name 09/02/21 1503 09/02/21 0725          Time Calculation    PT Received On  09/02/21  -DK  09/02/21  -DK     PT Goal Re-Cert Due Date  09/09/21  -DK  09/09/21  -DK        Timed Charges    88175 - PT Therapeutic Exercise Minutes  12  -DK  --     37163 - Gait Training Minutes   4  -DK  --     76832 - PT Therapeutic Activity Minutes  12  -DK  --        Total Minutes    Timed Charges Total Minutes  28  -DK  --      Total Minutes  28 -DK  --       User Key  (r) = Recorded By, (t) = Taken By, (c) = Cosigned By    Initials Name Provider Type    Torie Bustos PTA Physical Therapy Assistant        Therapy Charges for Today     Code Description Service Date Service Provider Modifiers Qty    51118640066 HC PT THER PROC EA 15 MIN 9/1/2021 Torie Deshpande, PTA GP 1    77146343477 HC PT THERAPEUTIC ACT EA 15 MIN 9/1/2021 Torie Deshpande, PTA GP 1    98299131843 HC PT THER PROC EA 15 MIN 9/2/2021 Torie Deshpnade, PTA GP 1    90682041633 HC PT THERAPEUTIC ACT EA 15 MIN 9/2/2021 Torie Deshpande, PTA GP 1          PT G-Codes  Outcome Measure Options: AM-PAC 6 Clicks Basic Mobility (PT)  AM-PAC 6 Clicks Score (PT): 20  AM-PAC 6 Clicks Score (OT): 15    Torie Deshpande PTA  9/2/2021

## 2021-09-02 NOTE — PROGRESS NOTES
[facility]     Progress Note    Patient Name: Noemi Rasheed  : 1955  MRN: [id number]  Primary Care Physician:  Navya Patel MD  Date of admission: 2021    Subjective   Subjective     Chief Complaint: Patient admitted with Covid pneumonia much more comfortable now and is being transferred to the neph unit today    HPI:  Patient Reports hemoglobin globin is stable her platelet count 60,000 she is back to her baseline I will see her as outpatient when she is discharged from the neph unit    Review of Systems   All systems were reviewed and negative except for: Reviewed    Objective   Objective     Vitals:   Temp:  [97.3 °F (36.3 °C)-98.4 °F (36.9 °C)] 98.4 °F (36.9 °C)  Heart Rate:  [79-94] 83  Resp:  [18-20] 20  BP: (118-127)/(45-61) 119/53  Flow (L/min):  [2-3] 2    Physical Exam    Constitutional: Awake, alert   Eyes: PERRLA, sclerae anicteric, no conjunctival injection   HENT: NCAT, mucous membranes moist   Neck: Supple, no thyromegaly, no lymphadenopathy, trachea midline   Respiratory: Clear to auscultation bilaterally, nonlabored respirations    Cardiovascular: RRR, no murmurs, rubs, or gallops, palpable pedal pulses bilaterally   Gastrointestinal: Positive bowel sounds, soft, nontender, nondistended   Musculoskeletal: No bilateral ankle edema, no clubbing or cyanosis to extremities   Psychiatric: Appropriate affect, cooperative   Neurologic: Oriented x 3, strength symmetric in all extremities, Cranial Nerves grossly intact to confrontation, speech clear   Skin: No rashes     Result Review    Result Review:  I have personally reviewed the results from the time of this admission to 2021 08:51 EDT and agree with these findings:  [x]  Laboratory  []  Microbiology  []  Radiology  []  EKG/Telemetry   []  Cardiology/Vascular   []  Pathology  []  Old records  []  Other: Stable thrombocytopenia  Most notable findings include: Thrombocytopenia    Assessment/Plan   Assessment / Plan     Brief  Patient Summary:  Noemi Rasheed is a 66 y.o. female who stable and doing well is being transferred to the  for unit    Active Hospital Problems:  Active Hospital Problems    Diagnosis    • **Acute respiratory distress syndrome (ARDS) due to COVID-19 virus (CMS/[facility])    • Heartburn    • COPD with acute exacerbation (CMS/[facility])    • Thrombocytopenia (CMS/[facility])    • Obesity, Class III, BMI 40-49.9 (morbid obesity) (CMS/[facility])    • Pseudomonas pneumonia (CMS/[facility])    • Pneumonia of both lungs due to infectious organism    • Hypertension    • Diabetes mellitus, type 2 (CMS/[facility])    • Hyperlipidemia        Plan:   Continue the supportive care physical therapy will see her in a follow-up visit after discharge in the office    DVT prophylaxis:  Mechanical DVT prophylaxis orders are present.    CODE STATUS:   Level Of Support Discussed With: Patient  Code Status: CPR  Medical Interventions (Level of Support Prior to Arrest): Full    Disposition:  I expect patient to be discharged patient to be transferred to the SNF today.    Electronically signed by Marty Garrett MD, 09/02/21, 8:51 AM EDT.

## 2021-09-02 NOTE — PLAN OF CARE
Goal Outcome Evaluation:  Plan of Care Reviewed With: patient        Progress: improving  Outcome Summary: pt on 2L NC. Pt plan [facility].  Felicia Dumas RN

## 2021-09-02 NOTE — PROGRESS NOTES
[facility]   Hospitalist Progress Note  Date: 2021  Patient Name: Noemi Rasheed  : 1955  MRN: [id number]  Date of admission: 2021      Subjective   Subjective     Chief Complaint: Shortness of breath    Summary:   Noemi Rasheed is a 66 y.o. female with past medical history of COPD that was admitted on 2021 with difficulty breathing.  Patient was found to have COVID-19 pneumonia and Pseudomonas pneumonia.  Patient treated with cefepime for 14 days. She completed treatment with remdesivir. Patient continues to be on Decadron as well as air Vo.  Patient slow wean from airvo. Pulmonology and hematology/oncology consulted to assist in care. Patient with thrombocytopenia and was treated with IVIG (received 2 doses).  Patient completed 14 days of cefepime.  Patient's submental O2 requirement weaned as tolerated.  PT/OT worked with patient throughout admission.  It was felt patient's best interest would be to pursue LTAC placement, currently waiting on bed availability.    Interval Followup: No events overnight.  Patient just received #room.  She states she is feeling much better.  She has been weaned down off her nasal cannula.  She says she is not currently having chest pain, chest pressure, palpitations.      Consultants:   Pulmonology/Critical Care  Hematology/Oncology          Review of Systems  All systems reviewed and are negative except as above.    Objective   Objective     Vitals:   Temp:  [97.3 °F (36.3 °C)-98.4 °F (36.9 °C)] 98.4 °F (36.9 °C)  Heart Rate:  [81-94] 81  Resp:  [18-20] 20  BP: (118-127)/(45-61) 119/53  Flow (L/min):  [2-3] 2  Physical Exam   Constitutional: Alert, awake, no acute distress  HEENT:  PERRLA, EOMI; No Scleral icterus  Neck:  Supple; No Mass, No Lymphadenopathy  Cardiovascular:  No Rubs, No Edema, No JVD  Respiratory: On nasal cannula, no respiratory distress, saturating well, rhonchi bilaterally  Abdomen:  Normal BS all 4 Quadrants; No Guarding, No  Tenderness  Musculoskeletal:  Pulses Positive all 4 Ext; No Cyanosis, No Edema  Neurological:  Alert+Ox3, Mental status WNL; No Dysarthria  Skin:  No Rash, No Cellulitis, No Erythema    Result Review    Result Review:  I have personally reviewed the results from the time of this admission to 9/2/2021 07:35 EDT and agree with these findings:  [x]  Laboratory:   [x]  Microbiology:   [x]  Radiology:   [x]  EKG/Telemetry:  []  Cardiology/Vascular:     []  Pathology:  [x]  Old records:  []  Other:     Assessment/Plan   Assessment / Plan     Assessment:  COVID-19 pneumonia  Acute hypoxic respiratory failure secondary to above  Hypokalemia  Hyponatremia  Pancytopenia  BARRIENTOS  Decompensated cirrhosis with ascites  Acute cardiogenic pulmonary edema  Small bilateral pleural effusions  Acute metabolic encephalopathy  History of Pseudomonas pneumonia    Plan:  -Pulmonology and hematology/oncology consulted and following, appreciate assistance and recommendations in the care of this patient.  -Continue LETICIA nebulizer twice daily   -Continue Lasix and Aldactone, strict I's and O's  -Cotinue Metolazone 10mg daily  -Continue nebulizers and bronchopulmonary hygiene protocol  -Continue supplemental O2 to maintain sats greater than 90%, wean as tolerated  -Platelet count improved, continue to monitor, recheck CBC in a.m.  -Continue current insulin regimen, monitor SSI requirement and make further adjustments accordingly  -Case management assistance appreciated with this patient  -Patient potentially to discharge to [facility] on 9/3  -Labs reviewed, records reviewed, image reviewed, medications reviewed, vital signs reviewed  -Further recommendations pending clinical course     DVT Prophylaxis: SCDs  GI Prophylaxis: Pepcid  Diet: Diabetic  Dispo: Hopefully plan to discharge to rehab on 9/3 to [facility]  Code Status: Full     Discussed with nursing.    DVT prophylaxis:  Mechanical DVT prophylaxis orders are present.    CODE STATUS:    Level Of Support Discussed With: Patient  Code Status: CPR  Medical Interventions (Level of Support Prior to Arrest): Full      Electronically signed by Ivan Jarrett DO, 09/02/21, 7:35 AM EDT.

## 2021-09-02 NOTE — SIGNIFICANT NOTE
09/02/21 1015   Plan   Plan Comments [facility] is no longer able to offer a rehab bed. [facility] is able to offer a rehab bed tomorrow as long as pt is discharged on inhalers instead of nebulizer. MD is aware. Pt will discharge to [facility] rehab hospital tomorrow. SW will update pt.

## 2021-09-02 NOTE — PLAN OF CARE
Goal Outcome Evaluation:  Plan of Care Reviewed With: patient        Progress: improving  Outcome Summary: patient on 2L nasal cannula sats above 90. No desaturation noted with exertion to BSC. all other VS stable. able to ambulate short distance from bed to BSC with stand by assistance. Patient looking forward to dicharge to rehab facility.

## 2021-09-03 NOTE — SIGNIFICANT NOTE
09/03/21 0831   Plan   Plan Comments Patient has discharge orders in to discharge to [facility] rehab [facility].   Final Discharge Disposition Code 62 - inpatient rehab facility   Final Note Patient to discharge to [facility] rehab [facility] today. YULIYA notified Carlita with [facility].

## 2021-09-03 NOTE — PLAN OF CARE
Goal Outcome Evaluation:  Plan of Care Reviewed With: patient        Progress: improving  Outcome Summary: pt D/C to [facility]  Felicia Dumas RN

## 2021-09-03 NOTE — PROGRESS NOTES
[facility]     Progress Note    Patient Name: Noemi Rasheed  : 1955  MRN: [id number]  Primary Care Physician:  Navya Patel MD  Date of admission: 2021    Subjective   Subjective     Chief Complaint: Patient further improved platelet count 90,000 she is going to be transferred to [facility] today I will see her in about 3 weeks and a follow-up visit in the office    HPI:  Patient Reports table and doing well hematological status has markedly improved    Review of Systems   All systems were reviewed and negative except for: Reviewed    Objective   Objective     Vitals:   Temp:  [98.2 °F (36.8 °C)-98.9 °F (37.2 °C)] 98.3 °F (36.8 °C)  Heart Rate:  [84-96] 84  Resp:  [20] 20  BP: (117-133)/(42-58) 117/50  Flow (L/min):  [2] 2    Physical Exam    Constitutional: Awake, alert   Eyes: PERRLA, sclerae anicteric, no conjunctival injection   HENT: NCAT, mucous membranes moist   Neck: Supple, no thyromegaly, no lymphadenopathy, trachea midline   Respiratory: Clear to auscultation bilaterally, nonlabored respirations    Cardiovascular: RRR, no murmurs, rubs, or gallops, palpable pedal pulses bilaterally   Gastrointestinal: Positive bowel sounds, soft, nontender, nondistended   Musculoskeletal: No bilateral ankle edema, no clubbing or cyanosis to extremities   Psychiatric: Appropriate affect, cooperative   Neurologic: Oriented x 3, strength symmetric in all extremities, Cranial Nerves grossly intact to confrontation, speech clear   Skin: No rashes     Result Review    Result Review:  I have personally reviewed the results from the time of this admission to 9/3/2021 09:40 EDT and agree with these findings:  [x]  Laboratory  []  Microbiology  []  Radiology  []  EKG/Telemetry   []  Cardiology/Vascular   []  Pathology  []  Old records  []  Other: Reviewed  Most notable findings include: Thrombocytopenia markedly improved    Assessment/Plan   Assessment / Plan     Brief Patient Summary:  Noemi Rasheed is a  66 y.o. female who patient stable going to be transferred to the rehab    Active Hospital Problems:  Active Hospital Problems    Diagnosis    • **Acute respiratory distress syndrome (ARDS) due to COVID-19 virus (CMS/[facility])    • Heartburn    • COPD with acute exacerbation (CMS/[facility])    • Thrombocytopenia (CMS/[facility])    • Obesity, Class III, BMI 40-49.9 (morbid obesity) (CMS/[facility])    • Pseudomonas pneumonia (CMS/[facility])    • Pneumonia of both lungs due to infectious organism    • Hypertension    • Diabetes mellitus, type 2 (CMS/[facility])    • Hyperlipidemia        Plan:   Patient to be transferred to the rehab will see in 3 weeks in the office    DVT prophylaxis:  Mechanical DVT prophylaxis orders are present.    CODE STATUS:   Level Of Support Discussed With: Patient  Code Status: CPR  Medical Interventions (Level of Support Prior to Arrest): Full    Disposition:  I expect patient to be discharged and being transferred to rehab.    Electronically signed by Marty Garrett MD, 09/03/21, 9:40 AM EDT.

## 2021-09-03 NOTE — DISCHARGE SUMMARY
[facility]         HOSPITALIST  DISCHARGE SUMMARY    Patient Name: Noemi Rasheed  : 1955  MRN: [id number]    Date of Admission: 2021  Date of Discharge: 9/3/2021  Primary Care Physician: Navya Patel MD    Consults     Date and Time Order Name Status Description    2021  7:55 AM Inpatient Pulmonology Consult Completed     2021  7:24 AM Hematology & Oncology Inpatient Consult      2021 11:56 PM Inpatient Hospitalist Consult Completed           Active and Resolved Hospital Problems:  Active Hospital Problems    Diagnosis POA   • **Acute respiratory distress syndrome (ARDS) due to COVID-19 virus (CMS/Formerly [facility]) [U07.1, J80] Unknown   • Heartburn [R12] Clinically Undetermined   • COPD with acute exacerbation (CMS/Formerly [facility]) [J44.1] Unknown   • Thrombocytopenia (CMS/Formerly [facility]) [D69.6] Unknown   • Obesity, Class III, BMI 40-49.9 (morbid obesity) (CMS/Formerly [facility]) [E66.01] Yes   • Pseudomonas pneumonia (CMS/Formerly [facility]) [J15.1] Unknown   • Pneumonia of both lungs due to infectious organism [J18.9] Yes   • Hypertension [I10] Yes   • Diabetes mellitus, type 2 (CMS/Formerly [facility]) [E11.9] Yes   • Hyperlipidemia [E78.5] Yes      Resolved Hospital Problems    Diagnosis POA   • Hypoglycemia [E16.2] No   • BRBPR (bright red blood per rectum) [K62.5] No   • Non-cardiac chest pain [R07.89] No       Hospital Course     Hospital Course:  Noemi Rasheed is a 66 y.o. female who presented with shortness of breath.  Patient subsequently found to be Covid positive on 2021.  Additionally had Pseudomonas pneumonia, treat with cefepime for total 14 days.  Patient received Decadron and remdesivir for COVID-19 pneumonia.  Patient initially required air Vo and subsequently was weaned off to nasal cannula.  Patient was saturating well on nasal cannula prior to discharge.  She will continue supplemental oxygen.  Patient course complicated by thrombocytopenia and she was ultimately evaluated by hematology oncology and received IVIG  for a total of 2 days.  Patient platelet count stable prior to discharge.  Patient will follow up with kathleen to continue her therapy treatment plan.  She will per PCP 1 week.  At the time of discharge patient had no chest pain, chest pressure, palpitations, fever, or chills.  She was discharged in stable condition.      DISCHARGE Follow Up Recommendations for labs and diagnostics: ECP 1 week.  [facility] as scheduled.      Day of Discharge     Vital Signs:  Temp:  [98.3 °F (36.8 °C)-98.9 °F (37.2 °C)] 98.3 °F (36.8 °C)  Heart Rate:  [84-96] 86  Resp:  [18-20] 18  BP: (117-133)/(42-58) 117/50  Flow (L/min):  [2-3] 3  Physical Exam:     Constitutional: Alert, awake, no acute distress  HEENT:  PERRLA, EOMI; No Scleral icterus  Neck:  Supple; No Mass, No Lymphadenopathy  Cardiovascular:  No Rubs, No Edema, No JVD  Respiratory: No respiratory distress, unlabored breathing, saturating well on irrigated with  Abdomen:  Normal BS all 4 Quadrants; No Guarding, No Tenderness  Musculoskeletal:  Pulses Positive all 4 Ext; No Cyanosis, No Edema  Neurological:  Alert+Ox3, Mental status WNL; No Dysarthria  Skin:  No Rash, No Cellulitis, No Erythema       Discharge Details        Discharge Medications      New Medications      Instructions Start Date   budesonide-formoterol 80-4.5 MCG/ACT inhaler  Commonly known as: Symbicort   2 puffs, Inhalation, 2 Times Daily - RT      famotidine 20 MG tablet  Commonly known as: PEPCID   20 mg, Oral, 2 Times Daily Before Meals      guaiFENesin 600 MG 12 hr tablet  Commonly known as: MUCINEX   600 mg, Oral, Every 12 Hours Scheduled      Hydrocortisone (Perianal) 2.5 % rectal cream  Commonly known as: ANUSOL-HC   Rectal, 2 Times Daily      insulin detemir 100 UNIT/ML injection  Commonly known as: LEVEMIR   10 Units, Subcutaneous, Daily      insulin lispro 100 UNIT/ML injection  Commonly known as: humaLOG   0-14 Units, Subcutaneous, 3 Times Daily With Meals      lactulose 10 GM/15ML  solution  Commonly known as: CHRONULAC   30 g, Oral, 2 Times Daily, Titrate to 2-3 bowel meds daily.      simethicone 80 MG chewable tablet  Commonly known as: MYLICON   80 mg, Oral, 4 Times Daily PRN         Changes to Medications      Instructions Start Date   albuterol sulfate  (90 Base) MCG/ACT inhaler  Commonly known as: PROVENTIL HFA;VENTOLIN HFA;PROAIR HFA  What changed: Another medication with the same name was removed. Continue taking this medication, and follow the directions you see here.   2 puffs, Inhalation, Every 4 Hours PRN         Continue These Medications      Instructions Start Date   aspirin 81 MG chewable tablet   81 mg, Oral, Daily      atorvastatin 20 MG tablet  Commonly known as: LIPITOR   40 mg, Oral, Daily      fluticasone 50 MCG/ACT nasal spray  Commonly known as: FLONASE   2 sprays, Nasal, Daily      freestyle lancets   1 each, Other, 3 Times Daily PRN, Use as instructed       furosemide 40 MG tablet  Commonly known as: LASIX   40 mg, Oral, 2 Times Daily      gabapentin 300 MG capsule  Commonly known as: NEURONTIN   300 mg, Oral, 2 times daily      ipratropium-albuterol 0.5-2.5 mg/3 ml nebulizer  Commonly known as: DUO-NEB   3 mL, Nebulization, Every 4 Hours PRN      metFORMIN 500 MG tablet  Commonly known as: GLUCOPHAGE   500 mg, Oral, 2 Times Daily With Meals      potassium chloride 20 MEQ CR tablet  Commonly known as: K-DUR,KLOR-CON   20 mEq, Oral, Daily      spironolactone 100 MG tablet  Commonly known as: ALDACTONE   100 mg, Oral, Daily      traMADol 50 MG tablet  Commonly known as: ULTRAM   50 mg, Oral, 2 Times Daily PRN      TYLENOL 500 MG tablet  Generic drug: acetaminophen   1,000 mg, Oral, Every 6 Hours PRN      Vitamin D (Ergocalciferol) 50 MCG (2000 UT) capsule   2,000 mcg, Oral, Daily         Stop These Medications    BASAGLAR KWIKPEN 100 UNIT/ML injection pen        ASK your doctor about these medications      Instructions Start Date   lisinopril 10 MG  tablet  Commonly known as: PRINIVIL,ZESTRIL   10 mg, Oral, Daily             No Known Allergies    Discharge Disposition:  Rehab Facility or Unit (DC - External)    Diet:  Hospital:  Diet Order   Procedures   • Diet Regular; Consistent Carbohydrate       Discharge Activity:       CODE STATUS:  Code Status and Medical Interventions:   Ordered at: 08/01/21 1018     Level Of Support Discussed With:    Patient     Code Status:    CPR     Medical Interventions (Level of Support Prior to Arrest):    Full         Future Appointments   Date Time Provider Department Center   9/15/2021  3:45 PM Shirley Payan [place]N [facility] [place] [facility]   12/16/2021  2:00 PM Junaid Carr [place] [facility] [place] [facility]           Pertinent  and/or Most Recent Results     PROCEDURES:   EEG    Result Date: 8/24/2021  Narrative: Clinical history: 66-year-old woman evaluated for confusion EEG description: This is a portable 19 channel EEG recording using the 10/20 international classification of electrode placement. EEG report: Background activity is obscured by significant amount of muscle and movement artifacts.  There is the presence of 2-4 hz delta activity noted diffusely amplitude of 50 µV intermixed with faster theta activity.  This theta activity is in the frequency of 5 to 7 Hz amplitude of 30 µV.  Focal slowing cannot be determined secondary to the muscle artifacts.  There were no definite epileptiform discharges that was recorded.  Photic stimulation did not activate any abnormalities. Impression: This EEG is suboptimal secondary to significant amount of muscle artifacts.  The underlying background activity consist of slowing of the background activity suggestive of a moderate encephalopathy of nonspecific etiology.  There were no definite epileptiform discharges were recorded.    CT Head Without Contrast    Result Date: 8/22/2021  Narrative: PROCEDURE: CT HEAD WO CONTRAST  COMPARISON:  [facility], CT, HEAD W/O  CONTRAST, 7/21/2020, 16:55.  [facility], CT, CTA HEAD AND NECK WITH CONTRAST  STROKE PROTOCOL, 7/21/2020, 17:02. INDICATIONS: Lethargic low platelet count  PROTOCOL:   Standard imaging protocol performed    RADIATION:   DLP: 1141 mGy*cm   MA and/or KV was adjusted to minimize radiation dose.     TECHNIQUE: After obtaining the patient's consent, CT images were obtained without non-ionic intravenous contrast material.  FINDINGS:  The sinuses clear with the exception of minimal sinus disease in the sphenoid sinuses.  Motion artifact.  Previous cataract extractions and no ventricular hemorrhage.  No mass effect or midline shift.  No definite acute intracranial or extra-axial hemorrhage.  The mild small vessel ischemic change.  Mild parenchymal volume loss.  CONCLUSION:  1. No acute process. \ 2. arenchymal volume loss and small vessel ischemic changes suspected.  Motion limited. 3. If concern for acute stroke consider MRI. 4. Minimal sphenoid sinus disease.     AKBAR HOWELL MD       Electronically Signed and Approved By: AKBAR HOWELL MD on 8/22/2021 at 12:48             CT Chest Without Contrast Diagnostic    Result Date: 8/11/2021  Narrative: PROCEDURE: CT CHEST WO CONTRAST DIAGNOSTIC  COMPARISONS: [facility], CR, XR SPINE LUMBAR 2 OR 3 VW, 6/23/2021, 11:37.   [facility], CR, XR SPINE CERVICAL COMPLETE 4 OR 5 VW, 6/23/2021, 11:28.  [facility], CT, CT LUMBAR SPINE WO CONTRAST, 7/02/2021, 14:22.   [facility], CR, XR CHEST 1 VW, 8/07/2021, 9:18.   [facility], CR, XR CHEST 1 VW, 8/11/2021, 7:58.   [facility], CT, ABDOMEN/PELVIS WITH CONTRAST, 7/16/2020, 13:12.  INDICATIONS: MULTIPLE LUNG INFILTRATES; ? RIGHT PLEURAL EFFUSION; SHORTNESS OF BREATH; COVID-19 POSITIVE.  TECHNIQUE: 587 CT images were created without the administration of contrast material.   PROTOCOL:   Standard imaging protocol performed     RADIATION:   DLP: 226mGy*cm   Automated exposure control was utilized to minimize radiation dose.  FINDINGS: The study is abnormal.  Diffuse bilateral ground-glass pulmonary opacities are seen.  Many areas are confluent.  The findings are most consistent with infectious multifocal pneumonia, including COVID-19 pneumonia.  The central tracheobronchial tree is well aerated.  No pleural effusion is seen.  No pericardial effusion.  There may be mild cardiomegaly.  Dense calcification involves the mitral annulus.  There are coronary artery calcifications.  Atherosclerotic changes are seen elsewhere.  The liver has a cirrhotic appearance.  There is suspected portal hypertension and portosystemic venous shunting.  Splenomegaly is seen.  There is a small to moderate amount of ascites in the upper abdomen, which is partially imaged.  The patient has undergone cholecystectomy.  Probably no acute pancreatitis is seen.  A fluid-filled mildly dilated esophagus is present, which may be related to esophageal dysmotility or gastroesophageal reflux disease (GERD).  A tiny hiatal hernia is seen.  Probably no adrenal mass is identified.  No pneumothorax or pneumomediastinum is suggested.  Degenerative changes involve the imaged shoulders.  Extensive ossification of the anterior longitudinal ligament is seen and may represent diffuse idiopathic skeletal hyperostosis (DISH).  No acute fracture.  No aggressive osseous lesion is seen.  CONCLUSION:   1. Diffuse bilateral ground-glass pulmonary opacities are seen.  Many areas are confluent.  The findings are consistent with infectious multifocal pneumonia, such as that associated with COVID-19 infection.   2. No pleural effusion is seen.   3. Please see above comments for further detail.      PHANI BAKER JR, MD       Electronically Signed and Approved By: PHANI BAKER JR, MD on 8/11/2021 at 21:20             CT Chest With Contrast Diagnostic    Result Date: 8/20/2021  Narrative:  PROCEDURE: CT CHEST W CONTRAST DIAGNOSTIC  COMPARISON:  [facility], CT, ABDOMEN/PELVIS WITH CONTRAST, 7/16/2020, 13:12.  [facility], CR, XR CHEST 2 VW, 7/30/2021, 16:59.  [facility], CT, CT CHEST WO CONTRAST DIAGNOSTIC, 8/11/2021, 18:29. INDICATIONS: Hypoxia, covid  PROTOCOL:   Pulmonary embolism imaging protocol performed    RADIATION:   DLP: 1438mGy*cm   Automated exposure control was utilized to minimize radiation dose.  TECHNIQUE: Axial images of the chest with intravenous contrast.  FINDINGS: No central pulmonary emboli are identified.  There is poor opacification of the peripheral branches.  No evidence of pericardial effusion.  There is a small left pleural effusion.  No evidence of pneumothorax.  Coronary artery calcification is present.  There is calcification of the mitral annulus.  Mild aortic valvular calcification is present.  No evidence of mediastinal, axillary or hilar adenopathy.  Areas of ground-glass opacity in the lung fields have improved.  There are increasing predominantly peripheral areas of consolidation.  There is moderate ascites in the upper abdomen.  The liver is cirrhotic with splenomegaly.  IMPRESSION:  1. No central pulmonary emboli are identified.  2. Increasing predominantly peripheral areas of airspace consolidation in the lung fields consistent with the patient's history of COVID-19 infection.  3. Small left pleural effusion.  4. Cirrhosis.  Ascites.  Splenomegaly.    LORRIE ABRAMS MD       Electronically Signed and Approved By: LORRIE ABRAMS MD on 8/20/2021 at 12:48             MRI Brain Without Contrast    Result Date: 8/23/2021  Narrative: PROCEDURE: MRI BRAIN WO CONTRAST  COMPARISON: None  INDICATIONS: increased confusion  TECHNIQUE: A variety of imaging planes and parameters were utilized for visualization of suspected pathology.  Images were performed without contrast.  FINDINGS:  There is mild diffuse generalized atrophy.  There are areas  of increased T2 and FLAIR signal scattered through the bilateral periventricular and subcortical white matter consistent with chronic microvascular ischemia.  There is no mass, mass effect or midline shift.  There are no abnormal extra-axial fluid collections.  The major intracranial flow voids are preserved.  The diffusion-weighted sequences are normal.  Sinuses are clear.  CONCLUSION: Mild generalized atrophy and chronic microvascular ischemia with no convincing acute intracranial process.      UMM DUNN MD       Electronically Signed and Approved By: UMM DUNN MD on 8/23/2021 at 11:17             CT Abdomen Pelvis With Contrast    Result Date: 8/26/2021  Narrative: PROCEDURE: CT ABDOMEN PELVIS W CONTRAST  COMPARISON: [facility], CT, ABDOMEN/PELVIS WITH CONTRAST, 7/16/2020, 13:12.  [facility], CT, CT CHEST W CONTRAST DIAGNOSTIC, 8/20/2021, 12:17.  INDICATIONS: Severe abdominal pain all over, bowel obstruction suspected  PROTOCOL:   Standard imaging protocol performed    RADIATION:      Automated exposure control was utilized to minimize radiation dose.  TECHNIQUE: Axial images of the abdomen and pelvis with intravenous and oral contrast.  ABDOMEN:  There are areas of airspace consolidation in a peripheral distribution in the lung bases consistent with the patient's history of COVID-19 infection.  There is a tiny left pleural effusion.  There is calcification of the mitral annulus.  Cirrhosis, splenomegaly and ascites are present.  Prior cholecystectomy.  The adrenal glands and kidneys are normal.  There is a small amount of peripancreatic fluid.  Portal hypertension is present.  There is a portosystemic shunt into the right gonadal vein as seen on the prior examination.  PELVIS:  There is prior hysterectomy.  The abdominal aorta has a normal caliber.  There is a fat containing umbilical hernia.  Mild colonic distention without evidence of abnormal bowel wall thickening suggesting  an ileus.  No convincing evidence of bowel obstruction.  No evidence of perforation or abscess.  Degenerative changes are present in the lumbar spine.  IMPRESSION:  1. Cirrhosis.  Splenomegaly.  Portal hypertension.  Ascites.  2. Airspace consolidation in the lung bases consistent with the patient's history of COVID-19 infection.  Tiny left pleural effusion.  3. Small amount of peripancreatic fluid.  Suggest correlation with any history of pancreatitis.  4. Findings suggest adynamic ileus.  No convincing evidence of bowel obstruction.   LORRIE ABRAMS MD       Electronically Signed and Approved By: LORRIE ABRAMS MD on 8/26/2021 at 17:25             XR Chest 1 View    Result Date: 8/18/2021  Narrative: PROCEDURE: XR CHEST 1 VW  COMPARISON: [facility], CR, XR CHEST 1 VW, 8/13/2021, 15:12.  INDICATIONS: resp failure, persistent  FINDINGS:  Heart size is unchanged.  Multifocal patchy airspace opacity is very similar to the previous study, slightly progressed in the upper right chest.  No pneumothorax.  CONCLUSION: Diffuse pulmonary opacities, slightly progressed in the upper right chest.       BESSIE PALMER MD       Electronically Signed and Approved By: BESSIE PALMER MD on 8/18/2021 at 14:25             XR Chest 1 View    Result Date: 8/13/2021  Narrative: PROCEDURE: XR CHEST 1 VW  COMPARISON: [facility], CT, CT CHEST WO CONTRAST DIAGNOSTIC, 8/11/2021, 18:29.  [facility], CR, XR CHEST 1 VW, 8/11/2021, 7:58.  INDICATIONS: CHEST PAIN  FINDINGS:   The lungs are well-expanded. The heart and pulmonary vasculature are within normal limits. No pleural effusions are identified. There is improving diffuse airspace opacity throughout the lung fields.  No evidence of pneumothorax.  IMPRESSION:  Improving diffuse airspace opacity throughout the lung hurd.  LORRIE ABRAMS MD       Electronically Signed and Approved By: LORRIE ABRAMS MD on 8/13/2021 at 15:29             XR Chest 1  View    Result Date: 8/11/2021  Narrative: PROCEDURE: XR CHEST 1 VW  COMPARISON: [facility], , XR CHEST 1 VW, 8/07/2021, 9:18.  INDICATIONS: hypoxia, covid  FINDINGS:  The heart is magnified.  There are diffuse bilateral infiltrates without improvement.  A right pleural effusion not entirely excluded.  CONCLUSION:  1. No improvement in the bilateral infiltrates. 2. Density right basilar area which seems increased that might relate to underlying pleural effusion.       BRITTNY LING MD       Electronically Signed and Approved By: BRITTNY LING MD on 8/11/2021 at 9:24             XR Chest 1 View    Result Date: 8/7/2021  Narrative: P the ROCEDURE: XR CHEST 1 VW  COMPARISON: [facility], , XR CHEST 1 VW, 7/30/2021, 20:58.  INDICATIONS: INCREASED SHORTNESS OF BREATH  FINDINGS:   Interval increase in diffuse patchy areas of airspace consolidation throughout the bilateral lung fields.  In the interval there has been interval worsening of airspace disease progressing to the upper lobes and apices.  There may be a small right pleural effusion.  The heart size appears stable.  Surrounding osseous structures are stable in appear intact  CONCLUSION:  1. Worsening diffuse bilateral airspace consolidation throughout the bilateral lung fields.  Worsening pneumonia/ARDS.       Sidney Kirkland M.D.       Electronically Signed and Approved By: Sidney Kirkland M.D. on 8/07/2021 at 9:56             XR Abdomen KUB    Result Date: 8/25/2021  Narrative: PROCEDURE: XR ABDOMEN KUB  COMPARISON: [facility], , ABDOMEN FLAT & UPRIGHT, 7/18/2012, 15:01.  INDICATIONS: MRI CLEARANCE.  FINDINGS: Two AP supine views of the abdomen and pelvis, which include much of the thorax, reveal bilateral infiltrates, which may represent infectious multifocal pneumonia, borderline cardiac enlargement, gas-distended bowel (which may represent a generalized adynamic ileus), pelvic vascular calcifications, external  artifacts, prior cholecystectomy, and no definite unintended retained foreign body.  The thoracic aorta is also atherosclerotic.  CONCLUSION:   1. Bilateral infiltrates are seen.  The findings may represent infectious multifocal pneumonia.   2. A generalized adynamic ileus is possible.   3. Prior cholecystectomy.  4. No definite retained foreign body is appreciated that would be a contraindication to MRI examination.      PHANI BAKER JR, MD       Electronically Signed and Approved By: PHANI BAKER JR, MD on 8/25/2021 at 20:54                 LAB RESULTS:      Lab 09/03/21  0604 09/02/21  0553 09/01/21  0618 08/31/21  0530 08/30/21  0724   WBC 2.55* 2.08* 2.66* 2.13* 2.33*   HEMOGLOBIN 9.4* 9.2* 8.7* 8.8* 9.1*   HEMATOCRIT 26.7* 26.7* 25.1* 25.3* 26.3*   PLATELETS 92* 60* 50* 36* 30*   NEUTROS ABS 1.00* 0.89* 1.44* 1.31* 1.68*   IMMATURE GRANS (ABS) 0.04 0.02 0.01 0.01 0.03   LYMPHS ABS 0.88 0.69* 0.66* 0.41* 0.31*   MONOS ABS 0.57 0.41 0.46 0.31 0.23   EOS ABS 0.05 0.06 0.08 0.09 0.07   MCV 89.0 90.2 88.7 88.8 90.1   PROTIME  --   --   --   --  12.5*   APTT  --   --   --   --  28.9         Lab 09/03/21  0604 09/02/21  0553 09/01/21  0618 08/31/21  0530 08/30/21  0724 08/29/21  0818 08/28/21  0550   SODIUM 131* 132* 134* 132* 134*   < > 134*   POTASSIUM 3.1* 3.6 3.5 3.2* 3.6   < > 3.6   CHLORIDE 93* 95* 99 98 98   < > 100   CO2 30.1* 31.2* 31.9* 30.7* 29.9*   < > 30.4*   ANION GAP 7.9 5.8 3.1* 3.3* 6.1   < > 3.6*   BUN 23 25* 26* 25* 28*   < > 24*   CREATININE 0.49* 0.55* 0.50* 0.42* 0.52*   < > 0.41*   GLUCOSE 301* 323* 70 108* 166*   < > 140*   CALCIUM 8.0* 8.2* 8.0* 8.0* 8.2*   < > 8.6   MAGNESIUM 1.6 1.6 1.6 1.8 1.7   < > 1.6   PHOSPHORUS  --   --   --  2.8  --   --  2.2*    < > = values in this interval not displayed.         Lab 09/03/21  0604 09/02/21  0553 09/01/21  0618 08/29/21  0818 08/28/21  0550   TOTAL PROTEIN 4.8* 4.9* 4.4* 5.2*  --    ALBUMIN 1.90* 1.80* 1.60* 1.90* 1.70*   GLOBULIN 2.9 3.1 2.8  3.3  --    ALT (SGPT) 54* 55* 53* 70*  --    AST (SGOT) 34* 38* 37* 61*  --    BILIRUBIN 2.5* 2.6* 2.3* 3.1*  --    ALK PHOS 130* 126* 110 121*  --          Lab 08/30/21  0724   PROTIME 12.5*   INR 1.17*             Lab 09/01/21  0618   IRON 83   IRON SATURATION 53*   TIBC 158*   TRANSFERRIN 106*         Brief Urine Lab Results  (Last result in the past 365 days)      Color   Clarity   Blood   Leuk Est   Nitrite   Protein   CREAT   Urine HCG        08/19/21 0952 Yellow Clear Moderate (2+) Trace Negative 100 mg/dL (2+)             Microbiology Results (last 10 days)     ** No results found for the last 240 hours. **               Results for orders placed during the hospital encounter of 07/30/21    Duplex Venous Lower Extremity - Bilateral CV-READ    Interpretation Summary  · Normal bilateral lower extremity venous duplex scan.      Results for orders placed during the hospital encounter of 07/30/21    Duplex Venous Lower Extremity - Bilateral CV-READ    Interpretation Summary  · Normal bilateral lower extremity venous duplex scan.      Results for orders placed during the hospital encounter of 07/21/20    Adult transthoracic echo complete    Interpretation Summary  · Left ventricular systolic function is normal. Calculated EF = 69.0%. Estimated EF was in agreement with the calculated EF. Normal left ventricular cavity size noted. All left ventricular wall segments contract normally. Left ventricular wall thickness is consistent with mild-to-moderate concentric hypertrophy. Left ventricular diastolic dysfunction is noted (grade I a w/high LAP) consistent with impaired relaxation.  · There is severe calcification of the aortic valve.No significant aortic valve regurgitation is present. Unable to assess aortic valve stenosis. Given the severity of calcification and restriction of leaflet excursion Doppler seems inadequate and underestimates valve area.  · Severe MAC is present. Trace mitral valve regurgitation is  present. Mild mitral valve stenosis is present.  · Moderate patent foramen ovale present. Saline test results are positive.      Labs Pending at Discharge:        Time spent on Discharge including face to face service: 32 minutes    Electronically signed by Ivan Jarrett DO, 09/03/21, 11:19 AM EDT.

## 2021-09-03 NOTE — PLAN OF CARE
Goal Outcome Evaluation:  Plan of Care Reviewed With: patient        Progress: improving  Outcome Summary: VSS on 2L NC. [facility]. No significant changes throughout shift. Anupama Smith RN

## 2021-09-13 NOTE — OUTREACH NOTE
Prep Survey      Responses   [facility] facility patient discharged from?  Non-BH   Is LACE score < 7 ?  Non-BH Discharge   Emergency Room discharge w/ pulse ox?  No   Eligibility  Count includes the [facility]   Date of Admission  09/03/21   Date of Discharge  09/15/21   Discharge diagnosis  covid 19   Does the patient have one of the following disease processes/diagnoses(primary or secondary)?  COVID-19   Prep survey completed?  Yes          Cristin De Los Santos RN

## 2021-09-13 NOTE — TELEPHONE ENCOUNTER
Caller: EDWARD OCAMPO [facility] [place]     Relationship to patient: UNIT SECRETARY     Best call back number:[phone]    New or established patient?  [] New  [x] Established    Date of discharge: 09/15/2021    Facility discharged from: [facility]     Diagnosis/Symptoms: COVID 19    Length of stay (If applicable): ADMITTED ON September 03, 2021 FOR RECOVERY     Specialty Only: Did you see a [facility] provider?    [] Yes  [x] No  If so, who?

## 2021-09-16 NOTE — OUTREACH NOTE
Call Center TCM Note      Responses   [facility] patient discharged from?  Non-   Does the patient have one of the following disease processes/diagnoses(primary or secondary)?  COVID-19   TCM attempt successful?  Yes   Call start time  1231   Call end time  1246   Discharge diagnosis  covid 19   Meds reviewed with patient/caregiver?  Yes   Is the patient having any side effects they believe may be caused by any medication additions or changes?  No   Does the patient have all medications ordered at discharge?  Yes   Is the patient taking all medications as directed (includes completed medication regime)?  Yes   Medication comments  There was some issues with medications at discharge but  has got things straightened out   Does the patient have a primary care provider?   Yes   Does the patient have an appointment with their PCP within 7 days of discharge?  Yes   Comments regarding PCP  Hospital D/C follow-up scheduled for 9/20/21@400pm-confirmed   Has the patient kept scheduled appointments due by today?  N/A   What is the Home health agency?   [facility]   Has home health visited the patient within 72 hours of discharge?  Yes   Home health comments  HH has visited once and will visit again today to place a catheter   What DME was ordered?  Oxygen at 2lpm   Has all DME been delivered?  Yes   Psychosocial issues?  No   Did the patient receive a copy of their discharge instructions?  Yes   Nursing interventions  Reviewed instructions with patient   What is the patient's perception of their health status since discharge?  Improving [Pt glad to be home after extended hospitalization/rehab.  Her concern today is due to urinary retention and some right bladder pain.  She was urinating w/o incident in rehab and initally ok at home but now has issues.  HH to assess today and place a cath]   Is the patient/caregiver able to teach back signs and symptoms related to disease process for when to call PCP?  Yes   Is  the patient/caregiver able to teach back signs and symptoms related to disease process for when to call 911?  Yes   Is the patient/caregiver able to teach back the hierarchy of who to call/visit for symptoms/problems? PCP, Specialist, Home health nurse, Urgent Care, ED, 911  Yes [Pt is aware of concerning s/s and when to call MD and HH agency---she reports she called HH as she did not want her urinary retention to lead to UTI.]   Additional teach back comments  Patient reports sats will drop to 88% while on 2lpm w/exertion but quickly increase to 95% after momentary rest.  She is aware of need to rest/recover when needed and if drop below 88% and stays despite intervention to seek urgent care.    TCM call completed?  Yes          Clarita Moise RN    9/16/2021, 12:46 EDT

## 2021-09-16 NOTE — TELEPHONE ENCOUNTER
Caller: Noemi Rasheed    Relationship to patient: Self    Best call back number: [phone]    Patient is needing: TO COME IN AND SEE DOCTOR IF POSSIBLE TO HAVE A CATHETER BECAUSE SHE IS HAVING ISSUES WITH BLADDER AFTER COVID.

## 2021-09-17 NOTE — TELEPHONE ENCOUNTER
Called pt lm on vm that Dr Patel is out due to exposure and will not be back until Thursday if she is having trouble now she will need to go to urgent care or ER for relief , unable to make appt for today due to providers out

## 2021-09-19 NOTE — ED NOTES
Bladder scan performed w/ 56 ml noted. Urine specimen obtained from antonio tubing not drainage bag. Antonio catheter flushed after urine specimen obtained. Patient to bedside drainage bag.     Fauzia Lewis RN  09/19/21 035

## 2021-09-19 NOTE — DISCHARGE INSTRUCTIONS
Rest at home.  Drink plenty of fluids.  You need to drink enough fluids that your urine is very pale.  Take the antibiotics as prescribed.  Continue your other home medications as previously described instructed.  Follow-up with your family doctor at the end of this week.  Return to the ER for development of fever greater than 101, vomiting, or any other concerns issues that may arise.

## 2021-09-19 NOTE — ED PROVIDER NOTES
Subjective   Patient is a 66-yo female who presents to the ED via private vehicle with c/o difficulty urinating. Pt had a antonio catheter placed on Thursday and has only had 250cc of output since 1400 yesterday.     PCP- Navya Patel MD        Difficulty Urinating  Pain severity:  No pain  Onset quality:  Gradual  Duration:  14 hours  Timing:  Constant  Progression:  Worsening  Chronicity:  New  Recent urinary tract infections: no    Relieved by:  None tried  Worsened by:  Nothing  Ineffective treatments:  None tried  Associated symptoms: no fever and no vomiting        Review of Systems   Constitutional: Negative for chills and fever.   HENT: Negative for nosebleeds.    Eyes: Negative for redness.   Respiratory: Negative for cough and shortness of breath.    Cardiovascular: Negative for chest pain.   Gastrointestinal: Negative for diarrhea and vomiting.   Genitourinary: Positive for difficulty urinating. Negative for dysuria and frequency.   Musculoskeletal: Negative for back pain and neck pain.   Skin: Negative for rash.   Neurological: Negative for seizures and syncope.   All other systems reviewed and are negative.      Past Medical History:   Diagnosis Date   • Aftercare following right knee joint replacement surgery 05/31/2018   • Asthma    • COPD (chronic obstructive pulmonary disease) (CMS/HCC)    • Diabetes mellitus (CMS/HCC)    • Enlarged liver    • Frequency of urination 03/22/2016   • Glucosuria 03/22/2016   • Hyperlipidemia 07/18/2019   • Hypertension    • Limb swelling    • Liver cirrhosis secondary to BARRIENTOS (nonalcoholic steatohepatitis) (CMS/HCC)    • Local edema 07/18/2019   • Lower back pain    • Nocturia 03/22/2016   • Osteoarthritis of right knee 01/23/2018   • Right knee injury 10/15/2018   • Right knee pain 03/14/2018   • SOB (shortness of breath)    • Stomach disorder    • Stroke (CMS/HCC)    • Urge incontinence of urine 03/22/2016   • Vitamin D deficiency 07/18/2019       No Known  Allergies    Past Surgical History:   Procedure Laterality Date   • APPENDECTOMY     • CARDIAC CATHETERIZATION  2015   •  SECTION     • CHOLECYSTECTOMY     • COLONOSCOPY      , ,   • ENDOSCOPY      , ,    • HYSTERECTOMY     • JOINT REPLACEMENT Bilateral     KNEE       Family History   Problem Relation Age of Onset   • Diabetes Mother    • Osteoporosis Mother    • Arthritis Mother    • Cancer Father    • Heart attack Father    • Arthritis Father    • Cancer Sister         KIDNEY & BLADDER   • Diabetes Sister    • Arthritis Sister    • Lung cancer Brother    • Diabetes Daughter        Social History     Socioeconomic History   • Marital status:      Spouse name: Not on file   • Number of children: Not on file   • Years of education: Not on file   • Highest education level: Not on file   Tobacco Use   • Smoking status: Former Smoker     Packs/day: 3.00     Years: 35.00     Pack years: 105.00     Types: Cigarettes     Quit date: 2000     Years since quittin.1   • Smokeless tobacco: Never Used   Vaping Use   • Vaping Use: Never used   Substance and Sexual Activity   • Alcohol use: Never   • Drug use: Not Currently   • Sexual activity: Defer     Birth control/protection: None         Objective   Physical Exam  Vitals and nursing note reviewed.   Constitutional:       General: She is awake. She is not in acute distress.     Appearance: Normal appearance. She is not toxic-appearing.   HENT:      Head: Normocephalic and atraumatic.      Nose: Nose normal.      Mouth/Throat:      Mouth: Mucous membranes are moist.   Eyes:      General: Lids are normal. No scleral icterus.     Conjunctiva/sclera: Conjunctivae normal.   Cardiovascular:      Rate and Rhythm: Normal rate and regular rhythm.      Pulses: Normal pulses.      Heart sounds: Normal heart sounds. No murmur heard.     Pulmonary:      Effort: Pulmonary effort is normal. No respiratory distress.      Breath sounds:  Normal air entry. Examination of the right-upper field reveals wheezing. Examination of the left-upper field reveals wheezing. Examination of the right-middle field reveals wheezing. Examination of the left-middle field reveals wheezing. Examination of the right-lower field reveals wheezing. Examination of the left-lower field reveals wheezing. Wheezing (scattered) present.   Chest:      Chest wall: No tenderness.   Abdominal:      General: Bowel sounds are normal.      Palpations: Abdomen is soft. There is no pulsatile mass.      Tenderness: There is no abdominal tenderness. There is no guarding or rebound.      Comments: Very dark (leonidas colored) urine in catheter output bag with 250cc.    Musculoskeletal:         General: No tenderness. Normal range of motion.      Cervical back: Normal range of motion and neck supple. No tenderness.      Right lower leg: No edema.      Left lower leg: No edema.   Skin:     General: Skin is warm and dry.      Findings: No rash.   Neurological:      General: No focal deficit present.      Mental Status: She is alert and oriented to person, place, and time.      Sensory: Sensation is intact. No sensory deficit.      Motor: Motor function is intact. No weakness.   Psychiatric:         Behavior: Behavior normal.         Procedures         ED Course        The patient was seen and evaluated here in the ED.  The the above history and physical examination was performed as stated.  The diagnostic data was obtained peer results reviewed.  Patient was given a liter of IV fluids.  Patient appears to have developing urinary tract infection.  Patient was given dose of IV Rocephin.  Patient's work-up with is otherwise unremarkable.  Patient for discharge home with outpatient treatment follow-up.  I will put her on some antibiotics outpatient wise.  Patient's aware history of plan agreeable to such.  Patient agrees to push more fluids at home.                                    [facility]    Final  diagnoses:   Acute UTI   Dehydration       Documentation assistance provided by gina Najera.  Information recorded by the scribe was done at my direction and has been verified and validated by me.     Fabienne Najear  09/19/21 8433       Carlton Martines DO  09/19/21 9453

## 2021-09-19 NOTE — ED NOTES
To ER 26 from triage w/ c/o decreased urine output. Reports that she has only had 250ml output via antonio catheter since 1400 on 9-18-21.     Fauzia Lewis RN  09/19/21 0354

## 2021-09-21 PROBLEM — Z87.01 HISTORY OF PNEUMONIA DUE TO PSEUDOMONAS: Status: ACTIVE | Noted: 2021-01-01

## 2021-09-21 PROBLEM — I21.4 NSTEMI (NON-ST ELEVATED MYOCARDIAL INFARCTION): Status: ACTIVE | Noted: 2021-01-01

## 2021-09-21 PROBLEM — J96.21 ACUTE AND CHRONIC RESPIRATORY FAILURE WITH HYPOXIA: Status: ACTIVE | Noted: 2021-01-01

## 2021-09-21 PROBLEM — E11.65 TYPE 2 DIABETES MELLITUS WITH HYPERGLYCEMIA (HCC): Status: ACTIVE | Noted: 2021-01-01

## 2021-09-21 PROBLEM — E87.1 HYPONATREMIA: Status: ACTIVE | Noted: 2021-01-01

## 2021-09-21 PROBLEM — E66.9 OBESITY (BMI 30-39.9): Status: ACTIVE | Noted: 2021-01-01

## 2021-09-21 PROBLEM — J18.9 MULTIFOCAL PNEUMONIA: Status: ACTIVE | Noted: 2021-01-01
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
## 2021-09-22 PROBLEM — J18.9 PNEUMONIA: Status: ACTIVE | Noted: 2021-01-01

## 2021-09-23 PROBLEM — E43 SEVERE MALNUTRITION: Status: ACTIVE | Noted: 2021-01-01

## 2021-09-23 NOTE — TELEPHONE ENCOUNTER
----- Message from Keegan Dean MD sent at 9/22/2021 12:43 PM EDT -----  Regarding: RE: APPT  Next couple weeks  ----- Message -----  From: Kylah Hernandez  Sent: 9/22/2021   9:40 AM EDT  To: Keegan Dean MD  Subject: FW: APPT                                         When would you like to see this patient?  ----- Message -----  From: Everardo Garcia RegSched Rep  Sent: 9/21/2021   3:59 PM EDT  To: Kylah Hernandez  Subject: APPT                                             JITENDRA DUCKWORTH REF TO DR DEAN FOR GROSS HEMATURIA, PLEASE ADVISE FOR AN APPT.

## 2021-09-23 NOTE — TELEPHONE ENCOUNTER
Left patient a voice mail to call me back.  Wanted to schedule her for a visit on 9-29-21 at 0815.  Please see if that is ok.

## 2021-09-24 PROBLEM — I21.4 NSTEMI (NON-ST ELEVATED MYOCARDIAL INFARCTION) (HCC): Status: RESOLVED | Noted: 2021-01-01 | Resolved: 2021-01-01

## 2021-09-24 PROBLEM — I46.9 PEA (PULSELESS ELECTRICAL ACTIVITY) (HCC): Status: ACTIVE | Noted: 2021-01-01

## 2021-09-24 PROBLEM — R77.8 ELEVATED TROPONIN: Status: ACTIVE | Noted: 2021-01-01

## 2021-09-24 PROBLEM — R79.89 ELEVATED TROPONIN: Status: ACTIVE | Noted: 2021-01-01

## 2021-10-02 LAB
A FLAVUS AB SER QL ID: NEGATIVE
A FUMIGATUS AB SER QL ID: NEGATIVE
A NIGER AB SER QL ID: NEGATIVE
BACTERIA SPEC AEROBE CULT: NORMAL
BH BB BLOOD EXPIRATION DATE: NORMAL
BH BB BLOOD TYPE BARCODE: 5100
BH BB DISPENSE STATUS: NORMAL
BH BB PRODUCT CODE: NORMAL
BH BB UNIT NUMBER: NORMAL
CROSSMATCH INTERPRETATION: NORMAL
UNIT  ABO: NORMAL
UNIT  RH: NORMAL

## 2021-10-03 LAB
BACTERIA SPEC AEROBE CULT: NORMAL
BACTERIA SPEC AEROBE CULT: NORMAL
GALACTOMANNAN AG SPEC IA-ACNC: 0.05 INDEX (ref 0–0.49)

## 2021-10-04 LAB — 1,3 BETA GLUCAN SER-MCNC: 243 PG/ML

## 2021-10-27 LAB — FUNGUS WND CULT: NORMAL

## 2024-01-01 NOTE — PROGRESS NOTES
Discharge Planning Assessment  [facility]     Patient Name: Noemi Rasheed  MRN: [id number]  Today's Date: 2020    Admit Date: 2020    Discharge Needs Assessment     Row Name 20 1105       Living Environment    Lives With  spouse    Name(s) of Who Lives With Patient  Geovanny Rasheed  [phone]    Current Living Arrangements  home/apartment/condo    Primary Care Provided by  self    Provides Primary Care For  spouse    Family Caregiver if Needed  spouse    Family Caregiver Names  Geovanny Rasheed  [phone]    Quality of Family Relationships  supportive    Able to Return to Prior Arrangements  yes       Resource/Environmental Concerns    Resource/Environmental Concerns  none    Transportation Concerns  car, none       Transition Planning    Patient/Family Anticipates Transition to  home with family    Patient/Family Anticipated Services at Transition      Transportation Anticipated  family or friend will provide       Discharge Needs Assessment    Readmission Within the Last 30 Days  no previous admission in last 30 days    Concerns to be Addressed  discharge planning    Provided Post Acute Provider List?  N/A    [facility]        Discharge Plan     Row Name 20 0922       Plan    Plan  Home with family     Plan Comments  Met with patient at bedside. Patient was tearful, as her brother just passed away on  and his  is this Friday.  Patient states that in recent weeks she has not taken good care of herself, working full time at [employer] then going to help sister-in-law with the care of her brother.  Face sheet updated accordingly.  Patient uses the [facility] at [employer], she has no difficulty affording or taking her medications. Declined meds to beds.  Patient has CPAP, nebulizer and states she has a glucometer at home. Discharge plans discussed, plan is to return home with family support.        Destination       Coordination has not been started for this encounter.      Durable Medical Equipment      Coordination has not been started for this encounter.      Dialysis/Infusion      Coordination has not been started for this encounter.      Home Medical Care      Coordination has not been started for this encounter.      Therapy      Coordination has not been started for this encounter.      Community Resources      Coordination has not been started for this encounter.          Demographic Summary     Row Name 07/22/20 1107       General Information    Admission Type  inpatient    Arrived From  emergency department    Required Notices Provided  Important Message from Medicare    Referral Source  admission list    Reason for Consult  discharge planning    Preferred Language  English     Used During This Interaction  yes       Contact Information    Permission Granted to Share Info With  family/designee Geovanny Rasheed  [phone]        Functional Status     Row Name 07/22/20 1107       Functional Status    Usual Activity Tolerance  moderate    Current Activity Tolerance  moderate       Functional Status, IADL    Medications  independent    Meal Preparation  independent    Housekeeping  independent    Laundry  independent    Shopping  independent       Mental Status    General Appearance WDL  WDL       Mental Status Summary    Recent Changes in Mental Status/Cognitive Functioning  no changes       Employment/    Employment Status  employed full time        Psychosocial    No documentation.       Abuse/Neglect    No documentation.       Legal    No documentation.       Substance Abuse    No documentation.       Patient Forms    No documentation.           Nathalie Huntley RN     Adequate: hears normal conversation without difficulty